# Patient Record
Sex: MALE | Race: WHITE | Employment: OTHER | URBAN - METROPOLITAN AREA
[De-identification: names, ages, dates, MRNs, and addresses within clinical notes are randomized per-mention and may not be internally consistent; named-entity substitution may affect disease eponyms.]

---

## 2017-08-21 ENCOUNTER — HOSPITAL ENCOUNTER (EMERGENCY)
Age: 82
Discharge: HOME | End: 2017-08-21
Attending: EMERGENCY MEDICINE
Payer: MEDICARE

## 2017-08-21 VITALS
WEIGHT: 178 LBS | TEMPERATURE: 97.9 F | SYSTOLIC BLOOD PRESSURE: 96 MMHG | RESPIRATION RATE: 18 BRPM | BODY MASS INDEX: 24.92 KG/M2 | DIASTOLIC BLOOD PRESSURE: 43 MMHG | OXYGEN SATURATION: 95 % | HEART RATE: 65 BPM | HEIGHT: 71 IN

## 2017-08-21 DIAGNOSIS — K92.2 GASTROINTESTINAL HEMORRHAGE, UNSPECIFIED GASTROINTESTINAL HEMORRHAGE TYPE: Primary | ICD-10-CM

## 2017-08-21 LAB
ALBUMIN SERPL-MCNC: 4 G/DL (ref 3.2–4.6)
ALBUMIN/GLOB SERPL: 1.2 {RATIO} (ref 1.2–3.5)
ALP SERPL-CCNC: 64 U/L (ref 50–136)
ALT SERPL-CCNC: 21 U/L (ref 12–65)
ANION GAP SERPL CALC-SCNC: 8 MMOL/L (ref 7–16)
AST SERPL-CCNC: 27 U/L (ref 15–37)
BASOPHILS # BLD: 0 K/UL (ref 0–0.2)
BASOPHILS NFR BLD: 1 % (ref 0–2)
BILIRUB SERPL-MCNC: 0.5 MG/DL (ref 0.2–1.1)
BUN SERPL-MCNC: 33 MG/DL (ref 8–23)
CALCIUM SERPL-MCNC: 8.9 MG/DL (ref 8.3–10.4)
CHLORIDE SERPL-SCNC: 106 MMOL/L (ref 98–107)
CO2 SERPL-SCNC: 30 MMOL/L (ref 21–32)
CREAT SERPL-MCNC: 1.25 MG/DL (ref 0.8–1.5)
DIFFERENTIAL METHOD BLD: ABNORMAL
EOSINOPHIL # BLD: 0.1 K/UL (ref 0–0.8)
EOSINOPHIL NFR BLD: 2 % (ref 0.5–7.8)
ERYTHROCYTE [DISTWIDTH] IN BLOOD BY AUTOMATED COUNT: 13.7 % (ref 11.9–14.6)
GLOBULIN SER CALC-MCNC: 3.4 G/DL (ref 2.3–3.5)
GLUCOSE SERPL-MCNC: 100 MG/DL (ref 65–100)
HCT VFR BLD AUTO: 37.2 % (ref 41.1–50.3)
HGB BLD-MCNC: 12.2 G/DL (ref 13.6–17.2)
IMM GRANULOCYTES # BLD: 0 K/UL (ref 0–0.5)
IMM GRANULOCYTES NFR BLD: 0.2 % (ref 0–5)
LYMPHOCYTES # BLD: 1.8 K/UL (ref 0.5–4.6)
LYMPHOCYTES NFR BLD: 28 % (ref 13–44)
MCH RBC QN AUTO: 31.3 PG (ref 26.1–32.9)
MCHC RBC AUTO-ENTMCNC: 32.8 G/DL (ref 31.4–35)
MCV RBC AUTO: 95.4 FL (ref 79.6–97.8)
MONOCYTES # BLD: 0.6 K/UL (ref 0.1–1.3)
MONOCYTES NFR BLD: 10 % (ref 4–12)
NEUTS SEG # BLD: 3.7 K/UL (ref 1.7–8.2)
NEUTS SEG NFR BLD: 59 % (ref 43–78)
PLATELET # BLD AUTO: 210 K/UL (ref 150–450)
PMV BLD AUTO: 10.2 FL (ref 10.8–14.1)
POTASSIUM SERPL-SCNC: 3.8 MMOL/L (ref 3.5–5.1)
PROT SERPL-MCNC: 7.4 G/DL (ref 6.3–8.2)
RBC # BLD AUTO: 3.9 M/UL (ref 4.23–5.67)
SODIUM SERPL-SCNC: 144 MMOL/L (ref 136–145)
WBC # BLD AUTO: 6.3 K/UL (ref 4.3–11.1)

## 2017-08-21 PROCEDURE — 99284 EMERGENCY DEPT VISIT MOD MDM: CPT | Performed by: EMERGENCY MEDICINE

## 2017-08-21 PROCEDURE — 80053 COMPREHEN METABOLIC PANEL: CPT | Performed by: EMERGENCY MEDICINE

## 2017-08-21 PROCEDURE — 85025 COMPLETE CBC W/AUTO DIFF WBC: CPT | Performed by: EMERGENCY MEDICINE

## 2017-08-21 NOTE — ED PROVIDER NOTES
HPI Comments: Patient is an 27-year-old male that states 2 hours ago he had a dark red loose stool. He then had a second bloody bowel movement. He denies abdominal pain. He denies chest pain or dyspnea. He states that about 2 or 3 years ago he had a similar episode and was hospitalized in [place]. He was diagnosed with diverticulitis at that time and had to receive a blood transfusion. He has had no other recurrence of the symptoms. Patient is a 80 y.o. male presenting with anal bleeding. The history is provided by the patient and the spouse. Rectal Bleeding    This is a new problem. The current episode started 1 to 2 hours ago. The stool is described as loose. Pertinent negatives include no abdominal pain, no chills, no fever, no nausea, no vomiting and no diarrhea. He has tried nothing for the symptoms. Past Medical History:   Diagnosis Date    Gastrointestinal disorder        Past Surgical History:   Procedure Laterality Date    HX APPENDECTOMY      HX GI           History reviewed. No pertinent family history. Social History     Social History    Marital status:      Spouse name: N/A    Number of children: N/A    Years of education: N/A     Occupational History    Not on file. Social History Main Topics    Smoking status: Never Smoker    Smokeless tobacco: Never Used    Alcohol use No    Drug use: No    Sexual activity: Not on file     Other Topics Concern    Not on file     Social History Narrative    No narrative on file         ALLERGIES: Review of patient's allergies indicates no known allergies. Review of Systems   Constitutional: Negative for chills and fever. HENT: Negative. Eyes: Negative. Respiratory: Negative. Cardiovascular: Negative. Gastrointestinal: Positive for anal bleeding. Negative for abdominal pain, diarrhea, nausea and vomiting. Endocrine: Negative. Genitourinary: Negative. Musculoskeletal: Negative.     Neurological: Negative. Vitals:    08/21/17 1416 08/21/17 1452   BP: 139/60    Pulse: 65    Resp: 18    Temp: 97.9 °F (36.6 °C)    SpO2: 93% 93%   Weight: 80.7 kg (178 lb)    Height: 5' 11\" (1.803 m)             Physical Exam   Constitutional: He is oriented to person, place, and time. He appears well-developed and well-nourished. HENT:   Head: Normocephalic and atraumatic. Cardiovascular: Normal rate, regular rhythm and intact distal pulses. Pulmonary/Chest: Effort normal and breath sounds normal.   Abdominal: Soft. There is no tenderness. There is no rebound and no guarding. Genitourinary: Rectum normal. Rectal exam shows guaiac negative stool. Genitourinary Comments: Round stool and no hemorrhoids   Neurological: He is alert and oriented to person, place, and time. He has normal strength. No cranial nerve deficit or sensory deficit. GCS eye subscore is 4. GCS verbal subscore is 5. GCS motor subscore is 6. Skin: Skin is warm and dry. No rash noted. No pallor. Nursing note and vitals reviewed. MDM  Number of Diagnoses or Management Options  Diagnosis management comments: 59-year-old male presents with some rectal bleeding    Differential diagnosis includes hemorrhoids, lower GI bleeding, diverticulitis, colitis       Amount and/or Complexity of Data Reviewed  Clinical lab tests: ordered and reviewed  Independent visualization of images, tracings, or specimens: yes    Risk of Complications, Morbidity, and/or Mortality  Presenting problems: moderate  Diagnostic procedures: low  Management options: low    Patient Progress  Patient progress: stable    ED Course     4:06 PM  Hgb is 12.5, abdomen exam is completely benign. Vital signs are normal.  Patient does not want to be admitted to the hospital he is agreeable to an observation here in the emergency department for a few hours to make sure he has no further bleeding.   If he remains stable we will discharge him home and he will contact his doctor for follow-up tomorrow. 5:47 PM  He was observed for 3 hours in the emergency department and had no further bleeding. He is ambulatory with a normal blood pressure. He does not want to be admitted to the hospital.  This is all likely a diverticular bleed similar to what he had 3 years ago. Wife will be with him tonight and he will contact his primary care physician tomorrow for recheck. I advised him to return to the emergency department immediately for any new or worsening symptoms. Voice dictation software was used during the making of this note. This software is not perfect and grammatical and other typographical errors may be present. This note has been proofread, but may still contain errors.   Abdias Gomez MD; 8/21/2017 @5:47 PM   ===================================================================      Procedures

## 2017-08-23 ENCOUNTER — HOSPITAL ENCOUNTER (INPATIENT)
Age: 82
LOS: 1 days | Discharge: HOME HEALTH | DRG: 378 | End: 2017-08-25
Attending: EMERGENCY MEDICINE | Admitting: INTERNAL MEDICINE
Payer: MEDICARE

## 2017-08-23 ENCOUNTER — APPOINTMENT (OUTPATIENT)
Dept: CT IMAGING | Age: 82
DRG: 378 | End: 2017-08-23
Attending: EMERGENCY MEDICINE
Payer: MEDICARE

## 2017-08-23 ENCOUNTER — APPOINTMENT (OUTPATIENT)
Dept: GENERAL RADIOLOGY | Age: 82
DRG: 378 | End: 2017-08-23
Attending: EMERGENCY MEDICINE
Payer: MEDICARE

## 2017-08-23 ENCOUNTER — APPOINTMENT (OUTPATIENT)
Dept: ULTRASOUND IMAGING | Age: 82
DRG: 378 | End: 2017-08-23
Attending: INTERNAL MEDICINE
Payer: MEDICARE

## 2017-08-23 DIAGNOSIS — R09.02 HYPOXEMIA: ICD-10-CM

## 2017-08-23 DIAGNOSIS — R55 NEAR SYNCOPE: ICD-10-CM

## 2017-08-23 DIAGNOSIS — R11.2 NON-INTRACTABLE VOMITING WITH NAUSEA, UNSPECIFIED VOMITING TYPE: ICD-10-CM

## 2017-08-23 DIAGNOSIS — K92.2 ACUTE LOWER GI BLEEDING: Primary | ICD-10-CM

## 2017-08-23 DIAGNOSIS — R00.1 SINUS BRADYCARDIA: ICD-10-CM

## 2017-08-23 LAB
ALBUMIN SERPL-MCNC: 3.7 G/DL (ref 3.2–4.6)
ALBUMIN/GLOB SERPL: 1.1 {RATIO} (ref 1.2–3.5)
ALP SERPL-CCNC: 58 U/L (ref 50–136)
ALT SERPL-CCNC: 17 U/L (ref 12–65)
ANION GAP SERPL CALC-SCNC: 10 MMOL/L (ref 7–16)
ARTERIAL PATENCY WRIST A: POSITIVE
AST SERPL-CCNC: 23 U/L (ref 15–37)
ATRIAL RATE: 51 BPM
BACTERIA SPEC CULT: NORMAL
BASE DEFICIT BLDA-SCNC: 0.5 MMOL/L (ref 0–2)
BASOPHILS # BLD: 0 K/UL (ref 0–0.2)
BASOPHILS NFR BLD: 0 % (ref 0–2)
BDY SITE: ABNORMAL
BILIRUB SERPL-MCNC: 0.4 MG/DL (ref 0.2–1.1)
BNP SERPL-MCNC: 119 PG/ML
BUN SERPL-MCNC: 29 MG/DL (ref 8–23)
CALCIUM SERPL-MCNC: 8.8 MG/DL (ref 8.3–10.4)
CALCULATED P AXIS, ECG09: 68 DEGREES
CALCULATED R AXIS, ECG10: 58 DEGREES
CALCULATED T AXIS, ECG11: 68 DEGREES
CHLORIDE SERPL-SCNC: 104 MMOL/L (ref 98–107)
CK MB CFR SERPL CALC: 2.2 %
CK MB SERPL-MCNC: 2.2 NG/ML (ref 0.5–3.6)
CK SERPL-CCNC: 101 U/L (ref 21–215)
CO2 SERPL-SCNC: 27 MMOL/L (ref 21–32)
COHGB MFR BLD: 0.2 % (ref 0.5–1.5)
CREAT SERPL-MCNC: 1.04 MG/DL (ref 0.8–1.5)
D DIMER PPP FEU-MCNC: 0.25 UG/ML(FEU)
DIAGNOSIS, 93000: NORMAL
DIFFERENTIAL METHOD BLD: ABNORMAL
DO-HGB BLD-MCNC: 9 % (ref 0–5)
EOSINOPHIL # BLD: 0.1 K/UL (ref 0–0.8)
EOSINOPHIL NFR BLD: 1 % (ref 0.5–7.8)
ERYTHROCYTE [DISTWIDTH] IN BLOOD BY AUTOMATED COUNT: 13.6 % (ref 11.9–14.6)
GLOBULIN SER CALC-MCNC: 3.4 G/DL (ref 2.3–3.5)
GLUCOSE SERPL-MCNC: 110 MG/DL (ref 65–100)
HCO3 BLDA-SCNC: 25 MMOL/L (ref 22–26)
HCT VFR BLD AUTO: 32.2 % (ref 41.1–50.3)
HCT VFR BLD AUTO: 33.6 % (ref 41.1–50.3)
HGB BLD-MCNC: 10.6 G/DL (ref 13.6–17.2)
HGB BLD-MCNC: 11.2 G/DL (ref 13.6–17.2)
HGB BLDMV-MCNC: 11.9 GM/DL (ref 11.7–15)
IMM GRANULOCYTES # BLD: 0 K/UL (ref 0–0.5)
IMM GRANULOCYTES NFR BLD: 0.2 % (ref 0–5)
LYMPHOCYTES # BLD: 1 K/UL (ref 0.5–4.6)
LYMPHOCYTES NFR BLD: 15 % (ref 13–44)
MAGNESIUM SERPL-MCNC: 1.9 MG/DL (ref 1.8–2.4)
MCH RBC QN AUTO: 31.7 PG (ref 26.1–32.9)
MCHC RBC AUTO-ENTMCNC: 33.3 G/DL (ref 31.4–35)
MCV RBC AUTO: 95.2 FL (ref 79.6–97.8)
METHGB MFR BLD: 0.3 % (ref 0–1.5)
MONOCYTES # BLD: 0.4 K/UL (ref 0.1–1.3)
MONOCYTES NFR BLD: 6 % (ref 4–12)
NEUTS SEG # BLD: 5 K/UL (ref 1.7–8.2)
NEUTS SEG NFR BLD: 78 % (ref 43–78)
OXYHGB MFR BLDA: 91 % (ref 94–97)
P-R INTERVAL, ECG05: 158 MS
PCO2 BLDA: 43 MMHG (ref 35–45)
PH BLDA: 7.38 [PH] (ref 7.35–7.45)
PHOSPHATE SERPL-MCNC: 3.1 MG/DL (ref 2.3–3.7)
PLATELET # BLD AUTO: 201 K/UL (ref 150–450)
PMV BLD AUTO: 9.9 FL (ref 10.8–14.1)
PO2 BLDA: 63 MMHG (ref 80–105)
POTASSIUM SERPL-SCNC: 3.9 MMOL/L (ref 3.5–5.1)
PROT SERPL-MCNC: 7.1 G/DL (ref 6.3–8.2)
Q-T INTERVAL, ECG07: 454 MS
QRS DURATION, ECG06: 90 MS
QTC CALCULATION (BEZET), ECG08: 418 MS
RBC # BLD AUTO: 3.53 M/UL (ref 4.23–5.67)
SAO2 % BLD: 92 % (ref 92–98.5)
SERVICE CMNT-IMP: ABNORMAL
SERVICE CMNT-IMP: NORMAL
SODIUM SERPL-SCNC: 141 MMOL/L (ref 136–145)
TROPONIN I BLD-MCNC: 0 NG/ML (ref 0–0.08)
TROPONIN I SERPL-MCNC: <0.04 NG/ML (ref 0.02–0.05)
TSH SERPL DL<=0.005 MIU/L-ACNC: 1.92 UIU/ML (ref 0.36–3.74)
VENTILATION MODE VENT: ABNORMAL
VENTRICULAR RATE, ECG03: 51 BPM
WBC # BLD AUTO: 6.5 K/UL (ref 4.3–11.1)

## 2017-08-23 PROCEDURE — 84443 ASSAY THYROID STIM HORMONE: CPT | Performed by: EMERGENCY MEDICINE

## 2017-08-23 PROCEDURE — 99218 HC RM OBSERVATION: CPT

## 2017-08-23 PROCEDURE — 71260 CT THORAX DX C+: CPT

## 2017-08-23 PROCEDURE — 84484 ASSAY OF TROPONIN QUANT: CPT

## 2017-08-23 PROCEDURE — 84100 ASSAY OF PHOSPHORUS: CPT | Performed by: EMERGENCY MEDICINE

## 2017-08-23 PROCEDURE — 85025 COMPLETE CBC W/AUTO DIFF WBC: CPT | Performed by: EMERGENCY MEDICINE

## 2017-08-23 PROCEDURE — 93005 ELECTROCARDIOGRAM TRACING: CPT | Performed by: EMERGENCY MEDICINE

## 2017-08-23 PROCEDURE — 74011636320 HC RX REV CODE- 636/320: Performed by: EMERGENCY MEDICINE

## 2017-08-23 PROCEDURE — 85379 FIBRIN DEGRADATION QUANT: CPT | Performed by: EMERGENCY MEDICINE

## 2017-08-23 PROCEDURE — 85018 HEMOGLOBIN: CPT | Performed by: INTERNAL MEDICINE

## 2017-08-23 PROCEDURE — 82550 ASSAY OF CK (CPK): CPT | Performed by: INTERNAL MEDICINE

## 2017-08-23 PROCEDURE — 74011250636 HC RX REV CODE- 250/636: Performed by: EMERGENCY MEDICINE

## 2017-08-23 PROCEDURE — 82803 BLOOD GASES ANY COMBINATION: CPT

## 2017-08-23 PROCEDURE — 77030032490 HC SLV COMPR SCD KNE COVD -B

## 2017-08-23 PROCEDURE — 99285 EMERGENCY DEPT VISIT HI MDM: CPT | Performed by: EMERGENCY MEDICINE

## 2017-08-23 PROCEDURE — 87641 MR-STAPH DNA AMP PROBE: CPT | Performed by: INTERNAL MEDICINE

## 2017-08-23 PROCEDURE — 74011250637 HC RX REV CODE- 250/637: Performed by: INTERNAL MEDICINE

## 2017-08-23 PROCEDURE — 96374 THER/PROPH/DIAG INJ IV PUSH: CPT

## 2017-08-23 PROCEDURE — 36600 WITHDRAWAL OF ARTERIAL BLOOD: CPT

## 2017-08-23 PROCEDURE — C9113 INJ PANTOPRAZOLE SODIUM, VIA: HCPCS | Performed by: EMERGENCY MEDICINE

## 2017-08-23 PROCEDURE — 96361 HYDRATE IV INFUSION ADD-ON: CPT | Performed by: EMERGENCY MEDICINE

## 2017-08-23 PROCEDURE — 96374 THER/PROPH/DIAG INJ IV PUSH: CPT | Performed by: EMERGENCY MEDICINE

## 2017-08-23 PROCEDURE — 80053 COMPREHEN METABOLIC PANEL: CPT | Performed by: EMERGENCY MEDICINE

## 2017-08-23 PROCEDURE — 96375 TX/PRO/DX INJ NEW DRUG ADDON: CPT | Performed by: EMERGENCY MEDICINE

## 2017-08-23 PROCEDURE — 36415 COLL VENOUS BLD VENIPUNCTURE: CPT | Performed by: INTERNAL MEDICINE

## 2017-08-23 PROCEDURE — 71010 XR CHEST PORT: CPT

## 2017-08-23 PROCEDURE — 83880 ASSAY OF NATRIURETIC PEPTIDE: CPT | Performed by: EMERGENCY MEDICINE

## 2017-08-23 PROCEDURE — 74011250636 HC RX REV CODE- 250/636: Performed by: INTERNAL MEDICINE

## 2017-08-23 PROCEDURE — 74011000250 HC RX REV CODE- 250: Performed by: INTERNAL MEDICINE

## 2017-08-23 PROCEDURE — 93880 EXTRACRANIAL BILAT STUDY: CPT

## 2017-08-23 PROCEDURE — 74011000258 HC RX REV CODE- 258: Performed by: EMERGENCY MEDICINE

## 2017-08-23 PROCEDURE — C9113 INJ PANTOPRAZOLE SODIUM, VIA: HCPCS | Performed by: INTERNAL MEDICINE

## 2017-08-23 PROCEDURE — 83735 ASSAY OF MAGNESIUM: CPT | Performed by: EMERGENCY MEDICINE

## 2017-08-23 PROCEDURE — 74011000250 HC RX REV CODE- 250: Performed by: EMERGENCY MEDICINE

## 2017-08-23 RX ORDER — LAMOTRIGINE 200 MG/1
200 TABLET ORAL DAILY
COMMUNITY
End: 2021-01-27 | Stop reason: SDUPTHER

## 2017-08-23 RX ORDER — SODIUM CHLORIDE 0.9 % (FLUSH) 0.9 %
10 SYRINGE (ML) INJECTION
Status: COMPLETED | OUTPATIENT
Start: 2017-08-23 | End: 2017-08-23

## 2017-08-23 RX ORDER — HYDRALAZINE HYDROCHLORIDE 20 MG/ML
10 INJECTION INTRAMUSCULAR; INTRAVENOUS
Status: DISCONTINUED | OUTPATIENT
Start: 2017-08-23 | End: 2017-08-25 | Stop reason: HOSPADM

## 2017-08-23 RX ORDER — ATORVASTATIN CALCIUM 20 MG/1
20 TABLET, FILM COATED ORAL DAILY
COMMUNITY
End: 2017-11-22

## 2017-08-23 RX ORDER — LAMOTRIGINE 100 MG/1
200 TABLET ORAL DAILY
Status: DISCONTINUED | OUTPATIENT
Start: 2017-08-24 | End: 2017-08-25 | Stop reason: HOSPADM

## 2017-08-23 RX ORDER — ONDANSETRON 2 MG/ML
4 INJECTION INTRAMUSCULAR; INTRAVENOUS
Status: COMPLETED | OUTPATIENT
Start: 2017-08-23 | End: 2017-08-23

## 2017-08-23 RX ORDER — TAMSULOSIN HYDROCHLORIDE 0.4 MG/1
0.4 CAPSULE ORAL
Status: DISCONTINUED | OUTPATIENT
Start: 2017-08-23 | End: 2017-08-25 | Stop reason: HOSPADM

## 2017-08-23 RX ORDER — ONDANSETRON 2 MG/ML
4 INJECTION INTRAMUSCULAR; INTRAVENOUS
Status: DISCONTINUED | OUTPATIENT
Start: 2017-08-23 | End: 2017-08-25 | Stop reason: HOSPADM

## 2017-08-23 RX ORDER — GALANTAMINE HYDROBROMIDE 16 MG/1
16 CAPSULE, EXTENDED RELEASE ORAL
COMMUNITY
End: 2017-08-25

## 2017-08-23 RX ORDER — RANITIDINE 150 MG/1
150 CAPSULE ORAL DAILY
COMMUNITY
End: 2019-12-18

## 2017-08-23 RX ORDER — AMLODIPINE BESYLATE 2.5 MG/1
2.5 TABLET ORAL DAILY
COMMUNITY
End: 2017-08-25

## 2017-08-23 RX ORDER — GALANTAMINE HYDROBROMIDE 16 MG/1
16 CAPSULE, EXTENDED RELEASE ORAL
Status: DISCONTINUED | OUTPATIENT
Start: 2017-08-24 | End: 2017-08-24

## 2017-08-23 RX ORDER — HYDROCHLOROTHIAZIDE 12.5 MG/1
12.5 TABLET ORAL DAILY
COMMUNITY
End: 2017-08-25

## 2017-08-23 RX ORDER — GLUCOSAMINE SULFATE 1500 MG
1000 POWDER IN PACKET (EA) ORAL DAILY
COMMUNITY
End: 2019-01-30

## 2017-08-23 RX ORDER — SERTRALINE HYDROCHLORIDE 100 MG/1
150 TABLET, FILM COATED ORAL DAILY
COMMUNITY
End: 2021-01-27 | Stop reason: SDUPTHER

## 2017-08-23 RX ORDER — SODIUM CHLORIDE 9 MG/ML
75 INJECTION, SOLUTION INTRAVENOUS CONTINUOUS
Status: DISCONTINUED | OUTPATIENT
Start: 2017-08-23 | End: 2017-08-25 | Stop reason: HOSPADM

## 2017-08-23 RX ORDER — TAMSULOSIN HYDROCHLORIDE 0.4 MG/1
0.4 CAPSULE ORAL DAILY
COMMUNITY
End: 2017-11-01

## 2017-08-23 RX ADMIN — ONDANSETRON 4 MG: 2 INJECTION INTRAMUSCULAR; INTRAVENOUS at 15:10

## 2017-08-23 RX ADMIN — SODIUM CHLORIDE 40 MG: 9 INJECTION INTRAMUSCULAR; INTRAVENOUS; SUBCUTANEOUS at 15:10

## 2017-08-23 RX ADMIN — SODIUM CHLORIDE 100 ML: 900 INJECTION, SOLUTION INTRAVENOUS at 14:36

## 2017-08-23 RX ADMIN — SODIUM CHLORIDE 40 MG: 9 INJECTION INTRAMUSCULAR; INTRAVENOUS; SUBCUTANEOUS at 21:03

## 2017-08-23 RX ADMIN — TAMSULOSIN HYDROCHLORIDE 0.4 MG: 0.4 CAPSULE ORAL at 21:03

## 2017-08-23 RX ADMIN — IOPAMIDOL 100 ML: 755 INJECTION, SOLUTION INTRAVENOUS at 14:36

## 2017-08-23 RX ADMIN — SODIUM CHLORIDE 100 ML/HR: 900 INJECTION, SOLUTION INTRAVENOUS at 15:59

## 2017-08-23 RX ADMIN — Medication 10 ML: at 14:36

## 2017-08-23 RX ADMIN — SODIUM CHLORIDE 1000 ML: 900 INJECTION, SOLUTION INTRAVENOUS at 15:09

## 2017-08-23 NOTE — PROGRESS NOTES
Pt in room 372, oriented to room, bed and orders. Pt given CHG bath and dual skin assessment completed with Renee Montiel RN; several abrasions to legs and arms; pt states these are from a lab puppy. No other skin issues seen. Admission assessment completed, see doc flowsheets;  VSS. Pt showing NSR/SB on tele and O2 sats 100% on 3L, so decreased O2 to 2L. Will monitor.

## 2017-08-23 NOTE — PROGRESS NOTES
TRANSFER - IN REPORT:    Verbal report received from LAYO Snow(name) on Leydi Mattson  being received from ER(unit) for routine progression of care      Report consisted of patients Situation, Background, Assessment and   Recommendations(SBAR). Information from the following report(s) MAR and Recent Results was reviewed with the receiving nurse. Opportunity for questions and clarification was provided. Assessment completed upon patients arrival to unit and care assumed.

## 2017-08-23 NOTE — PROGRESS NOTES
Problem: Nutrition Deficit  Goal: *Optimize nutritional status  Nutrition  Reason for assessment: Referral received from nursing admission Malnutrition Screening Tool for recently lost 14-23# without trying and eating poorly due to decreased appetite. (8/23)  Assessment:   Diet order(s): Clear Liquid  Food/Nutrition Patient History:  Pt presented with near syncope.  Past medical history notable for HTN.  Pt reports he had been trying to loose weight and also that his appetite has decreased the past few years.  Pt states he eats 3 well balanced meals/day.  Pt states his weight was ~ 207# 1 1/2 years ago and currently ~ 173#-stable. Pt currently at 84% weight from 1/12 years ago and 100% IBW.   Anthropometrics:Height: 5' 11\" (180.3 cm),  Weight: 78.7 kg (173 lb 8 oz), Weight Source: Bed, Body mass index is 24.2 kg/(m^2). BMI class of normal range for Older American Male.   Macronutrient needs:  EER:  2527-7649 kcal /day (20-25 kcal/kg BW)  EPR:  78-94 grams protein/day (1-1.2 grams/kg IBW)  Intake/Comparative Standards: Pt tolerated clear liquids late afternoon.  Clear liquid diet does not meet estimated kcal or protein needs.  Nutrition Diagnosis: Inadequate oral intake r/t decreased ability to consume sufficient oral intake as evidenced by dietary restrictions of a clear liquid diet.  Intervention:  Meals and snacks: Continue current diet.  Advance per MD recommendations.  Explained [facility] [employer] menu ordering.  Nutrition Supplement Therapy:  Initiated Ensure Clear on all meal trays while patient on clear liquids.      Mallory Bundy, RD, LD, MPH  [phone]

## 2017-08-23 NOTE — CONSULTS
[facility] Consult Note    Consult Date: 8/23/2017    Chief Complaint: GI bleed    Subjective:     History of Present Illness:  Patient is a 80 y.o. who is seen in consultation at the request of Dr. Gautam Sam for Gi bleed. Patient presented 8-21-17 with two red bloody BMs, painless and without dizziness at that time. His Hgb 12.2 in the ED. He was sent home to followup with PCP. No BMs yesterday. Today had one black BM, not red. However, he developed dizziness and called out to his wife. In ED, his vitals showed /77 with HR 54. In ICU, per nursing, no bloody BMs but his HR has remained < 50 consistently. He takes 325 mg aspirin for the past 6 mo due to recurrent TIAs and abnormal CT (per wife, changes of a stroke). He takes Aleve \"once in a blue moon\". He does have chronic heartburn over the years for which he takes ranitidine BID but gets breakthrough symptoms 3+ times weeks. He had a bleeding PUD > 40 years ago requiring what sounds like a pyloroplasty/over sew. He has had 5+ colonoscopies for Hx colon polyps. The most recent colonoscopy was 2 years ago at [facility] for LGI hemorrhage when he presented with large amounts of red rectal bleeding requiring 5 units of PRBCs; however, no EGD done at that time. He's had CT chest negative for pulmonary embolism but showed changes of emphysema. PMH:  Past Medical History:   Diagnosis Date    Gastrointestinal disorder        PSH:  Past Surgical History:   Procedure Laterality Date    HX APPENDECTOMY      HX GI         Allergies:  No Known Allergies    Home Medications:  Prior to Admission medications    Medication Sig Start Date End Date Taking? Authorizing Provider   cholecalciferol (VITAMIN D3) 1,000 unit cap Take  by mouth daily. Yes Golden Wu MD   raNITIdine hcl 150 mg capsule Take 150 mg by mouth two (2) times a day. Yes Golden Wu MD   sertraline (ZOLOFT) 100 mg tablet Take 150 mg by mouth daily.    Yes Phys Other, MD   hydroCHLOROthiazide (HYDRODIURIL) 12.5 mg tablet Take 12.5 mg by mouth daily. Yes Golden Wu MD   galantamine (RAZADYNE) 16 mg ER capsule Take 16 mg by mouth Daily (before breakfast). Yes Phys MD Bryce   amLODIPine (NORVASC) 2.5 mg tablet Take 2.5 mg by mouth daily. Yes Golden Wu MD   tamsulosin (FLOMAX) 0.4 mg capsule Take 0.4 mg by mouth daily. Yes Golden Wu MD   lamoTRIgine (LAMICTAL) 200 mg tablet Take 200 mg by mouth daily. Yes Golden Other, MD   atorvastatin (LIPITOR) 20 mg tablet Take 20 mg by mouth daily. Yes Golden Wu MD       Hospital Medications:  Current Facility-Administered Medications   Medication Dose Route Frequency    [START ON 8/24/2017] lamoTRIgine (LaMICtal) tablet 200 mg  200 mg Oral DAILY    [START ON 8/24/2017] sertraline (ZOLOFT) tablet 150 mg  150 mg Oral DAILY    tamsulosin (FLOMAX) capsule 0.4 mg  0.4 mg Oral QHS    0.9% sodium chloride infusion  100 mL/hr IntraVENous CONTINUOUS    pantoprazole (PROTONIX) 40 mg in sodium chloride 0.9 % 10 mL injection  40 mg IntraVENous Q12H    hydrALAZINE (APRESOLINE) 20 mg/mL injection 10 mg  10 mg IntraVENous Q6H PRN    ondansetron (ZOFRAN) injection 4 mg  4 mg IntraVENous Q6H PRN    [START ON 8/24/2017] galantamine (RAZADYNE) C24P 16 mg  16 mg Oral ACB       Social History:  Social History   Substance Use Topics    Smoking status: Never Smoker    Smokeless tobacco: Never Used    Alcohol use No   He smoked up until 50 years. Family History:  History reviewed. No pertinent family history. Review of Systems:  A detailed 10 system ROS is obtained, with pertinent positives as listed in the HPI and PMH, otherwise, all others are negative. Objective:     Physical Exam:  Vitals:  Visit Vitals    /76    Pulse (!) 56    Temp 97.2 °F (36.2 °C)    Resp (!) 32    Ht 5' 11\" (1.803 m)    Wt 78.7 kg (173 lb 8 oz)    SpO2 99%    BMI 24.2 kg/m2       Skin:  Extremities and face reveal no rashes.  No ledezma erythema. No telangiectasias on the chest wall. HEENT: Sclerae anicteric. Extra-occular muscles are intact. No oral ulcers. No abnormal pigmentation of the lips. The neck is supple. Cardiovascular: Regular rate and rhythm. No murmurs, gallops, or rubs. Respiratory:  Comfortable breathing  With no accessory muscle use. Clear breath sounds with no wheezes, rales, or rhonchi. GI:  Abdomen nondistended, soft, and nontender. Normal active bowel sounds. No enlargement of the liver or spleen. No masses palpable. Rectal:  Deferred  Musculoskeletal:  No pitting edema of the lower legs. Extremities have good range of motion. No costovertebral tenderness. Neurological:  Gross memory appears intact. Patient is alert and oriented. Psychiatric:  Mood appears appropriate with judgement intact. Lymphatic:  No cervical or supraclavicular adenopathy. Laboratory:    Recent Results (from the past 24 hour(s))   EKG, 12 LEAD, INITIAL    Collection Time: 08/23/17  1:15 PM   Result Value Ref Range    Ventricular Rate 51 BPM    Atrial Rate 51 BPM    P-R Interval 158 ms    QRS Duration 90 ms    Q-T Interval 454 ms    QTC Calculation (Bezet) 418 ms    Calculated P Axis 68 degrees    Calculated R Axis 58 degrees    Calculated T Axis 68 degrees    Diagnosis       !! AGE AND GENDER SPECIFIC ECG ANALYSIS !!   Sinus bradycardia  Otherwise normal ECG  No previous ECGs available  Confirmed by [facility]  MD ()WILEY ([id number]) on 8/23/2017 5:10:36 PM     CBC WITH AUTOMATED DIFF    Collection Time: 08/23/17  1:19 PM   Result Value Ref Range    WBC 6.5 4.3 - 11.1 K/uL    RBC 3.53 (L) 4.23 - 5.67 M/uL    HGB 11.2 (L) 13.6 - 17.2 g/dL    HCT 33.6 (L) 41.1 - 50.3 %    MCV 95.2 79.6 - 97.8 FL    MCH 31.7 26.1 - 32.9 PG    MCHC 33.3 31.4 - 35.0 g/dL    RDW 13.6 11.9 - 14.6 %    PLATELET 470 858 - 196 K/uL    MPV 9.9 (L) 10.8 - 14.1 FL    DF AUTOMATED      NEUTROPHILS 78 43 - 78 %    LYMPHOCYTES 15 13 - 44 %    MONOCYTES 6 4.0 - 12.0 %    EOSINOPHILS 1 0.5 - 7.8 %    BASOPHILS 0 0.0 - 2.0 %    IMMATURE GRANULOCYTES 0.2 0.0 - 5.0 %    ABS. NEUTROPHILS 5.0 1.7 - 8.2 K/UL    ABS. LYMPHOCYTES 1.0 0.5 - 4.6 K/UL    ABS. MONOCYTES 0.4 0.1 - 1.3 K/UL    ABS. EOSINOPHILS 0.1 0.0 - 0.8 K/UL    ABS. BASOPHILS 0.0 0.0 - 0.2 K/UL    ABS. IMM. GRANS. 0.0 0.0 - 0.5 K/UL   METABOLIC PANEL, COMPREHENSIVE    Collection Time: 08/23/17  1:19 PM   Result Value Ref Range    Sodium 141 136 - 145 mmol/L    Potassium 3.9 3.5 - 5.1 mmol/L    Chloride 104 98 - 107 mmol/L    CO2 27 21 - 32 mmol/L    Anion gap 10 7 - 16 mmol/L    Glucose 110 (H) 65 - 100 mg/dL    BUN 29 (H) 8 - 23 MG/DL    Creatinine 1.04 0.8 - 1.5 MG/DL    GFR est AA >60 >60 ml/min/1.73m2    GFR est non-AA >60 >60 ml/min/1.73m2    Calcium 8.8 8.3 - 10.4 MG/DL    Bilirubin, total 0.4 0.2 - 1.1 MG/DL    ALT (SGPT) 17 12 - 65 U/L    AST (SGOT) 23 15 - 37 U/L    Alk.  phosphatase 58 50 - 136 U/L    Protein, total 7.1 6.3 - 8.2 g/dL    Albumin 3.7 3.2 - 4.6 g/dL    Globulin 3.4 2.3 - 3.5 g/dL    A-G Ratio 1.1 (L) 1.2 - 3.5     D DIMER    Collection Time: 08/23/17  1:19 PM   Result Value Ref Range    D DIMER 0.25 <0.55 ug/ml(FEU)   MAGNESIUM    Collection Time: 08/23/17  1:19 PM   Result Value Ref Range    Magnesium 1.9 1.8 - 2.4 mg/dL   PHOSPHORUS    Collection Time: 08/23/17  1:19 PM   Result Value Ref Range    Phosphorus 3.1 2.3 - 3.7 MG/DL   TSH 3RD GENERATION    Collection Time: 08/23/17  1:19 PM   Result Value Ref Range    TSH 1.918 0.358 - 3.740 uIU/mL   POC TROPONIN-I    Collection Time: 08/23/17  1:23 PM   Result Value Ref Range    Troponin-I (POC) 0 0.0 - 0.08 ng/ml   BNP    Collection Time: 08/23/17  1:45 PM   Result Value Ref Range     pg/mL   BLOOD GAS, ARTERIAL    Collection Time: 08/23/17  2:05 PM   Result Value Ref Range    pH 7.38 7.35 - 7.45      PCO2 43 35 - 45 mmHg    PO2 63 (L) 80 - 105 mmHg    BICARBONATE 25 22 - 26 mmol/L    BASE DEFICIT 0.5 0 - 2 mmol/L    TOTAL HEMOGLOBIN 11.9 11.7 - 15.0 GM/DL    O2 SAT 92 92 - 98.5 %    Arterial O2 Hgb 91.0 (L) 94 - 97 %    CARBOXYHEMOGLOBIN 0.2 (L) 0.5 - 1.5 %    METHEMOGLOBIN 0.3 0.0 - 1.5 %    DEOXYHEMOGLOBIN 9 (H) 0.0 - 5.0 %    SITE RR     ALLENS TEST POSITIVE      MODE RA     Respiratory comment: MD Randall at 8 23 2017 2 09 22 PM. Read back. MRSA SCREEN - PCR (NASAL)    Collection Time: 08/23/17  4:00 PM   Result Value Ref Range    Special Requests: NO SPECIAL REQUESTS      Culture result:        MRSA target DNA is not detected (presumptive not colonized with MRSA)   HGB & HCT    Collection Time: 08/23/17  6:10 PM   Result Value Ref Range    HGB 10.6 (L) 13.6 - 17.2 g/dL    HCT 32.2 (L) 41.1 - 50.3 %   TROPONIN I    Collection Time: 08/23/17  6:10 PM   Result Value Ref Range    Troponin-I, Qt. <0.04 0.02 - 0.05 NG/ML   CK WITH MB    Collection Time: 08/23/17  6:10 PM   Result Value Ref Range     21 - 215 U/L    CK - MB 2.2 0.5 - 3.6 ng/ml    CK-MB Index 2.2 %       Assessment:       Active Problems:    Near syncope (8/23/2017)        Plan:       GI bleed. The red blood per rectum 2 days ago sounds lower given that he was not hemodynamically compromised and Hgb 12.2 with no subsequent ongoing bleeding. The BM today (only one) that was black is more likely residual \"old\" blood in the colon. If he had had an upper GI bleed resulting in red blood per rectum, generally, patients are very hemodynamically compromised. Therefore, I suspect he had a self limited diverticular bleed. Therefore, I do not at all feel comfortable in attributing his near syncope to GI bleed. In fact, given that he had an inappropriate bradycardia, I would be concerned about cardiac electrical conduction issue? Would like Cardiology's input. ECHO has been ordered. Chronic heartburn in a patient s/p surgery for bleeding PUD and on aspirin 325 mg every day.   At some point, whether during this hospitalization (if OK with Cardiology) or more electively as outpatient, would recommend EGD to check for esophagitis, PUD. Patient on Protonix during this admission.

## 2017-08-23 NOTE — ED PROVIDER NOTES
HPI Comments: 68-year-old male presents with complaints of lightheadedness. States occurred suddenly while working his computer today. Worse when he stood up and walk. Seen recently for an episode of GI bleeding, had stable labs and was discharged to follow-up with his primary care provider. Patient denies any overt shortness of breath, has had no chest pain, denies any abdominal pain today. Patient reports he did have 1 bloody bowel movement this morning, but a second bowel movement appeared to have cleared    No change in medications, no trauma    Patient is a 80 y.o. male presenting with dizziness. The history is provided by the patient, the spouse and the EMS personnel. Dizziness   This is a new problem. The current episode started 1 to 2 hours ago. The problem has been gradually improving. There was no focality noted. Primary symptoms include loss of balance. Pertinent negatives include no focal weakness, no loss of sensation, no slurred speech, no mental status change and no unresponsiveness. There has been no fever. Associated symptoms include vomiting and nausea. Pertinent negatives include no shortness of breath, no chest pain, no confusion and no headaches. There were no medications administered prior to arrival. Associated medical issues include dementia. Associated medical issues do not include trauma. Past Medical History:   Diagnosis Date    Gastrointestinal disorder        Past Surgical History:   Procedure Laterality Date    HX APPENDECTOMY      HX GI           History reviewed. No pertinent family history. Social History     Social History    Marital status:      Spouse name: N/A    Number of children: N/A    Years of education: N/A     Occupational History    Not on file.      Social History Main Topics    Smoking status: Never Smoker    Smokeless tobacco: Never Used    Alcohol use No    Drug use: No    Sexual activity: Not on file     Other Topics Concern  Not on file     Social History Narrative         ALLERGIES: Review of patient's allergies indicates no known allergies. Review of Systems   Constitutional: Negative for activity change, chills, diaphoresis and fever. HENT: Negative for dental problem, hearing loss, nosebleeds, rhinorrhea and sore throat. Eyes: Negative for pain, discharge, redness and visual disturbance. Respiratory: Negative for cough, chest tightness and shortness of breath. Cardiovascular: Negative for chest pain, palpitations and leg swelling. Gastrointestinal: Positive for blood in stool, nausea and vomiting. Negative for abdominal pain, constipation and diarrhea. Endocrine: Negative for cold intolerance, heat intolerance, polydipsia and polyuria. Genitourinary: Negative for dysuria and flank pain. Musculoskeletal: Negative for arthralgias, back pain, joint swelling, myalgias and neck pain. Skin: Negative for pallor, rash and wound. Allergic/Immunologic: Negative for environmental allergies and food allergies. Neurological: Positive for dizziness, weakness, light-headedness and loss of balance. Negative for tremors, focal weakness, numbness and headaches. Hematological: Negative for adenopathy. Does not bruise/bleed easily. Psychiatric/Behavioral: Negative for confusion and dysphoric mood. The patient is not nervous/anxious and is not hyperactive. All other systems reviewed and are negative. Vitals:    08/23/17 1310   BP: 168/77   Pulse: 64   Resp: 14   Temp: 97.6 °F (36.4 °C)   SpO2: (!) 89%   Weight: 80.7 kg (178 lb)   Height: 5' 11\" (1.803 m)            Physical Exam   Constitutional: He is oriented to person, place, and time. He appears well-developed and well-nourished. He appears distressed. HENT:   Head: Normocephalic and atraumatic. Mouth/Throat: Oropharynx is clear and moist.   Eyes: Conjunctivae and EOM are normal. Pupils are equal, round, and reactive to light.  Right eye exhibits no discharge. Left eye exhibits no discharge. No scleral icterus. Pupils are pinpoint bilaterally   Neck: Normal range of motion. Neck supple. No JVD present. Cardiovascular: Normal rate, regular rhythm, normal heart sounds and intact distal pulses. Exam reveals no gallop and no friction rub. No murmur heard. Pulmonary/Chest: Effort normal and breath sounds normal. No respiratory distress. He has no wheezes. Room air saturations 89%   Abdominal: Soft. Bowel sounds are normal. He exhibits no distension. There is no tenderness. There is no rebound and no guarding. Genitourinary: Penis normal. Rectal exam shows guaiac positive stool. Rectal exam shows no external hemorrhoid, no internal hemorrhoid, no fissure and anal tone normal. Circumcised. Genitourinary Comments: Bright red blood per rectum,   guaiac positive   QC Positive   Musculoskeletal: Normal range of motion. He exhibits no edema or tenderness. Lymphadenopathy:     He has no cervical adenopathy. Neurological: He is alert and oriented to person, place, and time. He has normal strength. No cranial nerve deficit or sensory deficit. He exhibits normal muscle tone. GCS eye subscore is 4. GCS verbal subscore is 5. GCS motor subscore is 6. Skin: Skin is warm and dry. No rash noted. He is not diaphoretic. No erythema. Psychiatric: He has a normal mood and affect. His speech is normal and behavior is normal. Judgment and thought content normal. Cognition and memory are normal.   Nursing note and vitals reviewed.        MDM  Number of Diagnoses or Management Options  Acute lower GI bleeding: new and requires workup  Hypoxemia: new and requires workup  Near syncope: new and requires workup  Non-intractable vomiting with nausea, unspecified vomiting type: new and requires workup  Sinus bradycardia: new and requires workup  Diagnosis management comments: EKG reviewed  Sinus bradycardia  No ectopy, no acute ischemic changes    Differential diagnosis  GI bleeding: Colitis, internal hemorrhoid, AVM  Bradycardia:  Electrolyte abnormality, medication reaction, MI  Hypoxia:  Pneumonia, PE, pneumothorax       Amount and/or Complexity of Data Reviewed  Clinical lab tests: ordered and reviewed  Tests in the radiology section of CPT®: ordered and reviewed  Tests in the medicine section of CPT®: ordered and reviewed  Obtain history from someone other than the patient: yes  Review and summarize past medical records: yes  Discuss the patient with other providers: yes  Independent visualization of images, tracings, or specimens: yes    Risk of Complications, Morbidity, and/or Mortality  Presenting problems: high  Diagnostic procedures: high  Management options: high  General comments: Elements of this note have been dictated via voice recognition software. Text and phrases may be limited by the accuracy of the software. The chart has been reviewed, but errors may still be present.       Critical Care  Total time providing critical care: 30-74 minutes (50 minutes)    Patient Progress  Patient progress: stable    ED Course       Procedures

## 2017-08-23 NOTE — H&P
HOSPITALIST H&P/CONSULT  NAME:  Sadie Carrion   Age:  80 y.o.  :   1934   MRN:   [id number]  PCP: Not On File [facility]  Consulting MD:  Treatment Team: Attending Provider: Summer Medellin MD; Primary Nurse: Hansel Albert RN  HPI:     This is an 79 YO male patient with a PMH of HTN, BPH, depression, and previous episode of GI bleed from diverticular disease who came to the ER today after he had an episode of near syncope. Mr. Edu Watson came to the ER 2 days ago complaining of melenas. He was evaluated in the ER and was found to have normal VS and unremarkable blood workup. HE was discharged home with plan to follow up with his PCP. Today he had a BM with melanotic stools in the morning. Later on had another BM which seemed to be clearing up. HE was working in his computer when he felt lightheaded, very dizzy and like if he was about to pass out. He stood up to the toilet and was retching but did not vomit anything. He denies abdominal pain, SOB, headache, focal motor deficit or any other symptom. He called his friend and was brought to the ER. Here he was found to have a BP of 168/77, HR of 54 and O2 sat of 89%. O2 sat improved with supplementary Oxygen. His blood workup showed a hgb of 11 ( was 12 two days ago), kidney and liver function were normal. A chest ct scan was done in the ER and reported NO evidence of PE, emphysema and atelectasis. He was presented to the hospitalist team for admission. 10 point ROS done and is negative except as noted in HPI. Past Medical History:   Diagnosis Date    Gastrointestinal disorder       Past Surgical History:   Procedure Laterality Date    HX APPENDECTOMY      HX GI        Prior to Admission Medications   Prescriptions Last Dose Informant Patient Reported? Taking? amLODIPine (NORVASC) 2.5 mg tablet   Yes Yes   Sig: Take 2.5 mg by mouth daily. atorvastatin (LIPITOR) 20 mg tablet   Yes Yes   Sig: Take 20 mg by mouth daily.    cholecalciferol (VITAMIN D3) 1,000 unit cap   Yes Yes   Sig: Take  by mouth daily. galantamine (RAZADYNE) 16 mg ER capsule   Yes Yes   Sig: Take 16 mg by mouth Daily (before breakfast). hydroCHLOROthiazide (HYDRODIURIL) 12.5 mg tablet   Yes Yes   Sig: Take 12.5 mg by mouth daily. lamoTRIgine (LAMICTAL) 200 mg tablet   Yes Yes   Sig: Take 200 mg by mouth daily. raNITIdine hcl 150 mg capsule   Yes Yes   Sig: Take 150 mg by mouth two (2) times a day. sertraline (ZOLOFT) 100 mg tablet   Yes Yes   Sig: Take 150 mg by mouth daily. tamsulosin (FLOMAX) 0.4 mg capsule   Yes Yes   Sig: Take 0.4 mg by mouth daily. Facility-Administered Medications: None     Home meds reconciled. No Known Allergies   Social History   Substance Use Topics    Smoking status: Never Smoker    Smokeless tobacco: Never Used    Alcohol use No      History reviewed. No pertinent family history. There is no immunization history on file for this patient. Objective:     Visit Vitals    /67    Pulse (!) 54    Temp 97.6 °F (36.4 °C)    Resp 29    Ht 5' 11\" (1.803 m)    Wt 80.7 kg (178 lb)    SpO2 97%    BMI 24.83 kg/m2      Temp (24hrs), Av.6 °F (36.4 °C), Min:97.6 °F (36.4 °C), Max:97.6 °F (36.4 °C)    Oxygen Therapy  O2 Sat (%): 97 % (17 1430)  Pulse via Oximetry: 54 beats per minute (17 1430)  O2 Device: Nasal cannula (17 143)  O2 Flow Rate (L/min): 3 l/min (17 1430)  Physical Exam:  General:    Alert, cooperative, no distress   Head:   NCAT. No obvious deformity  Nose:  Nares normal. No drainage  Lungs:   CTABL. No wheezing/rhonchi/rales  Heart:   RRR. No m/r/g. Abdomen:   Scars from previous surgical interventions. Extremities: No cyanosis. Skin:     No rashes or lesions. Not Jaundiced  Neurologic: Moves all extremities.   no gross focal deficits      Data Review:   Recent Results (from the past 24 hour(s))   EKG, 12 LEAD, INITIAL    Collection Time: 17  1:15 PM   Result Value Ref Range    Ventricular Rate 51 BPM    Atrial Rate 51 BPM    P-R Interval 158 ms    QRS Duration 90 ms    Q-T Interval 454 ms    QTC Calculation (Bezet) 418 ms    Calculated P Axis 68 degrees    Calculated R Axis 58 degrees    Calculated T Axis 68 degrees    Diagnosis       !! AGE AND GENDER SPECIFIC ECG ANALYSIS !! Sinus bradycardia  Otherwise normal ECG  No previous ECGs available     CBC WITH AUTOMATED DIFF    Collection Time: 08/23/17  1:19 PM   Result Value Ref Range    WBC 6.5 4.3 - 11.1 K/uL    RBC 3.53 (L) 4.23 - 5.67 M/uL    HGB 11.2 (L) 13.6 - 17.2 g/dL    HCT 33.6 (L) 41.1 - 50.3 %    MCV 95.2 79.6 - 97.8 FL    MCH 31.7 26.1 - 32.9 PG    MCHC 33.3 31.4 - 35.0 g/dL    RDW 13.6 11.9 - 14.6 %    PLATELET 377 836 - 612 K/uL    MPV 9.9 (L) 10.8 - 14.1 FL    DF AUTOMATED      NEUTROPHILS 78 43 - 78 %    LYMPHOCYTES 15 13 - 44 %    MONOCYTES 6 4.0 - 12.0 %    EOSINOPHILS 1 0.5 - 7.8 %    BASOPHILS 0 0.0 - 2.0 %    IMMATURE GRANULOCYTES 0.2 0.0 - 5.0 %    ABS. NEUTROPHILS 5.0 1.7 - 8.2 K/UL    ABS. LYMPHOCYTES 1.0 0.5 - 4.6 K/UL    ABS. MONOCYTES 0.4 0.1 - 1.3 K/UL    ABS. EOSINOPHILS 0.1 0.0 - 0.8 K/UL    ABS. BASOPHILS 0.0 0.0 - 0.2 K/UL    ABS. IMM. GRANS. 0.0 0.0 - 0.5 K/UL   METABOLIC PANEL, COMPREHENSIVE    Collection Time: 08/23/17  1:19 PM   Result Value Ref Range    Sodium 141 136 - 145 mmol/L    Potassium 3.9 3.5 - 5.1 mmol/L    Chloride 104 98 - 107 mmol/L    CO2 27 21 - 32 mmol/L    Anion gap 10 7 - 16 mmol/L    Glucose 110 (H) 65 - 100 mg/dL    BUN 29 (H) 8 - 23 MG/DL    Creatinine 1.04 0.8 - 1.5 MG/DL    GFR est AA >60 >60 ml/min/1.73m2    GFR est non-AA >60 >60 ml/min/1.73m2    Calcium 8.8 8.3 - 10.4 MG/DL    Bilirubin, total 0.4 0.2 - 1.1 MG/DL    ALT (SGPT) 17 12 - 65 U/L    AST (SGOT) 23 15 - 37 U/L    Alk.  phosphatase 58 50 - 136 U/L    Protein, total 7.1 6.3 - 8.2 g/dL    Albumin 3.7 3.2 - 4.6 g/dL    Globulin 3.4 2.3 - 3.5 g/dL    A-G Ratio 1.1 (L) 1.2 - 3.5     D DIMER    Collection Time: 08/23/17  1:19 PM   Result Value Ref Range    D DIMER 0.25 <0.55 ug/ml(FEU)   MAGNESIUM    Collection Time: 08/23/17  1:19 PM   Result Value Ref Range    Magnesium 1.9 1.8 - 2.4 mg/dL   PHOSPHORUS    Collection Time: 08/23/17  1:19 PM   Result Value Ref Range    Phosphorus 3.1 2.3 - 3.7 MG/DL   TSH 3RD GENERATION    Collection Time: 08/23/17  1:19 PM   Result Value Ref Range    TSH 1.918 0.358 - 3.740 uIU/mL   POC TROPONIN-I    Collection Time: 08/23/17  1:23 PM   Result Value Ref Range    Troponin-I (POC) 0 0.0 - 0.08 ng/ml   BNP    Collection Time: 08/23/17  1:45 PM   Result Value Ref Range     pg/mL   BLOOD GAS, ARTERIAL    Collection Time: 08/23/17  2:05 PM   Result Value Ref Range    pH 7.38 7.35 - 7.45      PCO2 43 35 - 45 mmHg    PO2 63 (L) 80 - 105 mmHg    BICARBONATE 25 22 - 26 mmol/L    BASE DEFICIT 0.5 0 - 2 mmol/L    TOTAL HEMOGLOBIN 11.9 11.7 - 15.0 GM/DL    O2 SAT 92 92 - 98.5 %    Arterial O2 Hgb 91.0 (L) 94 - 97 %    CARBOXYHEMOGLOBIN 0.2 (L) 0.5 - 1.5 %    METHEMOGLOBIN 0.3 0.0 - 1.5 %    DEOXYHEMOGLOBIN 9 (H) 0.0 - 5.0 %    SITE RR     ALLENS TEST POSITIVE      MODE RA     Respiratory comment: MD Randall at 8 23 2017 2 09 22 PM. Read back. Imaging /Procedures /Studies:  I personally reviewed all labs, imaging, and other studies this admission:  CXR Results  (Last 48 hours)               08/23/17 1349  XR CHEST PORT Final result    Narrative:  History: Chest pain and dizziness. Sudden onset with nausea and vomiting. Exam: portable chest       Comparison: None       Findings: The lungs are clear. The cardiac silhouette, mediastinal contour, and   osseous structures are normal.       Impressions: No acute abnormality. CT Results  (Last 48 hours)               08/23/17 1435  CT CHEST W CONT Final result    Narrative:  HISTORY: Hypoxia. Near syncopal episode. Exam: CT chest, PE protocol       Technique:  Thin section axial CT images are obtained from the thoracic inlet   through the upper abdomen. Coronal reformatted images are obtained based on the   axial data. 100 cc Isovue 370 is administered intraveneously without incident. Radiation dose reduction techniques were used for this study. Our CT scanners   use one or all of the following: Automated exposure control, adjustment of the   mA and/or kV according to patient size, use of iterative reconstruction. Comparison: None       Findings: There are no filling defects seen within the pulmonary vasculature to   suggest pulmonary embolus. There is a granuloma at the left lung base. There are   centrilobular emphysematous changes present within the upper and lower lungs. There is mild biapical scarring present. The central airways are patent. There   is left basilar atelectasis. There is no mediastinal, hilar, or axillary   lymphadenopathy seen. No suspicious pulmonary nodules. No pleural or pericardial   effusions. Evaluation of the upper abdomen demonstrates no definite abnormality. There is a   gallbladder calculus present. Bone window evaluation demonstrates no aggressive osseous lesions. IMPRESSIONS:       1. No evidence of pulmonary embolus. 2. Centrilobular emphysematous change. 3. Evidence of prior granulomatous disease. 4. Bibasilar atelectasis. Assessment and Plan:      Active Hospital Problems    Diagnosis Date Noted    Near syncope 08/23/2017       PLAN    # GI bleed   -clear liquid diet ordered   -IV fluids   -IV protonix   -monitor Hgb q6h   -GI consult     #Near syncopal episode   -could be secondary to GI bleed, however, Hgb of 11 does not correlate with his symptoms   -will monitor with telemetry   -VS   -ECHO and carotid US ordered     #HTN  -will hold oral BP meds for now   -hydralazine prn     #hypoxia   -Chest ct scan negative for PE   -emphysema + atelectasis reported   -Incentive spirometer ordered     #BPH   -continue flomax #depression   -continue home meds         FEN:  Clear liquid diet   DVT ppx:  BCD  Code status:  Full   Estimated LOS:  < 2 MN for now   Risk assessment:  High   Plan of care discussed with: patient     Signed By: Juanita Panchal MD     August 23, 2017

## 2017-08-23 NOTE — IP AVS SNAPSHOT
Alberto Parent 
 
 
 [address] 
[phone] Patient: Susanne Regalado MRN: [id number] :1934 You are allergic to the following No active allergies Recent Documentation Height Weight BMI Smoking Status 1.803 m 78.7 kg 24.2 kg/m2 Never Smoker About your hospitalization You were admitted on:  2017 You last received care in the:  [address] You were discharged on:  2017 Unit phone number:  [phone] Why you were hospitalized Your primary diagnosis was:  Gi Bleed Your diagnoses also included:  Near Syncope, Sinus Bradycardia, Hypertension, Acute Blood Loss Anemia Providers Seen During Your Hospitalizations Provider Role Specialty Primary office phone Marimar Rodriguez MD Attending Provider Emergency Medicine [phone] Tigre Zuluaga MD Attending Provider Internal Medicine [phone] Your Primary Care Physician (PCP) Primary Care Physician Office Phone Office Fax NOT ON FILE ** None ** ** None ** Follow-up Information Follow up With Details Comments Contact Info Gurpreet Hodge MD Schedule an appointment as soon as possible for a visit in 3 days for follow up GI bleed [address] [place] [address] [address] [facility] [address] [facility] [address] 
[phone] Current Discharge Medication List  
  
START taking these medications Dose & Instructions Dispensing Information Comments Morning Noon Evening Bedtime  
 albuterol 90 mcg/actuation inhaler Commonly known as:  PROAIR HFA Your last dose was: Your next dose is:    
   
   
 Dose:  2 Puff Take 2 Puffs by inhalation every six (6) hours as needed for Wheezing or Shortness of Breath. Quantity:  1 Inhaler Refills:  0 CONTINUE these medications which have NOT CHANGED Dose & Instructions Dispensing Information Comments Morning Noon Evening Bedtime  
 atorvastatin 20 mg tablet Commonly known as:  LIPITOR Your last dose was: Your next dose is:    
   
   
 Dose:  20 mg Take 20 mg by mouth daily. Refills:  0  
     
   
   
   
  
 cholecalciferol 1,000 unit Cap Commonly known as:  VITAMIN D3 Your last dose was: Your next dose is: Take  by mouth daily. Refills:  0  
     
   
   
   
  
 lamoTRIgine 200 mg tablet Commonly known as: LaMICtal  
   
Your last dose was: Your next dose is:    
   
   
 Dose:  200 mg Take 200 mg by mouth daily. Refills:  0  
     
   
   
   
  
 raNITIdine hcl 150 mg capsule Your last dose was: Your next dose is:    
   
   
 Dose:  150 mg Take 150 mg by mouth two (2) times a day. Refills:  0  
     
   
   
   
  
 sertraline 100 mg tablet Commonly known as:  ZOLOFT Your last dose was: Your next dose is:    
   
   
 Dose:  150 mg Take 150 mg by mouth daily. Refills:  0  
     
   
   
   
  
 tamsulosin 0.4 mg capsule Commonly known as:  FLOMAX Your last dose was: Your next dose is:    
   
   
 Dose:  0.4 mg Take 0.4 mg by mouth daily. Refills:  0 STOP taking these medications   
 amLODIPine 2.5 mg tablet Commonly known as:  NORVASC  
   
  
 galantamine 16 mg ER capsule Commonly known as:  RAZADYNE  
   
  
 hydroCHLOROthiazide 12.5 mg tablet Commonly known as:  HYDRODIURIL Where to Get Your Medications Information on where to get these meds will be given to you by the nurse or doctor. ! Ask your nurse or doctor about these medications  
  albuterol 90 mcg/actuation inhaler Discharge Instructions DISCHARGE SUMMARY from Nurse The following personal items are in your possession at time of discharge: 
 
Dental Appliances: None Visual Aid: Glasses (with wife) Hearing Aids/Status: At home Home Medications: None Jewelry: Ring Clothing: At bedside PATIENT INSTRUCTIONS: 
 
 
F-face looks uneven A-arms unable to move or move unevenly S-speech slurred or non-existent T-time-call 911 as soon as signs and symptoms begin-DO NOT go Back to bed or wait to see if you get better-TIME IS BRAIN. Warning Signs of HEART ATTACK Call 911 if you have these symptoms: 
? Chest discomfort. Most heart attacks involve discomfort in the center of the chest that lasts more than a few minutes, or that goes away and comes back. It can feel like uncomfortable pressure, squeezing, fullness, or pain. ? Discomfort in other areas of the upper body. Symptoms can include pain or discomfort in one or both arms, the back, neck, jaw, or stomach. ? Shortness of breath with or without chest discomfort. ? Other signs may include breaking out in a cold sweat, nausea, or lightheadedness. Don't wait more than five minutes to call [address]! Fast action can save your life. Calling 911 is almost always the fastest way to get lifesaving treatment. Emergency Medical Services staff can begin treatment when they arrive  up to an hour sooner than if someone gets to the hospital by car. The discharge information has been reviewed with the patient. The patient verbalized understanding. Discharge medications reviewed with the patient and appropriate educational materials and side effects teaching were provided. Gastrointestinal Bleeding: Care Instructions Your Care Instructions The digestive or gastrointestinal tract goes from the mouth to the anus. It is often called the GI tract. Bleeding can happen anywhere in the GI tract. It may be caused by an ulcer, an infection, or cancer. It may also be caused by medicines such as aspirin or ibuprofen. Light bleeding may not cause any symptoms at first. But if you continue to bleed for a while, you may feel very weak or tired. Sudden, heavy bleeding means you need to see a doctor right away. This kind of bleeding can be very dangerous. But it can usually be cured or controlled. The doctor may do some tests to find the cause of your bleeding. Follow-up care is a key part of your treatment and safety. Be sure to make and go to all appointments, and call your doctor if you are having problems. It's also a good idea to know your test results and keep a list of the medicines you take. How can you care for yourself at home? · Be safe with medicines. Take your medicines exactly as prescribed. Call your doctor if you think you are having a problem with your medicine. You will get more details on the specific medicines your doctor prescribes. · Do not take aspirin or other anti-inflammatory medicines, such as naproxen (Aleve) or ibuprofen (Advil, Motrin), without talking to your doctor first. Ask your doctor if it is okay to use acetaminophen (Tylenol). · Do not drink alcohol. · The bleeding may make you lose iron. So it's important to eat foods that have a lot of iron. These include red meat, shellfish, poultry, and eggs. They also include beans, raisins, whole-grain breads, and leafy green vegetables. If you want help planning meals, you can make an appointment with a dietitian. When should you call for help? Call 911 anytime you think you may need emergency care. For example, call if: 
· You have sudden, severe belly pain. · You vomit blood or what looks like coffee grounds. · You passed out (lost consciousness). · Your stools are maroon or very bloody. Call your doctor now or seek immediate medical care if: 
· You are dizzy or lightheaded, or you feel like you may faint. · Your stools are black and look like tar, or they have streaks of blood. · You have belly pain. · You vomit or have nausea. · You have trouble swallowing, or it hurts when you swallow. Watch closely for changes in your health, and be sure to contact your doctor if: 
· You do not get better as expected. Where can you learn more? Go to http://www.gray.com/. Enter K110 in the search box to learn more about \"Gastrointestinal Bleeding: Care Instructions. \" Current as of: March 20, 2017 Content Version: 11.3 © 7723-7052 [employer]. Care instructions adapted under license by [employer] (which disclaims liability or warranty for this information). If you have questions about a medical condition or this instruction, always ask your healthcare professional. [place] [address] any warranty or liability for your use of this information. Vasovagal Syncope: Care Instructions Your Care Instructions Vasovagal syncope (say \"[id number] [id number]\") is sudden dizziness or fainting that can be set off by things such as pain, stress, fear, or trauma. You may sweat or feel lightheaded, sick to your stomach, or tingly. The problem causes the heart rate to slow and the blood vessels to widen, or dilate, for a short time. When this happens, blood pools in the lower body, and less blood goes to the brain. You can usually get relief by lying down with your legs raised (elevated). This helps more blood to flow to your brain and may help relieve symptoms like feeling dizzy. Some doctors may recommend a technique that involves tensing your fists and arms. This type of fainting is often easy to predict.  For example, it happens to some people when they see blood or have to get a shot. They may feel symptoms before they faint. An episode of vasovagal syncope usually responds well to self-care. Other treatment often isn't needed. But if the fainting keeps happening, your doctor may suggest further treatments. Follow-up care is a key part of your treatment and safety. Be sure to make and go to all appointments, and call your doctor if you are having problems. It's also a good idea to know your test results and keep a list of the medicines you take. How can you care for yourself at home? · Drink plenty of fluids to prevent dehydration. If you have kidney, heart, or liver disease and have to limit fluids, talk with your doctor before you increase your fluid intake. · Try to avoid things that you think may set off vasovagal syncope. · Talk to your doctor about any medicines you take. Some medicines may increase the chance of this condition occurring. · If you feel symptoms, lie down with your legs raised. Talk to your doctor about what to do if your symptoms come back. When should you call for help? Call 911 anytime you think you may need emergency care. For example, call if: 
· You have symptoms of a heart problem. These may include: ¨ Chest pain or pressure. ¨ Severe trouble breathing. ¨ A fast or irregular heartbeat. Watch closely for changes in your health, and be sure to contact your doctor if: 
· You have more episodes of fainting at home. · You do not get better as expected. Where can you learn more? Go to http://www.gray.com/. Enter L754 in the search box to learn more about \"Vasovagal Syncope: Care Instructions. \" Current as of: March 20, 2017 Content Version: 11.3 © 1925-3145 "[employer]. Care instructions adapted under license by [employer] (which disclaims liability or warranty for this information).  If you have questions about a medical condition or this instruction, always ask your healthcare professional. [place] [address] any warranty or liability for your use of this information. Discharge Orders None Introducing [facility]! [employer] introduces [employer] patient portal. Now you can access parts of your medical record, email your doctor's office, and request medication refills online. 1. In your internet browser, go to [email or web address] 2. Click on the First Time User? Click Here link in the Sign In box. You will see the New Member Sign Up page. 3. Enter your [employer] Access Code exactly as it appears below. You will not need to use this code after youve completed the sign-up process. If you do not sign up before the expiration date, you must request a new code. · [employer] Access Code: [email or web address] Expires: 11/19/2017  2:10 PM 
 
4. Enter the last four digits of your Social Security Number (xxxx) and Date of Birth (mm/dd/yyyy) as indicated and click Submit. You will be taken to the next sign-up page. 5. Create a [employer] ID. This will be your [employer] login ID and cannot be changed, so think of one that is secure and easy to remember. 6. Create a [employer] password. You can change your password at any time. 7. Enter your Password Reset Question and Answer. This can be used at a later time if you forget your password. 8. Enter your e-mail address. You will receive e-mail notification when new information is available in [address] 9. Click Sign Up. You can now view and download portions of your medical record. 10. Click the Download Summary menu link to download a portable copy of your medical information. If you have questions, please visit the Frequently Asked Questions section of the [employer] website. Remember, [employer] is NOT to be used for urgent needs. For medical emergencies, dial 911. Now available from your iPhone and Android! General Information Please provide this summary of care documentation to your next provider. Patient Signature:  ____________________________________________________________ Date:  ____________________________________________________________  
  
Jesus Patron Provider Signature:  ____________________________________________________________ Date:  ____________________________________________________________

## 2017-08-23 NOTE — ED NOTES
TRANSFER - OUT REPORT:    Verbal report given to Miley Levi RN(name) on Susanne Regalado  being transferred to 372(unit) for routine progression of care       Report consisted of patients Situation, Background, Assessment and   Recommendations(SBAR). Information from the following report(s) SBAR, ED Summary and MAR was reviewed with the receiving nurse. Lines:   Peripheral IV 08/23/17 Right Antecubital (Active)   Site Assessment Clean, dry, & intact 8/23/2017  1:28 PM   Phlebitis Assessment 0 8/23/2017  1:28 PM   Infiltration Assessment 0 8/23/2017  1:28 PM   Dressing Status Clean, dry, & intact 8/23/2017  1:28 PM   Hub Color/Line Status Green 8/23/2017  1:28 PM       Peripheral IV 08/23/17 Left Wrist (Active)   Site Assessment Clean, dry, & intact 8/23/2017  1:28 PM   Phlebitis Assessment 0 8/23/2017  1:28 PM   Infiltration Assessment 0 8/23/2017  1:28 PM   Dressing Status Clean, dry, & intact 8/23/2017  1:28 PM   Dressing Type Transparent;Tape 8/23/2017  1:28 PM   Hub Color/Line Status Green;Flushed;Patent 8/23/2017  1:28 PM   Action Taken Dressing changed 8/23/2017  1:28 PM        Opportunity for questions and clarification was provided.       Patient transported with:   O2 @ 3 liters   RN  Monitor

## 2017-08-24 ENCOUNTER — ANESTHESIA EVENT (OUTPATIENT)
Dept: ENDOSCOPY | Age: 82
DRG: 378 | End: 2017-08-24
Payer: MEDICARE

## 2017-08-24 PROBLEM — I10 HYPERTENSION: Status: ACTIVE | Noted: 2017-08-24

## 2017-08-24 PROBLEM — D62 ACUTE BLOOD LOSS ANEMIA: Status: ACTIVE | Noted: 2017-08-24

## 2017-08-24 PROBLEM — K92.2 GI BLEED: Status: ACTIVE | Noted: 2017-08-24

## 2017-08-24 PROBLEM — R00.1 SINUS BRADYCARDIA: Status: ACTIVE | Noted: 2017-08-24

## 2017-08-24 LAB
ALBUMIN SERPL-MCNC: 3 G/DL (ref 3.2–4.6)
ALBUMIN/GLOB SERPL: 1 {RATIO} (ref 1.2–3.5)
ALP SERPL-CCNC: 53 U/L (ref 50–136)
ALT SERPL-CCNC: 12 U/L (ref 12–65)
ANION GAP SERPL CALC-SCNC: 5 MMOL/L (ref 7–16)
AST SERPL-CCNC: 17 U/L (ref 15–37)
BILIRUB DIRECT SERPL-MCNC: 0.1 MG/DL
BILIRUB SERPL-MCNC: 0.4 MG/DL (ref 0.2–1.1)
BUN SERPL-MCNC: 19 MG/DL (ref 8–23)
CALCIUM SERPL-MCNC: 8.1 MG/DL (ref 8.3–10.4)
CHLORIDE SERPL-SCNC: 107 MMOL/L (ref 98–107)
CK MB CFR SERPL CALC: 1.8 %
CK MB CFR SERPL CALC: 1.9 %
CK MB SERPL-MCNC: 1.6 NG/ML (ref 0.5–3.6)
CK MB SERPL-MCNC: 1.9 NG/ML (ref 0.5–3.6)
CK SERPL-CCNC: 88 U/L (ref 21–215)
CK SERPL-CCNC: 98 U/L (ref 21–215)
CO2 SERPL-SCNC: 28 MMOL/L (ref 21–32)
CREAT SERPL-MCNC: 0.92 MG/DL (ref 0.8–1.5)
ERYTHROCYTE [DISTWIDTH] IN BLOOD BY AUTOMATED COUNT: 13.7 % (ref 11.9–14.6)
GLOBULIN SER CALC-MCNC: 3 G/DL (ref 2.3–3.5)
GLUCOSE SERPL-MCNC: 92 MG/DL (ref 65–100)
HCT VFR BLD AUTO: 30.7 % (ref 41.1–50.3)
HCT VFR BLD AUTO: 30.8 % (ref 41.1–50.3)
HCT VFR BLD AUTO: 32 % (ref 41.1–50.3)
HGB BLD-MCNC: 10 G/DL (ref 13.6–17.2)
HGB BLD-MCNC: 10.1 G/DL (ref 13.6–17.2)
HGB BLD-MCNC: 10.3 G/DL (ref 13.6–17.2)
MCH RBC QN AUTO: 31.2 PG (ref 26.1–32.9)
MCHC RBC AUTO-ENTMCNC: 32.6 G/DL (ref 31.4–35)
MCV RBC AUTO: 95.6 FL (ref 79.6–97.8)
PLATELET # BLD AUTO: 180 K/UL (ref 150–450)
PMV BLD AUTO: 9.6 FL (ref 10.8–14.1)
POTASSIUM SERPL-SCNC: 3.8 MMOL/L (ref 3.5–5.1)
PROT SERPL-MCNC: 6 G/DL (ref 6.3–8.2)
RBC # BLD AUTO: 3.21 M/UL (ref 4.23–5.67)
SODIUM SERPL-SCNC: 140 MMOL/L (ref 136–145)
TROPONIN I SERPL-MCNC: <0.04 NG/ML (ref 0.02–0.05)
TROPONIN I SERPL-MCNC: <0.04 NG/ML (ref 0.02–0.05)
WBC # BLD AUTO: 5.9 K/UL (ref 4.3–11.1)

## 2017-08-24 PROCEDURE — 82553 CREATINE MB FRACTION: CPT | Performed by: INTERNAL MEDICINE

## 2017-08-24 PROCEDURE — 74011250636 HC RX REV CODE- 250/636: Performed by: INTERNAL MEDICINE

## 2017-08-24 PROCEDURE — C9113 INJ PANTOPRAZOLE SODIUM, VIA: HCPCS | Performed by: INTERNAL MEDICINE

## 2017-08-24 PROCEDURE — 82248 BILIRUBIN DIRECT: CPT | Performed by: INTERNAL MEDICINE

## 2017-08-24 PROCEDURE — 99218 HC RM OBSERVATION: CPT

## 2017-08-24 PROCEDURE — 96376 TX/PRO/DX INJ SAME DRUG ADON: CPT

## 2017-08-24 PROCEDURE — 77010033678 HC OXYGEN DAILY

## 2017-08-24 PROCEDURE — 93306 TTE W/DOPPLER COMPLETE: CPT

## 2017-08-24 PROCEDURE — 94760 N-INVAS EAR/PLS OXIMETRY 1: CPT

## 2017-08-24 PROCEDURE — 85027 COMPLETE CBC AUTOMATED: CPT | Performed by: INTERNAL MEDICINE

## 2017-08-24 PROCEDURE — 74011250637 HC RX REV CODE- 250/637: Performed by: INTERNAL MEDICINE

## 2017-08-24 PROCEDURE — 80053 COMPREHEN METABOLIC PANEL: CPT | Performed by: INTERNAL MEDICINE

## 2017-08-24 PROCEDURE — 77030027138 HC INCENT SPIROMETER -A

## 2017-08-24 PROCEDURE — 77030032490 HC SLV COMPR SCD KNE COVD -B

## 2017-08-24 PROCEDURE — 36415 COLL VENOUS BLD VENIPUNCTURE: CPT | Performed by: INTERNAL MEDICINE

## 2017-08-24 PROCEDURE — 74011000250 HC RX REV CODE- 250: Performed by: INTERNAL MEDICINE

## 2017-08-24 PROCEDURE — 85018 HEMOGLOBIN: CPT | Performed by: INTERNAL MEDICINE

## 2017-08-24 PROCEDURE — 84484 ASSAY OF TROPONIN QUANT: CPT | Performed by: INTERNAL MEDICINE

## 2017-08-24 RX ADMIN — SODIUM CHLORIDE 40 MG: 9 INJECTION INTRAMUSCULAR; INTRAVENOUS; SUBCUTANEOUS at 08:12

## 2017-08-24 RX ADMIN — SODIUM CHLORIDE 100 ML/HR: 900 INJECTION, SOLUTION INTRAVENOUS at 16:00

## 2017-08-24 RX ADMIN — TAMSULOSIN HYDROCHLORIDE 0.4 MG: 0.4 CAPSULE ORAL at 22:23

## 2017-08-24 RX ADMIN — LAMOTRIGINE 200 MG: 100 TABLET ORAL at 08:12

## 2017-08-24 RX ADMIN — SODIUM CHLORIDE 100 ML/HR: 900 INJECTION, SOLUTION INTRAVENOUS at 01:30

## 2017-08-24 RX ADMIN — SODIUM CHLORIDE 40 MG: 9 INJECTION INTRAMUSCULAR; INTRAVENOUS; SUBCUTANEOUS at 22:23

## 2017-08-24 RX ADMIN — SERTRALINE HYDROCHLORIDE 150 MG: 50 TABLET ORAL at 08:12

## 2017-08-24 NOTE — PROGRESS NOTES
Bedside, Verbal and Written shift change report given to Juan Muhammad RN (oncoming nurse) by Daisha Nevarez RN (offgoing nurse).  Report included the following information SBAR, Kardex, MAR, Recent Results and Cardiac Rhythm SB.

## 2017-08-24 NOTE — PROGRESS NOTES
Bedside report received from [facility]. Pt is resting comfortably in bed. Family at bedside.

## 2017-08-24 NOTE — CONSULTS
[facility] Cardiology Consult                Date of  Admission: 8/23/2017  1:09 PM     Primary Care Physician: Dr Karilyn Pallas at [facility]  Primary Cardiologist: None  Referring Physician: Dr Jan Meredith Physician: Dr Hieu Mccormack    CC/Reason for consult: bradycardia      Zunilda Mendenhall is a 80 y.o. male admitted for Near syncope. He has a h/o htn, TIA, GERD w h/o PUD and diverticular bleeding requiring blood transfusion. He has been on norvasc and hctz for hypertension and will occasionally feel dizzy if he stands quickly- this happens in spells and can occur a few times a day, though he has never had syncope before except in 2013 w acute diverticular bleed. He has done well without CP, SOB, palpitations, though he has had occasional ankle swelling on and off for a few weeks. He had a nuclear stress test 15-20 years ago which was normal and has no h/o CAD, CHF or arrhythmia. On Monday he passed two bloody stools and was seen in the ER where hgb was stable at 12.2, he was discharged home, had no further bleeding or problems until yesterday when he was at work sitting at a desk when he suddenly became nauseated, sweaty, dizzy and light headed with dry heaves though no emesis. He had not eaten much that day. He tried to get up but was dizzy and nausea and dry heaves got worse and he sat back down and dizziness improved. No CP, SOB or palpitations though he has noticed some mild wheezing during this admission which is new. He never lost consciousness. Hgb had dropped to 11.2 and has dropped to 10. Since admission he has had no dizziness or light headedness and on monitor he has been SB w HR 45-55. /52, troponin negative, EKG shows SB w rate 51 without ST/T wave changes, hgb down to 10, K 3.8, TSH 1.9, cr .92. Echo pending. CT chest showed no PE, centrilobular emphysematous changes and bibasilar atelectasis. He is active, walks 1 1/2-2 miles a day without CP, SOB, dizziness.      Patient Active Problem List Diagnosis Code    Near syncope R55    Sinus bradycardia R00.1    Hypertension I10    GI bleed K92.2    Acute blood loss anemia D62       Past Medical History:   Diagnosis Date    Gastrointestinal disorder       Past Surgical History:   Procedure Laterality Date    HX APPENDECTOMY      HX GI       No Known Allergies     History reviewed. No pertinent family history. Social History   Substance Use Topics    Smoking status: Never Smoker    Smokeless tobacco: Never Used    Alcohol use No   Active, walks 1 1/2- 2 miles a day.       Current Facility-Administered Medications   Medication Dose Route Frequency    lamoTRIgine (LaMICtal) tablet 200 mg  200 mg Oral DAILY    sertraline (ZOLOFT) tablet 150 mg  150 mg Oral DAILY    tamsulosin (FLOMAX) capsule 0.4 mg  0.4 mg Oral QHS    0.9% sodium chloride infusion  100 mL/hr IntraVENous CONTINUOUS    pantoprazole (PROTONIX) 40 mg in sodium chloride 0.9 % 10 mL injection  40 mg IntraVENous Q12H    hydrALAZINE (APRESOLINE) 20 mg/mL injection 10 mg  10 mg IntraVENous Q6H PRN    ondansetron (ZOFRAN) injection 4 mg  4 mg IntraVENous Q6H PRN    galantamine (RAZADYNE) C24P 16 mg  16 mg Oral ACB       Review of Symptoms:  General: no weight loss, weakness, fever or chills  Skin: no rashes, lumps, or other skin changes  HEENT: no headache, + dizziness as per HPI  Neck: no swollen glands, goiter, pain or stiffness  Respiratory: no cough, sputum, hemoptysis, dyspnea, + mild wheezing only since admission   Cardiovascular: + as per HPI  Gastrointestinal: + GERD, no constipation, diarrhea, liver problems, + h/o GI bleed  Urinary: no frequency, urgency , hematuria, burning/pain with urination, recent flank pain, polyuria, nocturia, or difficulty urinating  Peripheral Vascular: no claudication, leg cramps, prior DVTs, swelling of calves, legs, or feet, color change, or swelling with redness or tenderness  Musculoskeletal: no muscle or joint pain/stiffness, joint swelling, erythema of joints, or back pain  Psychiatric: no depression or excessive stress  Neurological: no sensory or motor loss, seizures, syncope, tremors, numbness, no dementia  Hematologic: + anemia  Endocrine: no thyroid problems, heat or cold intolerance, excessive sweating, polyuria, polydipsia, no diabetes. Physical Exam  Vitals:    08/24/17 0615 08/24/17 0815 08/24/17 0907 08/24/17 1015   BP: 120/52 128/69  123/52   Pulse: (!) 45 (!) 48  (!) 47   Resp: 14 19 17   Temp:  97 °F (36.1 °C)     SpO2: 97% 97% 94% 94%   Weight:       Height:           Physical Exam:  General: Well Developed, Well Nourished, No Acute Distress  HEENT: pupils equal and round, no abnormalities noted  Neck: supple, no JVD, no carotid bruits  Heart: S1S2 with RRR without murmurs or gallops  Lungs: Clear throughout auscultation bilaterally without adventitious sounds  Abd: soft, nontender, nondistended, with good bowel sounds  Ext: warm, no edema, calves supple/nontender, pulses 2+ bilaterally  Skin: warm and dry  Psychiatric: Normal mood and affect  Neurologic: Alert and oriented X 3      Cardiographics    Telemetry: SB rate 45-55 while I am speaking to pt       Labs:   Recent Labs      08/24/17   0411  08/24/17   0002   08/23/17   1319   NA  140   --    --   141   K  3.8   --    --   3.9   MG   --    --    --   1.9   BUN  19   --    --   29*   CREA  0.92   --    --   1.04   GLU  92   --    --   110*   WBC  5.9   --    --   6.5   HGB  10.0*  10.3*   < >  11.2*   HCT  30.7*  32.0*   < >  33.6*   PLT  180   --    --   201    < > = values in this interval not displayed. Assessment/Plan:     Assessment:   GI bleed- GI following, possible diverticular bleed, considering EGD at some point, cont PPI. Near syncope- Sounds like vasovagal symptoms related to profound nausea and retching. No further dizziness associated with SB on tele monitor.       Sinus bradycardia- Does not appear symptomatic w SB, not on rate slowing meds, cont to monitor on tele, check echo. Hypertension - Consider restarting norvasc on discharge if BP higher, may hold hctz and monitor for orthostatic hypotension. Acute blood loss anemia- Monitor. Thank you very much for this referral. We appreciate the opportunity to participate in this patient's care. We will follow along with above stated plan.     Teresa Ray PA-C  Consulting MD: Cali Marc

## 2017-08-24 NOTE — PROGRESS NOTES
Progress Note    Patient: Juani Echavarria MRN: [id number]  SSN: [id number]    YOB: 1934  Age: 80 y.o. Sex: male      Admit Date: 8/23/2017    LOS: 0 days     Subjective:     Seen at bedside. Stable. Denies abdominal pain or SOB. HGB has remained stable. EGD to be done tomorrow. Objective:     Vitals:    08/24/17 1215 08/24/17 1418 08/24/17 1515 08/24/17 1616   BP: 135/71 112/64 125/55 122/50   Pulse: (!) 47 (!) 47 (!) 45 (!) 51   Resp: 26 28 15 12   Temp: 97.4 °F (36.3 °C)   97.4 °F (36.3 °C)   SpO2: 97% 97% 95% 94%   Weight:       Height:            Intake and Output:  Current Shift: 08/24 0701 - 08/24 1900  In: -   Out: 675 [Urine:675]  Last three shifts: 08/22 1901 - 08/24 0700  In: 2360 [P.O.:960; I.V.:1400]  Out: 2325 [Urine:2325]    Physical Exam:   GENERAL: alert, cooperative, no distress, appears stated age  EYE: conjunctivae/corneas clear. PERRL, EOM's intact. Fundi benign  LYMPHATIC: Cervical, supraclavicular, and axillary nodes normal.   THROAT & NECK: normal and no erythema or exudates noted. LUNG: clear to auscultation bilaterally  HEART: bradycardia   ABDOMEN: soft, non-tender. Bowel sounds normal. No masses,  no organomegaly  EXTREMITIES:  extremities normal, atraumatic, no cyanosis or edema  SKIN: Normal.  NEUROLOGIC: AOx3. Gait normal. Reflexes and motor strength normal and symmetric. Cranial nerves 2-12 and sensation grossly intact. PSYCHIATRIC: non focal    Lab/Data Review:  Lab results reviewed. For significant abnormal values and values requiring intervention, see assessment and plan. Assessment:     Principal Problem:    GI bleed (8/24/2017)    Active Problems:    Near syncope (8/23/2017)      Sinus bradycardia (8/24/2017)      Hypertension (8/24/2017)      Acute blood loss anemia (8/24/2017)        Plan:     # GI bleed   -clear liquid diet   -NPO at midnight.  GI will perform EGD tomorrow   -IV fluids   -IV protonix   -monitor Hgb      #Near syncopal episode -unclear etiology  -sinus bradycardia not very impressive  -bradycardia could be related to galantamine use . Will hold for now and will monitor. -GI bleed not severe enough to explain symptoms  -Orthostatic?  Cardiology input appreciated      #HTN  -will hold oral BP meds for now   -hydralazine prn      #hypoxia   -stable   -Chest ct scan negative for PE   -emphysema + atelectasis reported   -Incentive spirometer ordered      #BPH   -continue flomax      #depression   -continue home meds                Signed By: Aki Vu MD     August 24, 2017

## 2017-08-24 NOTE — PROGRESS NOTES
GI DAILY PROGRESS NOTE    Admit Date:  8/23/2017    Today's Date:  8/24/2017    CC:  GI bleed    Subjective:     Patient denies BM, CP, SOB. Feels well. Medications:   Current Facility-Administered Medications   Medication Dose Route Frequency    lamoTRIgine (LaMICtal) tablet 200 mg  200 mg Oral DAILY    sertraline (ZOLOFT) tablet 150 mg  150 mg Oral DAILY    tamsulosin (FLOMAX) capsule 0.4 mg  0.4 mg Oral QHS    0.9% sodium chloride infusion  100 mL/hr IntraVENous CONTINUOUS    pantoprazole (PROTONIX) 40 mg in sodium chloride 0.9 % 10 mL injection  40 mg IntraVENous Q12H    hydrALAZINE (APRESOLINE) 20 mg/mL injection 10 mg  10 mg IntraVENous Q6H PRN    ondansetron (ZOFRAN) injection 4 mg  4 mg IntraVENous Q6H PRN    galantamine (RAZADYNE) C24P 16 mg  16 mg Oral ACB       Review of Systems:  ROS was obtained, with pertinent positives as listed above. No chest pain or SOB. Diet:  Clear liquids    Objective:   Vitals:  Visit Vitals    /71 (BP 1 Location: Right arm, BP Patient Position: At rest)    Pulse (!) 47    Temp 97.4 °F (36.3 °C)    Resp 26    Ht 5' 11\" (1.803 m)    Wt 78.7 kg (173 lb 8 oz)    SpO2 97%    BMI 24.2 kg/m2     Intake/Output:  08/24 0701 - 08/24 1900  In: -   Out: 781 [Urine:675]  08/22 1901 - 08/24 0700  In: 2360 [P.O.:960; I.V.:1400]  Out: 2325 [Urine:2325]  Exam:  General appearance: alert, cooperative, no distress  Lungs: clear to auscultation bilaterally anteriorly  Heart: regular rate and rhythm  Abdomen: soft, non-tender.  Bowel sounds normal. No masses, no organomegaly  Extremities: extremities normal, atraumatic, no cyanosis or edema  Neuro:  alert and oriented    Data Review (Labs):    Recent Labs      08/24/17   1339  08/24/17   0411  08/24/17   0002  08/23/17   1810  08/23/17   1319   WBC   --   5.9   --    --   6.5   HGB  10.1*  10.0*  10.3*  10.6*  11.2*   HCT  30.8*  30.7*  32.0*  32.2*  33.6*   PLT   --   180   --    --   201   MCV   --   95.6   --    -- 95.2   NA   --   140   --    --   141   K   --   3.8   --    --   3.9   CL   --   107   --    --   104   CO2   --   28   --    --   27   BUN   --   19   --    --   29*   CREA   --   0.92   --    --   1.04   CA   --   8.1*   --    --   8.8   MG   --    --    --    --   1.9   GLU   --   92   --    --   110*   AP   --   53   --    --   58   SGOT   --   17   --    --   23   ALT   --   12   --    --   17   TBILI   --   0.4   --    --   0.4   CBIL   --   0.1   --    --    --    ALB   --   3.0*   --    --   3.7   TP   --   6.0*   --    --   7.1       Assessment:     Principal Problem:    GI bleed (8/24/2017)    Active Problems:    Near syncope (8/23/2017)      Sinus bradycardia (8/24/2017)      Hypertension (8/24/2017)      Acute blood loss anemia (8/24/2017)      80 y.o. male patient who was seen in GI consultation 8/23/17 at the request of Dr. Jcarlos Mistry for GI bleed. He presented to ED 8-21-17 with c/o two red bloody BMs, painless and without dizziness at that time following which he was d/c home with PCP f/u. Re-presented 8/23 with c/o one black BM with onset of dizziness. Reportedly no recurrent GI bleeding in ED however HR remained <50. Hx significant for use of  mg x 6 mo due to recurrent TIAs and abnormal CT (per wife, changes of a stroke). Also use of Aleve \"once in a blue moon\". +Hx chronic heartburn over the years for which he takes ranitidine BID but gets breakthrough symptoms 3+ times weeks. +Hx bleeding PUD > 40 years ago requiring what sounds like a pyloroplasty/over sew. 5+ colonoscopies in the past for Hx colon polyps. The most recent colonoscopy was 2 years ago at [facility] for LGI hemorrhage when he presented with large amounts of red rectal bleeding requiring 5 units of PRBCs; however, no EGD done at that time. CT chest was negative for pulmonary embolism but showed changes of emphysema.   S/p Cardiology evaluation secondary to his bradycardia- after which it appears that is was felt that pt had probable diverticular bleed bradycardia and with hx of chronic low grade postural dizziness. Note plan for continued monitoring on tele. ECHO completed per order review though no dictated results that I can see.       Plan:     Possible lower GIB now resolved with residual melena   Hg decreased 2 gm/dL  Plan EGD tomorrow assuming no new cardiac information  If EGD negative, colonoscopy should be done, but can probably be done as outpatient    Miguel Harvey MD  [facility]

## 2017-08-24 NOTE — PROGRESS NOTES
TRANSFER - IN REPORT:    Verbal report received from [facility]) on Brijesh Yo  being received from ICU(unit) for routine progression of care      Report consisted of patients Situation, Background, Assessment and   Recommendations(SBAR). Information from the following report(s) SBAR and Kardex was reviewed with the receiving nurse. Opportunity for questions and clarification was provided. Assessment completed upon patients arrival to unit and care assumed.

## 2017-08-25 ENCOUNTER — ANESTHESIA (OUTPATIENT)
Dept: ENDOSCOPY | Age: 82
DRG: 378 | End: 2017-08-25
Payer: MEDICARE

## 2017-08-25 ENCOUNTER — APPOINTMENT (OUTPATIENT)
Dept: CT IMAGING | Age: 82
DRG: 378 | End: 2017-08-25
Attending: INTERNAL MEDICINE
Payer: MEDICARE

## 2017-08-25 ENCOUNTER — HOSPITAL ENCOUNTER (OUTPATIENT)
Age: 82
Setting detail: OUTPATIENT SURGERY
Discharge: OTHER INSTITUTION | DRG: 378 | End: 2017-08-25
Attending: INTERNAL MEDICINE | Admitting: INTERNAL MEDICINE
Payer: MEDICARE

## 2017-08-25 VITALS
SYSTOLIC BLOOD PRESSURE: 125 MMHG | OXYGEN SATURATION: 96 % | DIASTOLIC BLOOD PRESSURE: 58 MMHG | RESPIRATION RATE: 16 BRPM | HEART RATE: 58 BPM | TEMPERATURE: 98.6 F

## 2017-08-25 VITALS
WEIGHT: 173.5 LBS | OXYGEN SATURATION: 93 % | SYSTOLIC BLOOD PRESSURE: 146 MMHG | HEART RATE: 50 BPM | DIASTOLIC BLOOD PRESSURE: 59 MMHG | BODY MASS INDEX: 24.29 KG/M2 | RESPIRATION RATE: 16 BRPM | TEMPERATURE: 96.6 F | HEIGHT: 71 IN

## 2017-08-25 LAB
ALBUMIN SERPL-MCNC: 3.3 G/DL (ref 3.2–4.6)
ALBUMIN/GLOB SERPL: 1 {RATIO} (ref 1.2–3.5)
ALP SERPL-CCNC: 61 U/L (ref 50–136)
ALT SERPL-CCNC: 13 U/L (ref 12–65)
ANION GAP SERPL CALC-SCNC: 5 MMOL/L (ref 7–16)
AST SERPL-CCNC: 17 U/L (ref 15–37)
BILIRUB SERPL-MCNC: 0.4 MG/DL (ref 0.2–1.1)
BUN SERPL-MCNC: 16 MG/DL (ref 8–23)
CALCIUM SERPL-MCNC: 8.5 MG/DL (ref 8.3–10.4)
CHLORIDE SERPL-SCNC: 106 MMOL/L (ref 98–107)
CO2 SERPL-SCNC: 30 MMOL/L (ref 21–32)
CREAT SERPL-MCNC: 0.99 MG/DL (ref 0.8–1.5)
ERYTHROCYTE [DISTWIDTH] IN BLOOD BY AUTOMATED COUNT: 13.4 % (ref 11.9–14.6)
GLOBULIN SER CALC-MCNC: 3.3 G/DL (ref 2.3–3.5)
GLUCOSE SERPL-MCNC: 91 MG/DL (ref 65–100)
HCT VFR BLD AUTO: 32.5 % (ref 41.1–50.3)
HCT VFR BLD AUTO: 33.8 % (ref 41.1–50.3)
HGB BLD-MCNC: 10.7 G/DL (ref 13.6–17.2)
HGB BLD-MCNC: 11.1 G/DL (ref 13.6–17.2)
MCH RBC QN AUTO: 31.4 PG (ref 26.1–32.9)
MCHC RBC AUTO-ENTMCNC: 32.8 G/DL (ref 31.4–35)
MCV RBC AUTO: 95.5 FL (ref 79.6–97.8)
PLATELET # BLD AUTO: 202 K/UL (ref 150–450)
PMV BLD AUTO: 9.9 FL (ref 10.8–14.1)
POTASSIUM SERPL-SCNC: 3.7 MMOL/L (ref 3.5–5.1)
PROT SERPL-MCNC: 6.6 G/DL (ref 6.3–8.2)
RBC # BLD AUTO: 3.54 M/UL (ref 4.23–5.67)
SODIUM SERPL-SCNC: 141 MMOL/L (ref 136–145)
WBC # BLD AUTO: 7.9 K/UL (ref 4.3–11.1)

## 2017-08-25 PROCEDURE — 88305 TISSUE EXAM BY PATHOLOGIST: CPT | Performed by: INTERNAL MEDICINE

## 2017-08-25 PROCEDURE — 76040000025: Performed by: INTERNAL MEDICINE

## 2017-08-25 PROCEDURE — 85027 COMPLETE CBC AUTOMATED: CPT | Performed by: INTERNAL MEDICINE

## 2017-08-25 PROCEDURE — 74011000250 HC RX REV CODE- 250

## 2017-08-25 PROCEDURE — 74176 CT ABD & PELVIS W/O CONTRAST: CPT

## 2017-08-25 PROCEDURE — 74011000250 HC RX REV CODE- 250: Performed by: INTERNAL MEDICINE

## 2017-08-25 PROCEDURE — 74011250636 HC RX REV CODE- 250/636

## 2017-08-25 PROCEDURE — 77030009426 HC FCPS BIOP ENDOSC BSC -B: Performed by: INTERNAL MEDICINE

## 2017-08-25 PROCEDURE — 76060000031 HC ANESTHESIA FIRST 0.5 HR: Performed by: INTERNAL MEDICINE

## 2017-08-25 PROCEDURE — 0D758ZZ DILATION OF ESOPHAGUS, VIA NATURAL OR ARTIFICIAL OPENING ENDOSCOPIC: ICD-10-PCS | Performed by: INTERNAL MEDICINE

## 2017-08-25 PROCEDURE — 80053 COMPREHEN METABOLIC PANEL: CPT | Performed by: INTERNAL MEDICINE

## 2017-08-25 PROCEDURE — 74011636320 HC RX REV CODE- 636/320: Performed by: INTERNAL MEDICINE

## 2017-08-25 PROCEDURE — C9113 INJ PANTOPRAZOLE SODIUM, VIA: HCPCS | Performed by: INTERNAL MEDICINE

## 2017-08-25 PROCEDURE — 36415 COLL VENOUS BLD VENIPUNCTURE: CPT | Performed by: INTERNAL MEDICINE

## 2017-08-25 PROCEDURE — 74011250636 HC RX REV CODE- 250/636: Performed by: ANESTHESIOLOGY

## 2017-08-25 PROCEDURE — 99218 HC RM OBSERVATION: CPT

## 2017-08-25 PROCEDURE — 74011250636 HC RX REV CODE- 250/636: Performed by: INTERNAL MEDICINE

## 2017-08-25 PROCEDURE — 85018 HEMOGLOBIN: CPT | Performed by: INTERNAL MEDICINE

## 2017-08-25 PROCEDURE — 96376 TX/PRO/DX INJ SAME DRUG ADON: CPT

## 2017-08-25 PROCEDURE — 88312 SPECIAL STAINS GROUP 1: CPT | Performed by: INTERNAL MEDICINE

## 2017-08-25 PROCEDURE — 65270000029 HC RM PRIVATE

## 2017-08-25 RX ORDER — LAMOTRIGINE 100 MG/1
200 TABLET ORAL DAILY
Status: DISCONTINUED | OUTPATIENT
Start: 2017-08-26 | End: 2017-08-25 | Stop reason: HOSPADM

## 2017-08-25 RX ORDER — ALBUTEROL SULFATE 90 UG/1
2 AEROSOL, METERED RESPIRATORY (INHALATION)
Qty: 1 INHALER | Refills: 0 | Status: SHIPPED | OUTPATIENT
Start: 2017-08-25 | End: 2017-08-25

## 2017-08-25 RX ORDER — SODIUM CHLORIDE, SODIUM LACTATE, POTASSIUM CHLORIDE, CALCIUM CHLORIDE 600; 310; 30; 20 MG/100ML; MG/100ML; MG/100ML; MG/100ML
100 INJECTION, SOLUTION INTRAVENOUS CONTINUOUS
Status: CANCELLED | OUTPATIENT
Start: 2017-08-25

## 2017-08-25 RX ORDER — ACETAMINOPHEN 500 MG
TABLET ORAL
Qty: 1 KIT | Refills: 0 | Status: SHIPPED | OUTPATIENT
Start: 2017-08-25 | End: 2018-11-14

## 2017-08-25 RX ORDER — ATORVASTATIN CALCIUM 10 MG/1
20 TABLET, FILM COATED ORAL
Status: DISCONTINUED | OUTPATIENT
Start: 2017-08-25 | End: 2017-08-25 | Stop reason: HOSPADM

## 2017-08-25 RX ORDER — SODIUM CHLORIDE 0.9 % (FLUSH) 0.9 %
5-10 SYRINGE (ML) INJECTION AS NEEDED
Status: CANCELLED | OUTPATIENT
Start: 2017-08-25

## 2017-08-25 RX ORDER — LIDOCAINE HYDROCHLORIDE 20 MG/ML
INJECTION, SOLUTION EPIDURAL; INFILTRATION; INTRACAUDAL; PERINEURAL AS NEEDED
Status: DISCONTINUED | OUTPATIENT
Start: 2017-08-25 | End: 2017-08-25 | Stop reason: HOSPADM

## 2017-08-25 RX ORDER — ALBUTEROL SULFATE 90 UG/1
2 AEROSOL, METERED RESPIRATORY (INHALATION)
Qty: 1 INHALER | Refills: 0 | Status: SHIPPED | OUTPATIENT
Start: 2017-08-25 | End: 2017-11-01

## 2017-08-25 RX ORDER — ONDANSETRON 2 MG/ML
4 INJECTION INTRAMUSCULAR; INTRAVENOUS
Status: DISCONTINUED | OUTPATIENT
Start: 2017-08-25 | End: 2017-08-25 | Stop reason: HOSPADM

## 2017-08-25 RX ORDER — PROPOFOL 10 MG/ML
INJECTION, EMULSION INTRAVENOUS AS NEEDED
Status: DISCONTINUED | OUTPATIENT
Start: 2017-08-25 | End: 2017-08-25 | Stop reason: HOSPADM

## 2017-08-25 RX ORDER — PANTOPRAZOLE SODIUM 40 MG/1
40 TABLET, DELAYED RELEASE ORAL
Status: DISCONTINUED | OUTPATIENT
Start: 2017-08-26 | End: 2017-08-25 | Stop reason: HOSPADM

## 2017-08-25 RX ORDER — TAMSULOSIN HYDROCHLORIDE 0.4 MG/1
0.4 CAPSULE ORAL
Status: DISCONTINUED | OUTPATIENT
Start: 2017-08-25 | End: 2017-08-25 | Stop reason: HOSPADM

## 2017-08-25 RX ORDER — SODIUM CHLORIDE, SODIUM LACTATE, POTASSIUM CHLORIDE, CALCIUM CHLORIDE 600; 310; 30; 20 MG/100ML; MG/100ML; MG/100ML; MG/100ML
100 INJECTION, SOLUTION INTRAVENOUS CONTINUOUS
Status: DISCONTINUED | OUTPATIENT
Start: 2017-08-25 | End: 2017-08-25 | Stop reason: HOSPADM

## 2017-08-25 RX ORDER — ACETAMINOPHEN 325 MG/1
650 TABLET ORAL
Status: DISCONTINUED | OUTPATIENT
Start: 2017-08-25 | End: 2017-08-25 | Stop reason: HOSPADM

## 2017-08-25 RX ADMIN — PROPOFOL 20 MG: 10 INJECTION, EMULSION INTRAVENOUS at 12:52

## 2017-08-25 RX ADMIN — PROPOFOL 20 MG: 10 INJECTION, EMULSION INTRAVENOUS at 12:51

## 2017-08-25 RX ADMIN — SODIUM CHLORIDE, SODIUM LACTATE, POTASSIUM CHLORIDE, AND CALCIUM CHLORIDE 100 ML/HR: 600; 310; 30; 20 INJECTION, SOLUTION INTRAVENOUS at 11:55

## 2017-08-25 RX ADMIN — LIDOCAINE HYDROCHLORIDE 30 MG: 20 INJECTION, SOLUTION EPIDURAL; INFILTRATION; INTRACAUDAL; PERINEURAL at 12:49

## 2017-08-25 RX ADMIN — PROPOFOL 20 MG: 10 INJECTION, EMULSION INTRAVENOUS at 12:53

## 2017-08-25 RX ADMIN — PROPOFOL 20 MG: 10 INJECTION, EMULSION INTRAVENOUS at 12:55

## 2017-08-25 RX ADMIN — PROPOFOL 30 MG: 10 INJECTION, EMULSION INTRAVENOUS at 12:56

## 2017-08-25 RX ADMIN — SODIUM CHLORIDE 40 MG: 9 INJECTION INTRAMUSCULAR; INTRAVENOUS; SUBCUTANEOUS at 08:44

## 2017-08-25 RX ADMIN — PROPOFOL 50 MG: 10 INJECTION, EMULSION INTRAVENOUS at 12:49

## 2017-08-25 RX ADMIN — DIATRIZOATE MEGLUMINE AND DIATRIZOATE SODIUM 15 ML: 600; 100 SOLUTION ORAL; RECTAL at 15:00

## 2017-08-25 NOTE — DISCHARGE INSTRUCTIONS
DISCHARGE SUMMARY from Nurse    The following personal items are in your possession at time of discharge:    Dental Appliances: None  Visual Aid: Glasses (with wife)  Hearing Aids/Status: At home  Home Medications: None  Jewelry: Ring  Clothing: At bedside                PATIENT INSTRUCTIONS:    After general anesthesia or intravenous sedation, for 24 hours or while taking prescription Narcotics:  · Limit your activities  · Do not drive and operate hazardous machinery  · Do not make important personal or business decisions  · Do  not drink alcoholic beverages  · If you have not urinated within 8 hours after discharge, please contact your surgeon on call. Report the following to your surgeon:  · Excessive pain, swelling, redness or odor of or around the surgical area  · Temperature over 100.5  · Nausea and vomiting lasting longer than 4 hours or if unable to take medications  · Any signs of decreased circulation or nerve impairment to extremity: change in color, persistent  numbness, tingling, coldness or increase pain  · Any questions        What to do at Home:  Recommended activity: Activity as tolerated,     If you experience any of the following symptoms rectal bleeding ,vomiting blood , abdominal swelling , increase pain , please follow up with Doctor . *  Please give a list of your current medications to your Primary Care Provider. *  Please update this list whenever your medications are discontinued, doses are      changed, or new medications (including over-the-counter products) are added. *  Please carry medication information at all times in case of emergency situations. These are general instructions for a healthy lifestyle:    No smoking/ No tobacco products/ Avoid exposure to second hand smoke    Surgeon General's Warning:  Quitting smoking now greatly reduces serious risk to your health.     Obesity, smoking, and sedentary lifestyle greatly increases your risk for illness    A healthy diet, regular physical exercise & weight monitoring are important for maintaining a healthy lifestyle    You may be retaining fluid if you have a history of heart failure or if you experience any of the following symptoms:  Weight gain of 3 pounds or more overnight or 5 pounds in a week, increased swelling in our hands or feet or shortness of breath while lying flat in bed. Please call your doctor as soon as you notice any of these symptoms; do not wait until your next office visit. Recognize signs and symptoms of STROKE:    F-face looks uneven    A-arms unable to move or move unevenly    S-speech slurred or non-existent    T-time-call 911 as soon as signs and symptoms begin-DO NOT go       Back to bed or wait to see if you get better-TIME IS BRAIN. Warning Signs of HEART ATTACK     Call 911 if you have these symptoms:   Chest discomfort. Most heart attacks involve discomfort in the center of the chest that lasts more than a few minutes, or that goes away and comes back. It can feel like uncomfortable pressure, squeezing, fullness, or pain.  Discomfort in other areas of the upper body. Symptoms can include pain or discomfort in one or both arms, the back, neck, jaw, or stomach.  Shortness of breath with or without chest discomfort.  Other signs may include breaking out in a cold sweat, nausea, or lightheadedness. Don't wait more than five minutes to call 911 - MINUTES MATTER! Fast action can save your life. Calling 911 is almost always the fastest way to get lifesaving treatment. Emergency Medical Services staff can begin treatment when they arrive -- up to an hour sooner than if someone gets to the hospital by car. The discharge information has been reviewed with the patient. The patient verbalized understanding. Discharge medications reviewed with the patient and appropriate educational materials and side effects teaching were provided.                    Gastrointestinal Bleeding: Care Instructions  Your Care Instructions    The digestive or gastrointestinal tract goes from the mouth to the anus. It is often called the GI tract. Bleeding can happen anywhere in the GI tract. It may be caused by an ulcer, an infection, or cancer. It may also be caused by medicines such as aspirin or ibuprofen. Light bleeding may not cause any symptoms at first. But if you continue to bleed for a while, you may feel very weak or tired. Sudden, heavy bleeding means you need to see a doctor right away. This kind of bleeding can be very dangerous. But it can usually be cured or controlled. The doctor may do some tests to find the cause of your bleeding. Follow-up care is a key part of your treatment and safety. Be sure to make and go to all appointments, and call your doctor if you are having problems. It's also a good idea to know your test results and keep a list of the medicines you take. How can you care for yourself at home? · Be safe with medicines. Take your medicines exactly as prescribed. Call your doctor if you think you are having a problem with your medicine. You will get more details on the specific medicines your doctor prescribes. · Do not take aspirin or other anti-inflammatory medicines, such as naproxen (Aleve) or ibuprofen (Advil, Motrin), without talking to your doctor first. Ask your doctor if it is okay to use acetaminophen (Tylenol). · Do not drink alcohol. · The bleeding may make you lose iron. So it's important to eat foods that have a lot of iron. These include red meat, shellfish, poultry, and eggs. They also include beans, raisins, whole-grain breads, and leafy green vegetables. If you want help planning meals, you can make an appointment with a dietitian. When should you call for help? Call 911 anytime you think you may need emergency care. For example, call if:  · You have sudden, severe belly pain. · You vomit blood or what looks like coffee grounds.   · You passed out (lost consciousness).  · Your stools are maroon or very bloody.  Call your doctor now or seek immediate medical care if:  · You are dizzy or lightheaded, or you feel like you may faint.  · Your stools are black and look like tar, or they have streaks of blood.  · You have belly pain.  · You vomit or have nausea.  · You have trouble swallowing, or it hurts when you swallow.  Watch closely for changes in your health, and be sure to contact your doctor if:  · You do not get better as expected.  Where can you learn more?  Go to http://www.[employer].net/Floor64pConnections.  Enter F981 in the search box to learn more about \"Gastrointestinal Bleeding: Care Instructions.\"  Current as of: March 20, 2017  Content Version: 11.3  © 8225-1361 [employer]. Care instructions adapted under license by [employer] (which disclaims liability or warranty for this information). If you have questions about a medical condition or this instruction, always ask your healthcare professional. [employer] disclaims any warranty or liability for your use of this information.           Vasovagal Syncope: Care Instructions  Your Care Instructions    Vasovagal syncope (say \"toc-rmi-KYO-gul URBN-ipy-nmx\") is sudden dizziness or fainting that can be set off by things such as pain, stress, fear, or trauma. You may sweat or feel lightheaded, sick to your stomach, or tingly.  The problem causes the heart rate to slow and the blood vessels to widen, or dilate, for a short time. When this happens, blood pools in the lower body, and less blood goes to the brain.  You can usually get relief by lying down with your legs raised (elevated). This helps more blood to flow to your brain and may help relieve symptoms like feeling dizzy. Some doctors may recommend a technique that involves tensing your fists and arms.  This type of fainting is often easy to predict. For example, it happens to some people when they see blood or  have to get a shot. They may feel symptoms before they faint. An episode of vasovagal syncope usually responds well to self-care. Other treatment often isn't needed. But if the fainting keeps happening, your doctor may suggest further treatments. Follow-up care is a key part of your treatment and safety. Be sure to make and go to all appointments, and call your doctor if you are having problems. It's also a good idea to know your test results and keep a list of the medicines you take. How can you care for yourself at home? · Drink plenty of fluids to prevent dehydration. If you have kidney, heart, or liver disease and have to limit fluids, talk with your doctor before you increase your fluid intake. · Try to avoid things that you think may set off vasovagal syncope. · Talk to your doctor about any medicines you take. Some medicines may increase the chance of this condition occurring. · If you feel symptoms, lie down with your legs raised. Talk to your doctor about what to do if your symptoms come back. When should you call for help? Call 911 anytime you think you may need emergency care. For example, call if:  · You have symptoms of a heart problem. These may include:  ¨ Chest pain or pressure. ¨ Severe trouble breathing. ¨ A fast or irregular heartbeat. Watch closely for changes in your health, and be sure to contact your doctor if:  · You have more episodes of fainting at home. · You do not get better as expected. Where can you learn more? Go to http://www.gray.com/. Enter L754 in the search box to learn more about \"Vasovagal Syncope: Care Instructions. \"  Current as of: March 20, 2017  Content Version: 11.3  © 2049-4657 [employer]. Care instructions adapted under license by [employer] (which disclaims liability or warranty for this information).  If you have questions about a medical condition or this instruction, always ask your healthcare professional. [address] any warranty or liability for your use of this information.

## 2017-08-25 NOTE — DISCHARGE SUMMARY
Discharge Summary     Patient: Cristela Hyatt MRN: [id number]  SSN: [id number]    YOB: 1934  Age: 80 y.o. Sex: male       Admit Date: 8/23/2017    Discharge Date: 8/25/2017      Admission Diagnoses: Near syncope  GI bleed    Discharge Diagnoses:   Problem List as of 8/25/2017  Date Reviewed: 8/25/2017          Codes Class Noted - Resolved    Sinus bradycardia ICD-10-CM: R00.1  ICD-9-CM: 427.89  8/24/2017 - Present        Hypertension ICD-10-CM: I10  ICD-9-CM: 401.9  8/24/2017 - Present        * (Principal)GI bleed ICD-10-CM: K92.2  ICD-9-CM: 578.9  8/24/2017 - Present        Acute blood loss anemia ICD-10-CM: D62  ICD-9-CM: 285.1  8/24/2017 - Present        Near syncope ICD-10-CM: R55  ICD-9-CM: 780.2  8/23/2017 - Present               Discharge Condition: [facility] Hospital Course: This is an 79 YO male patient with a PMH of HTN, BPH, depression, and previous episode of GI bleed from diverticular disease who came to the ER on 8/23 after he had an episode of near syncope. Mr. Michael Cunha came to the ER on 8/21 complaining of melenas. He was evaluated in the ER and was found to have normal VS and unremarkable blood workup. He was discharged home with plan to follow up with his PCP. The day of admission  he had a BM with melanotic stools in the morning. Later on,  had another BM which seemed to be clearing up. HE was working in his computer when he felt lightheaded, very dizzy and like if he was about to pass out. He stood up to the toilet and was retching but did not vomit anything. He denied abdominal pain, SOB, headache, focal motor deficit or any other symptom. He called his friend and was brought to the ER. Here he was found to have a BP of 168/77, HR of 54 and O2 sat of 89%. O2 sat improved with supplementary Oxygen.  His blood workup showed a hgb of 11 ( was 12 two days ago), kidney and liver function were normal. A chest ct scan was done in the ER and reported NO evidence of PE, but did show emphysema and atelectasis. He was presented to the hospitalist team for admission. He was admitted to the telemetry unit. His Hgb went down to 10-10.5 but remained stable at that level. He was seen by cardiology due to persistent sinus susie. It was considered this was NOT related to his near-syncope and no particular intervention was recommended by them, except holding his BP meds, specially his diuretic. He was seen by GI and underwent an EGD which was negative for any source of bleeding. GI considered colonoscopy could be done as an outpatient. A ct scan of the abdomen was done and reported diverticulosis but NO other major findings. Patient was considered stable to be discharged. His BP meds were held at discharged and he was advised to monitor his BP at home. Galantamine was also stopped ( can cause bradycardia in 1 % of patients). Patient will follow up with his PCP, and GI doctor.  GENERAL: alert, cooperative, no distress, appears stated age  EYE: conjunctivae/corneas clear. PERRL, EOM's intact. Fundi benign  LYMPHATIC: Cervical, supraclavicular, and axillary nodes normal.   THROAT & NECK: normal and no erythema or exudates noted. LUNG: clear to auscultation bilaterally  HEART: bradycardia   ABDOMEN: soft, non-tender.  Bowel sounds normal. No masses,  no organomegaly  EXTREMITIES:  extremities normal, atraumatic, no cyanosis or edema  SKIN: Normal.  NEUROLOGIC: no focal symptoms   PSYCHIATRIC: non focal        Consults: Cardiology and Gastroenterology    Significant Diagnostic Studies: see hospital course     Disposition: home    Discharge Medications:     Current Facility-Administered Medications:     atorvastatin (LIPITOR) tablet 20 mg, 20 mg, Oral, QHS, Fady Bauer MD    [START ON 8/26/2017] pantoprazole (PROTONIX) tablet 40 mg, 40 mg, Oral, ACB, MD Carla Hope ON 8/26/2017] sertraline (ZOLOFT) tablet 150 mg, 150 mg, Oral, DAILY, Rashid Valenzuela Lennie Varela MD  [address] [place] [address]  Lv Zendejas ON 8/26/2017] lamoTRIgine (LaMICtal) tablet 200 mg, 200 mg, Oral, DAILY, Jose R Jenean Cowden, MD    [facility]) capsule 0.4 mg, 0.4 mg, Oral, PCD, Jose R Jenean Cowden, MD    ondansetron [facility]) injection 4 mg, 4 mg, IntraVENous, Q6H PRN, Lurdes Biggs MD    acetaminophen (TYLENOL) tablet 650 mg, 650 mg, Oral, Q6H PRN, Lurdes Biggs MD    Current Outpatient Prescriptions:     albuterol (PROAIR HFA) 90 mcg/actuation inhaler, Take 2 Puffs by inhalation every six (6) hours as needed for Wheezing or Shortness of Breath., Disp: 1 Inhaler, Rfl: 0    Blood Pressure Test Kit-Wrist (BLOOD PRESSURE UNIT) kit, Check BP 2 times per day and write it down, Disp: 1 Kit, Rfl: 0    cholecalciferol (VITAMIN D3) 1,000 unit cap, Take  by mouth daily. , Disp: , Rfl:     raNITIdine hcl 150 mg capsule, Take 150 mg by mouth two (2) times a day., Disp: , Rfl:     sertraline (ZOLOFT) 100 mg tablet, Take 150 mg by mouth daily. , Disp: , Rfl:     tamsulosin (FLOMAX) 0.4 mg capsule, Take 0.4 mg by mouth daily. , Disp: , Rfl:     lamoTRIgine (LAMICTAL) 200 mg tablet, Take 200 mg by mouth daily. , Disp: , Rfl:     atorvastatin (LIPITOR) 20 mg tablet, Take 20 mg by mouth daily. , Disp: , Rfl:       Activity: Activity as tolerated  Diet: Cardiac Diet  Wound Care: None needed    Follow-up Appointments   Procedures    FOLLOW UP VISIT Appointment in: 3 - 5 Days Gi. FOLLOW UP FOR gi BLEED     Gi. FOLLOW UP FOR gi BLEED     Standing Status:   Standing     Number of Occurrences:   1     Order Specific Question:   Appointment in     Answer:   3 - 5 Days    FOLLOW UP VISIT Appointment in: One Week pcp     pcp     Standing Status:   Standing     Number of Occurrences:   1     Standing Expiration Date:   8/26/2017     Order Specific Question:   Appointment in     Answer:    One Week       Signed By: Lurdes Biggs MD     August 25, 2017

## 2017-08-25 NOTE — H&P (VIEW-ONLY)
GI DAILY PROGRESS NOTE    Admit Date:  8/23/2017    Today's Date:  8/24/2017    CC:  GI bleed    Subjective:     Patient denies BM, CP, SOB. Feels well. Medications:   Current Facility-Administered Medications   Medication Dose Route Frequency    lamoTRIgine (LaMICtal) tablet 200 mg  200 mg Oral DAILY    sertraline (ZOLOFT) tablet 150 mg  150 mg Oral DAILY    tamsulosin (FLOMAX) capsule 0.4 mg  0.4 mg Oral QHS    0.9% sodium chloride infusion  100 mL/hr IntraVENous CONTINUOUS    pantoprazole (PROTONIX) 40 mg in sodium chloride 0.9 % 10 mL injection  40 mg IntraVENous Q12H    hydrALAZINE (APRESOLINE) 20 mg/mL injection 10 mg  10 mg IntraVENous Q6H PRN    ondansetron (ZOFRAN) injection 4 mg  4 mg IntraVENous Q6H PRN    galantamine (RAZADYNE) C24P 16 mg  16 mg Oral ACB       Review of Systems:  ROS was obtained, with pertinent positives as listed above. No chest pain or SOB. Diet:  Clear liquids    Objective:   Vitals:  Visit Vitals    /71 (BP 1 Location: Right arm, BP Patient Position: At rest)    Pulse (!) 47    Temp 97.4 °F (36.3 °C)    Resp 26    Ht 5' 11\" (1.803 m)    Wt 78.7 kg (173 lb 8 oz)    SpO2 97%    BMI 24.2 kg/m2     Intake/Output:  08/24 0701 - 08/24 1900  In: -   Out: 099 [Urine:675]  08/22 1901 - 08/24 0700  In: 2360 [P.O.:960; I.V.:1400]  Out: 2325 [Urine:2325]  Exam:  General appearance: alert, cooperative, no distress  Lungs: clear to auscultation bilaterally anteriorly  Heart: regular rate and rhythm  Abdomen: soft, non-tender.  Bowel sounds normal. No masses, no organomegaly  Extremities: extremities normal, atraumatic, no cyanosis or edema  Neuro:  alert and oriented    Data Review (Labs):    Recent Labs      08/24/17   1339  08/24/17   0411  08/24/17   0002  08/23/17   1810  08/23/17   1319   WBC   --   5.9   --    --   6.5   HGB  10.1*  10.0*  10.3*  10.6*  11.2*   HCT  30.8*  30.7*  32.0*  32.2*  33.6*   PLT   --   180   --    --   201   MCV   --   95.6   --    -- 95.2   NA   --   140   --    --   141   K   --   3.8   --    --   3.9   CL   --   107   --    --   104   CO2   --   28   --    --   27   BUN   --   19   --    --   29*   CREA   --   0.92   --    --   1.04   CA   --   8.1*   --    --   8.8   MG   --    --    --    --   1.9   GLU   --   92   --    --   110*   AP   --   53   --    --   58   SGOT   --   17   --    --   23   ALT   --   12   --    --   17   TBILI   --   0.4   --    --   0.4   CBIL   --   0.1   --    --    --    ALB   --   3.0*   --    --   3.7   TP   --   6.0*   --    --   7.1       Assessment:     Principal Problem:    GI bleed (8/24/2017)    Active Problems:    Near syncope (8/23/2017)      Sinus bradycardia (8/24/2017)      Hypertension (8/24/2017)      Acute blood loss anemia (8/24/2017)      80 y.o. male patient who was seen in GI consultation 8/23/17 at the request of Dr. Marley Mercado for GI bleed. He presented to ED 8-21-17 with c/o two red bloody BMs, painless and without dizziness at that time following which he was d/c home with PCP f/u. Re-presented 8/23 with c/o one black BM with onset of dizziness. Reportedly no recurrent GI bleeding in ED however HR remained <50. Hx significant for use of  mg x 6 mo due to recurrent TIAs and abnormal CT (per wife, changes of a stroke). Also use of Aleve \"once in a blue moon\". +Hx chronic heartburn over the years for which he takes ranitidine BID but gets breakthrough symptoms 3+ times weeks. +Hx bleeding PUD > 40 years ago requiring what sounds like a pyloroplasty/over sew. 5+ colonoscopies in the past for Hx colon polyps. The most recent colonoscopy was 2 years ago at [facility] for LGI hemorrhage when he presented with large amounts of red rectal bleeding requiring 5 units of PRBCs; however, no EGD done at that time. CT chest was negative for pulmonary embolism but showed changes of emphysema.   S/p Cardiology evaluation secondary to his bradycardia- after which it appears that is was felt that pt had probable diverticular bleed bradycardia and with hx of chronic low grade postural dizziness. Note plan for continued monitoring on tele. ECHO completed per order review though no dictated results that I can see.       Plan:     Possible lower GIB now resolved with residual melena   Hg decreased 2 gm/dL  Plan EGD tomorrow assuming no new cardiac information  If EGD negative, colonoscopy should be done, but can probably be done as outpatient    Sharmila Pelayo MD  [facility]

## 2017-08-25 NOTE — PROGRESS NOTES
Initial visit by  to convey care and concern and encourage patient that  services are available if desired. No needs were voiced during the visit. Provided business card for future reference.      Mr. Yadira Parkinson was anticipating going downtown and returning to [place] after his test.     Zaid Santillan 68  Board Certified

## 2017-08-25 NOTE — PROGRESS NOTES
Discharge instructions reviewed with pt and wife . 2 prescriptions given . Wife is to make f/u on Monday .  Pt was taken out in w/c

## 2017-08-25 NOTE — ROUTINE PROCESS
TRANSFER - OUT REPORT:    Verbal report given to LAYO Taylor on Yesica Hathaway  being transferred to [facility] for routine progression of care       Report consisted of patients Situation, Background, Assessment and   Recommendations(SBAR). Information from the following report(s) Procedure Summary was reviewed with the receiving nurse. Lines:   Peripheral IV 08/23/17 Right Antecubital (Active)   Site Assessment Clean, dry, & intact 8/25/2017  8:42 AM   Phlebitis Assessment 0 8/25/2017  8:42 AM   Infiltration Assessment 0 8/25/2017  8:42 AM   Dressing Status Clean, dry, & intact 8/25/2017  8:42 AM   Dressing Type Transparent 8/25/2017  8:42 AM   Hub Color/Line Status Patent 8/25/2017  8:42 AM   Alcohol Cap Used No 8/24/2017  8:15 AM       Peripheral IV 08/23/17 Left Wrist (Active)   Site Assessment Clean, dry, & intact 8/25/2017 11:47 AM   Phlebitis Assessment 0 8/25/2017 11:47 AM   Infiltration Assessment 0 8/25/2017 11:47 AM   Dressing Status Clean, dry, & intact 8/25/2017 11:47 AM   Dressing Type Tape;Transparent 8/25/2017 11:47 AM   Hub Color/Line Status Green; Infusing 8/25/2017 11:47 AM   Action Taken Dressing changed 8/23/2017  1:28 PM   Alcohol Cap Used No 8/24/2017  8:15 AM        Opportunity for questions and clarification was provided.       Patient transported with:   Mami Zuniga

## 2017-08-25 NOTE — ANESTHESIA PREPROCEDURE EVALUATION
Anesthetic History   No history of anesthetic complications            Review of Systems / Medical History  Patient summary reviewed and pertinent labs reviewed    Pulmonary  Within defined limits                 Neuro/Psych   Within defined limits           Cardiovascular    Hypertension                Comments: bradycardia   GI/Hepatic/Renal               Comments: Melena stools and anemia Endo/Other             Other Findings              Physical Exam    Airway  Mallampati: I  TM Distance: 4 - 6 cm  Neck ROM: normal range of motion   Mouth opening: Normal     Cardiovascular    Rhythm: regular  Rate: abnormal         Dental    Dentition: Poor dentition and Caps/crowns     Pulmonary  Breath sounds clear to auscultation               Abdominal         Other Findings            Anesthetic Plan    ASA: 3  Anesthesia type: total IV anesthesia          Induction: Intravenous  Anesthetic plan and risks discussed with: Patient

## 2017-08-25 NOTE — INTERVAL H&P NOTE
H&P Update:  Shannon Baron was seen and examined. Mr Helen Arenas remains appropriate for endoscopy, risks, benefits alternatives were discussed.   He wishes to proceed    Signed By: Carola Valles MD     August 25, 2017 12:42 PM

## 2017-08-25 NOTE — PROGRESS NOTES
TRANSFER - OUT REPORT:    Verbal report given to [facility]  RN GI lab (name) on Monroe Roles  being transferred to GI lab (unit) for ordered procedure       Report consisted of patients Situation, Background, Assessment and   Recommendations(SBAR). Information from the following report(s) SBAR, Kardex, [facility] and Recent Results was reviewed with the receiving nurse.

## 2017-08-25 NOTE — PROGRESS NOTES
[facility] CARDIOLOGY PROGRESS NOTE      8/25/2017 5:53 PM    Admit Date: 8/23/2017    Admit Diagnosis: Near syncope;GI bleed      Subjective:   Patient seem and examined in route for EGD . No chest pain or dyspnea. Objective:      Vitals:    08/24/17 2336 08/25/17 0347 08/25/17 0955 08/25/17 1430   BP: 141/86 115/52 131/65 146/59   Pulse: (!) 59 (!) 57 (!) 57 (!) 50   Resp: 16 16 16 16   Temp: 97.4 °F (36.3 °C) 98.1 °F (36.7 °C) 97.2 °F (36.2 °C) 96.6 °F (35.9 °C)   SpO2: 93% 94% 91% 93%   Weight:       Height:           Physical Exam:  Neck-   CV- rr+r  Lungs- clear  Ext-  Skin- warm and dry        Data Review:   Recent Labs      08/25/17   1508  08/25/17   0555   08/24/17   0411   08/23/17   1319   NA   --   141   --   140   --   141   K   --   3.7   --   3.8   --   3.9   MG   --    --    --    --    --   1.9   BUN   --   16   --   19   --   29*   CREA   --   0.99   --   0.92   --   1.04   GLU   --   91   --   92   --   110*   WBC   --   7.9   --   5.9   --   6.5   HGB  10.7*  11.1*   < >  10.0*   < >  11.2*   HCT  32.5*  33.8*   < >  30.7*   < >  33.6*   PLT   --   202   --   180   --   201    < > = values in this interval not displayed. Assessment and Plan:     Principal Problem:    GI bleed (8/24/2017)  resolving     Active Problems:    Near syncope (8/23/2017)  GI bleeding related with hypotension   improved       Sinus bradycardia (8/24/2017)  probably vagal mediated and not cause for syncope . We may see him in f/u as outpatient on his blood pressure meds as well as heart rate .        Hypertension (8/24/2017)      Acute blood loss anemia (8/24/2017)        Fady Cardona MD

## 2017-08-25 NOTE — PROCEDURES
[address] [facility]   PROCEDURE NOTE       Name:  Iam Overton   MR#:  [id number]   :  1934   Account #:  [de-identified]   Date of Adm:  2017       DATE OF PROCEDURE: 2017     PROCEDURE: Esophagogastroduodenoscopy. INDICATIONS: Gastrointestinal bleed. POSTOPERATIVE DIAGNOSIS: Gastritis and empiric dilation for   dysphagia. ANESTHESIA: MAC.      OTHER PROCEDURES: Include Savary dilation and biopsy. SCOPE: Olympus GIF-180. PROCEDURE NOTE: After informed consent was obtained, the patient   was placed in the left lateral decubitus position in the   endoscopy suite. Conscious sedation obtained utilizing monitored   anesthesia. Please see anesthesia flow sheet for details. Once   adequate analgesia was obtained, the Olympus GIF-180 video chip   endoscope was passed through the oropharynx into the esophagus,   stomach and small bowel under direct visualization. The scope   was advanced to the area of the periampullary duodenum and was   withdrawn with careful attention to mucosal detail. The entire   examined duodenum is normal. The pylorus is normal. In the   prepyloric antrum extending up into the body of the stomach,   there is diffuse nonbleeding gastritis which was biopsied. The   esophagus was normal. Given the patient's dysphagic symptoms,   empiric dilation performed to [address]. ASSESSMENT AND PLAN:    1. Gastrointestinal bleed, likely  lower gastrointestinal   bleed. Clear liquid diet, advance as tolerated. 2. Dysphagia. Repeat dilation, p.r.n. Followup with Dr. Kait Amaya.          MD LIZETTE Hankins / Lucio Desai   D:  2017   13:21   T:  2017   13:34   Job #:  [id number]

## 2017-08-25 NOTE — ANESTHESIA POSTPROCEDURE EVALUATION
Post-Anesthesia Evaluation and Assessment    Patient: Ailin Wells MRN: [id number]  SSN: [id number]    YOB: 1934  Age: 80 y.o. Sex: male       Cardiovascular Function/Vital Signs  Visit Vitals    /58    Pulse (!) 58    Temp 37 °C (98.6 °F)    Resp 16    SpO2 96%       Patient is status post total IV anesthesia anesthesia for Procedure(s):  ESOPHAGOGASTRODUODENOSCOPY (EGD)  BMI 24 ROOM 372 ES  ESOPHAGEAL DILATION  ESOPHAGOGASTRODUODENAL (EGD) BIOPSY. Nausea/Vomiting: None    Postoperative hydration reviewed and adequate. Pain:  Pain Scale 1: Numeric (0 - 10) (08/25/17 1333)  Pain Intensity 1: 0 (08/25/17 1333)   Managed    Neurological Status: At baseline    Mental Status and Level of Consciousness: Alert and oriented     Pulmonary Status:   O2 Device: Room air (08/25/17 1333)   Adequate oxygenation and airway patent    Complications related to anesthesia: None    Post-anesthesia assessment completed.  No concerns    Signed By: Ebony Croft MD     August 25, 2017

## 2017-10-05 ENCOUNTER — HOSPITAL ENCOUNTER (OUTPATIENT)
Dept: LAB | Age: 82
Discharge: HOME | End: 2017-10-05

## 2017-10-05 PROCEDURE — 88305 TISSUE EXAM BY PATHOLOGIST: CPT | Performed by: INTERNAL MEDICINE

## 2017-10-11 ENCOUNTER — HOSPITAL ENCOUNTER (EMERGENCY)
Age: 82
Discharge: HOME | End: 2017-10-11
Attending: EMERGENCY MEDICINE
Payer: MEDICARE

## 2017-10-11 ENCOUNTER — APPOINTMENT (OUTPATIENT)
Dept: GENERAL RADIOLOGY | Age: 82
End: 2017-10-11
Attending: EMERGENCY MEDICINE
Payer: MEDICARE

## 2017-10-11 VITALS
HEIGHT: 71 IN | WEIGHT: 173 LBS | BODY MASS INDEX: 24.22 KG/M2 | RESPIRATION RATE: 14 BRPM | SYSTOLIC BLOOD PRESSURE: 155 MMHG | TEMPERATURE: 98.2 F | DIASTOLIC BLOOD PRESSURE: 81 MMHG | OXYGEN SATURATION: 95 % | HEART RATE: 75 BPM

## 2017-10-11 DIAGNOSIS — E86.0 DEHYDRATION: Primary | ICD-10-CM

## 2017-10-11 DIAGNOSIS — I95.1 ORTHOSTATIC HYPOTENSION: ICD-10-CM

## 2017-10-11 DIAGNOSIS — N17.9 AKI (ACUTE KIDNEY INJURY) (HCC): ICD-10-CM

## 2017-10-11 LAB
ALBUMIN SERPL-MCNC: 4 G/DL (ref 3.2–4.6)
ALBUMIN/GLOB SERPL: 1.1 {RATIO} (ref 1.2–3.5)
ALP SERPL-CCNC: 75 U/L (ref 50–136)
ALT SERPL-CCNC: 19 U/L (ref 12–65)
ANION GAP SERPL CALC-SCNC: 8 MMOL/L (ref 7–16)
AST SERPL-CCNC: 24 U/L (ref 15–37)
ATRIAL RATE: 86 BPM
BASOPHILS # BLD: 0 K/UL (ref 0–0.2)
BASOPHILS NFR BLD: 1 % (ref 0–2)
BILIRUB SERPL-MCNC: 0.4 MG/DL (ref 0.2–1.1)
BUN SERPL-MCNC: 25 MG/DL (ref 8–23)
CALCIUM SERPL-MCNC: 9.1 MG/DL (ref 8.3–10.4)
CALCULATED P AXIS, ECG09: 72 DEGREES
CALCULATED R AXIS, ECG10: 55 DEGREES
CALCULATED T AXIS, ECG11: 67 DEGREES
CHLORIDE SERPL-SCNC: 105 MMOL/L (ref 98–107)
CO2 SERPL-SCNC: 29 MMOL/L (ref 21–32)
CREAT SERPL-MCNC: 1.54 MG/DL (ref 0.8–1.5)
DIAGNOSIS, 93000: NORMAL
DIFFERENTIAL METHOD BLD: ABNORMAL
EOSINOPHIL # BLD: 0.1 K/UL (ref 0–0.8)
EOSINOPHIL NFR BLD: 1 % (ref 0.5–7.8)
ERYTHROCYTE [DISTWIDTH] IN BLOOD BY AUTOMATED COUNT: 13.7 % (ref 11.9–14.6)
GLOBULIN SER CALC-MCNC: 3.8 G/DL (ref 2.3–3.5)
GLUCOSE SERPL-MCNC: 130 MG/DL (ref 65–100)
HCT VFR BLD AUTO: 36.8 % (ref 41.1–50.3)
HGB BLD-MCNC: 11.8 G/DL (ref 13.6–17.2)
IMM GRANULOCYTES # BLD: 0 K/UL (ref 0–0.5)
IMM GRANULOCYTES NFR BLD: 0.2 % (ref 0–5)
LYMPHOCYTES # BLD: 1.2 K/UL (ref 0.5–4.6)
LYMPHOCYTES NFR BLD: 19 % (ref 13–44)
MCH RBC QN AUTO: 30.6 PG (ref 26.1–32.9)
MCHC RBC AUTO-ENTMCNC: 32.1 G/DL (ref 31.4–35)
MCV RBC AUTO: 95.3 FL (ref 79.6–97.8)
MONOCYTES # BLD: 0.4 K/UL (ref 0.1–1.3)
MONOCYTES NFR BLD: 6 % (ref 4–12)
NEUTS SEG # BLD: 4.7 K/UL (ref 1.7–8.2)
NEUTS SEG NFR BLD: 73 % (ref 43–78)
P-R INTERVAL, ECG05: 162 MS
PLATELET # BLD AUTO: 257 K/UL (ref 150–450)
PMV BLD AUTO: 10.4 FL (ref 10.8–14.1)
POTASSIUM SERPL-SCNC: 3.9 MMOL/L (ref 3.5–5.1)
PROT SERPL-MCNC: 7.8 G/DL (ref 6.3–8.2)
Q-T INTERVAL, ECG07: 376 MS
QRS DURATION, ECG06: 88 MS
QTC CALCULATION (BEZET), ECG08: 449 MS
RBC # BLD AUTO: 3.86 M/UL (ref 4.23–5.67)
SODIUM SERPL-SCNC: 142 MMOL/L (ref 136–145)
TROPONIN I SERPL-MCNC: <0.04 NG/ML (ref 0.02–0.05)
VENTRICULAR RATE, ECG03: 86 BPM
WBC # BLD AUTO: 6.4 K/UL (ref 4.3–11.1)

## 2017-10-11 PROCEDURE — 96360 HYDRATION IV INFUSION INIT: CPT | Performed by: EMERGENCY MEDICINE

## 2017-10-11 PROCEDURE — 74011250636 HC RX REV CODE- 250/636: Performed by: EMERGENCY MEDICINE

## 2017-10-11 PROCEDURE — 93005 ELECTROCARDIOGRAM TRACING: CPT | Performed by: EMERGENCY MEDICINE

## 2017-10-11 PROCEDURE — 71020 XR CHEST PA LAT: CPT

## 2017-10-11 PROCEDURE — 84484 ASSAY OF TROPONIN QUANT: CPT | Performed by: EMERGENCY MEDICINE

## 2017-10-11 PROCEDURE — 85025 COMPLETE CBC W/AUTO DIFF WBC: CPT | Performed by: EMERGENCY MEDICINE

## 2017-10-11 PROCEDURE — 99284 EMERGENCY DEPT VISIT MOD MDM: CPT | Performed by: EMERGENCY MEDICINE

## 2017-10-11 PROCEDURE — 80053 COMPREHEN METABOLIC PANEL: CPT | Performed by: EMERGENCY MEDICINE

## 2017-10-11 RX ORDER — SODIUM CHLORIDE 0.9 % (FLUSH) 0.9 %
5-10 SYRINGE (ML) INJECTION AS NEEDED
Status: DISCONTINUED | OUTPATIENT
Start: 2017-10-11 | End: 2017-10-11 | Stop reason: HOSPADM

## 2017-10-11 RX ORDER — SODIUM CHLORIDE 0.9 % (FLUSH) 0.9 %
5-10 SYRINGE (ML) INJECTION EVERY 8 HOURS
Status: DISCONTINUED | OUTPATIENT
Start: 2017-10-11 | End: 2017-10-11 | Stop reason: HOSPADM

## 2017-10-11 RX ADMIN — SODIUM CHLORIDE 1000 ML: 900 INJECTION, SOLUTION INTRAVENOUS at 15:49

## 2017-10-11 NOTE — ED TRIAGE NOTES
C/o feeling dizzy for 5hours today. States has had a change in BP medication x2-3 weeks ago. Pt was admitted then for diverticulitis, he also had \"dizzy spells\" at that time. Denies CP or SOB.

## 2017-10-11 NOTE — DISCHARGE INSTRUCTIONS
Dehydration: Care Instructions  Your Care Instructions  Dehydration happens when your body loses too much fluid. This might happen when you do not drink enough water or you lose large amounts of fluids from your body because of diarrhea, vomiting, or sweating. Severe dehydration can be life-threatening. Water and minerals called electrolytes help put your body fluids back in balance. Learn the early signs of fluid loss, and drink more fluids to prevent dehydration. Follow-up care is a key part of your treatment and safety. Be sure to make and go to all appointments, and call your doctor if you are having problems. It's also a good idea to know your test results and keep a list of the medicines you take. How can you care for yourself at home? · To prevent dehydration, drink plenty of fluids, enough so that your urine is light yellow or clear like water. Choose water and other caffeine-free clear liquids until you feel better. If you have kidney, heart, or liver disease and have to limit fluids, talk with your doctor before you increase the amount of fluids you drink. · If you do not feel like eating or drinking, try taking small sips of water, sports drinks, or other rehydration drinks. · Get plenty of rest.  To prevent dehydration  · Add more fluids to your diet and daily routine, unless your doctor has told you not to. · During hot weather, drink more fluids. Drink even more fluids if you exercise a lot. Stay away from drinks with alcohol or caffeine. · Watch for the symptoms of dehydration. These include:  ¨ A dry, sticky mouth. ¨ Dark yellow urine, and not much of it. ¨ Dry and sunken eyes. ¨ Feeling very tired. · Learn what problems can lead to dehydration. These include:  ¨ Diarrhea, fever, and vomiting. ¨ Any illness with a fever, such as pneumonia or the flu. ¨ Activities that cause heavy sweating, such as endurance races and heavy outdoor work in hot or humid weather.   ¨ Alcohol or drug abuse or withdrawal.  ¨ Certain medicines, such as cold and allergy pills (antihistamines), diet pills (diuretics), and laxatives. ¨ Certain diseases, such as diabetes, cancer, and heart or kidney disease. When should you call for help? Call 911 anytime you think you may need emergency care. For example, call if:  · You passed out (lost consciousness). Call your doctor now or seek immediate medical care if:  · You are confused and cannot think clearly. · You are dizzy or lightheaded, or you feel like you may faint. · You have signs of needing more fluids. You have sunken eyes and a dry mouth, and you pass only a little dark urine. · You cannot keep fluids down. Watch closely for changes in your health, and be sure to contact your doctor if:  · You are not making tears. · Your skin is very dry and sags slowly back into place after you pinch it. · Your mouth and eyes are very dry. Where can you learn more? Go to http://sylvia-hector.info/. Enter Q251 in the search box to learn more about \"Dehydration: Care Instructions. \"  Current as of: March 20, 2017  Content Version: 11.3  © 5442-2150 [employer]. Care instructions adapted under license by [employer] (which disclaims liability or warranty for this information). If you have questions about a medical condition or this instruction, always ask your healthcare professional. [place] [address] any warranty or liability for your use of this information. Orthostatic Hypotension: Care Instructions  Your Care Instructions  Orthostatic hypotension is a quick drop in blood pressure. It happens when you get up from sitting or lying down. You may feel faint, lightheaded, or dizzy. When a person sits up or stands up, the body changes the way it pumps blood. This can slow the flow of blood to the brain for a very short time. And that can make you feel lightheaded.   Many medicines can cause this problem, especially in older people. Lack of fluids (dehydration) or illnesses such as diabetes or heart disease also can cause it. Follow-up care is a key part of your treatment and safety. Be sure to make and go to all appointments, and call your doctor if you are having problems. It's also a good idea to know your test results and keep a list of the medicines you take. How can you care for yourself at home? · Tell your doctor about any problems you have with your medicines. · If your doctor prescribes medicine to help prevent a low blood pressure problem, take it exactly as prescribed. Call your doctor if you think you are having a problem with your medicine. · Drink plenty of fluids, enough so that your urine is light yellow or clear like water. Choose water and other caffeine-free clear liquids. If you have kidney, heart, or liver disease and have to limit fluids, talk with your doctor before you increase the amount of fluids you drink. · Limit or avoid alcohol and caffeine. · Get up slowly from bed or after sitting for a long time. If you are in bed, roll to your side and swing your legs over the edge of the bed and onto the floor. Push your body up to a sitting position. Wait for a while before you slowly stand up. If you are dizzy or lightheaded, sit or lie down. When should you call for help? Call 911 anytime you think you may need emergency care. For example, call if:  · You passed out (lost consciousness). Watch closely for changes in your health, and be sure to contact your doctor if:  · You do not get better as expected. Where can you learn more? Go to http://sylvia-hector.info/. Enter O162 in the search box to learn more about \"Orthostatic Hypotension: Care Instructions. \"  Current as of: April 3, 2017  Content Version: 11.3  © 5116-9025 [employer].  Care instructions adapted under license by [employer] (which disclaims liability or warranty for this information). If you have questions about a medical condition or this instruction, always ask your healthcare professional. [address] any warranty or liability for your use of this information. Oral Rehydration: Care Instructions  Your Care Instructions  Dehydration occurs when your body loses too much water. This can happen if you do not drink enough fluids or lose a lot of fluid due to diarrhea, vomiting, or sweating. Being dehydrated can cause health problems and can even be life-threatening. To replace lost fluids, you need to drink liquid that contains special chemicals called electrolytes. Electrolytes keep your body working well. Plain water does not have electrolytes. You also need to rest to prevent more fluid loss. Replacing water and electrolytes (oral rehydration) completely takes about 36 hours. But you should feel better within a few hours. Follow-up care is a key part of your treatment and safety. Be sure to make and go to all appointments, and call your doctor if you are having problems. It's also a good idea to know your test results and keep a list of the medicines you take. How can you care for yourself at home? · Take frequent sips of a drink such as Gatorade, Powerade, or other rehydration drinks that your doctor suggests. These replace both fluid and important chemicals (electrolytes) you need for balance in your blood. · Drink 2 quarts of cool liquid over 2 to 4 hours. You should have at least 10 glasses of liquid a day to replace lost fluid. If you have kidney, heart, or liver disease and have to limit fluids, talk with your doctor before you increase the amount of fluids you drink. · Make your own drink. Measure everything carefully. The drink may not work well or may even be harmful if the amounts are off. ¨ 1 quart water  ¨ ½ teaspoon salt  ¨ 6 teaspoons sugar  · Do not drink liquid with caffeine, such as coffee and alayna.   · Do not drink any alcohol. It can make you dehydrated. · Drink plenty of fluids, enough so that your urine is light yellow or clear like water. If you have kidney, heart, or liver disease and have to limit fluids, talk with your doctor before you increase the amount of fluids you drink. When should you call for help? Call 911 anytime you think you may need emergency care. For example, call if:  · You have signs of severe dehydration, such as:  ¨ You are confused or unable to stay awake. ¨ You passed out (lost consciousness). Call your doctor now or seek immediate medical care if:  · You still have signs of dehydration. You have sunken eyes and a dry mouth, and you pass only a little dark urine. · You are dizzy or lightheaded, or you feel like you may faint. · You are not able to keep down fluids. Watch closely for changes in your health, and be sure to contact your doctor if:  · You do not get better as expected. Where can you learn more? Go to http://sylvia-hector.info/. Enter I040 in the search box to learn more about \"Oral Rehydration: Care Instructions. \"  Current as of: March 20, 2017  Content Version: 11.3  © 4881-1130 [employer]. Care instructions adapted under license by [employer] (which disclaims liability or warranty for this information). If you have questions about a medical condition or this instruction, always ask your healthcare professional. [place] [address] any warranty or liability for your use of this information.

## 2017-10-11 NOTE — ED NOTES
I have reviewed discharge instructions with the patient and spouse. The patient and spouse verbalized understanding. Patient left ED via Discharge Method: ambulatory to Home with wife. Opportunity for questions and clarification provided. Patient given 0 scripts.

## 2017-10-11 NOTE — ED NOTES
Pt is resting on bed while fluids are being infused. Change of position was offered. Vitals are scheduled for after bag is complete.

## 2017-10-11 NOTE — ED PROVIDER NOTES
HPI Comments: 80-year-old male w/ PMHx of emphysema presents with complaint of near syncope and constant dizziness with standing over the course of the past day. Patient denies chest pain, shortness of breath, focal weakness, numbness, tingling, nausea, vomiting, diarrhea, abdominal pain, HA, melena, hematochezia. Denies syncopal episode. States he is currently on Flomax. States has had a change in BP medication x2-3 weeks ago. Patient is a 80 y.o. male presenting with dizziness. The history is provided by the patient. Dizziness   This is a new problem. The current episode started 12 to 24 hours ago. The problem has not changed since onset. There was no focality noted. Pertinent negatives include no focal weakness, no loss of sensation, no loss of balance, no slurred speech, no speech difficulty, no memory loss, no movement disorder, no agitation, no visual change, no auditory change, no mental status change, no unresponsiveness and no disorientation. There has been no fever. Pertinent negatives include no shortness of breath, no chest pain, no vomiting, no altered mental status, no confusion, no headaches, no nausea, no bowel incontinence and no bladder incontinence. There were no medications administered prior to arrival. Associated medical issues include dementia. Associated medical issues do not include seizures or CVA. Past Medical History:   Diagnosis Date    Gastrointestinal disorder        Past Surgical History:   Procedure Laterality Date    HX APPENDECTOMY      HX GI           History reviewed. No pertinent family history. Social History     Social History    Marital status:      Spouse name: N/A    Number of children: N/A    Years of education: N/A     Occupational History    Not on file.      Social History Main Topics    Smoking status: Never Smoker    Smokeless tobacco: Never Used    Alcohol use No    Drug use: No    Sexual activity: Not on file     Other Topics Concern    Not on file     Social History Narrative         ALLERGIES: Review of patient's allergies indicates no known allergies. Review of Systems   Constitutional: Negative for chills and fever. HENT: Negative for congestion, facial swelling and sore throat. Eyes: Negative for pain and visual disturbance. Respiratory: Negative for cough and shortness of breath. Cardiovascular: Negative for chest pain, palpitations and leg swelling. Gastrointestinal: Negative for abdominal pain, bowel incontinence, constipation, nausea and vomiting. Endocrine: Negative for polydipsia. Genitourinary: Negative for bladder incontinence, dysuria, flank pain and hematuria. Musculoskeletal: Negative for back pain, joint swelling, myalgias and neck pain. Skin: Negative for pallor and wound. Neurological: Positive for dizziness. Negative for focal weakness, seizures, syncope, speech difficulty, weakness, numbness, headaches and loss of balance. Psychiatric/Behavioral: Negative for agitation, confusion and memory loss. Vitals:    10/11/17 1411 10/11/17 1449 10/11/17 1659   BP: 132/70 105/58 142/67   Pulse: 88 69 72   Resp: 14     Temp: 97.8 °F (36.6 °C)     SpO2: 96% 92% 95%   Weight: 78.5 kg (173 lb)     Height: 5' 11\" (1.803 m)              Physical Exam   Constitutional: He is oriented to person, place, and time. He appears well-developed and well-nourished. HENT:   Head: Normocephalic. Mouth/Throat: Oropharynx is clear and moist. No oropharyngeal exudate. Eyes: Conjunctivae and EOM are normal. Pupils are equal, round, and reactive to light. No nystagmus   Neck: Normal range of motion. No JVD present. No tracheal deviation present. Cardiovascular: Normal rate, regular rhythm, normal heart sounds and intact distal pulses. No murmur heard. Radial pulses 2+ and equal bilaterally    Pulmonary/Chest: Effort normal and breath sounds normal. No respiratory distress. He has no wheezes.  He has no rales. He exhibits no tenderness. Abdominal: Soft. Bowel sounds are normal. He exhibits no distension. There is no tenderness. There is no rebound and no guarding. Musculoskeletal: Normal range of motion. He exhibits no edema, tenderness or deformity. Neurological: He is alert and oriented to person, place, and time. No cranial nerve deficit. Coordination normal.   Strength 5/5 throughout. Normal sensory. Normal gait. Skin: Skin is warm and dry. No rash noted. No erythema. Nursing note and vitals reviewed. MDM  Number of Diagnoses or Management Options  QI (acute kidney injury) ([facility] [address]):   Dehydration: new and requires workup  Orthostatic hypotension: new and requires workup  Diagnosis management comments: Patient with positive orthostatics initially. Patient given 1 L IV fluid. Orthostatics redrawn with normalization. Patient ambulatory without assistance. Normal gait. Patient states he is ready for discharge.        Amount and/or Complexity of Data Reviewed  Clinical lab tests: ordered and reviewed  Tests in the radiology section of CPT®: ordered and reviewed  Tests in the medicine section of CPT®: reviewed and ordered  Review and summarize past medical records: yes  Independent visualization of images, tracings, or specimens: yes    Risk of Complications, Morbidity, and/or Mortality  Presenting problems: moderate  Diagnostic procedures: moderate  Management options: moderate    Patient Progress  Patient progress: improved    ED Course       EKG  Date/Time: 10/11/2017 3:21 PM  Performed by: Erik Marc by: Janene De La Rosa     ECG reviewed by ED Physician in the absence of a cardiologist: yes    Rate:     ECG rate:  86    ECG rate assessment: normal    Rhythm:     Rhythm: sinus rhythm    Ectopy:     Ectopy: none    QRS:     QRS axis:  Normal    QRS intervals:  Normal  Conduction:     Conduction: normal    ST segments:     ST segments:  Normal  T waves:     T waves: normal

## 2017-11-02 PROBLEM — R00.1 SINUS BRADYCARDIA: Status: RESOLVED | Noted: 2017-08-24 | Resolved: 2017-11-02

## 2017-11-02 PROBLEM — R55 NEAR SYNCOPE: Status: RESOLVED | Noted: 2017-08-23 | Resolved: 2017-11-02

## 2017-11-02 PROBLEM — K92.2 GI BLEED: Status: RESOLVED | Noted: 2017-08-24 | Resolved: 2017-11-02

## 2017-11-02 PROBLEM — I10 HYPERTENSION: Status: RESOLVED | Noted: 2017-08-24 | Resolved: 2017-11-02

## 2017-11-02 PROBLEM — D62 ACUTE BLOOD LOSS ANEMIA: Status: RESOLVED | Noted: 2017-08-24 | Resolved: 2017-11-02

## 2017-11-03 ENCOUNTER — HOSPITAL ENCOUNTER (OUTPATIENT)
Dept: CT IMAGING | Age: 82
Discharge: HOME | End: 2017-11-03
Attending: INTERNAL MEDICINE
Payer: SELF-PAY

## 2017-11-03 DIAGNOSIS — Z91.89 CARDIOVASCULAR RISK FACTOR: ICD-10-CM

## 2017-11-03 PROCEDURE — 75571 CT HRT W/O DYE W/CA TEST: CPT

## 2017-11-05 NOTE — PROGRESS NOTES
Let Mr. Jose Barrios know he has extensive plaque in his coronaries-score 1779. He should see a cardiologist for a stress test vs cath. Suggest he see Dr. Snow Freed of [facility]. How to interpret Coronary Calcium Score Results:  1-10: Minimal plaque burden with low risk cardiovascular disease. : Mild plaque burden with moderate risk of cardiovascular disease. 101-400: Moderate plaque burden with high risk of cardiovascular disease. Greater than 401: Extensive plaque burden with a very high cardiovascular  disease risk.

## 2017-11-10 ENCOUNTER — HOSPITAL ENCOUNTER (OUTPATIENT)
Dept: LAB | Age: 82
Discharge: HOME | End: 2017-11-10
Payer: MEDICARE

## 2017-11-10 DIAGNOSIS — R53.82 CHRONIC FATIGUE: ICD-10-CM

## 2017-11-10 DIAGNOSIS — I25.119 CORONARY ARTERY DISEASE INVOLVING NATIVE CORONARY ARTERY OF NATIVE HEART WITH ANGINA PECTORIS (HCC): ICD-10-CM

## 2017-11-10 LAB
ANION GAP SERPL CALC-SCNC: 6 MMOL/L (ref 7–16)
BASOPHILS # BLD: 0 K/UL (ref 0–0.2)
BASOPHILS NFR BLD: 1 % (ref 0–2)
BNP SERPL-MCNC: 165 PG/ML
BUN SERPL-MCNC: 22 MG/DL (ref 8–23)
CALCIUM SERPL-MCNC: 8.4 MG/DL (ref 8.3–10.4)
CHLORIDE SERPL-SCNC: 105 MMOL/L (ref 98–107)
CO2 SERPL-SCNC: 31 MMOL/L (ref 21–32)
CREAT SERPL-MCNC: 1.18 MG/DL (ref 0.8–1.5)
DIFFERENTIAL METHOD BLD: ABNORMAL
EOSINOPHIL # BLD: 0.1 K/UL (ref 0–0.8)
EOSINOPHIL NFR BLD: 1 % (ref 0.5–7.8)
ERYTHROCYTE [DISTWIDTH] IN BLOOD BY AUTOMATED COUNT: 13.2 % (ref 11.9–14.6)
GLUCOSE SERPL-MCNC: 111 MG/DL (ref 65–100)
HCT VFR BLD AUTO: 39.8 % (ref 41.1–50.3)
HGB BLD-MCNC: 12.7 G/DL (ref 13.6–17.2)
INR PPP: 1 (ref 0.9–1.2)
LYMPHOCYTES # BLD: 1.3 K/UL (ref 0.5–4.6)
LYMPHOCYTES NFR BLD: 23 % (ref 13–44)
MCH RBC QN AUTO: 30.7 PG (ref 26.1–32.9)
MCHC RBC AUTO-ENTMCNC: 31.9 G/DL (ref 31.4–35)
MCV RBC AUTO: 96.1 FL (ref 79.6–97.8)
MONOCYTES # BLD: 0.5 K/UL (ref 0.1–1.3)
MONOCYTES NFR BLD: 9 % (ref 4–12)
NEUTS SEG # BLD: 3.6 K/UL (ref 1.7–8.2)
NEUTS SEG NFR BLD: 66 % (ref 43–78)
PLATELET # BLD AUTO: 225 K/UL (ref 150–450)
PMV BLD AUTO: 8.9 FL (ref 10.8–14.1)
POTASSIUM SERPL-SCNC: 3.8 MMOL/L (ref 3.5–5.1)
PROTHROMBIN TIME: 10.4 SEC (ref 9.6–12)
RBC # BLD AUTO: 4.14 M/UL (ref 4.23–5.67)
SODIUM SERPL-SCNC: 142 MMOL/L (ref 136–145)
TSH SERPL DL<=0.005 MIU/L-ACNC: 1.45 UIU/ML (ref 0.36–3.74)
WBC # BLD AUTO: 5.6 K/UL (ref 4.3–11.1)

## 2017-11-10 PROCEDURE — 84443 ASSAY THYROID STIM HORMONE: CPT | Performed by: INTERNAL MEDICINE

## 2017-11-10 PROCEDURE — 85025 COMPLETE CBC W/AUTO DIFF WBC: CPT | Performed by: INTERNAL MEDICINE

## 2017-11-10 PROCEDURE — 36415 COLL VENOUS BLD VENIPUNCTURE: CPT | Performed by: INTERNAL MEDICINE

## 2017-11-10 PROCEDURE — 83880 ASSAY OF NATRIURETIC PEPTIDE: CPT | Performed by: INTERNAL MEDICINE

## 2017-11-10 PROCEDURE — 80048 BASIC METABOLIC PNL TOTAL CA: CPT | Performed by: INTERNAL MEDICINE

## 2017-11-10 PROCEDURE — 85610 PROTHROMBIN TIME: CPT | Performed by: INTERNAL MEDICINE

## 2017-11-13 ENCOUNTER — HOSPITAL ENCOUNTER (OUTPATIENT)
Dept: CARDIAC CATH/INVASIVE PROCEDURES | Age: 82
Discharge: HOME | End: 2017-11-13
Attending: INTERNAL MEDICINE | Admitting: INTERNAL MEDICINE
Payer: MEDICARE

## 2017-11-13 VITALS
RESPIRATION RATE: 37 BRPM | HEART RATE: 53 BPM | OXYGEN SATURATION: 95 % | DIASTOLIC BLOOD PRESSURE: 62 MMHG | WEIGHT: 175 LBS | SYSTOLIC BLOOD PRESSURE: 135 MMHG | HEIGHT: 71 IN | BODY MASS INDEX: 24.5 KG/M2

## 2017-11-13 PROCEDURE — 74011000250 HC RX REV CODE- 250: Performed by: INTERNAL MEDICINE

## 2017-11-13 PROCEDURE — 74011250636 HC RX REV CODE- 250/636: Performed by: INTERNAL MEDICINE

## 2017-11-13 PROCEDURE — 77030015766

## 2017-11-13 PROCEDURE — C1894 INTRO/SHEATH, NON-LASER: HCPCS

## 2017-11-13 PROCEDURE — C8929 TTE W OR WO FOL WCON,DOPPLER: HCPCS

## 2017-11-13 PROCEDURE — C1769 GUIDE WIRE: HCPCS

## 2017-11-13 PROCEDURE — 74011636320 HC RX REV CODE- 636/320: Performed by: INTERNAL MEDICINE

## 2017-11-13 PROCEDURE — 77030029997 HC DEV COM RDL R BND TELE -B

## 2017-11-13 PROCEDURE — 93458 L HRT ARTERY/VENTRICLE ANGIO: CPT

## 2017-11-13 PROCEDURE — 77030004559 HC CATH ANGI DX SUPT CARD -B

## 2017-11-13 PROCEDURE — 99152 MOD SED SAME PHYS/QHP 5/>YRS: CPT

## 2017-11-13 PROCEDURE — 77030004534 HC CATH ANGI DX INFN CARD -A

## 2017-11-13 PROCEDURE — 74011250636 HC RX REV CODE- 250/636

## 2017-11-13 RX ORDER — LIDOCAINE HYDROCHLORIDE 20 MG/ML
60 INJECTION, SOLUTION INFILTRATION; PERINEURAL ONCE
Status: COMPLETED | OUTPATIENT
Start: 2017-11-13 | End: 2017-11-13

## 2017-11-13 RX ORDER — MIDAZOLAM HYDROCHLORIDE 1 MG/ML
.5-2 INJECTION, SOLUTION INTRAMUSCULAR; INTRAVENOUS
Status: DISCONTINUED | OUTPATIENT
Start: 2017-11-13 | End: 2017-11-13 | Stop reason: HOSPADM

## 2017-11-13 RX ORDER — DIAZEPAM 5 MG/1
5 TABLET ORAL ONCE
Status: DISCONTINUED | OUTPATIENT
Start: 2017-11-13 | End: 2017-11-13 | Stop reason: HOSPADM

## 2017-11-13 RX ORDER — HEPARIN SODIUM 200 [USP'U]/100ML
3 INJECTION, SOLUTION INTRAVENOUS CONTINUOUS
Status: DISCONTINUED | OUTPATIENT
Start: 2017-11-13 | End: 2017-11-13 | Stop reason: HOSPADM

## 2017-11-13 RX ORDER — SODIUM CHLORIDE 0.9 % (FLUSH) 0.9 %
5-10 SYRINGE (ML) INJECTION AS NEEDED
Status: DISCONTINUED | OUTPATIENT
Start: 2017-11-13 | End: 2017-11-13 | Stop reason: HOSPADM

## 2017-11-13 RX ORDER — SODIUM CHLORIDE 0.9 % (FLUSH) 0.9 %
5-10 SYRINGE (ML) INJECTION EVERY 8 HOURS
Status: DISCONTINUED | OUTPATIENT
Start: 2017-11-13 | End: 2017-11-13 | Stop reason: HOSPADM

## 2017-11-13 RX ORDER — SODIUM CHLORIDE 9 MG/ML
75 INJECTION, SOLUTION INTRAVENOUS CONTINUOUS
Status: DISCONTINUED | OUTPATIENT
Start: 2017-11-13 | End: 2017-11-13 | Stop reason: HOSPADM

## 2017-11-13 RX ORDER — GUAIFENESIN 100 MG/5ML
81-324 LIQUID (ML) ORAL
Status: DISCONTINUED | OUTPATIENT
Start: 2017-11-13 | End: 2017-11-13 | Stop reason: HOSPADM

## 2017-11-13 RX ORDER — FENTANYL CITRATE 50 UG/ML
25-50 INJECTION, SOLUTION INTRAMUSCULAR; INTRAVENOUS
Status: DISCONTINUED | OUTPATIENT
Start: 2017-11-13 | End: 2017-11-13 | Stop reason: HOSPADM

## 2017-11-13 RX ADMIN — MIDAZOLAM HYDROCHLORIDE 2 MG: 1 INJECTION, SOLUTION INTRAMUSCULAR; INTRAVENOUS at 10:38

## 2017-11-13 RX ADMIN — IOPAMIDOL 80 ML: 755 INJECTION, SOLUTION INTRAVENOUS at 10:57

## 2017-11-13 RX ADMIN — PERFLUTREN 1 ML: 6.52 INJECTION, SUSPENSION INTRAVENOUS at 13:00

## 2017-11-13 RX ADMIN — LIDOCAINE HYDROCHLORIDE 60 MG: 20 INJECTION, SOLUTION INFILTRATION; PERINEURAL at 10:38

## 2017-11-13 RX ADMIN — FENTANYL CITRATE 25 MCG: 50 INJECTION, SOLUTION INTRAMUSCULAR; INTRAVENOUS at 10:38

## 2017-11-13 RX ADMIN — SODIUM CHLORIDE 75 ML/HR: 900 INJECTION, SOLUTION INTRAVENOUS at 07:16

## 2017-11-13 RX ADMIN — LIDOCAINE HYDROCHLORIDE 60 MG: 20 INJECTION, SOLUTION INFILTRATION; PERINEURAL at 10:45

## 2017-11-13 RX ADMIN — HEPARIN SODIUM 3 ML/HR: 200 INJECTION, SOLUTION INTRAVENOUS at 10:38

## 2017-11-13 NOTE — PROGRESS NOTES
6FR sheath removed from right femoral artery. Manual pressure held for 20 minutes until hemostasis achieved. No bleeding or hematoma noted afterwards. Sterile dressing placed. Pt instructed to keep right leg straight and to remain flat. Pt verbalized understanding of post procedural instructions. Call bell within reach. Will continue to monitor. Family at bedside. Hemostasis achieved at 1140.

## 2017-11-13 NOTE — DISCHARGE INSTRUCTIONS
HEART CATHETERIZATION/ANGIOGRAPHY DISCHARGE INSTRUCTIONS    1. Check puncture site frequently for swelling or bleeding. If there is any bleeding, lie down and apply pressure over the area with a clean towel or washcloth. Notify your doctor for any redness, swelling, drainage, or oozing from the puncture site. Notify your doctor for any fever or chills. 2. If the extremity becomes cold, numb, or painful call [facility] at [phone].  3. Activity should be limited for the next 48 hours. Climb stairs as little as possible and avoid any pushing, pulling, stooping, bending, or strenuous activity for 48 hours. No heavy lifting (anything over 10 pounds) for 3 days. 4. You may resume your usual diet. Drink more fluids than usual.  5. Have a responsible person drive you home and stay with you for at least 24 hours after your heart catheterization/angiography. 6. You may remove bandage from your Right groin and right wrist in 24 hours. You may shower in 24 hours. No tub baths, hot tubs, or swimming for 1 week. Do not place any lotions, creams, powders, or ointments over puncture site for 1 week. You may place a clean band-aid over the puncture site each day for 5 days. Change daily. I have read the above instructions and have had the opportunity to ask questions.       Patient: ________________________   Date: 11/13/2017    Witness: _______________________   Date: 11/13/2017

## 2017-11-13 NOTE — PROGRESS NOTES
TRANSFER - OUT REPORT:    Verbal report given to morro(name) on Arun Rivera  being transferred to cpru(unit) for routine progression of care       Report consisted of patients Situation, Background, Assessment and   Recommendations(SBAR). Information from the following report(s) SBAR was reviewed with the receiving nurse. Opportunity for questions and clarification was provided. Procedure: [facility]   Finding Summary: multiple blockages-surgery candidate(cath/pci/pacer settings)  Location: r wrist, r band 10 ml    Closure Device: r band right wrist(yes/no/description)                   R groin, 6f sheath in place  Post Site Assessment: no bleeding no hematoma to either site. Transparent dressing, dry and intact to right groin         Versed: 2mg  Fentanyl: 25mcg               Peripheral IV 11/13/17 Left Wrist (Active)       Peripheral IV 11/13/17 Right Forearm (Active)     Sheath 11/13/17 (Active)   Site Assessment Clean, dry, & intact 11/13/2017 10:57 AM   Dressing Status Clean, dry, & intact 11/13/2017 10:57 AM   Dressing Type Transparent 11/13/2017 10:57 AM   Hub Color/Line Status Green 11/13/2017 10:57 AM     Post-Procedure Site Assessment (1)  Wound Type: Catheter entry/exit  Location: Wrist  Orientation : Right  Hemostasis : TR Band (10ml)  Site Assessment: No bleeding, No hematoma, Dry/intact                       has No Known Allergies.     Past Medical History:   Diagnosis Date    Asymptomatic gallstones     CAD (coronary artery disease)     Centrilobular emphysema CT Findings (HCC)     Chronic anemia     Chronic renal insufficiency, stage III (moderate)     Cognitive impairment     Colon polyps     Dyslipidemia     GERD (gastroesophageal reflux disease)     History of penile implant     Mood disorder (HCC)     Sigmoid diverticulosis      Visit Vitals    /84    Pulse (!) 56    Resp 16    Ht 5' 11\" (1.803 m)    Wt 79.4 kg (175 lb)    SpO2 100%    BMI 24.41 kg/m2

## 2017-11-13 NOTE — CONSULTS
[address]       Name:  Minh Jordan   MR#:  [id number]   :  1934   Account #:  [de-identified]   Date of Adm:  2017       REFERRING PHYSICIAN: Lissy Trinidad MD.    PRIMARY CARDIOLOGIST: Tate Quintanilla MD.    PRIMARY PHYSICIAN: Delfin Simon MD.    DIAGNOSES: Severe multivessel atherosclerotic coronary artery   disease. HISTORY: The patient is a very pleasant 80-year-old gentleman   who is quite active, who had been recently experiencing some   [place] class 3 symptoms. He underwent coronary calcium scoring   on 2017 which came back . Left heart catheterization   today confirmed severe multivessel disease with preserved LV   function. Currently, he is chest pain free. PAST MEDICAL HISTORY: Remarkable for:    1. Coronary artery disease. 2. Chronic fatigue. 3. Sigmoid diverticulosis. 4. Mood disorder. 5. Dyslipidemia. 6. Chronic anemia. 7. Gallstones. 8. Chronic renal insufficiency, stage III. PAST SURGICAL HISTORY: Remarkable for status post appendectomy   and penile implant. HOME MEDICATIONS:    1. Hemocyte 324 mg daily. 2.  1.5 mg b.i.d.   3. Artificial Tears p.r.n.   4. Aspirin 325 mg daily. 5. Zantac 150 mg b.i.d.   6. Zoloft 100 mg daily. 7. Lamictal 200 mg daily. 8. Lipitor 20 mg daily. ALLERGIES: NO KNOWN DRUG ALLERGIES. FAMILY HISTORY: Negative for premature coronary artery disease. SOCIAL HISTORY: Never smoked. No alcohol use. Currently . REVIEW OF SYSTEMS   He denies history of stroke, TIAs, amaurosis, otherwise   negative. PHYSICAL EXAMINATION   GENERAL: He is a well-developed, well-nourished 80-year-old   gentleman, alert and oriented x4 in no acute distress. HEENT: Exam is normal.   NECK: No bruits are noted. LUNGS: Clear to auscultation bilaterally. HEART: Regular rate, no murmurs. ABDOMEN: Soft, nontender, no masses noted. EXTREMITIES: Without clubbing or edema. NEUROLOGIC: Grossly nonfocal.    IMPRESSION: Severe multivessel disease with preserved left   ventricular function. PLAN: I discussed in detail with the patient and his wife both   the alternatives to and risks and benefits of CABG. They did see   our teaching video. Risks include, but not limited to:   1. Bleeding. 2. Infection including mediastinitis. 3. Myocardial infarction. 4. Stroke. 5. Potential death. All questions were answered. They state they understand and wish   to proceed.         MD CARLOS Farley / Mira Claros   D:  11/13/2017   14:44   T:  11/13/2017   15:02   Job #:  [id number]

## 2017-11-13 NOTE — PROCEDURES
[address]       Name:  Gabrielle Matthews   MR#:  [id number]   :  1934   Account #:  [de-identified]   Date of Adm:  2017       DATE OF PROCEDURE: 2017    CLINICAL INFORMATION: The patient with chest pain, shortness of   breath and exertional fatigue consistent with [place] class III   angina referred for catheterization. PROCEDURE DETAILS: After informed consent was obtained, a 6-  [place] sheath was placed in the right radial artery. A radial   cocktail was given by protocol. A 5-[place] Tiger wire could not   be placed through a loop in the patient's proximal radial artery   and therefore a 6-[place] sheath was placed in the right femoral   artery. A TR band was placed in the radial cath site and a 6-  [place] JL4, Juan C right angle pigtail were used. Manual   pressure will be used to gain hemostasis at the cath site. Conscious sedation was given by Louis Nance RN under my   supervision beginning at 10:35 and concluding at 10:57. A total   of 2 mg of Versed and 25 mcg of fentanyl were given. Vital signs   and saturations were stable throughout. FINDINGS: The left ventricle is normal size with normal systolic   function. Ejection fraction of 60%. Left ventricular end-  diastolic pressure is 5 mmHg. Opening aortic pressure is 130/60   with no gradient across the aortic valve. The left main coronary artery is in normal anatomic position,   free of significant disease. It bifurcates into LAD and   circumflex system. The mid LAD has 60-70% narrowing. The distal vessel is free of   significant disease. The diagonal branch has a 50-60% proximal narrowing. This is a   large diagonal branch with two limbs. The superior limb has a   90% narrowing within it. The circumflex has a 80-90% narrowing in the mid vessel before   the takeoff of the RCA. The right coronary artery is a dominant vessel, heavily   calcified.  There is diffuse narrowing in the mid vessel up to   30%. There is a distal 85% narrowing before the takeoff of the   PDA and posterolateral obtuse marginal branch. The PDA has   diffuse narrowing up to 60-70% as well. CONCLUSION:   1. Significant diffuse 3-vessel coronary artery disease with   preserved left ventricular systolic function. 2. Manual pressure for hemostasis at the femoral catheterization   site and a TR band at the radial catheterization site. RECOMMENDATIONS: CT Surgery consultation.         MD Jasiel Hernandez / Carolyn Jones   D:  11/13/2017   11:17   T:  11/13/2017   12:21   Job #:  [id number]

## 2017-11-13 NOTE — IP AVS SNAPSHOT
[address] 
[phone] Patient: Jackson Downey MRN: [id number] :1934 Discharge Summary 2017 Jackson Downey MRN[de-identified]  [id number] Admission Information Provider Pager Service Admission Date Expected D/C Date Mauri Cowden, MD  CARDIAC CATH LAB 2017 Actual LOS Patient Class 0 days OUTPATIENT Follow-up Information None My Medications ASK your physician about these medications Instructions Each Dose to Equal  
 Morning Noon Evening Bedtime  
 aspirin 325 mg tablet Commonly known as:  ASPIRIN Your last dose was: Your next dose is: Take 325 mg by mouth daily. 325 mg  
    
   
   
   
  
 atorvastatin 20 mg tablet Commonly known as:  LIPITOR Your last dose was: Your next dose is: Take 20 mg by mouth daily. 20 mg Blood Pressure Test Kit-Wrist Kit Commonly known as:  Blood Pressure Unit Your last dose was: Your next dose is:    
   
   
 Check BP 2 times per day and write it down  
     
   
   
   
  
 cholecalciferol 1,000 unit Cap Commonly known as:  VITAMIN D3 Your last dose was: Your next dose is: Take  by mouth daily. dextran 70-hypromellose ophthalmic solution Commonly known as:  ARTIFICIAL TEARS Your last dose was: Your next dose is:    
   
   
 Administer  to both eyes as needed. ferrous fumarate 324 mg (106 mg iron) Tab Commonly known as:  HEMOCYTE Your last dose was: Your next dose is: Take 1 Tab by mouth daily. 1 Tab  
    
   
   
   
  
 fluticasone 50 mcg/actuation nasal spray Commonly known as:  Polly George Your last dose was: Your next dose is: 2 Sprays by Both Nostrils route daily. 2 Spray  
    
   
   
   
  
 lamoTRIgine 200 mg tablet Commonly known as: LaMICtal  
   
Your last dose was: Your next dose is: Take 200 mg by mouth daily. 200 mg  
    
   
   
   
  
 raNITIdine hcl 150 mg capsule Your last dose was: Your next dose is: Take 150 mg by mouth two (2) times a day. 150 mg  
    
   
   
   
  
 rivastigmine tartrate 1.5 mg capsule Commonly known as:  EXELON Your last dose was: Your next dose is: Take  by mouth two (2) times daily (with meals). sertraline 100 mg tablet Commonly known as:  ZOLOFT Your last dose was: Your next dose is: Take 150 mg by mouth daily. 150 mg General Information Please provide this summary of care documentation to your next provider. Allergies No Known Allergies Current Immunizations  Never Reviewed No immunizations on file. Discharge Instructions Discharge Instructions HEART CATHETERIZATION/ANGIOGRAPHY DISCHARGE INSTRUCTIONS 1. Check puncture site frequently for swelling or bleeding. If there is any bleeding, lie down and apply pressure over the area with a clean towel or washcloth. Notify your doctor for any redness, swelling, drainage, or oozing from the puncture site. Notify your doctor for any fever or chills. 2. If the extremity becomes cold, numb, or painful call [facility] at [phone]. 
3. Activity should be limited for the next 48 hours. Climb stairs as little as possible and avoid any pushing, pulling, stooping, bending, or strenuous activity for 48 hours. No heavy lifting (anything over 10 pounds) for 3 days. 4. You may resume your usual diet.  Drink more fluids than usual. 
5. Have a responsible person drive you home and stay with you for at least 24 hours after your heart catheterization/angiography. 6. You may remove bandage from your Right groin and right wrist in 24 hours. You may shower in 24 hours. No tub baths, hot tubs, or swimming for 1 week. Do not place any lotions, creams, powders, or ointments over puncture site for 1 week. You may place a clean band-aid over the puncture site each day for 5 days. Change daily. I have read the above instructions and have had the opportunity to ask questions. Patient: ________________________   Date: 11/13/2017 Witness: _______________________   Date: 11/13/2017 Discharge Orders None  
  
` Patient Signature:  ____________________________________________________________ Date:  ____________________________________________________________  
  
 Paulene Greener Provider Signature:  ____________________________________________________________ Date:  ____________________________________________________________

## 2017-11-13 NOTE — PROGRESS NOTES
Terumo band removed without difficulty. Sterile dressing applied to wrist. No signs or symptoms of bleeding or hematoma.

## 2017-11-13 NOTE — PROCEDURES
Brief Cardiac Procedure Note    Patient: Arun Rivera MRN: [id number]  SSN: [id number]    YOB: 1934  Age: 80 y.o. Sex: male      Date of Procedure: 11/13/2017     Pre-procedure Diagnosis: Chest pain CCS Class III    Post-procedure Diagnosis: Coronary Artery Disease    Procedure: Left Heart Catheterization    Brief Description of Procedure: lhc via right radial, tr    Performed By: Michelle Christie MD     Assistants: none    Anesthesia: Moderate Sedation    Estimated Blood Loss: Less than 10 mL      Specimens: None    Implants: None    Findings: 3 vessel cad with normal lvef    Complications: None    Recommendations: Continue medical therapy.     Signed By: Michelle Christie MD     November 13, 2017

## 2017-11-13 NOTE — PROGRESS NOTES
Discharge instructions given per orders, voiced good understanding of post radial and femoral cath care, medications & follow up care. Denies any questions or concerns.  Discharged to home with family

## 2017-11-13 NOTE — PROGRESS NOTES
Patient received to [address] [place] [address] room # 6  Ambulatory from [facility]. Patient scheduled for C today with Dr Mehran Rojas. Procedure reviewed & questions answered, voiced good understanding consent obtained & placed on chart. All medications and medical history reviewed. Will prep patient per orders. Patient & family updated on plan of care.

## 2017-11-15 ENCOUNTER — HOSPITAL ENCOUNTER (OUTPATIENT)
Dept: ULTRASOUND IMAGING | Age: 82
Discharge: HOME | DRG: 236 | End: 2017-11-15
Attending: THORACIC SURGERY (CARDIOTHORACIC VASCULAR SURGERY)
Payer: MEDICARE

## 2017-11-15 ENCOUNTER — HOSPITAL ENCOUNTER (OUTPATIENT)
Dept: SURGERY | Age: 82
Discharge: HOME | DRG: 236 | End: 2017-11-15
Payer: MEDICARE

## 2017-11-15 ENCOUNTER — ANESTHESIA EVENT (OUTPATIENT)
Dept: SURGERY | Age: 82
DRG: 236 | End: 2017-11-15
Payer: MEDICARE

## 2017-11-15 ENCOUNTER — HOSPITAL ENCOUNTER (OUTPATIENT)
Dept: GENERAL RADIOLOGY | Age: 82
Discharge: HOME | DRG: 236 | End: 2017-11-15
Attending: THORACIC SURGERY (CARDIOTHORACIC VASCULAR SURGERY)
Payer: MEDICARE

## 2017-11-15 VITALS
OXYGEN SATURATION: 96 % | BODY MASS INDEX: 27.24 KG/M2 | HEART RATE: 66 BPM | DIASTOLIC BLOOD PRESSURE: 65 MMHG | SYSTOLIC BLOOD PRESSURE: 159 MMHG | HEIGHT: 69 IN | WEIGHT: 183.9 LBS | RESPIRATION RATE: 17 BRPM | TEMPERATURE: 97.9 F

## 2017-11-15 LAB
ALBUMIN SERPL-MCNC: 3.9 G/DL (ref 3.2–4.6)
ANION GAP SERPL CALC-SCNC: 6 MMOL/L (ref 7–16)
APPEARANCE UR: CLEAR
APTT PPP: 28.1 SEC (ref 23.5–31.7)
ATRIAL RATE: 53 BPM
BACTERIA SPEC CULT: NORMAL
BILIRUB SERPL-MCNC: 0.3 MG/DL (ref 0.2–1.1)
BILIRUB UR QL: NEGATIVE
BUN SERPL-MCNC: 27 MG/DL (ref 8–23)
CALCIUM SERPL-MCNC: 9.1 MG/DL (ref 8.3–10.4)
CALCULATED P AXIS, ECG09: 66 DEGREES
CALCULATED R AXIS, ECG10: 42 DEGREES
CALCULATED T AXIS, ECG11: 64 DEGREES
CHLORIDE SERPL-SCNC: 106 MMOL/L (ref 98–107)
CO2 SERPL-SCNC: 30 MMOL/L (ref 21–32)
COLOR UR: YELLOW
CREAT SERPL-MCNC: 1.12 MG/DL (ref 0.8–1.5)
DIAGNOSIS, 93000: NORMAL
ERYTHROCYTE [DISTWIDTH] IN BLOOD BY AUTOMATED COUNT: 13.7 % (ref 11.9–14.6)
EST. AVERAGE GLUCOSE BLD GHB EST-MCNC: 108 MG/DL
GLUCOSE SERPL-MCNC: 65 MG/DL (ref 65–100)
GLUCOSE UR STRIP.AUTO-MCNC: NEGATIVE MG/DL
HBA1C MFR BLD: 5.4 % (ref 4.8–6)
HCT VFR BLD AUTO: 40.9 % (ref 41.1–50.3)
HGB BLD-MCNC: 13 G/DL (ref 13.6–17.2)
HGB UR QL STRIP: NEGATIVE
INR PPP: 1 (ref 0.9–1.2)
KETONES UR QL STRIP.AUTO: NEGATIVE MG/DL
LEUKOCYTE ESTERASE UR QL STRIP.AUTO: NEGATIVE
MCH RBC QN AUTO: 30.2 PG (ref 26.1–32.9)
MCHC RBC AUTO-ENTMCNC: 31.8 G/DL (ref 31.4–35)
MCV RBC AUTO: 94.9 FL (ref 79.6–97.8)
NITRITE UR QL STRIP.AUTO: NEGATIVE
P-R INTERVAL, ECG05: 154 MS
PH UR STRIP: 5.5 [PH] (ref 5–9)
PLATELET # BLD AUTO: 273 K/UL (ref 150–450)
PMV BLD AUTO: 9.9 FL (ref 10.8–14.1)
POTASSIUM SERPL-SCNC: 3.8 MMOL/L (ref 3.5–5.1)
PROT UR STRIP-MCNC: NEGATIVE MG/DL
PROTHROMBIN TIME: 10.5 SEC (ref 9.6–12)
Q-T INTERVAL, ECG07: 436 MS
QRS DURATION, ECG06: 94 MS
QTC CALCULATION (BEZET), ECG08: 409 MS
RBC # BLD AUTO: 4.31 M/UL (ref 4.23–5.67)
SERVICE CMNT-IMP: NORMAL
SODIUM SERPL-SCNC: 142 MMOL/L (ref 136–145)
SP GR UR REFRACTOMETRY: 1.02 (ref 1–1.02)
UROBILINOGEN UR QL STRIP.AUTO: 0.2 EU/DL (ref 0.2–1)
VENTRICULAR RATE, ECG03: 53 BPM
WBC # BLD AUTO: 5.9 K/UL (ref 4.3–11.1)

## 2017-11-15 PROCEDURE — 77030027138 HC INCENT SPIROMETER -A

## 2017-11-15 PROCEDURE — 77030012890

## 2017-11-15 RX ORDER — CHLORHEXIDINE GLUCONATE 4 G/100ML
SOLUTION TOPICAL DAILY PRN
COMMUNITY
End: 2017-11-22

## 2017-11-15 RX ORDER — SODIUM CHLORIDE 9 MG/ML
250 INJECTION, SOLUTION INTRAVENOUS AS NEEDED
Status: DISCONTINUED | OUTPATIENT
Start: 2017-11-15 | End: 2017-11-19 | Stop reason: HOSPADM

## 2017-11-15 RX ORDER — MUPIROCIN 20 MG/G
OINTMENT TOPICAL DAILY
COMMUNITY
End: 2017-11-22

## 2017-11-15 RX ORDER — AMIODARONE HYDROCHLORIDE 200 MG/1
600 TABLET ORAL ONCE
COMMUNITY
Start: 2017-11-16 | End: 2017-11-22

## 2017-11-15 NOTE — ANESTHESIA PREPROCEDURE EVALUATION
Anesthetic History               Review of Systems / Medical History  Patient summary reviewed, nursing notes reviewed and pertinent labs reviewed    Pulmonary    COPD (Emphysema): moderate    Sleep apnea (Suspected)           Neuro/Psych         TIA     Cardiovascular              CAD (3-vessel obstructive coronary dz w preserved LV systolic function) and hyperlipidemia    Exercise tolerance: <4 METS     GI/Hepatic/Renal     GERD: well controlled      PUD (Remote hx)     Endo/Other             Other Findings              Physical Exam    Airway  Mallampati: II  TM Distance: > 6 cm  Neck ROM: decreased range of motion   Mouth opening: Normal     Cardiovascular  Regular rate and rhythm,  S1 and S2 normal,  no murmur, click, rub, or gallop             Dental    Dentition: Caps/crowns     Pulmonary      Decreased breath sounds: bilateral           Abdominal  GI exam deferred       Other Findings            Anesthetic Plan    ASA: 4  Anesthesia type: general    Monitoring Plan: Arterial line, BIS, CVP, [place] and BRETT    Post procedure ventilation     Anesthetic plan and risks discussed with: Patient and Spouse

## 2017-11-15 NOTE — PERIOP NOTES
Patient agrees that pt name, date of birth and procedure is correct. All questions were answered to the best of their ability. Patient is here with his wife Buzz Chirinos. Dr Ang Neal in to assess patient per anesthesia protocol. All cardiac records reviewed and approved-- EKG today, Echo 11/13/17, Cath Report 11/13/17 and last office note. Patient viewed preoperative heart teaching video. Pre op instructions and education sheets given and reviewed with patient:  Heart instructions, central line catheter infection prevention, ventilator education,  blood transfusion education, hand hygiene education, smoking cessation education, pain management education. Patient verbalizes understanding of all. Patient given incentive spirometer with verbal instructions,demonstrates proficient use, and verbalizes understanding to bring incentive spirometer on day of surgery. Patient seen by respiratory therapist, and FEV1 results on chart. Pt instructed on importance of handwashing for infection prevention. Pt verbalizes understanding to shower nightly with non-moisturizing soap provided at pre assessment for THREE nights prior to surgery, and on the night before and morning of surgery wash with HIBICLENS from chin to toes avoiding genitalia, then rinse. Pt verbalizes understanding to repeat this the morning of surgery. Pt provided list of current medications. Pt verbalizes understanding to take listed medications only on morning of surgery: Aspirin, lamictal, ranitidine, sertraline, rivastigmine and mupirocin ointment    Left message for Cody Puente @ Dr Tyra Hillman office informing that pt takes Aspirin 325mg and orders state to continue. Requested Cody Puente contact the patient for any concerns or changes. Pt verbalizes understanding to hold the following medications: None    Pt verbalizes understanding to CONTINUE ALL OTHER MEDICATIONS AS PRESCRIBED UNTIL DAY OF SURGERY.     Prescriptions called to patient's pharmacy: [facility]  Patient given written and verbal instructions to obtain and instructions for use. Pt understands instructions to apply Mupirocin ointment with clean q-tips to each nostril twice daily and morning of surgery. Pt verbalizes understanding to take Amiodarone 200 mg 3 tablets after 4 pm the night before heart surgery. Lopressor 12.5 mg take by mouth after 4 pm the night before heart surgery. One time dose with no refills. MRSA swab and clean catch urine obtained and sent to lab. Patient ID armband placed on patient's arm, and patient given copy of order for CXR and carotid ultrasound. Patient verbalizes understanding to proceed to radiology after labs are drawn to have both CXR and carotid ultrasound completed before leaving the hospital today. Labs drawn including blood bank labs. [employer] blood bank wrist band placed on the patient's right wrist and pt verbalizes understanding not to remove the band until discharged from the hospital after surgery. Patient verbalizes understanding that arrival time will be called to patient on the weekday prior to surgery date and that patient must check phone messages. If patient has any questions regarding arrival times call [phone]. PT. INSTRUCTED TO CALL THE FOLLOWING NUMBERS IF ANY SAFETY CONCERNS BEFORE, DURING, OR AFTER HOSPITAL ENCOUNTER: PT. SAFETY [facility] - [phone] OR PT. RELATIONS - [phone].

## 2017-11-15 NOTE — PERIOP NOTES
Recent Results (from the past 12 hour(s))   CBC W/O DIFF    Collection Time: 11/15/17  8:46 AM   Result Value Ref Range    WBC 5.9 4.3 - 11.1 K/uL    RBC 4.31 4.23 - 5.67 M/uL    HGB 13.0 (L) 13.6 - 17.2 g/dL    HCT 40.9 (L) 41.1 - 50.3 %    MCV 94.9 79.6 - 97.8 FL    MCH 30.2 26.1 - 32.9 PG    MCHC 31.8 31.4 - 35.0 g/dL    RDW 13.7 11.9 - 14.6 %    PLATELET 899 721 - 118 K/uL    MPV 9.9 (L) 10.8 - 88.2 FL   METABOLIC PANEL, BASIC    Collection Time: 11/15/17  8:46 AM   Result Value Ref Range    Sodium 142 136 - 145 mmol/L    Potassium 3.8 3.5 - 5.1 mmol/L    Chloride 106 98 - 107 mmol/L    CO2 30 21 - 32 mmol/L    Anion gap 6 (L) 7 - 16 mmol/L    Glucose 65 65 - 100 mg/dL    BUN 27 (H) 8 - 23 MG/DL    Creatinine 1.12 0.8 - 1.5 MG/DL    GFR est AA >60 >60 ml/min/1.73m2    GFR est non-AA >60 >60 ml/min/1.73m2    Calcium 9.1 8.3 - 10.4 MG/DL   MSSA/MRSA SC BY PCR, NASAL SWAB    Collection Time: 11/15/17  8:46 AM   Result Value Ref Range    Special Requests: NO SPECIAL REQUESTS      Culture result:        SA target not detected. A MRSA NEGATIVE, SA NEGATIVE test result does not preclude MRSA or SA nasal colonization.    HEMOGLOBIN A1C WITH EAG    Collection Time: 11/15/17  8:46 AM   Result Value Ref Range    Hemoglobin A1c 5.4 4.8 - 6.0 %    Est. average glucose 108 mg/dL   PROTHROMBIN TIME + INR    Collection Time: 11/15/17  8:46 AM   Result Value Ref Range    Prothrombin time 10.5 9.6 - 12.0 sec    INR 1.0 0.9 - 1.2     PTT    Collection Time: 11/15/17  8:46 AM   Result Value Ref Range    aPTT 28.1 23.5 - 31.7 SEC   BILIRUBIN, TOTAL    Collection Time: 11/15/17  8:46 AM   Result Value Ref Range    Bilirubin, total 0.3 0.2 - 1.1 MG/DL   ALBUMIN    Collection Time: 11/15/17  8:46 AM   Result Value Ref Range    Albumin 3.9 3.2 - 4.6 g/dL   TYPE + CROSSMATCH    Collection Time: 11/15/17  8:46 AM   Result Value Ref Range    Crossmatch Expiration 11/18/2017     ABO/Rh(D) A NEGATIVE Antibody screen NEG    URINALYSIS W/ RFLX MICROSCOPIC    Collection Time: 11/15/17  8:46 AM   Result Value Ref Range    Color YELLOW      Appearance CLEAR      Specific gravity 1.024 (H) 1.001 - 1.023      pH (UA) 5.5 5.0 - 9.0      Protein NEGATIVE  NEG mg/dL    Glucose NEGATIVE  mg/dL    Ketone NEGATIVE  NEG mg/dL    Bilirubin NEGATIVE  NEG      Blood NEGATIVE  NEG      Urobilinogen 0.2 0.2 - 1.0 EU/dL    Nitrites NEGATIVE  NEG      Leukocyte Esterase NEGATIVE  NEG

## 2017-11-16 RX ORDER — TRANEXAMIC ACID 100 MG/ML
500 INJECTION, SOLUTION INTRAVENOUS ONCE
Status: DISCONTINUED | OUTPATIENT
Start: 2017-11-17 | End: 2017-11-17 | Stop reason: HOSPADM

## 2017-11-17 ENCOUNTER — HOSPITAL ENCOUNTER (INPATIENT)
Age: 82
LOS: 5 days | Discharge: HOME HEALTH | DRG: 236 | End: 2017-11-22
Attending: THORACIC SURGERY (CARDIOTHORACIC VASCULAR SURGERY) | Admitting: THORACIC SURGERY (CARDIOTHORACIC VASCULAR SURGERY)
Payer: MEDICARE

## 2017-11-17 ENCOUNTER — APPOINTMENT (OUTPATIENT)
Dept: GENERAL RADIOLOGY | Age: 82
DRG: 236 | End: 2017-11-17
Attending: THORACIC SURGERY (CARDIOTHORACIC VASCULAR SURGERY)
Payer: MEDICARE

## 2017-11-17 ENCOUNTER — ANESTHESIA (OUTPATIENT)
Dept: SURGERY | Age: 82
DRG: 236 | End: 2017-11-17
Payer: MEDICARE

## 2017-11-17 DIAGNOSIS — R09.02 HYPOXIA: ICD-10-CM

## 2017-11-17 DIAGNOSIS — Z95.1 S/P CABG X 4: ICD-10-CM

## 2017-11-17 DIAGNOSIS — J43.2 CENTRILOBULAR EMPHYSEMA (HCC): Chronic | ICD-10-CM

## 2017-11-17 DIAGNOSIS — Z99.11 ENCOUNTER FOR WEANING FROM VENTILATOR (HCC): ICD-10-CM

## 2017-11-17 DIAGNOSIS — R41.89 COGNITIVE IMPAIRMENT: ICD-10-CM

## 2017-11-17 DIAGNOSIS — J98.11 ATELECTASIS: ICD-10-CM

## 2017-11-17 DIAGNOSIS — G47.34 NOCTURNAL HYPOXEMIA: ICD-10-CM

## 2017-11-17 DIAGNOSIS — D62 ACUTE BLOOD LOSS ANEMIA: ICD-10-CM

## 2017-11-17 PROBLEM — I25.10 CAD (CORONARY ARTERY DISEASE): Chronic | Status: ACTIVE | Noted: 2017-11-17

## 2017-11-17 LAB
ANION GAP SERPL CALC-SCNC: 8 MMOL/L (ref 7–16)
APTT PPP: 36.5 SEC (ref 23.5–31.7)
ARTERIAL PATENCY WRIST A: POSITIVE
ATRIAL RATE: 78 BPM
BASE DEFICIT BLD-SCNC: 1 MMOL/L
BASE DEFICIT BLD-SCNC: 1 MMOL/L
BASE DEFICIT BLDA-SCNC: 3.5 MMOL/L (ref 0–2)
BASE EXCESS BLD CALC-SCNC: 1 MMOL/L
BASE EXCESS BLD CALC-SCNC: 3 MMOL/L
BDY SITE: ABNORMAL
BUN SERPL-MCNC: 23 MG/DL (ref 8–23)
CA-I BLD-MCNC: 1.17 MMOL/L (ref 1.12–1.32)
CA-I BLD-MCNC: 1.18 MMOL/L (ref 1.12–1.32)
CA-I BLD-MCNC: 1.19 MMOL/L (ref 1.12–1.32)
CA-I BLD-MCNC: 1.27 MMOL/L (ref 1.12–1.32)
CA-I BLD-MCNC: 1.28 MMOL/L (ref 1.12–1.32)
CA-I BLD-MCNC: 1.31 MMOL/L (ref 1.12–1.32)
CALCIUM SERPL-MCNC: 7.9 MG/DL (ref 8.3–10.4)
CALCULATED P AXIS, ECG09: 54 DEGREES
CALCULATED R AXIS, ECG10: 43 DEGREES
CALCULATED T AXIS, ECG11: 69 DEGREES
CHLORIDE SERPL-SCNC: 112 MMOL/L (ref 98–107)
CO2 SERPL-SCNC: 24 MMOL/L (ref 21–32)
COHGB MFR BLD: 0.3 % (ref 0.5–1.5)
CREAT SERPL-MCNC: 1.15 MG/DL (ref 0.8–1.5)
DIAGNOSIS, 93000: NORMAL
DO-HGB BLD-MCNC: 2 % (ref 0–5)
ERYTHROCYTE [DISTWIDTH] IN BLOOD BY AUTOMATED COUNT: 14 % (ref 11.9–14.6)
FIBRINOGEN PPP-MCNC: 148 MG/DL (ref 172–437)
FIO2 ON VENT: 60 %
GLUCOSE BLD STRIP.AUTO-MCNC: 102 MG/DL (ref 65–100)
GLUCOSE BLD STRIP.AUTO-MCNC: 105 MG/DL (ref 65–100)
GLUCOSE BLD STRIP.AUTO-MCNC: 107 MG/DL (ref 65–100)
GLUCOSE BLD STRIP.AUTO-MCNC: 111 MG/DL (ref 65–100)
GLUCOSE BLD STRIP.AUTO-MCNC: 111 MG/DL (ref 65–100)
GLUCOSE BLD STRIP.AUTO-MCNC: 121 MG/DL (ref 65–100)
GLUCOSE BLD STRIP.AUTO-MCNC: 129 MG/DL (ref 65–100)
GLUCOSE BLD STRIP.AUTO-MCNC: 130 MG/DL (ref 65–100)
GLUCOSE BLD STRIP.AUTO-MCNC: 131 MG/DL (ref 65–100)
GLUCOSE BLD STRIP.AUTO-MCNC: 138 MG/DL (ref 65–100)
GLUCOSE BLD STRIP.AUTO-MCNC: 143 MG/DL (ref 65–100)
GLUCOSE BLD STRIP.AUTO-MCNC: 154 MG/DL (ref 65–100)
GLUCOSE SERPL-MCNC: 126 MG/DL (ref 65–100)
HCO3 BLD-SCNC: 25 MMOL/L (ref 22–26)
HCO3 BLD-SCNC: 25.2 MMOL/L (ref 22–26)
HCO3 BLD-SCNC: 26.6 MMOL/L (ref 22–26)
HCO3 BLD-SCNC: 26.6 MMOL/L (ref 22–26)
HCO3 BLD-SCNC: 27.1 MMOL/L (ref 22–26)
HCO3 BLD-SCNC: 28.9 MMOL/L (ref 22–26)
HCO3 BLDA-SCNC: 23 MMOL/L (ref 22–26)
HCT VFR BLD AUTO: 28.6 % (ref 41.1–50.3)
HCT VFR BLD AUTO: 31.4 % (ref 41.1–50.3)
HCT VFR BLD AUTO: 32.2 % (ref 41.1–50.3)
HGB BLD-MCNC: 10.2 G/DL (ref 13.6–17.2)
HGB BLD-MCNC: 10.5 G/DL (ref 13.6–17.2)
HGB BLD-MCNC: 9.4 G/DL (ref 13.6–17.2)
HGB BLDMV-MCNC: 10.2 GM/DL (ref 11.7–15)
INR PPP: 1.2 (ref 0.9–1.2)
MAGNESIUM SERPL-MCNC: 2.1 MG/DL (ref 1.8–2.4)
MAGNESIUM SERPL-MCNC: 2.4 MG/DL (ref 1.8–2.4)
MAGNESIUM SERPL-MCNC: 3.1 MG/DL (ref 1.8–2.4)
MCH RBC QN AUTO: 30.3 PG (ref 26.1–32.9)
MCHC RBC AUTO-ENTMCNC: 32.9 G/DL (ref 31.4–35)
MCV RBC AUTO: 92.3 FL (ref 79.6–97.8)
METHGB MFR BLD: 0.5 % (ref 0–1.5)
OXYHGB MFR BLDA: 97.6 % (ref 94–97)
P-R INTERVAL, ECG05: 214 MS
PCO2 BLD: 45.5 MMHG (ref 35–45)
PCO2 BLD: 48.6 MMHG (ref 35–45)
PCO2 BLD: 49.1 MMHG (ref 35–45)
PCO2 BLD: 50.3 MMHG (ref 35–45)
PCO2 BLD: 51.1 MMHG (ref 35–45)
PCO2 BLD: 53.3 MMHG (ref 35–45)
PCO2 BLDA: 49 MMHG (ref 35–45)
PEEP RESPIRATORY: 8 CM[H2O]
PH BLD: 7.31 [PH] (ref 7.35–7.45)
PH BLD: 7.31 [PH] (ref 7.35–7.45)
PH BLD: 7.32 [PH] (ref 7.35–7.45)
PH BLD: 7.34 [PH] (ref 7.35–7.45)
PH BLD: 7.36 [PH] (ref 7.35–7.45)
PH BLD: 7.38 [PH] (ref 7.35–7.45)
PH BLDA: 7.29 [PH] (ref 7.35–7.45)
PLATELET # BLD AUTO: 134 K/UL (ref 150–450)
PMV BLD AUTO: 9.4 FL (ref 10.8–14.1)
PO2 BLD: 189 MMHG (ref 80–105)
PO2 BLD: 316 MMHG (ref 80–105)
PO2 BLD: 355 MMHG (ref 80–105)
PO2 BLD: 365 MMHG (ref 80–105)
PO2 BLD: 453 MMHG (ref 80–105)
PO2 BLD: 505 MMHG (ref 80–105)
PO2 BLDA: 195 MMHG (ref 80–105)
POTASSIUM BLD-SCNC: 3.9 MMOL/L (ref 3.5–5.1)
POTASSIUM BLD-SCNC: 4 MMOL/L (ref 3.5–5.1)
POTASSIUM BLD-SCNC: 4 MMOL/L (ref 3.5–5.1)
POTASSIUM BLD-SCNC: 4.3 MMOL/L (ref 3.5–5.1)
POTASSIUM BLD-SCNC: 4.7 MMOL/L (ref 3.5–5.1)
POTASSIUM BLD-SCNC: 4.8 MMOL/L (ref 3.5–5.1)
POTASSIUM SERPL-SCNC: 3.8 MMOL/L (ref 3.5–5.1)
POTASSIUM SERPL-SCNC: 4.2 MMOL/L (ref 3.5–5.1)
POTASSIUM SERPL-SCNC: 4.5 MMOL/L (ref 3.5–5.1)
PROTHROMBIN TIME: 12.6 SEC (ref 9.6–12)
Q-T INTERVAL, ECG07: 412 MS
QRS DURATION, ECG06: 92 MS
QTC CALCULATION (BEZET), ECG08: 469 MS
RBC # BLD AUTO: 3.1 M/UL (ref 4.23–5.67)
RESP RATE: 16
SAO2 % BLD: 100 % (ref 95–98)
SAO2 % BLD: 98 % (ref 92–98.5)
SODIUM BLD-SCNC: 137 MMOL/L (ref 136–145)
SODIUM BLD-SCNC: 138 MMOL/L (ref 136–145)
SODIUM BLD-SCNC: 139 MMOL/L (ref 136–145)
SODIUM BLD-SCNC: 139 MMOL/L (ref 136–145)
SODIUM BLD-SCNC: 140 MMOL/L (ref 136–145)
SODIUM BLD-SCNC: 140 MMOL/L (ref 136–145)
SODIUM SERPL-SCNC: 144 MMOL/L (ref 136–145)
VENTILATION MODE VENT: ABNORMAL
VENTRICULAR RATE, ECG03: 78 BPM
VT SETTING VENT: 500 ML
WBC # BLD AUTO: 10.3 K/UL (ref 4.3–11.1)

## 2017-11-17 PROCEDURE — 77030002986 HC SUT PROL J&J -A: Performed by: THORACIC SURGERY (CARDIOTHORACIC VASCULAR SURGERY)

## 2017-11-17 PROCEDURE — 74011000250 HC RX REV CODE- 250: Performed by: THORACIC SURGERY (CARDIOTHORACIC VASCULAR SURGERY)

## 2017-11-17 PROCEDURE — 77030003034 HC SUT WRE CRD J&J -B: Performed by: THORACIC SURGERY (CARDIOTHORACIC VASCULAR SURGERY)

## 2017-11-17 PROCEDURE — 5A1223Z PERFORMANCE OF CARDIAC PACING, CONTINUOUS: ICD-10-PCS | Performed by: THORACIC SURGERY (CARDIOTHORACIC VASCULAR SURGERY)

## 2017-11-17 PROCEDURE — 77030002520 HC INSRT CLMP LATIS STLTH AMR -B: Performed by: THORACIC SURGERY (CARDIOTHORACIC VASCULAR SURGERY)

## 2017-11-17 PROCEDURE — 85610 PROTHROMBIN TIME: CPT | Performed by: THORACIC SURGERY (CARDIOTHORACIC VASCULAR SURGERY)

## 2017-11-17 PROCEDURE — 77030020751 HC FLTR TBNG TRNSFUS HAEM -A: Performed by: THORACIC SURGERY (CARDIOTHORACIC VASCULAR SURGERY)

## 2017-11-17 PROCEDURE — 74011250637 HC RX REV CODE- 250/637: Performed by: THORACIC SURGERY (CARDIOTHORACIC VASCULAR SURGERY)

## 2017-11-17 PROCEDURE — 77030005521 HC CATH URETH FOL38 BARD -B: Performed by: THORACIC SURGERY (CARDIOTHORACIC VASCULAR SURGERY)

## 2017-11-17 PROCEDURE — 77030010939 HC CLP LIG TELE -B: Performed by: THORACIC SURGERY (CARDIOTHORACIC VASCULAR SURGERY)

## 2017-11-17 PROCEDURE — C1751 CATH, INF, PER/CENT/MIDLINE: HCPCS | Performed by: ANESTHESIOLOGY

## 2017-11-17 PROCEDURE — C1769 GUIDE WIRE: HCPCS | Performed by: THORACIC SURGERY (CARDIOTHORACIC VASCULAR SURGERY)

## 2017-11-17 PROCEDURE — 77030018836 HC SOL IRR NACL ICUM -A: Performed by: THORACIC SURGERY (CARDIOTHORACIC VASCULAR SURGERY)

## 2017-11-17 PROCEDURE — 74011250637 HC RX REV CODE- 250/637: Performed by: NURSE PRACTITIONER

## 2017-11-17 PROCEDURE — 77030002970 HC SUT PLEDG TELE -A: Performed by: THORACIC SURGERY (CARDIOTHORACIC VASCULAR SURGERY)

## 2017-11-17 PROCEDURE — 77030008703 HC TU ET UNCUF COVD -A: Performed by: ANESTHESIOLOGY

## 2017-11-17 PROCEDURE — 77030008477 HC STYL SATN SLP COVD -A: Performed by: ANESTHESIOLOGY

## 2017-11-17 PROCEDURE — 82947 ASSAY GLUCOSE BLOOD QUANT: CPT

## 2017-11-17 PROCEDURE — 85018 HEMOGLOBIN: CPT | Performed by: THORACIC SURGERY (CARDIOTHORACIC VASCULAR SURGERY)

## 2017-11-17 PROCEDURE — 77030005401 HC CATH RAD ARRO -A: Performed by: ANESTHESIOLOGY

## 2017-11-17 PROCEDURE — 77030013797 HC KT TRNSDUC PRSSR EDWD -A: Performed by: THORACIC SURGERY (CARDIOTHORACIC VASCULAR SURGERY)

## 2017-11-17 PROCEDURE — 77030010512 HC APPL CLP LIG J&J -C: Performed by: THORACIC SURGERY (CARDIOTHORACIC VASCULAR SURGERY)

## 2017-11-17 PROCEDURE — 77030005537 HC CATH URETH BARD -A: Performed by: THORACIC SURGERY (CARDIOTHORACIC VASCULAR SURGERY)

## 2017-11-17 PROCEDURE — 77030018547 HC SUT ETHBND1 J&J -B: Performed by: THORACIC SURGERY (CARDIOTHORACIC VASCULAR SURGERY)

## 2017-11-17 PROCEDURE — 77030009995: Performed by: THORACIC SURGERY (CARDIOTHORACIC VASCULAR SURGERY)

## 2017-11-17 PROCEDURE — 82803 BLOOD GASES ANY COMBINATION: CPT

## 2017-11-17 PROCEDURE — 77030016687: Performed by: THORACIC SURGERY (CARDIOTHORACIC VASCULAR SURGERY)

## 2017-11-17 PROCEDURE — 77030006689 HC BLD OPHTH BVR BD -A: Performed by: THORACIC SURGERY (CARDIOTHORACIC VASCULAR SURGERY)

## 2017-11-17 PROCEDURE — 74011000258 HC RX REV CODE- 258: Performed by: THORACIC SURGERY (CARDIOTHORACIC VASCULAR SURGERY)

## 2017-11-17 PROCEDURE — 74011250636 HC RX REV CODE- 250/636

## 2017-11-17 PROCEDURE — 65610000006 HC RM INTENSIVE CARE

## 2017-11-17 PROCEDURE — 77030013292 HC BOWL MX PRSM J&J -A: Performed by: ANESTHESIOLOGY

## 2017-11-17 PROCEDURE — 77030009355 HC CRDPLG DEL SET QUES -C: Performed by: THORACIC SURGERY (CARDIOTHORACIC VASCULAR SURGERY)

## 2017-11-17 PROCEDURE — 77030027138 HC INCENT SPIROMETER -A

## 2017-11-17 PROCEDURE — 77030002996 HC SUT SLK J&J -A: Performed by: THORACIC SURGERY (CARDIOTHORACIC VASCULAR SURGERY)

## 2017-11-17 PROCEDURE — 74011636637 HC RX REV CODE- 636/637: Performed by: THORACIC SURGERY (CARDIOTHORACIC VASCULAR SURGERY)

## 2017-11-17 PROCEDURE — 77030032994 HC SOL INJ SOD CL 0.9% LFCR 500ML

## 2017-11-17 PROCEDURE — 77030019908 HC STETH ESOPH SIMS -A: Performed by: ANESTHESIOLOGY

## 2017-11-17 PROCEDURE — 77030012890

## 2017-11-17 PROCEDURE — 77030018548 HC SUT ETHBND2 J&J -B: Performed by: THORACIC SURGERY (CARDIOTHORACIC VASCULAR SURGERY)

## 2017-11-17 PROCEDURE — 99223 1ST HOSP IP/OBS HIGH 75: CPT | Performed by: INTERNAL MEDICINE

## 2017-11-17 PROCEDURE — 77030025827 HC BG BLD DNR AUTLG MEDT -A: Performed by: THORACIC SURGERY (CARDIOTHORACIC VASCULAR SURGERY)

## 2017-11-17 PROCEDURE — 02100A9 BYPASS CORONARY ARTERY, ONE ARTERY FROM LEFT INTERNAL MAMMARY WITH AUTOLOGOUS ARTERIAL TISSUE, OPEN APPROACH: ICD-10-PCS | Performed by: THORACIC SURGERY (CARDIOTHORACIC VASCULAR SURGERY)

## 2017-11-17 PROCEDURE — 93005 ELECTROCARDIOGRAM TRACING: CPT | Performed by: THORACIC SURGERY (CARDIOTHORACIC VASCULAR SURGERY)

## 2017-11-17 PROCEDURE — 77030008771 HC TU NG SALEM SUMP -A: Performed by: ANESTHESIOLOGY

## 2017-11-17 PROCEDURE — 82962 GLUCOSE BLOOD TEST: CPT

## 2017-11-17 PROCEDURE — 77030034927 HC PK PROC CPB INSPIRE PERF LIVA -F: Performed by: THORACIC SURGERY (CARDIOTHORACIC VASCULAR SURGERY)

## 2017-11-17 PROCEDURE — 74011000258 HC RX REV CODE- 258

## 2017-11-17 PROCEDURE — 77030005424 HC CATH THOR GTNG -A: Performed by: THORACIC SURGERY (CARDIOTHORACIC VASCULAR SURGERY)

## 2017-11-17 PROCEDURE — P9045 ALBUMIN (HUMAN), 5%, 250 ML: HCPCS | Performed by: THORACIC SURGERY (CARDIOTHORACIC VASCULAR SURGERY)

## 2017-11-17 PROCEDURE — 77030003037 HC SUT WRE STRNOTMY AEMC -B: Performed by: THORACIC SURGERY (CARDIOTHORACIC VASCULAR SURGERY)

## 2017-11-17 PROCEDURE — 77030020407 HC IV BLD WRMR ST 3M -A: Performed by: ANESTHESIOLOGY

## 2017-11-17 PROCEDURE — P9045 ALBUMIN (HUMAN), 5%, 250 ML: HCPCS

## 2017-11-17 PROCEDURE — 77030013861 HC PNCH AORT CLNCUT QUES -B: Performed by: THORACIC SURGERY (CARDIOTHORACIC VASCULAR SURGERY)

## 2017-11-17 PROCEDURE — 77030018571 HC SUT PROL1 J&J -B: Performed by: THORACIC SURGERY (CARDIOTHORACIC VASCULAR SURGERY)

## 2017-11-17 PROCEDURE — 36600 WITHDRAWAL OF ARTERIAL BLOOD: CPT

## 2017-11-17 PROCEDURE — 77030025646 HC AUTOTRNSFUS KT TERU -C: Performed by: THORACIC SURGERY (CARDIOTHORACIC VASCULAR SURGERY)

## 2017-11-17 PROCEDURE — 83735 ASSAY OF MAGNESIUM: CPT | Performed by: THORACIC SURGERY (CARDIOTHORACIC VASCULAR SURGERY)

## 2017-11-17 PROCEDURE — 77030003010 HC SUT SURG STL J&J -B: Performed by: THORACIC SURGERY (CARDIOTHORACIC VASCULAR SURGERY)

## 2017-11-17 PROCEDURE — 94002 VENT MGMT INPAT INIT DAY: CPT

## 2017-11-17 PROCEDURE — 77030002966 HC SUT PDS J&J -A: Performed by: THORACIC SURGERY (CARDIOTHORACIC VASCULAR SURGERY)

## 2017-11-17 PROCEDURE — 85384 FIBRINOGEN ACTIVITY: CPT | Performed by: THORACIC SURGERY (CARDIOTHORACIC VASCULAR SURGERY)

## 2017-11-17 PROCEDURE — 77030016564 HC BLD STRNL SAW4 CNMD -B: Performed by: THORACIC SURGERY (CARDIOTHORACIC VASCULAR SURGERY)

## 2017-11-17 PROCEDURE — 74011250637 HC RX REV CODE- 250/637: Performed by: ANESTHESIOLOGY

## 2017-11-17 PROCEDURE — 74011000250 HC RX REV CODE- 250

## 2017-11-17 PROCEDURE — 74011250636 HC RX REV CODE- 250/636: Performed by: ANESTHESIOLOGY

## 2017-11-17 PROCEDURE — 77030018729 HC ELECTRD DEFIB PAD CARD -B: Performed by: THORACIC SURGERY (CARDIOTHORACIC VASCULAR SURGERY)

## 2017-11-17 PROCEDURE — 77030015758: Performed by: THORACIC SURGERY (CARDIOTHORACIC VASCULAR SURGERY)

## 2017-11-17 PROCEDURE — 77030020751 HC FLTR TBNG TRNSFUS HAEM -A: Performed by: ANESTHESIOLOGY

## 2017-11-17 PROCEDURE — 74011250637 HC RX REV CODE- 250/637

## 2017-11-17 PROCEDURE — 77030010514 HC APPL CLP LIG COVD -B: Performed by: THORACIC SURGERY (CARDIOTHORACIC VASCULAR SURGERY)

## 2017-11-17 PROCEDURE — P9047 ALBUMIN (HUMAN), 25%, 50ML: HCPCS

## 2017-11-17 PROCEDURE — 76010000199 HC CV SURG 4.5 TO 5 HR INTENSV-TIER 1: Performed by: THORACIC SURGERY (CARDIOTHORACIC VASCULAR SURGERY)

## 2017-11-17 PROCEDURE — 80048 BASIC METABOLIC PNL TOTAL CA: CPT | Performed by: THORACIC SURGERY (CARDIOTHORACIC VASCULAR SURGERY)

## 2017-11-17 PROCEDURE — 84132 ASSAY OF SERUM POTASSIUM: CPT | Performed by: THORACIC SURGERY (CARDIOTHORACIC VASCULAR SURGERY)

## 2017-11-17 PROCEDURE — 77030013794 HC KT TRNSDUC BLD EDWD -B: Performed by: ANESTHESIOLOGY

## 2017-11-17 PROCEDURE — 3E080GC INTRODUCTION OF OTHER THERAPEUTIC SUBSTANCE INTO HEART, OPEN APPROACH: ICD-10-PCS | Performed by: THORACIC SURGERY (CARDIOTHORACIC VASCULAR SURGERY)

## 2017-11-17 PROCEDURE — 76060000041 HC ANESTHESIA 5 TO 5.5 HR: Performed by: THORACIC SURGERY (CARDIOTHORACIC VASCULAR SURGERY)

## 2017-11-17 PROCEDURE — 77030031139 HC SUT VCRL2 J&J -A: Performed by: THORACIC SURGERY (CARDIOTHORACIC VASCULAR SURGERY)

## 2017-11-17 PROCEDURE — 85730 THROMBOPLASTIN TIME PARTIAL: CPT | Performed by: THORACIC SURGERY (CARDIOTHORACIC VASCULAR SURGERY)

## 2017-11-17 PROCEDURE — 86580 TB INTRADERMAL TEST: CPT | Performed by: THORACIC SURGERY (CARDIOTHORACIC VASCULAR SURGERY)

## 2017-11-17 PROCEDURE — 74011250636 HC RX REV CODE- 250/636: Performed by: THORACIC SURGERY (CARDIOTHORACIC VASCULAR SURGERY)

## 2017-11-17 PROCEDURE — 77030016791 HC CATH CV SWN1 EDWD -C: Performed by: ANESTHESIOLOGY

## 2017-11-17 PROCEDURE — 85027 COMPLETE CBC AUTOMATED: CPT | Performed by: THORACIC SURGERY (CARDIOTHORACIC VASCULAR SURGERY)

## 2017-11-17 PROCEDURE — C1729 CATH, DRAINAGE: HCPCS | Performed by: THORACIC SURGERY (CARDIOTHORACIC VASCULAR SURGERY)

## 2017-11-17 PROCEDURE — 71010 XR CHEST SNGL V: CPT

## 2017-11-17 PROCEDURE — 77030014007 HC SPNG HEMSTAT J&J -B: Performed by: THORACIC SURGERY (CARDIOTHORACIC VASCULAR SURGERY)

## 2017-11-17 PROCEDURE — 5A1221Z PERFORMANCE OF CARDIAC OUTPUT, CONTINUOUS: ICD-10-PCS | Performed by: THORACIC SURGERY (CARDIOTHORACIC VASCULAR SURGERY)

## 2017-11-17 PROCEDURE — 74011000302 HC RX REV CODE- 302: Performed by: THORACIC SURGERY (CARDIOTHORACIC VASCULAR SURGERY)

## 2017-11-17 PROCEDURE — 77030002888 HC SUT CHRMC J&J -A: Performed by: THORACIC SURGERY (CARDIOTHORACIC VASCULAR SURGERY)

## 2017-11-17 PROCEDURE — 77030034888 HC SUT PROL 2 J&J -B: Performed by: THORACIC SURGERY (CARDIOTHORACIC VASCULAR SURGERY)

## 2017-11-17 PROCEDURE — 021209W BYPASS CORONARY ARTERY, THREE ARTERIES FROM AORTA WITH AUTOLOGOUS VENOUS TISSUE, OPEN APPROACH: ICD-10-PCS | Performed by: THORACIC SURGERY (CARDIOTHORACIC VASCULAR SURGERY)

## 2017-11-17 PROCEDURE — 76010000155 HC AUTO TRANSFUSION/CELL SAVER: Performed by: THORACIC SURGERY (CARDIOTHORACIC VASCULAR SURGERY)

## 2017-11-17 PROCEDURE — 77030002987 HC SUT PROL J&J -B: Performed by: THORACIC SURGERY (CARDIOTHORACIC VASCULAR SURGERY)

## 2017-11-17 PROCEDURE — 77030010813: Performed by: THORACIC SURGERY (CARDIOTHORACIC VASCULAR SURGERY)

## 2017-11-17 PROCEDURE — 06BQ0ZZ EXCISION OF LEFT SAPHENOUS VEIN, OPEN APPROACH: ICD-10-PCS | Performed by: THORACIC SURGERY (CARDIOTHORACIC VASCULAR SURGERY)

## 2017-11-17 PROCEDURE — 77030011640 HC PAD GRND REM COVD -A: Performed by: THORACIC SURGERY (CARDIOTHORACIC VASCULAR SURGERY)

## 2017-11-17 PROCEDURE — 77010033711 HC HIGH FLOW OXYGEN

## 2017-11-17 PROCEDURE — 77030020782 HC GWN BAIR PAWS FLX 3M -B: Performed by: ANESTHESIOLOGY

## 2017-11-17 RX ORDER — SODIUM CHLORIDE, SODIUM LACTATE, POTASSIUM CHLORIDE, CALCIUM CHLORIDE 600; 310; 30; 20 MG/100ML; MG/100ML; MG/100ML; MG/100ML
100 INJECTION, SOLUTION INTRAVENOUS CONTINUOUS
Status: DISCONTINUED | OUTPATIENT
Start: 2017-11-17 | End: 2017-11-17 | Stop reason: HOSPADM

## 2017-11-17 RX ORDER — MORPHINE SULFATE 10 MG/ML
3-5 INJECTION, SOLUTION INTRAMUSCULAR; INTRAVENOUS
Status: DISCONTINUED | OUTPATIENT
Start: 2017-11-17 | End: 2017-11-18

## 2017-11-17 RX ORDER — OXYCODONE AND ACETAMINOPHEN 10; 325 MG/1; MG/1
1 TABLET ORAL
Status: DISCONTINUED | OUTPATIENT
Start: 2017-11-17 | End: 2017-11-18

## 2017-11-17 RX ORDER — SODIUM CHLORIDE 0.9 % (FLUSH) 0.9 %
5-10 SYRINGE (ML) INJECTION EVERY 8 HOURS
Status: DISCONTINUED | OUTPATIENT
Start: 2017-11-17 | End: 2017-11-17 | Stop reason: HOSPADM

## 2017-11-17 RX ORDER — GUAIFENESIN 100 MG/5ML
81 LIQUID (ML) ORAL DAILY
Status: DISCONTINUED | OUTPATIENT
Start: 2017-11-18 | End: 2017-11-18

## 2017-11-17 RX ORDER — SODIUM CHLORIDE 0.9 % (FLUSH) 0.9 %
5-10 SYRINGE (ML) INJECTION EVERY 8 HOURS
Status: DISCONTINUED | OUTPATIENT
Start: 2017-11-17 | End: 2017-11-18

## 2017-11-17 RX ORDER — POTASSIUM CHLORIDE 14.9 MG/ML
10 INJECTION INTRAVENOUS AS NEEDED
Status: DISCONTINUED | OUTPATIENT
Start: 2017-11-17 | End: 2017-11-18

## 2017-11-17 RX ORDER — DEXTROSE, SODIUM CHLORIDE, AND POTASSIUM CHLORIDE 5; .45; .15 G/100ML; G/100ML; G/100ML
25 INJECTION INTRAVENOUS CONTINUOUS
Status: DISCONTINUED | OUTPATIENT
Start: 2017-11-17 | End: 2017-11-18

## 2017-11-17 RX ORDER — MIDAZOLAM HYDROCHLORIDE 1 MG/ML
INJECTION, SOLUTION INTRAMUSCULAR; INTRAVENOUS AS NEEDED
Status: DISCONTINUED | OUTPATIENT
Start: 2017-11-17 | End: 2017-11-17 | Stop reason: HOSPADM

## 2017-11-17 RX ORDER — CEFAZOLIN SODIUM IN 0.9 % NACL 2 G/50 ML
2 INTRAVENOUS SOLUTION, PIGGYBACK (ML) INTRAVENOUS ONCE
Status: COMPLETED | OUTPATIENT
Start: 2017-11-17 | End: 2017-11-17

## 2017-11-17 RX ORDER — CEFAZOLIN SODIUM 1 G/3ML
INJECTION, POWDER, FOR SOLUTION INTRAMUSCULAR; INTRAVENOUS AS NEEDED
Status: DISCONTINUED | OUTPATIENT
Start: 2017-11-17 | End: 2017-11-17 | Stop reason: HOSPADM

## 2017-11-17 RX ORDER — ALBUMIN HUMAN 50 G/1000ML
SOLUTION INTRAVENOUS AS NEEDED
Status: DISCONTINUED | OUTPATIENT
Start: 2017-11-17 | End: 2017-11-17 | Stop reason: HOSPADM

## 2017-11-17 RX ORDER — CEFAZOLIN SODIUM IN 0.9 % NACL 2 G/50 ML
2 INTRAVENOUS SOLUTION, PIGGYBACK (ML) INTRAVENOUS EVERY 8 HOURS
Status: COMPLETED | OUTPATIENT
Start: 2017-11-17 | End: 2017-11-18

## 2017-11-17 RX ORDER — SODIUM CHLORIDE 9 MG/ML
INJECTION, SOLUTION INTRAVENOUS
Status: DISCONTINUED | OUTPATIENT
Start: 2017-11-17 | End: 2017-11-17 | Stop reason: HOSPADM

## 2017-11-17 RX ORDER — PROTAMINE SULFATE 10 MG/ML
INJECTION, SOLUTION INTRAVENOUS AS NEEDED
Status: DISCONTINUED | OUTPATIENT
Start: 2017-11-17 | End: 2017-11-17 | Stop reason: HOSPADM

## 2017-11-17 RX ORDER — FAMOTIDINE 20 MG/1
20 TABLET, FILM COATED ORAL ONCE
Status: COMPLETED | OUTPATIENT
Start: 2017-11-17 | End: 2017-11-17

## 2017-11-17 RX ORDER — VECURONIUM BROMIDE FOR INJECTION 1 MG/ML
INJECTION, POWDER, LYOPHILIZED, FOR SOLUTION INTRAVENOUS AS NEEDED
Status: DISCONTINUED | OUTPATIENT
Start: 2017-11-17 | End: 2017-11-17 | Stop reason: HOSPADM

## 2017-11-17 RX ORDER — AMIODARONE HYDROCHLORIDE 200 MG/1
400 TABLET ORAL ONCE
Status: DISCONTINUED | OUTPATIENT
Start: 2017-11-17 | End: 2017-11-17 | Stop reason: HOSPADM

## 2017-11-17 RX ORDER — NALOXONE HYDROCHLORIDE 0.4 MG/ML
0.4 INJECTION, SOLUTION INTRAMUSCULAR; INTRAVENOUS; SUBCUTANEOUS AS NEEDED
Status: DISCONTINUED | OUTPATIENT
Start: 2017-11-17 | End: 2017-11-18

## 2017-11-17 RX ORDER — PROPOFOL 10 MG/ML
0-50 VIAL (ML) INTRAVENOUS
Status: DISCONTINUED | OUTPATIENT
Start: 2017-11-17 | End: 2017-11-17

## 2017-11-17 RX ORDER — LIDOCAINE HCL/PF 100 MG/5ML
50-100 SYRINGE (ML) INTRAVENOUS
Status: DISCONTINUED | OUTPATIENT
Start: 2017-11-17 | End: 2017-11-18

## 2017-11-17 RX ORDER — DEXTROSE 50 % IN WATER (D50W) INTRAVENOUS SYRINGE
25 AS NEEDED
Status: DISCONTINUED | OUTPATIENT
Start: 2017-11-17 | End: 2017-11-18

## 2017-11-17 RX ORDER — MUPIROCIN 20 MG/G
OINTMENT TOPICAL 2 TIMES DAILY
Status: DISCONTINUED | OUTPATIENT
Start: 2017-11-17 | End: 2017-11-18

## 2017-11-17 RX ORDER — SODIUM CHLORIDE 9 MG/ML
25 INJECTION, SOLUTION INTRAVENOUS CONTINUOUS
Status: DISCONTINUED | OUTPATIENT
Start: 2017-11-17 | End: 2017-11-17

## 2017-11-17 RX ORDER — SODIUM CHLORIDE 0.9 % (FLUSH) 0.9 %
5-10 SYRINGE (ML) INJECTION AS NEEDED
Status: DISCONTINUED | OUTPATIENT
Start: 2017-11-17 | End: 2017-11-18

## 2017-11-17 RX ORDER — SODIUM CHLORIDE 0.9 % (FLUSH) 0.9 %
5-10 SYRINGE (ML) INJECTION AS NEEDED
Status: DISCONTINUED | OUTPATIENT
Start: 2017-11-17 | End: 2017-11-17 | Stop reason: HOSPADM

## 2017-11-17 RX ORDER — MUPIROCIN 20 MG/G
OINTMENT TOPICAL ONCE
Status: DISCONTINUED | OUTPATIENT
Start: 2017-11-17 | End: 2017-11-17 | Stop reason: HOSPADM

## 2017-11-17 RX ORDER — HEPARIN SODIUM 1000 [USP'U]/ML
INJECTION, SOLUTION INTRAVENOUS; SUBCUTANEOUS AS NEEDED
Status: DISCONTINUED | OUTPATIENT
Start: 2017-11-17 | End: 2017-11-17 | Stop reason: HOSPADM

## 2017-11-17 RX ORDER — ROCURONIUM BROMIDE 10 MG/ML
INJECTION, SOLUTION INTRAVENOUS AS NEEDED
Status: DISCONTINUED | OUTPATIENT
Start: 2017-11-17 | End: 2017-11-17 | Stop reason: HOSPADM

## 2017-11-17 RX ORDER — MIDAZOLAM HYDROCHLORIDE 1 MG/ML
1 INJECTION, SOLUTION INTRAMUSCULAR; INTRAVENOUS
Status: DISCONTINUED | OUTPATIENT
Start: 2017-11-17 | End: 2017-11-17

## 2017-11-17 RX ORDER — LIDOCAINE HYDROCHLORIDE 10 MG/ML
0.5 INJECTION INFILTRATION; PERINEURAL ONCE
Status: DISCONTINUED | OUTPATIENT
Start: 2017-11-17 | End: 2017-11-17 | Stop reason: HOSPADM

## 2017-11-17 RX ORDER — NITROGLYCERIN 20 MG/100ML
10-100 INJECTION INTRAVENOUS
Status: DISCONTINUED | OUTPATIENT
Start: 2017-11-17 | End: 2017-11-18

## 2017-11-17 RX ORDER — PAPAVERINE HYDROCHLORIDE 30 MG/ML
INJECTION INTRAMUSCULAR; INTRAVENOUS AS NEEDED
Status: DISCONTINUED | OUTPATIENT
Start: 2017-11-17 | End: 2017-11-17 | Stop reason: HOSPADM

## 2017-11-17 RX ORDER — ALBUMIN HUMAN 50 G/1000ML
SOLUTION INTRAVENOUS
Status: DISCONTINUED | OUTPATIENT
Start: 2017-11-17 | End: 2017-11-17 | Stop reason: HOSPADM

## 2017-11-17 RX ORDER — SODIUM BICARBONATE 1 MEQ/ML
50 SYRINGE (ML) INTRAVENOUS AS NEEDED
Status: DISCONTINUED | OUTPATIENT
Start: 2017-11-17 | End: 2017-11-18

## 2017-11-17 RX ORDER — SUFENTANIL CITRATE 50 UG/ML
INJECTION EPIDURAL; INTRAVENOUS AS NEEDED
Status: DISCONTINUED | OUTPATIENT
Start: 2017-11-17 | End: 2017-11-17 | Stop reason: HOSPADM

## 2017-11-17 RX ORDER — OXYCODONE AND ACETAMINOPHEN 5; 325 MG/1; MG/1
1 TABLET ORAL
Status: DISCONTINUED | OUTPATIENT
Start: 2017-11-17 | End: 2017-11-18

## 2017-11-17 RX ORDER — CHLORHEXIDINE GLUCONATE 1.2 MG/ML
10 RINSE ORAL 2 TIMES DAILY
Status: DISCONTINUED | OUTPATIENT
Start: 2017-11-17 | End: 2017-11-17

## 2017-11-17 RX ORDER — SODIUM CHLORIDE, SODIUM LACTATE, POTASSIUM CHLORIDE, CALCIUM CHLORIDE 600; 310; 30; 20 MG/100ML; MG/100ML; MG/100ML; MG/100ML
INJECTION, SOLUTION INTRAVENOUS
Status: DISCONTINUED | OUTPATIENT
Start: 2017-11-17 | End: 2017-11-17 | Stop reason: HOSPADM

## 2017-11-17 RX ORDER — AMIODARONE HYDROCHLORIDE 200 MG/1
200 TABLET ORAL EVERY 12 HOURS
Status: DISCONTINUED | OUTPATIENT
Start: 2017-11-17 | End: 2017-11-18

## 2017-11-17 RX ORDER — LIDOCAINE HYDROCHLORIDE 20 MG/ML
INJECTION, SOLUTION EPIDURAL; INFILTRATION; INTRACAUDAL; PERINEURAL AS NEEDED
Status: DISCONTINUED | OUTPATIENT
Start: 2017-11-17 | End: 2017-11-17 | Stop reason: HOSPADM

## 2017-11-17 RX ORDER — MAGNESIUM SULFATE 1 G/100ML
1 INJECTION INTRAVENOUS AS NEEDED
Status: DISCONTINUED | OUTPATIENT
Start: 2017-11-17 | End: 2017-11-18

## 2017-11-17 RX ORDER — ATORVASTATIN CALCIUM 40 MG/1
80 TABLET, FILM COATED ORAL
Status: DISCONTINUED | OUTPATIENT
Start: 2017-11-17 | End: 2017-11-18

## 2017-11-17 RX ORDER — ALBUMIN HUMAN 50 G/1000ML
25 SOLUTION INTRAVENOUS ONCE
Status: DISCONTINUED | OUTPATIENT
Start: 2017-11-17 | End: 2017-11-17

## 2017-11-17 RX ORDER — MIDAZOLAM HYDROCHLORIDE 1 MG/ML
2 INJECTION, SOLUTION INTRAMUSCULAR; INTRAVENOUS ONCE
Status: DISCONTINUED | OUTPATIENT
Start: 2017-11-17 | End: 2017-11-17 | Stop reason: HOSPADM

## 2017-11-17 RX ORDER — NITROGLYCERIN 20 MG/100ML
INJECTION INTRAVENOUS
Status: DISCONTINUED | OUTPATIENT
Start: 2017-11-17 | End: 2017-11-17 | Stop reason: HOSPADM

## 2017-11-17 RX ORDER — METOPROLOL TARTRATE 25 MG/1
25 TABLET, FILM COATED ORAL EVERY 12 HOURS
Status: DISCONTINUED | OUTPATIENT
Start: 2017-11-18 | End: 2017-11-18

## 2017-11-17 RX ADMIN — SUFENTANIL CITRATE 25 MCG: 50 INJECTION EPIDURAL; INTRAVENOUS at 09:07

## 2017-11-17 RX ADMIN — MIDAZOLAM HYDROCHLORIDE 2 MG: 1 INJECTION, SOLUTION INTRAMUSCULAR; INTRAVENOUS at 07:36

## 2017-11-17 RX ADMIN — SODIUM CHLORIDE 25 ML/HR: 900 INJECTION, SOLUTION INTRAVENOUS at 13:25

## 2017-11-17 RX ADMIN — POTASSIUM CHLORIDE, DEXTROSE MONOHYDRATE AND SODIUM CHLORIDE 25 ML/HR: 150; 5; 450 INJECTION, SOLUTION INTRAVENOUS at 13:28

## 2017-11-17 RX ADMIN — SODIUM CHLORIDE, SODIUM LACTATE, POTASSIUM CHLORIDE, AND CALCIUM CHLORIDE: 600; 310; 30; 20 INJECTION, SOLUTION INTRAVENOUS at 07:33

## 2017-11-17 RX ADMIN — VECURONIUM BROMIDE FOR INJECTION 3 MG: 1 INJECTION, POWDER, LYOPHILIZED, FOR SOLUTION INTRAVENOUS at 09:05

## 2017-11-17 RX ADMIN — ALBUMIN HUMAN 250 ML: 50 SOLUTION INTRAVENOUS at 09:20

## 2017-11-17 RX ADMIN — FAMOTIDINE 20 MG: 10 INJECTION, SOLUTION INTRAVENOUS at 21:43

## 2017-11-17 RX ADMIN — SUFENTANIL CITRATE 5 MCG: 50 INJECTION EPIDURAL; INTRAVENOUS at 07:36

## 2017-11-17 RX ADMIN — TUBERCULIN PURIFIED PROTEIN DERIVATIVE 5 UNITS: 5 INJECTION INTRADERMAL at 21:13

## 2017-11-17 RX ADMIN — SUFENTANIL CITRATE 70 MCG: 50 INJECTION EPIDURAL; INTRAVENOUS at 07:42

## 2017-11-17 RX ADMIN — SODIUM CHLORIDE, SODIUM LACTATE, POTASSIUM CHLORIDE, AND CALCIUM CHLORIDE 1000 ML: 600; 310; 30; 20 INJECTION, SOLUTION INTRAVENOUS at 06:23

## 2017-11-17 RX ADMIN — SODIUM CHLORIDE 55 MG/HR: 900 INJECTION, SOLUTION INTRAVENOUS at 08:52

## 2017-11-17 RX ADMIN — Medication: at 19:26

## 2017-11-17 RX ADMIN — ALBUMIN HUMAN 250 ML: 50 SOLUTION INTRAVENOUS at 09:44

## 2017-11-17 RX ADMIN — ATORVASTATIN CALCIUM 80 MG: 40 TABLET, FILM COATED ORAL at 21:32

## 2017-11-17 RX ADMIN — Medication 10 ML: at 21:43

## 2017-11-17 RX ADMIN — SODIUM CHLORIDE: 9 INJECTION, SOLUTION INTRAVENOUS at 08:06

## 2017-11-17 RX ADMIN — FAMOTIDINE 20 MG: 20 TABLET, FILM COATED ORAL at 06:23

## 2017-11-17 RX ADMIN — HEPARIN SODIUM 25000 UNITS: 1000 INJECTION, SOLUTION INTRAVENOUS; SUBCUTANEOUS at 09:38

## 2017-11-17 RX ADMIN — SUFENTANIL CITRATE 25 MCG: 50 INJECTION EPIDURAL; INTRAVENOUS at 09:46

## 2017-11-17 RX ADMIN — SUFENTANIL CITRATE 25 MCG: 50 INJECTION EPIDURAL; INTRAVENOUS at 08:37

## 2017-11-17 RX ADMIN — POTASSIUM CHLORIDE 10 MEQ: 14.9 INJECTION, SOLUTION INTRAVENOUS at 13:33

## 2017-11-17 RX ADMIN — ROCURONIUM BROMIDE 50 MG: 10 INJECTION, SOLUTION INTRAVENOUS at 07:42

## 2017-11-17 RX ADMIN — SODIUM CHLORIDE: 9 INJECTION, SOLUTION INTRAVENOUS at 12:05

## 2017-11-17 RX ADMIN — SODIUM CHLORIDE, SODIUM LACTATE, POTASSIUM CHLORIDE, CALCIUM CHLORIDE: 600; 310; 30; 20 INJECTION, SOLUTION INTRAVENOUS at 08:06

## 2017-11-17 RX ADMIN — SODIUM CHLORIDE 2 UNITS/HR: 900 INJECTION, SOLUTION INTRAVENOUS at 15:14

## 2017-11-17 RX ADMIN — AMIODARONE HYDROCHLORIDE 200 MG: 200 TABLET ORAL at 21:32

## 2017-11-17 RX ADMIN — MUPIROCIN: 20 OINTMENT TOPICAL at 17:00

## 2017-11-17 RX ADMIN — CEFAZOLIN SODIUM 2 G: 1 INJECTION, POWDER, FOR SOLUTION INTRAMUSCULAR; INTRAVENOUS at 11:30

## 2017-11-17 RX ADMIN — SUFENTANIL CITRATE 25 MCG: 50 INJECTION EPIDURAL; INTRAVENOUS at 09:53

## 2017-11-17 RX ADMIN — MIDAZOLAM HYDROCHLORIDE 2 MG: 1 INJECTION, SOLUTION INTRAMUSCULAR; INTRAVENOUS at 10:49

## 2017-11-17 RX ADMIN — CHLORHEXIDINE GLUCONATE 10 ML: 1.2 RINSE ORAL at 15:19

## 2017-11-17 RX ADMIN — SUFENTANIL CITRATE 25 MCG: 50 INJECTION EPIDURAL; INTRAVENOUS at 11:51

## 2017-11-17 RX ADMIN — CEFAZOLIN 2 G: 1 INJECTION, POWDER, FOR SOLUTION INTRAMUSCULAR; INTRAVENOUS; PARENTERAL at 08:11

## 2017-11-17 RX ADMIN — VECURONIUM BROMIDE FOR INJECTION 3 MG: 1 INJECTION, POWDER, LYOPHILIZED, FOR SOLUTION INTRAVENOUS at 08:18

## 2017-11-17 RX ADMIN — MORPHINE SULFATE 3 MG: 10 INJECTION INTRAMUSCULAR; INTRAVENOUS; SUBCUTANEOUS at 19:39

## 2017-11-17 RX ADMIN — SUFENTANIL CITRATE 25 MCG: 50 INJECTION EPIDURAL; INTRAVENOUS at 09:51

## 2017-11-17 RX ADMIN — NITROGLYCERIN 10 MCG/MIN: 20 INJECTION INTRAVENOUS at 08:11

## 2017-11-17 RX ADMIN — SODIUM CHLORIDE 1 G: 900 INJECTION, SOLUTION INTRAVENOUS at 08:35

## 2017-11-17 RX ADMIN — MORPHINE SULFATE 3 MG: 10 INJECTION INTRAMUSCULAR; INTRAVENOUS; SUBCUTANEOUS at 16:57

## 2017-11-17 RX ADMIN — PROTAMINE SULFATE 200 MG: 10 INJECTION, SOLUTION INTRAVENOUS at 11:26

## 2017-11-17 RX ADMIN — SUFENTANIL CITRATE 25 MCG: 50 INJECTION EPIDURAL; INTRAVENOUS at 09:23

## 2017-11-17 RX ADMIN — OXYCODONE HYDROCHLORIDE AND ACETAMINOPHEN 1 TABLET: 5; 325 TABLET ORAL at 16:11

## 2017-11-17 RX ADMIN — CEFAZOLIN 2 G: 1 INJECTION, POWDER, FOR SOLUTION INTRAMUSCULAR; INTRAVENOUS; PARENTERAL at 16:11

## 2017-11-17 RX ADMIN — ROCURONIUM BROMIDE 50 MG: 10 INJECTION, SOLUTION INTRAVENOUS at 10:49

## 2017-11-17 RX ADMIN — Medication 10 ML: at 13:41

## 2017-11-17 RX ADMIN — OXYCODONE HYDROCHLORIDE AND ACETAMINOPHEN 1 TABLET: 10; 325 TABLET ORAL at 21:33

## 2017-11-17 RX ADMIN — LIDOCAINE HYDROCHLORIDE 100 MG: 20 INJECTION, SOLUTION EPIDURAL; INFILTRATION; INTRACAUDAL; PERINEURAL at 07:42

## 2017-11-17 RX ADMIN — MIDAZOLAM HYDROCHLORIDE 6 MG: 1 INJECTION, SOLUTION INTRAMUSCULAR; INTRAVENOUS at 07:42

## 2017-11-17 RX ADMIN — VECURONIUM BROMIDE FOR INJECTION 4 MG: 1 INJECTION, POWDER, LYOPHILIZED, FOR SOLUTION INTRAVENOUS at 09:47

## 2017-11-17 NOTE — ANESTHESIA PROCEDURE NOTES
Central Line Placement    Start time: 11/17/2017 7:54 AM  End time: 11/17/2017 8:05 AM  Performed by: Deatrice Fleischer  Authorized by: Deatrice Fleischer     Indications: vascular access, central pressure monitoring and need for vasopressors  Preanesthetic Checklist: patient identified, risks and benefits discussed, anesthesia consent, site marked, patient being monitored and timeout performed    Timeout Time: 07:54       Pre-procedure: All elements of maximal sterile barrier technique followed? Yes    Maximal barrier precautions followed, 2% Chlorhexidine for cutaneous antisepsis, Hand hygiene performed prior to catheter insertion and Ultrasound guidance    Sterile Ultrasound Technique followed?: Yes          Procedure:   Prep:  ChloraPrep  Location:  Internal jugular  Orientation:  Right  Patient position:  Trendelenburg  Number of lumens: sg introducer. Catheter size:  8.5 Fr    Number of attempts:  1  Successful placement: Yes      Assessment:   Post-procedure:  Catheter secured and sterile dressing applied  Assessment:  Blood return through all ports and free fluid flow  Insertion:  Uncomplicated  Patient tolerance:  Patient tolerated the procedure well with no immediate complications  Right ij with real ultrasound guidance. Venous canulation confirmed with manometry and visualization of wire in miguel on ultrasound. [place] scot floated into pa to 54 cm. without difficulty. All needles out intact, procedure tolerated well without problems. cxr to be done in icu.

## 2017-11-17 NOTE — OP NOTES
[facility] 65   OPERATIVE REPORT       Name:  Sonny Andrews   MR#:  [id number]   :  1934   Account #:  [de-identified]   Date of Adm:  2017       DATE OF SURGERY: 2017    DESCRIPTION OF PROCEDURE: After informed consent was obtained   for the above mentioned procedure, the patient was then taken to   the operating room. Appropriate monitoring lines were placed by   the anesthesia department. After administration of general   endotracheal anesthesia, the patient was prepped and draped in   the usual fashion with Betadine scrub and paint and Ioban   cardiovascular drapes. The chest was then entered through a standard mediastinotomy   incision while simultaneous harvesting of peripheral conduit was   undertaken. After harvesting the conduit, it was inspected and   noted to be adequate. The incisions were subsequently closed in   a 2-layer fashion of 3-0 and 4-0 Vicryl. The left internal   mammary harvest was then undertaken in pedicle fashion and was   injected with Papaverine solution. After administration of   heparin, the distal pedicle was incised and noted to bleed   briskly. A thoracostomy tube was then placed. This was brought   out through a separate stab incision inferiorly and affixed to   the skin. The pericardium was then opened and tacked up into a   pericardial well, revealing the heart. Pursestrings were then   placed into the aorta, the right atrial appendage, and the right   atrium. After adequate ACT was obtained the patient was then   cannulated through these pursestrings, at which point   cardiopulmonary bypass was started. Target vessels were   identified and marked. A crossclamp was then applied. Antegrade   and retrograde cardioplegia was administered initially and then   given intermittently throughout the case.  A latticed heart   support was placed to assist with the distal anastomoses, which   were performed by using 7-0 Prolene for vein to artery anastomosis and 8-0 Prolene for artery to artery anastomosis. Proximal anastomoses to the aorta were also placed using 6-0   Prolene. At the conclusion of the final proximal anastomosis,   the aorta and right ventricle were flushed of debris and the   anastomosis was completed. The crossclamp was then removed. The heart then underwent meticulous de-airing. The patient was   warmed and weaned from the cardiopulmonary bypass. The distal   anastomoses were visualized and noted to be hemostatic. All   grafts were dopplered and noted to have an excellent flow. The   venous cannula was then removed. Ventricular and atrial pacing   wires were then placed upon the heart, brought out through stab   wounds inferiorly and affixed to the skin. A single straight   mediastinal tube was placed, brought out through a separate stab   incision inferiorly, and also affixed to the skin. After   meticulous hemostasis was made the sternum was reapproximated   with heavy gauge stainless steel wire. The midline fascia was   subsequently closed separately. Vicryl was then used in a   running fashion for subcutaneous tissue and skin. Dressings were   then applied to all wounds. The patient was then transported   with monitors to the intensive care unit intubated and   ventilated. PREOPERATIVE DIAGNOSIS: Severe multivessel atherosclerotic   coronary artery disease. POSTPROCEDURE DIAGNOSES: Severe multivessel atherosclerotic   coronary artery disease. ATTENDING SURGEON:  Navid Khanna MD    ASSISTANT:  Leann Galeas MD     NAME OF PROCEDURE: Coronary artery bypass grafting x4 grafts   consisting of:   1. Left internal mammary artery to left anterior descending. 2. Reverse saphenous vein graft to diagonal 1.   3. Reverse saphenous vein graft to obtuse marginal.   4. Reverse saphenous vein graft to the posterior descending   artery. OPERATIVE FINDINGS: Saphenous vein 3 to 4 mm, LIMA 3 mm. Aorta   was soft.  No palpable plaque. LAD is 1.4 mm, severe distal   disease. Diagonal is 1.3. OM is 1.2. PDA is 1.3. INDICATIONS: The patient is a very pleasant 51-year-old   gentleman who is very active, who presented with [place] Class   III symptoms, underwent calcium scoring which was positive at   1779. Left heart cath confirmed severe multivessel disease with   preserved LV function. COMPLICATIONS: None. COUNTS: All instrument and sponge counts were correct at the end   of the case.         Tunde Timmons MD      THW / DV   D:  11/17/2017   12:31   T:  11/17/2017   12:45   Job #:  [id number]

## 2017-11-17 NOTE — ANESTHESIA POSTPROCEDURE EVALUATION
Post-Anesthesia Evaluation and Assessment    Patient: Halley Manuel MRN: [id number]  SSN: [id number]    YOB: 1934  Age: 80 y.o. Sex: male       Cardiovascular Function/Vital Signs  Visit Vitals    /58 (BP 1 Location: Left arm, BP Patient Position: At rest)    Pulse 84    Temp 36.6 °C (97.9 °F)    Resp 21    Ht 5' 11\" (1.803 m)    Wt 81.6 kg (180 lb)    SpO2 93%    BMI 25.1 kg/m2       Patient is status post general anesthesia for Procedure(s):  CORONARY ARTERY BYPASS GRAFT (CABG X 4)/ LIMA  VEIN HARVEST/ GREATER SAPHANEOUS. Nausea/Vomiting: None    Postoperative hydration reviewed and adequate. Pain:  Pain Scale 1: Numeric (0 - 10) (11/17/17 1657)  Pain Intensity 1: 9 (11/17/17 1657)   Managed    Neurological Status:   Neuro (WDL): Within Defined Limits (11/17/17 3255)  Neuro  Neurologic State: Drowsy; Eyes open to voice (11/17/17 1440)  Orientation Level: Unable to verbalize (11/17/17 1225)  Cognition: Unable to assess (comment) (s/p anesthesia) (11/17/17 1225)  Speech: Intubated (11/17/17 1225)  Assessment L Pupil: Round;Brisk (11/17/17 1440)  Size L Pupil (mm): 2 (11/17/17 1225)  Assessment R Pupil: Round;Brisk (11/17/17 1440)  Size R Pupil (mm): 2 (11/17/17 1225)  LUE Motor Response: Purposeful;Spontaneous ;Weak (11/17/17 1440)  LLE Motor Response: Purposeful;Spontaneous ;Weak (11/17/17 1440)  RUE Motor Response: Purposeful;Spontaneous ;Weak (11/17/17 1440)  RLE Motor Response: Purposeful;Spontaneous ;Weak (11/17/17 1440)   Asleep on vent. Mental Status and Level of Consciousness: Sedated    Pulmonary Status:   O2 Device: Heated; Hi flow nasal cannula;Humidifier (11/17/17 1610)   On vent    Complications related to anesthesia: None    Post-anesthesia assessment completed. No concerns. To cvru in stable but critical condition s/p cabg.     Signed By: Melba Garnett MD     November 17, 2017

## 2017-11-17 NOTE — PERIOP NOTES
Updated patient family at 8:40 AM  Updated patient family at 10:12 AM  Updated patient family at 11:44 AM    Four digit code obtained

## 2017-11-17 NOTE — IP AVS SNAPSHOT
Mallory Grate 
 
 
 [address] [facility] 
[phone] Patient: Baldemar Taylor MRN: [id number] :1934 About your hospitalization You were admitted on:  2017 You last received care in the:  [facility] 2 CV STEPDOWN You were discharged on:  2017 Why you were hospitalized Your primary diagnosis was:  Coronary Artery Disease Involving Native Coronary Artery Of Native Heart With Angina Pectoris (Hcc) Your diagnoses also included:  S/P Cabg X 4, Hypoxia, Encounter For Weaning From Ventilator (Formerly [facility]), Acute Blood Loss Anemia, Chronic Anemia, Cognitive Impairment Things You Need To Do (next 8 weeks) Follow up with Ella Santiago MD  
  
Phone:  [phone] Where:  [address] [place], [place] [address]  Office Visit with Ella Santiago MD at  8:30 AM  
Where:  [facility] HOSPITAL FOLLOW-UP with Cynthia Reinoso DO at  3:15 PM  
[facility]:  [facility] ([address] [facility]) Follow up with [employer] Follow-up with Dr. Irma Kilgore  @ 3:15 Phone:  [phone] Where:  [address] [place],  [address] [address] Tuesday Dec 12, 2017 ORIENTATION with John Gilmore at 10:15 AM  
Where:  [facility] ([address] [facility]) Follow up with [facility] Please arrive at 10:00am to sign in with Registration on the first floor before coming to your appointment on the second floor. Phone:  [phone] Where:  [address], [address] Wednesday Dec 13, 2017 Discharge Followup with Alma Estrella MD at  2:20 PM  
Where:  [address] [facility] ([address] Discharge Orders Procedure Order Date Status Priority Quantity Spec Type Associated Dx REFERRAL TO CARDIAC [facility] 11/21/17 0955 Normal Routine 1  S/P CABG x 4 [[id number]] A check madisyn indicates which time of day the medication should be taken. My Medications STOP taking these medications   
 amiodarone 200 mg tablet Commonly known as:  CORDARONE  
   
  
 aspirin 325 mg tablet Commonly known as:  ASPIRIN Replaced by:  aspirin delayed-release 81 mg tablet  
   
  
 chlorhexidine 4 % liquid Commonly known as:  HIBICLENS  
   
  
 mupirocin 2 % ointment Commonly known as:  BACTROBAN  
   
  
  
TAKE these medications as instructed Instructions Each Dose to Equal  
 Morning Noon Evening Bedtime  
 acetaminophen 325 mg tablet Commonly known as:  TYLENOL May take for pain as directed on label. aspirin delayed-release 81 mg tablet Start taking on:  11/22/2017 Take 1 Tab by mouth daily. 81 mg  
    
  
   
   
   
  
 atorvastatin 40 mg tablet Commonly known as:  LIPITOR Your next dose is: Today Take 1 Tab by mouth nightly. 40 mg Blood Pressure Test Kit-Wrist Kit Commonly known as:  Blood Pressure Unit Check BP 2 times per day and write it down  
     
   
   
   
  
 cholecalciferol 1,000 unit Cap Commonly known as:  VITAMIN D3 Your next dose is:  Tomorrow Take 1,000 Units by mouth daily. 1000 Units  
    
  
   
   
   
  
 dextran 70-hypromellose ophthalmic solution Commonly known as:  ARTIFICIAL TEARS Administer  to both eyes as needed. ferrous fumarate 324 mg (106 mg iron) Tab Commonly known as:  HEMOCYTE Your next dose is:  Tomorrow Take 1 Tab by mouth daily. 1 Tab  
    
  
   
   
   
  
 fluticasone 50 mcg/actuation nasal spray Commonly known as:  Ave Jumper Your next dose is:  Tomorrow 2 Sprays by Both Nostrils route daily. 2 Spray  
    
  
   
   
   
  
 guaiFENesin 1,200 mg Ta12 ER tablet Commonly known as:  [email or web address] Over the counter Mucinex (Guaifenesin) take to keep secretions thin and easy to cough up  
     
   
   
   
  
 lamoTRIgine 200 mg tablet Commonly known as: LaMICtal  
Your next dose is:  Tomorrow Take 200 mg by mouth daily. 200 mg  
    
  
   
   
   
  
 metoprolol succinate 25 mg XL tablet Commonly known as:  TOPROL-XL Your next dose is:  Tomorrow Take 1 Tab by mouth daily. 25 mg  
    
  
   
   
   
  
 raNITIdine hcl 150 mg capsule Your next dose is: Today Take 150 mg by mouth two (2) times a day. 150 mg  
    
   
   
   
  
  
 rivastigmine tartrate 1.5 mg capsule Commonly known as:  EXELON Your next dose is: Today Take 1.5 mg by mouth two (2) times daily (with meals). 1.5 mg  
    
   
   
  
   
  
 senna-docusate 8.6-50 mg per tablet Commonly known as:  [facility] Over the counter Colace and Senakot - take for constipation as needed on label  
     
   
   
   
  
 sertraline 100 mg tablet Commonly known as:  ZOLOFT Your next dose is:  Tomorrow Take 150 mg by mouth daily. 150 mg  
    
  
   
   
   
  
 traMADol 50 mg tablet Commonly known as:  ULTRAM  
   
 Take 1 Tab by mouth every six (6) hours as needed. Max Daily Amount: 200 mg.  
 50 mg Where to Get Your Medications Information on where to get these meds will be given to you by the nurse or doctor. ! Ask your nurse or doctor about these medications  
  acetaminophen 325 mg tablet  
 aspirin delayed-release 81 mg tablet  
 atorvastatin 40 mg tablet  
 guaiFENesin 1,200 mg Ta12 ER tablet  
 metoprolol succinate 25 mg XL tablet  
 senna-docusate 8.6-50 mg per tablet  
 traMADol 50 mg tablet Discharge Instructions DISCHARGE SUMMARY from Nurse PATIENT INSTRUCTIONS: 
 
 
F-face looks uneven A-arms unable to move or move unevenly S-speech slurred or non-existent T-time-call 911 as soon as signs and symptoms begin-DO NOT go Back to bed or wait to see if you get better-TIME IS BRAIN. Warning Signs of HEART ATTACK Call 911 if you have these symptoms: 
? Chest discomfort. Most heart attacks involve discomfort in the center of the chest that lasts more than a few minutes, or that goes away and comes back. It can feel like uncomfortable pressure, squeezing, fullness, or pain. ? Discomfort in other areas of the upper body. Symptoms can include pain or discomfort in one or both arms, the back, neck, jaw, or stomach. ? Shortness of breath with or without chest discomfort. ? Other signs may include breaking out in a cold sweat, nausea, or lightheadedness. Don't wait more than five minutes to call [address]! Fast action can save your life. Calling 911 is almost always the fastest way to get lifesaving treatment. Emergency Medical Services staff can begin treatment when they arrive  up to an hour sooner than if someone gets to the hospital by car. The discharge information has been reviewed with the patient. The patient verbalized understanding. Discharge medications reviewed with the patient and appropriate educational materials and side effects teaching were provided. ___________________________________________________________________________________________________________________________________ CueSong[employer]art Announcement We are excited to announce that we are making your provider's discharge notes available to you in [employer] Made Easy. You will see these notes when they are completed and signed by the physician that discharged you from your recent hospital stay.   If you have any questions or concerns about any information you see in CueSongshart, please call the ibabybox Department where you were seen or reach out to your Primary Care Provider for more information about your plan of care. Introducing [facility]! Dear Kirti Castillo: Thank you for requesting a LOFTY account. Our records indicate that you already have an active LOFTY account. You can access your account anytime at [email or web address] Did you know that you can access your hospital and ER discharge instructions at any time in LOFTY? You can also review all of your test results from your hospital stay or ER visit. Additional Information If you have questions, please visit the Frequently Asked Questions section of the LOFTY website at [email or web address] Remember, LOFTY is NOT to be used for urgent needs. For medical emergencies, dial 911. Now available from your iPhone and Android! Unresulted Labs-Please follow up with your PCP about these lab tests Order Current Status CULTURE, RESPIRATORY/SPUTUM/BRONCH W GRAM STAIN Preliminary result Providers Seen During Your Hospitalization Provider Specialty Primary office phone Deidra Javier MD Cardiothoracic Surgery [phone] Immunizations Administered for This Admission Name Date  
 TB Skin Test (PPD) Intradermal 11/17/2017 Your Primary Care Physician (PCP) Primary Care Physician Office Phone Office Fax Lauren Davis, [address] [phone] You are allergic to the following No active allergies Recent Documentation Height Weight BMI Smoking Status 1.803 m 81.6 kg 25.1 kg/m2 Former Smoker Emergency Contacts Name Discharge Info Relation Home Work Mobile Pankaj Tai  Spouse [3] [phone] Patient Belongings  The following personal items are in your possession at time of discharge: 
  Dental Appliances: None  Visual Aid: None      Home Medications: None Jewelry: None  Clothing: Shirt, Pants, Undergarments, Footwear, Socks    Other Valuables: None Discharge Instructions Attachments/References CORONARY ARTERY BYPASS GRAFT: POST-OP (ENGLISH) CARDIAC REHABILITATION (ENGLISH) HEART ATTACK: MEDICINE TO REDUCE RISK (ENGLISH) HEALTHY DIET: HEART (ENGLISH) SECONDHAND SMOKE (ENGLISH) Patient Handouts Coronary Artery Bypass Graft: What to Expect at Home Your Recovery Coronary artery bypass graft (CABG) is surgery to treat coronary artery disease. The surgery helps blood make a detour, or bypass, around one or more narrowed or blocked coronary arteries. Coronary arteries are the blood vessels that bring blood to the heart. Your doctor did the surgery through a cut, called an incision, in your chest. 
You will feel tired and sore for the first few weeks after surgery. You may have some brief, sharp pains on either side of your chest. Your chest, shoulders, and upper back may ache. The incision in your chest and the area where the healthy vein was taken may be sore or swollen. These symptoms usually get better after 4 to 6 weeks. You will probably be able to do many of your usual activities after 4 to 6 weeks. But for 2 to 3 months you will not be able to lift heavy objects or do activities that strain your chest or upper arm muscles. At first you may notice that you get tired easily and need to rest often. It may take 1 to 2 months to get your energy back. Some people find that they are more emotional after this surgery. You may cry easily or show emotion in ways that are unusual for you. This is common and may last for up to a year. Some people get depressed after CABG surgery. Talk with your doctor if you have sadness that continues or you are concerned about how you are feeling. Treatment and other support can help you feel better.  
Even though the surgery may improve your symptoms, you will still need to make changes in your lifestyle to lower your risk of a heart attack or stroke. It will be important to eat a heart-healthy diet, get regular exercise, not smoke, take your heart medicines, and reduce stress. You will likely start a cardiac rehabilitation (rehab) program in the hospital. You will continue with this rehab program after you go home to help you recover and prevent problems with your heart. Talk to your doctor about whether rehab is right for you. This care sheet gives you a general idea about how long it will take for you to recover. But each person recovers at a different pace. Follow the steps below to get better as quickly as possible. How can you care for yourself at home? Activity ? · Rest when you feel tired. Getting enough sleep will help you recover. Try to sleep on your back for 4 to 6 weeks while your breastbone (sternum) heals. This usually takes about 4 to 6 weeks. ? · Try to walk each day. Start by walking a little more than you did the day before. Bit by bit, increase the amount you walk. Walking boosts blood flow and helps prevent pneumonia and constipation. ? · Avoid strenuous activities, such as bicycle riding, jogging, weight lifting, or heavy aerobic exercise, until your doctor says it is okay. ? · For 3 months, avoid activities that strain your chest or upper arm muscles. This includes pushing a  or vacuum, mopping floors, or swinging a golf club or tennis racquet. ? · For 2 to 3 months, avoid lifting anything that would make you strain. This may include a child, heavy grocery bags and milk containers, a heavy briefcase or backpack, or cat litter or dog food bags. ? · Hold a pillow firmly over your chest incision when you cough or take deep breaths. This will support your chest and reduce your pain. ? · Do breathing exercises at home as instructed by your doctor. This will help prevent pneumonia. ? · Ask your doctor when you can drive again. ? · You will probably need to take 4 to 12 weeks off from work. It depends on the type of work you do and how you feel. ? · You may shower as usual. Pat the incision dry. Do not take a bath for the first 3 weeks, or until your doctor tells you it is okay. ? · Do not swim or use a hot tub for at least 1 month, or until your doctor says it is okay. ? · Ask your doctor when it is okay for you to have sex. Diet ? · Eat a heart-healthy diet. If you have not been eating this way, talk to your doctor. You also may want to talk to a dietitian. A dietitian can help you learn about healthy foods. ? · Drink plenty of fluids (unless your doctor tells you not to). ? · You may notice that your bowel movements are not regular right after your surgery. This is common. Try to avoid constipation and straining with bowel movements. You may want to take a fiber supplement every day. If you have not had a bowel movement after a couple of days, ask your doctor about taking a mild laxative. Medicines ? · Your doctor will tell you if and when you can restart your medicines. He or she will also give you instructions about taking any new medicines. ? · If you take blood thinners, such as warfarin (Coumadin), clopidogrel (Plavix), or aspirin, be sure to talk to your doctor. He or she will tell you if and when to start taking those medicines again. Make sure that you understand exactly what your doctor wants you to do.  
? · Your doctor may give you medicines to prevent blood clots, keep your heartbeat steady, and lower your blood pressure and cholesterol. Take your medicines exactly as prescribed. Call your doctor if you think you are having a problem with your medicine. ? · Be safe with medicines. Take pain medicines exactly as directed. ¨ If the doctor gave you a prescription medicine for pain, take it as prescribed.  
¨ If you are not taking a prescription pain medicine, ask your doctor if you can take an over-the-counter medicine. ¨ Do not take aspirin, ibuprofen (Advil, Motrin), naproxen (Aleve), or other nonsteroidal anti-inflammatory drugs (NSAIDs) unless your doctor says it is okay. ? · If you think your pain medicine is making you sick to your stomach: 
¨ Take your medicine after meals (unless your doctor has told you not to). ¨ Ask your doctor for a different pain medicine. ? · If your doctor prescribed antibiotics, take them as directed. Do not stop taking them just because you feel better. You need to take the full course of antibiotics. Incision care ? · If you have strips of tape on the incisions the doctor made, leave the tape on for a week or until it falls off.  
? · Wash the area daily with warm, soapy water, and pat it dry. Don't use hydrogen peroxide or alcohol, which can slow healing. You may cover the area with a gauze bandage if it weeps or rubs against clothing. Change the bandage every day. ? · Keep the area clean and dry. ? · If you have an incision in your leg: ¨ Wear support stockings on your legs during the day for the first 2 weeks. You can take the stockings off at night while you sleep. ¨ Raise your legs above the level of your heart whenever you lay down for the first 4 to 6 weeks. Other instructions ? · Keep track of your weight. Weigh yourself every day at the same time of day, on the same scale, in the same amount of clothing. A sudden increase in weight can be a sign of a problem with your heart. Tell your doctor if you suddenly gain weight, such as 3 pounds or more in 2 to 3 days. ? · Do not smoke. Smoking can make it harder for you to recover and it will raise the chances of your arteries narrowing again. If you need help quitting, talk to your doctor about stop-smoking programs and medicines. These can increase your chances of quitting for good. Follow-up care is a key part of your treatment and safety.  Be sure to make and go to all appointments, and call your doctor if you are having problems. It's also a good idea to know your test results and keep a list of the medicines you take. When should you call for help? Call 911 anytime you think you may need emergency care. For example, call if: 
? · You passed out (lost consciousness). ? · You have severe trouble breathing. ? · You have sudden chest pain and shortness of breath, or you cough up blood. ? · You have severe pain in your chest.  
? · You have symptoms of a heart attack. These may include: ¨ Chest pain or pressure, or a strange feeling in the chest. 
¨ Sweating. ¨ Shortness of breath. ¨ Nausea or vomiting. ¨ Pain, pressure, or a strange feeling in the back, neck, jaw, or upper belly or in one or both shoulders or arms. ¨ Lightheadedness or sudden weakness. ¨ A fast or irregular heartbeat. After you call 911, the  may tell you to chew 1 adult-strength or 2 to 4 low-dose aspirin. Wait for an ambulance. Do not try to drive yourself. ? · You have angina symptoms (such as chest pain or pressure) that do not go away with rest or are not getting better within 5 minutes after you take a dose of nitroglycerin. ?Call your doctor now or seek immediate medical care if: 
? · You have pain that does not get better after you take pain medicine. ? · You have a fever over 100°F.  
? · You have loose stitches, or your incision comes open. ? · Bright red blood has soaked through the bandage over your incision. ? · You have signs of infection, such as: 
¨ Increased pain, swelling, warmth, or redness. ¨ Red streaks leading from the incision. ¨ Pus draining from the incision. ¨ Swollen lymph nodes in your neck, armpits, or groin. ¨ A fever. ? · You have signs of a blood clot in a leg. If you had a vein removed from your leg, you may have tenderness and swelling while your leg heals.  But signs of a blood clot may be in a different part of your leg and may include: 
¨ Pain in your calf, back of the knee, thigh, or groin. ¨ Redness and swelling in your leg or groin. ? · Your heartbeat feels very fast or slow, skips beats, or flutters. ? · You are dizzy or lightheaded, or you feel like you may faint. ? · You have new or increased shortness of breath. ? Watch closely for changes in your health, and be sure to contact your doctor if: 
? · You gain weight suddenly, such as 3 pounds or more in 2 to 3 days. ? · You have increased swelling in your legs, ankles, or feet. ? · You have any concerns about your incision. ? · You feel very sad or have other signs of depression, such as trouble sleeping or eating. ? · You have questions about diet, exercise, quitting smoking, or stress reduction after surgery. Where can you learn more? Go to http://sylvia-hector.info/. Enter F759 in the search box to learn more about \"Coronary Artery Bypass Graft: What to Expect at Home. \" Current as of: September 21, 2016 Content Version: 11.4 © 7190-6781 [employer]. Care instructions adapted under license by [employer] (which disclaims liability or warranty for this information). If you have questions about a medical condition or this instruction, always ask your healthcare professional. [address] any warranty or liability for your use of this information. Cardiac Rehabilitation: Care Instructions Your Care Instructions Cardiac rehabilitation is a program for people who have a heart problem, such as a heart attack, heart failure, or a heart valve disease. The program includes exercise, lifestyle changes, education, and emotional support. Cardiac rehab can help you improve the quality of your life through better overall health. It can help you lose weight and feel better about yourself.  
On your cardiac rehab team, you may have your doctor, a nurse specialist, a dietitian, and a physical therapist. They will design your cardiac rehab program specifically for you. You will learn how to reduce your risk for heart problems, how to manage stress, and how to eat a heart-healthy diet. By the end of the program, you will be ready to maintain a healthier lifestyle on your own. Follow-up care is a key part of your treatment and safety. Be sure to make and go to all appointments, and call your doctor if you are having problems. It's also a good idea to know your test results and keep a list of the medicines you take. How can you care for yourself at home? · Take your medicines exactly as prescribed. Call your doctor if you think you are having a problem with your medicine. You will get more details on the specific medicines your doctor prescribes. · Weigh yourself every day if your doctor tells you to. Watch for sudden weight gain. Weigh yourself on the same scale with the same amount of clothing at the same time of day. · Plan your meals so that you are eating heart-healthy foods. ¨ Eat a variety of foods daily. Fresh fruits and vegetables and whole-grains are good choices. ¨ Limit your fat intake, especially saturated and trans fat. ¨ Limit salt (sodium). ¨ Increase fiber in your diet. ¨ Limit alcohol. · Learn how to take your pulse so that you can track your heart rate during exercise. · Always check with your doctor before you begin a new exercise program. 
· Warm up before you exercise and cool down afterward for at least 15 minutes each. This will help your heart gradually prepare for and recover from exercise and avoid pushing your heart too hard. · Stop exercising if you have any unusual discomfort, such as chest pain. · Do not smoke. Smoking can make heart problems worse. If you need help quitting, talk to your doctor about stop-smoking programs and medicines. These can increase your chances of quitting for good. When should you call for help? Call 911 anytime you think you may need emergency care. For example, call if: 
? · You have severe trouble breathing. ? · You cough up pink, foamy mucus and you have trouble breathing. ? · You have symptoms of a heart attack. These may include: ¨ Chest pain or pressure, or a strange feeling in the chest. 
¨ Sweating. ¨ Shortness of breath. ¨ Nausea or vomiting. ¨ Pain, pressure, or a strange feeling in the back, neck, jaw, or upper belly or in one or both shoulders or arms. ¨ Lightheadedness or sudden weakness. ¨ A fast or irregular heartbeat. After you call 911, the  may tell you to chew 1 adult-strength or 2 to 4 low-dose aspirin. Wait for an ambulance. Do not try to drive yourself. ? · You have angina symptoms (such as chest pain or pressure) that do not go away with rest or are not getting better within 5 minutes after you take a dose of nitroglycerin. ? · You have symptoms of a stroke. These may include: 
¨ Sudden numbness, tingling, weakness, or loss of movement in your face, arm, or leg, especially on only one side of your body. ¨ Sudden vision changes. ¨ Sudden trouble speaking. ¨ Sudden confusion or trouble understanding simple statements. ¨ Sudden problems with walking or balance. ¨ A sudden, severe headache that is different from past headaches. ? · You passed out (lost consciousness). ?Call your doctor now or seek immediate medical care if: 
? · You have new or increased shortness of breath. ? · You are dizzy or lightheaded, or you feel like you may faint. ? · You gain weight suddenly, such as more than 2 to 3 pounds in a day or 5 pounds in a week. (Your doctor may suggest a different range of weight gain.) ? · You have increased swelling in your legs, ankles, or feet. ? Watch closely for changes in your health, and be sure to contact your doctor if you have any problems. Where can you learn more? Go to http://will.info/. Enter T022 in the search box to learn more about \"Cardiac Rehabilitation: Care Instructions. \" Current as of: September 21, 2016 Content Version: 11.4 © 3010-3166 [employer]. Care instructions adapted under license by [employer] (which disclaims liability or warranty for this information). If you have questions about a medical condition or this instruction, always ask your healthcare professional. [address] any warranty or liability for your use of this information. Reducing Heart Attack Risk With Daily Medicine: Care Instructions Your Care Instructions Heart disease is the number one cause of death. If you are at risk for heart disease, there are many medicines that can reduce your risk. These include: · ACE inhibitors. These are a type of blood pressure medicine. They can reduce the risk of heart attacks and strokes if you are at high risk. · Statin medicines. These lower cholesterol. They can also reduce the risk of heart disease and strokes. · Aspirin. It can help certain people lower their risk of a heart attack or stroke. · Beta-blocker medicines. These are a type of blood pressure and heart medicine. They can reduce the chance of early death if you have had a heart attack. All medicines can cause side effects. So it is important to understand the pros and cons of any medicine you take. It is also important to take your medicines exactly as your doctor tells you to. Follow-up care is a key part of your treatment and safety. Be sure to make and go to all appointments, and call your doctor if you are having problems. It's also a good idea to know your test results and keep a list of the medicines you take. ACE inhibitors ACE (angiotensin-converting enzyme) inhibitors are used for three main reasons. They lower blood pressure, protect the kidneys, and prevent heart attacks and strokes.  Examples include benazepril (Lotensin), lisinopril (Prinivil, Zestril), and ramipril (Altace). Before you start taking an ACE inhibitor, make sure your doctor knows if: 
· You are taking a water pill (diuretic). · You are taking potassium pills or using salt substitutes. · You are pregnant or breastfeeding. · You have had a kidney transplant or other kidney problems. ACE inhibitors can cause side effects. Call your doctor right away if you have: · Trouble breathing. · Swelling in your face, head, neck, or tongue. · Dizziness or lightheadedness. · A dry cough. Statins Statins lower cholesterol. Examples include atorvastatin (Lipitor), lovastatin (Mevacor), pravastatin (Pravachol), and simvastatin (Zocor). Before you start taking a statin, make sure your doctor knows if: 
· You have had a kidney transplant or other kidney problems. · You have liver disease. · You take any other prescription medicine, over-the-counter medicine, vitamins, supplements, or herbal remedies. · You are pregnant or breastfeeding. Statins can cause side effects. Call your doctor right away if you have: · New, severe muscle aches. · Brown urine. Aspirin Taking an aspirin every day can lower your risk for a heart attack. A heart attack occurs when a blood vessel in the heart gets blocked. When this happens, oxygen can't get to the heart muscle, and part of the heart dies. Aspirin can help prevent blood clots that can block the blood vessels. Talk to your doctor before you start taking aspirin every day. He or she may recommend that you take one low-dose aspirin (81 mg) tablet each day, with a meal and a full glass of water. Taking aspirin isn't right for everyone. This is because it can cause serious bleeding. And you may not be able to use aspirin if you: 
· Have asthma. · Have an ulcer or other stomach problem. · Take some other medicine (called a blood thinner) that prevents blood clots. · Are allergic to aspirin. Before having a surgery or procedure, tell your doctor or dentist that you take aspirin. He or she will tell you if you should stop taking aspirin beforehand. Make sure that you understand exactly what your doctor wants you to do. Aspirin can cause side effects. Call your doctor right away if you have: · Unusual bleeding or bruising. · Nausea, vomiting, or heartburn. · Black or bloody stools. Beta-blockers Beta-blockers are used for three main reasons. They lower blood pressure, relieve angina symptoms (such as chest pain or pressure), and reduce the chances of a second heart attack. They include atenolol (Tenormin), carvedilol (Coreg), and metoprolol (Lopressor). Before you start taking a beta-blocker, make sure your doctor knows if you have: · Severe asthma or frequent asthma attacks. · A very slow pulse (less than 55 beats a minute). Beta-blockers can cause side effects. Call your doctor right away if you have: · Wheezing or trouble breathing. · Dizziness or lightheadedness. · Asthma that gets worse. When should you call for help? Watch closely for changes in your health, and be sure to contact your doctor if you have any problems. Where can you learn more? Go to http://sylvia-hector.info/. Enter R428 in the search box to learn more about \"Reducing Heart Attack Risk With Daily Medicine: Care Instructions. \" Current as of: September 21, 2016 Content Version: 11.4 © 5339-7432 [employer]. Care instructions adapted under license by [employer] (which disclaims liability or warranty for this information). If you have questions about a medical condition or this instruction, always ask your healthcare professional. [place] [address] any warranty or liability for your use of this information. Heart-Healthy Diet: Care Instructions Your Care Instructions A heart-healthy diet has lots of vegetables, fruits, nuts, beans, and whole grains, and is low in salt. It limits foods that are high in saturated fat, such as meats, cheeses, and fried foods. It may be hard to change your diet, but even small changes can lower your risk of heart attack and heart disease. Follow-up care is a key part of your treatment and safety. Be sure to make and go to all appointments, and call your doctor if you are having problems. It's also a good idea to know your test results and keep a list of the medicines you take. How can you care for yourself at home? Watch your portions · Learn what a serving is. A \"serving\" and a \"portion\" are not always the same thing. Make sure that you are not eating larger portions than are recommended. For example, a serving of pasta is ½ cup. A serving size of meat is 2 to 3 ounces. A 3-ounce serving is about the size of a deck of cards. Measure serving sizes until you are good at [place]" them. Keep in mind that restaurants often serve portions that are 2 or 3 times the size of one serving. · To keep your energy level up and keep you from feeling hungry, eat often but in smaller portions. · Eat only the number of calories you need to stay at a healthy weight. If you need to lose weight, eat fewer calories than your body burns (through exercise and other physical activity). Eat more fruits and vegetables · Eat a variety of fruit and vegetables every day. Dark green, deep orange, red, or yellow fruits and vegetables are especially good for you. Examples include spinach, carrots, peaches, and berries. · Keep carrots, celery, and other veggies handy for snacks. Buy fruit that is in season and store it where you can see it so that you will be tempted to eat it. · Cook dishes that have a lot of veggies in them, such as stir-fries and soups. Limit saturated and trans fat · Read food labels, and try to avoid saturated and trans fats. They increase your risk of heart disease.  Trans fat is found in many processed foods such as cookies and crackers. · Use olive or canola oil when you cook. Try cholesterol-lowering spreads, such as Benecol or Take Control. · Bake, broil, grill, or steam foods instead of frying them. · Choose lean meats instead of high-fat meats such as hot dogs and sausages. Cut off all visible fat when you prepare meat. · Eat fish, skinless poultry, and meat alternatives such as soy products instead of high-fat meats. Soy products, such as tofu, may be especially good for your heart. · Choose low-fat or fat-free milk and dairy products. Eat fish · Eat at least two servings of fish a week. Certain fish, such as salmon and tuna, contain omega-3 fatty acids, which may help reduce your risk of heart attack. Eat foods high in fiber · Eat a variety of grain products every day. Include whole-grain foods that have lots of fiber and nutrients. Examples of whole-grain foods include oats, whole wheat bread, and brown rice. · Buy whole-grain breads and cereals, instead of white bread or pastries. Limit salt and sodium · Limit how much salt and sodium you eat to help lower your blood pressure. · Taste food before you salt it. Add only a little salt when you think you need it. With time, your taste buds will adjust to less salt. · Eat fewer snack items, fast foods, and other high-salt, processed foods. Check food labels for the amount of sodium in packaged foods. · Choose low-sodium versions of canned goods (such as soups, vegetables, and beans). Limit sugar · Limit drinks and foods with added sugar. These include candy, desserts, and soda pop. Limit alcohol · Limit alcohol to no more than 2 drinks a day for men and 1 drink a day for women. Too much alcohol can cause health problems. When should you call for help? Watch closely for changes in your health, and be sure to contact your doctor if: 
? · You would like help planning heart-healthy meals. Where can you learn more? Go to http://sylvia-hector.info/. Enter V137 in the search box to learn more about \"Heart-Healthy Diet: Care Instructions. \" Current as of: September 21, 2016 Content Version: 11.4 © 2111-4714 imagine. Care instructions adapted under license by [employer] (which disclaims liability or warranty for this information). If you have questions about a medical condition or this instruction, always ask your healthcare professional. [address] any warranty or liability for your use of this information. Secondhand Smoke: Care Instructions Your Care Instructions Secondhand smoke comes from the burning end of a cigarette, cigar, or pipe and the smoke that a smoker exhales. The smoke contains nicotine and many other harmful chemicals. Breathing secondhand smoke can cause or worsen health problems including cancer, asthma, coronary artery disease, and respiratory infections. It can make your eyes and nose burn and cause a sore throat. Secondhand smoke is especially bad for babies and young children whose lungs are still developing. Babies whose parents smoke are more likely to have ear infections, pneumonia, and bronchitis in the first few years of their lives. Secondhand smoke can make asthma symptoms worse in children. If you are pregnant, it is important that you not smoke and that you avoid secondhand smoke. You are more likely to give birth to a baby who weighs less than expected (low birth weight) if you smoke. And your baby may have a greater risk for sudden infant death syndrome (SIDS). Babies whose mothers are exposed to secondhand smoke during pregnancy have a higher risk for health problems. Follow-up care is a key part of your treatment and safety. Be sure to make and go to all appointments, and call your doctor if you are having problems.  It's also a good idea to know your test results and keep a list of the medicines you take. How can you care for yourself at home? · Do not smoke or let anyone else smoke in your home. If people must smoke, ask them to go outside. · If people do smoke in your home, choose a room where you can open a window or use a fan to get the smoke outside. · Do not let anyone smoke in your car. If someone must smoke, pull over in a safe place and let him or her smoke away from the car. · Ask your employer to make sure that you have a smoke-free work area. · Make sure that your children are not exposed to secondhand smoke at day care, school, and after-school programs. · Try to choose nonsmoking bars, restaurants, and other public places when you go out. · Help your family and friends who smoke to quit by encouraging them to try. Tell them about treatment resources. Having support from others often helps. · If you smoke, quit. Quitting is hard, but there are ways to boost your chance of quitting tobacco for good. ¨ Use nicotine gum, patches, or lozenges. Call a quitline. Ask your doctor about stop-smoking programs and medicines. ¨ Keep trying. When should you call for help? Watch closely for changes in your health, and be sure to contact your doctor if you have any problems. Where can you learn more? Go to http://sylvia-hector.info/. Enter L004 in the search box to learn more about \"Secondhand Smoke: Care Instructions. \" Current as of: March 20, 2017 Content Version: 11.4 © 2222-2352 [employer]. Care instructions adapted under license by [employer] (which disclaims liability or warranty for this information). If you have questions about a medical condition or this instruction, always ask your healthcare professional. [address] any warranty or liability for your use of this information. Please provide this summary of care documentation to your next provider. Signatures-by signing, you are acknowledging that this After Visit Summary has been reviewed with you and you have received a copy. Patient Signature:  ____________________________________________________________ Date:  ____________________________________________________________  
  
Suzon Mems Provider Signature:  ____________________________________________________________ Date:  ____________________________________________________________

## 2017-11-17 NOTE — PROGRESS NOTES
Patient out from operating room and placed on the ventilator on documented settings. Patient is orally intubated with a # 8.0 ET Tube secured at the 20 cm madisyn at the lip. Breath sounds are diminished. Trachea is midline. Negative for subcutaneous air, chest excursion is symmetric. Negative for pitting edema. Patient is also Negative for cyanosis. All alarms are set and audible. Resuscitation bag is at the head of the bed.       Ventilator Settings  Mode FIO2 Rate Tidal Volume Pressure PEEP I:E Ratio   PRVC  40 %  16    500    8 cm H20  1:3.13      Peak airway pressure: 18.8 cm H2O   Minute ventilation: 7.9 l/min     ABG:   Recent Labs      11/17/17   1240   PH  7.29*   PCO2  49*   PO2  195*   HCO3  23         Mina Weaver, RT

## 2017-11-17 NOTE — PROGRESS NOTES
Spiritual Care visit. Initial Visit, Pre Surgery Consult. Visit and prayer before patient goes to surgery.     Visit by Ivone Leach M.Ed., Th.B. ,Staff

## 2017-11-17 NOTE — IP AVS SNAPSHOT
Fiona [place] 
 
 
 [address] [facility] [address] [facility] 
[phone] Patient: Phillip Carolina MRN: [id number] :1934 My Medications STOP taking these medications   
 amiodarone 200 mg tablet Commonly known as:  CORDARONE  
   
  
 aspirin 325 mg tablet Commonly known as:  ASPIRIN Replaced by:  aspirin delayed-release 81 mg tablet  
   
  
 chlorhexidine 4 % liquid Commonly known as:  HIBICLENS  
   
  
 mupirocin 2 % ointment Commonly known as:  BACTROBAN  
   
  
  
TAKE these medications as instructed Instructions Each Dose to Equal  
 Morning Noon Evening Bedtime  
 acetaminophen 325 mg tablet Commonly known as:  TYLENOL May take for pain as directed on label. aspirin delayed-release 81 mg tablet Start taking on:  2017 Take 1 Tab by mouth daily. 81 mg  
    
  
   
   
   
  
 atorvastatin 40 mg tablet Commonly known as:  LIPITOR Your next dose is: Today Take 1 Tab by mouth nightly. 40 mg Blood Pressure Test Kit-Wrist Kit Commonly known as:  Blood Pressure Unit Check BP 2 times per day and write it down  
     
   
   
   
  
 cholecalciferol 1,000 unit Cap Commonly known as:  VITAMIN D3 Your next dose is:  Tomorrow Take 1,000 Units by mouth daily. 1000 Units  
    
  
   
   
   
  
 dextran 70-hypromellose ophthalmic solution Commonly known as:  ARTIFICIAL TEARS Administer  to both eyes as needed. ferrous fumarate 324 mg (106 mg iron) Tab Commonly known as:  HEMOCYTE Your next dose is:  Tomorrow Take 1 Tab by mouth daily. 1 Tab  
    
  
   
   
   
  
 fluticasone 50 mcg/actuation nasal spray Commonly known as:  Lyly Osorio Your next dose is:  Tomorrow 2 Sprays by Both Nostrils route daily. 2 Spray  
    
  
   
   
   
  
 guaiFENesin 1,200 mg Ta12 ER tablet Commonly known as:  Jd & TDX Over the counter Mucinex (Guaifenesin) take to keep secretions thin and easy to cough up  
     
   
   
   
  
 lamoTRIgine 200 mg tablet Commonly known as: LaMICtal  
Your next dose is:  Tomorrow Take 200 mg by mouth daily. 200 mg  
    
  
   
   
   
  
 metoprolol succinate 25 mg XL tablet Commonly known as:  TOPROL-XL Your next dose is:  Tomorrow Take 1 Tab by mouth daily. 25 mg  
    
  
   
   
   
  
 raNITIdine hcl 150 mg capsule Your next dose is: Today Take 150 mg by mouth two (2) times a day. 150 mg  
    
   
   
   
  
  
 rivastigmine tartrate 1.5 mg capsule Commonly known as:  EXELON Your next dose is: Today Take 1.5 mg by mouth two (2) times daily (with meals). 1.5 mg  
    
   
   
  
   
  
 senna-docusate 8.6-50 mg per tablet Commonly known as:  Sabine Pilon Over the counter Colace and Senakot - take for constipation as needed on label  
     
   
   
   
  
 sertraline 100 mg tablet Commonly known as:  ZOLOFT Your next dose is:  Tomorrow Take 150 mg by mouth daily. 150 mg  
    
  
   
   
   
  
 traMADol 50 mg tablet Commonly known as:  ULTRAM  
   
 Take 1 Tab by mouth every six (6) hours as needed. Max Daily Amount: 200 mg.  
 50 mg Where to Get Your Medications Information on where to get these meds will be given to you by the nurse or doctor. ! Ask your nurse or doctor about these medications  
  acetaminophen 325 mg tablet  
 aspirin delayed-release 81 mg tablet  
 atorvastatin 40 mg tablet  
 guaiFENesin 1,200 mg Ta12 ER tablet  
 metoprolol succinate 25 mg XL tablet  
 senna-docusate 8.6-50 mg per tablet  
 traMADol 50 mg tablet

## 2017-11-17 NOTE — ANESTHESIA PROCEDURE NOTES
Arterial Line Placement    Start time: 11/17/2017 7:38 AM  End time: 11/17/2017 7:40 AM  Performed by: Damir Mckee  Authorized by: Antoinette Zaman     Pre-Procedure  Indications:  Arterial pressure monitoring and blood sampling  Preanesthetic Checklist: patient identified, risks and benefits discussed, anesthesia consent, site marked, patient being monitored, timeout performed and patient being monitored    Timeout Time: 07:38        Procedure:   Prep:  ChloraPrep  Seldinger Technique?: No    Orientation:  Left  Location:  Radial artery  Catheter size:  20 G  Number of attempts:  1  Cont Cardiac Output Sensor: No      Assessment:   Post-procedure:  Line secured and sterile dressing applied  Patient Tolerance:  Patient tolerated the procedure well with no immediate complications

## 2017-11-17 NOTE — PERIOP NOTES
TMonitor  O2 @ 15 liters  Patient-specific medications from Pharmacy  Registered NurseRANSFER - OUT REPORT:    Verbal report given to Marni Norman on Earley Hashimoto  being transferred to [facility](unit) for routine progression of care       Report consisted of patients Situation, Background, Assessment and   Recommendations(SBAR). Information from the following report(s) OR Summary was reviewed with the receiving nurse. Lines:   Double Lumen 11/17/17 Right Internal jugular (Active)       Judene Hanly Triple 11/17/17 Right Neck (Active)       Peripheral IV 11/17/17 Right Arm (Active)   Site Assessment Clean, dry, & intact 11/17/2017  6:10 AM   Phlebitis Assessment 0 11/17/2017  6:10 AM   Infiltration Assessment 0 11/17/2017  6:10 AM   Dressing Status Clean, dry, & intact 11/17/2017  6:10 AM   Dressing Type Tape;Transparent 11/17/2017  6:10 AM   Hub Color/Line Status Green; Infusing 11/17/2017  6:10 AM       Arterial Line 11/17/17 Left Radial artery (Active)        Opportunity for [id number] and clarification was provided.       Patient transported with:   Monitor  O2 @ 15 liters  Patient-specific medications from Pharmacy  Registered Nurse

## 2017-11-17 NOTE — PROGRESS NOTES
RT at bedside. Pt able to meet requirements for extubation (physical mechanics and NIF). Procedure explained to Pt. Pt nodded understanding. VSS. Pt extubated at this time. Mouth and throat suctioned as Pt coughed to removed all secretions. BBS , no stridor noted. Pt able to verbalize name and cough effectively. Pt tolerated procedure well, no acute distress noted. Pt placed on 35L and 35% O2  via Airvo, O2 sat 95%. Will continue to monitor.

## 2017-11-17 NOTE — BRIEF OP NOTE
BRIEF OPERATIVE NOTE    Date of Procedure: 11/17/2017   Preoperative Diagnosis: Atherosclerosis of native coronary artery of native heart with unstable angina pectoris [facility] [I25.110]  Postoperative Diagnosis: Atherosclerosis of native coronary artery of native heart with unstable angina pectoris ([facility] [I25.110]    Procedure(s):  CORONARY ARTERY BYPASS GRAFT (CABG X 4)/ LIMA  VEIN HARVEST/ GREATER SAPHANEOUS  Surgeon(s) and Role:     * Michelle Diaz MD - Primary         Assistant Staff:       Surgical Staff:  Circ-1: Oscar Dow RN  Circ-2: Connor Hodge RN  Perfusionist: Juan Santana  Scrub Tech-1: Theresa Muñiz  Scrub Tech-3: Yayo Vizcarra  Scrub RN-1: Reagan Saldivar RN  Event Time In   Incision Start 1354   Incision Close 1207     Anesthesia: General   Estimated Blood Loss: minimal  Specimens: * No specimens in log *   Findings: cad   Complications: none  Implants: * No implants in log *

## 2017-11-17 NOTE — PROGRESS NOTES
Respiratory Mechanics completed and are as follows:  Weaning Parameters  Spontaneous Breathing Trial Complete: Yes  Resp Rate Observed: 16  Ve: 8.2  VT: 675  RSBI: 23  NIF: -20  VC: 924  Bloom Agitation Sedation Scale (RASS): Light sedation  Patient extubated to an AIRVO 35% 35L. Patient is able to communicate and is negative for stridor. Breath sounds are diminished. No complications with extubation.      [address], RT

## 2017-11-17 NOTE — PROGRESS NOTES
Dual skin assessment performed with RN. Pt rolled side-to-side, Allevyn pulled back, all skin assessed. Lines and wounds documented under designated flowsheets. Otherwise, skin warm, dry, intact, and appropriate for ethnicity. Allevyn replaced.

## 2017-11-17 NOTE — PROGRESS NOTES
TRANSFER - IN REPORT:    Verbal report received from CRNA (name) on Sofía Anguiano  being received from [place] [address] (unit) for routine post - op      Report consisted of patients Situation, Background, Assessment and   Recommendations(SBAR). Information from the following report(s) SBAR, Kardex, OR Summary, Intake/Output, MAR, Accordion and Recent Results was reviewed with the receiving nurse. Opportunity for questions and clarification was provided. Assessment completed upon patients arrival to unit and care assumed.      Care assumed by Angel Wing RN

## 2017-11-17 NOTE — CONSULTS
Cardiovascular ICU Consult Note: 11/17/2017  Nickolas Tello  Admission Date: 11/17/2017     The patient's chart is reviewed and the patient is discussed with the staff. Subjective:     79 y/o white male is seen at the request of Dr. Janette Covarrubias for respiratory management status post cardiac surgery. Patient had CABG x4. Currently is sedated in CV-ICU and orally intubated receiving  mechanical ventilation. Patient has been experiencing [place] class 3 symptoms and had elevated calcium score. [facility] revelaed multivessel CAD and surgical revascularization was recommended. We have been asked to see in the CV-ICU for mechanical ventilation management and weaning. Prior to Admission Medications   Prescriptions Last Dose Informant Patient Reported? Taking? Blood Pressure Test Kit-Wrist (BLOOD PRESSURE UNIT) kit 11/16/2017 at Unknown time  No Yes   Sig: Check BP 2 times per day and write it down   METOPROLOL SUCCINATE (TOPROL XL PO) 11/16/2017 at 1700  Yes Yes   Sig: Take 12.5 mg by mouth once. Called in to [facility] 11/15/17-per Dr Janette Covarrubias--- take after 4 pm the evening prior to surgery-- one time dose NO REFILLS   amiodarone (CORDARONE) 200 mg tablet 11/16/2017 at 1700  Yes No   Sig: Take 600 mg by mouth once. Called in to [facility] 11/15/17-per Dr Janette Covarrubias--- take after 4 pm the evening prior to surgery-- one time dose NO REFILLS   aspirin (ASPIRIN) 325 mg tablet 11/17/2017 at 1700  Yes Yes   Sig: Take 325 mg by mouth daily. atorvastatin (LIPITOR) 20 mg tablet 11/17/2017 at 0420  Yes Yes   Sig: Take 20 mg by mouth daily. chlorhexidine (HIBICLENS) 4 % liquid 11/17/2017 at 0400  Yes Yes   Sig: Apply  to affected area daily as needed. Provided in [facility] 11/15/17- shower using hibiclens as instructed the night before and morning of surgery   cholecalciferol (VITAMIN D3) 1,000 unit cap 11/16/2017 at Unknown time  Yes Yes   Sig: Take 1,000 Units by mouth daily.    dextran 70-hypromellose (ARTIFICIAL TEARS) ophthalmic solution 2017 at 0420  Yes Yes   Sig: Administer  to both eyes as needed. ferrous fumarate (HEMOCYTE) 324 mg (106 mg iron) tab 2017 at Unknown time  No Yes   Sig: Take 1 Tab by mouth daily. fluticasone (FLONASE) 50 mcg/actuation nasal spray 2017 at 0420  Yes Yes   Si Sprays by Both Nostrils route daily. lamoTRIgine (LAMICTAL) 200 mg tablet 2017 at 0420  Yes Yes   Sig: Take 200 mg by mouth daily. mupirocin (BACTROBAN) 2 % ointment 2017 at 0420  Yes Yes   Sig: Apply  to affected area daily. Called in to [facility] 11/15/17-per Dr Khalif Humphreys as instructed by RN - NO REFILLS   raNITIdine hcl 150 mg capsule 2017 at Unknown time  Yes Yes   Sig: Take 150 mg by mouth two (2) times a day. rivastigmine tartrate (EXELON) 1.5 mg capsule 2017 at Unknown time  Yes Yes   Sig: Take 1.5 mg by mouth two (2) times daily (with meals). sertraline (ZOLOFT) 100 mg tablet 2017 at 0420  Yes Yes   Sig: Take 150 mg by mouth daily. Facility-Administered Medications: None       Review of Systems  Review of systems not obtained due to patient factors. Patient is sedated and intubated immediatly post op CABG x4.       Past Medical History:   Diagnosis Date    Apnea     recognized by wife--- has not had sleep study     Asymptomatic gallstones     CAD (coronary artery disease)     denies mi-- heart cath 17    Centrilobular emphysema CT Findings [facility])     no treatment required--     Childhood asthma     Chronic anemia     Chronic renal insufficiency, stage III (moderate)     Pt is unaware of this dx- states was told in ED that he was dehydrated and received fluids-resolved    Cognitive impairment     Colon polyps     Dyslipidemia     Ex-smoker for more than 1 year     Quit  -  15 cig. daily x 25 yrs    GERD (gastroesophageal reflux disease)     hx of pyloric stenosis- takes ranitidine b.i.d.-  \"comes and goes\" takes prn pepto- sleeps on 1 pillow    History of penile implant     History of stomach ulcers 1959    \"none since\"    Iron deficiency anemia     fe oral - denies hx of blood transfusions    Mood disorder ([address]     Sigmoid diverticulosis     TIA (transient ischemic attack)     x 2 events and verified through MRI     Past Surgical History:   Procedure Laterality Date    HX APPENDECTOMY  1938    HX CORONARY ARTERY BYPASS GRAFT  11/17/2017    CABG x4, Dr. Maria M Jacobs      no stents- scheduled CABG    HX ORTHOPAEDIC Right     fx R arm x 2- -required hardware and later removed    HX UROLOGICAL      penile implant     Social History     Social History    Marital status:      Spouse name: N/A    Number of children: 3    Years of education: N/A     Occupational History    [employer]      Social History Main Topics    Smoking status: Former Smoker     Packs/day: 0.75     Years: 25.00     Quit date: 1977    Smokeless tobacco: Never Used    Alcohol use No    Drug use: No    Sexual activity: Not on file     Other Topics Concern    Not on file     Social History Narrative    Has 3 biologic children, 3 step kids.       Family History   Problem Relation Age of Onset    Headache Father     Stroke Brother      No Known Allergies    Current Facility-Administered Medications   Medication Dose Route Frequency    0.9% sodium chloride infusion  25 mL/hr IntraVENous CONTINUOUS    dextrose 5% - 0.45% NaCl with KCl 20 mEq/L infusion  25 mL/hr IntraVENous CONTINUOUS    sodium chloride (NS) flush 5-10 mL  5-10 mL IntraVENous Q8H    sodium chloride (NS) flush 5-10 mL  5-10 mL IntraVENous PRN    oxyCODONE-acetaminophen (PERCOCET) 5-325 mg per tablet 1 Tab  1 Tab Oral Q4H PRN    morphine injection 3-5 mg  3-5 mg IntraVENous Q1H PRN    naloxone (NARCAN) injection 0.4 mg  0.4 mg IntraVENous PRN    mupirocin (BACTROBAN) 2 % ointment   Both Nostrils BID    ceFAZolin in 0.9% NS (ANCEF) IVPB soln 2 g  2 g IntraVENous Q8H    sodium bicarbonate 8.4 % (1 mEq/mL) injection 50 mEq  50 mEq IntraVENous PRN    EPINEPHrine (ADRENALIN) 4 mg in 0.9% sodium chloride 250 mL infusion  0.05-0.1 mcg/kg/min IntraVENous TITRATE    nitroglycerin (Tridil) 200 mcg/ml infusion   mcg/min IntraVENous TITRATE    PHENYLephrine (SUNDAY-SYNEPHRINE) 30 mg in 0.9% sodium chloride 250 mL infusion   mcg/min IntraVENous TITRATE    lidocaine (PF) (XYLOCAINE) 100 mg/5 mL (2 %) injection syringe  mg   mg IntraVENous ONCE PRN    amiodarone (CORDARONE) tablet 200 mg  200 mg Oral Q12H    [START ON 11/18/2017] metoprolol tartrate (LOPRESSOR) tablet 25 mg  25 mg Oral Q12H    atorvastatin (LIPITOR) tablet 80 mg  80 mg Oral QHS    insulin regular (NOVOLIN R, HUMULIN R) 100 Units in 0.9% sodium chloride 100 mL infusion  1-10 Units/hr IntraVENous TITRATE    dextrose (D50W) injection syrg 12.5 g  25 mL IntraVENous PRN    [START ON 11/18/2017] aspirin chewable tablet 81 mg  81 mg Oral DAILY    magnesium sulfate 1 g/100 ml IVPB (premix or compounded)  1 g IntraVENous PRN    potassium chloride 10 mEq in 50 ml IVPB  10 mEq IntraVENous PRN    midazolam (VERSED) injection 1 mg  1 mg IntraVENous Q1H PRN    propofol (DIPRIVAN) infusion  0-50 mcg/kg/min IntraVENous TITRATE    meperidine (DEMEROL) injection 25 mg  25 mg IntraVENous Q1H PRN    chlorhexidine (PERIDEX) 0.12 % mouthwash 10 mL  10 mL Oral BID    tuberculin injection 5 Units  5 Units IntraDERMal ONCE     Facility-Administered Medications Ordered in Other Encounters   Medication Dose Route Frequency    0.9% sodium chloride infusion 250 mL  250 mL IntraVENous PRN         Objective:     Vitals:    11/17/17 0615 11/17/17 0629 11/17/17 1225 11/17/17 1226   BP: (!) 201/80 185/83 102/46    Pulse: (!) 50  78 79   Resp: 17  16 13   Temp: 97.5 °F (36.4 °C)  97.7 °F (36.5 °C) 97.7 °F (36.5 °C)   SpO2: 94%  100% Weight: 180 lb (81.6 kg)      Height: 5' 11\" (1.803 m)          Intake and Output:      11/17 0701 - 11/17 1900  In: 4505 [I.V.:1650]  Out: 36 [Urine:458]    Physical Exam:          Constitutional:  Sedated, orally intubated and mechanically ventilated. EENMT:  Sclera clear, pupils equal, oral mucosa moist and orally intubated  Respiratory: clear  Cardiovascular:  RRR with no M,G,R;  Gastrointestinal:  soft; no bowel sounds present  Musculoskeletal:  warm with no cyanosis, no lower leg edema. [place] site left leg with ace wrap. Sedated with no movements. SKIN:  no jaundice or ecchymosis   Neurologic:  sedated but no gross neuro deficits  Psychiatric:  sedated and unable to assess at this time    CXR:        LINES:  ETT, tucker, swan scot, arterial line, chest tubes times 2 in epigastric area without air leak. DRIPS:  NTG, Ryan-synephrine    CI:  3.4    Ventilator Settings  Mode FIO2 Rate Tidal Volume Pressure PEEP     imv/ps  40%                 Peak airway pressure:     Minute ventilation:       ABG:   Recent Labs      11/17/17   1240   PH  7.29*   PCO2  49*   PO2  195*   HCO3  23        LAB  Recent Labs      11/17/17   1239  11/15/17   0846   WBC  10.3  5.9   HGB  9.4*  13.0*   HCT  28.6*  40.9*   PLT  134*  273   INR   --   1.0     Recent Labs      11/15/17   0846   NA  142   K  3.8   CL  106   CO2  30   GLU  65   BUN  27*   CREA  1.12   CA  9.1   ALB  3.9     No results for input(s): LCAD, LAC in the last 72 hours.     Assessment and Plan :  (Medical Decision Making)     Hospital Problems  Date Reviewed: 11/17/2017          Codes Class Noted POA    S/P CABG x 4 ICD-10-CM: Z95.1  ICD-9-CM: V45.81  11/17/2017 No        Hypoxia ICD-10-CM: R09.02  ICD-9-CM: 799.02  11/17/2017 No    On vent    * (Principal)CAD (coronary artery disease) (Chronic) ICD-10-CM: I25.10  ICD-9-CM: 414.00  11/17/2017 Yes        Encounter for weaning from ventilator [facility]) ICD-10-CM: Z99.11  ICD-9-CM: V46.13  11/17/2017 No Plan:   --Wean mechanical ventilation per protocol. --Bronchodilators per protocol. --Incentive spirometry every hour post extubation. --Review CXR    More than 50% of the time documented was spent in face-to-face contact with the patient and in the care of the patient on the floor/unit where the patient is located. Thank you for this referral.  We appreciate the opportunity to participate in this patient's care. Will follow along with you. Armando Mosquera MD  Lungs: clear  Heart:  RRR with no Murmur/Rubs/Gallops    Additional Comments:  cxr -tube fairly high -advance 2-3 cm    I have spoken with and examined the patient. I agree with the above assessment and plan as documented.     Armando Mosquera MD

## 2017-11-18 ENCOUNTER — APPOINTMENT (OUTPATIENT)
Dept: GENERAL RADIOLOGY | Age: 82
DRG: 236 | End: 2017-11-18
Attending: THORACIC SURGERY (CARDIOTHORACIC VASCULAR SURGERY)
Payer: MEDICARE

## 2017-11-18 ENCOUNTER — APPOINTMENT (OUTPATIENT)
Dept: GENERAL RADIOLOGY | Age: 82
DRG: 236 | End: 2017-11-18
Attending: INTERNAL MEDICINE
Payer: MEDICARE

## 2017-11-18 PROBLEM — Z99.11 ENCOUNTER FOR WEANING FROM VENTILATOR (HCC): Status: RESOLVED | Noted: 2017-11-17 | Resolved: 2017-11-18

## 2017-11-18 LAB
ANION GAP SERPL CALC-SCNC: 8 MMOL/L (ref 7–16)
ARTERIAL PATENCY WRIST A: POSITIVE
ATRIAL RATE: 79 BPM
BASE DEFICIT BLDA-SCNC: 0.8 MMOL/L (ref 0–2)
BDY SITE: ABNORMAL
BLD PROD TYP BPU: NORMAL
BPU ID: NORMAL
BUN SERPL-MCNC: 22 MG/DL (ref 8–23)
CALCIUM SERPL-MCNC: 7.9 MG/DL (ref 8.3–10.4)
CALCULATED P AXIS, ECG09: 41 DEGREES
CALCULATED R AXIS, ECG10: 20 DEGREES
CALCULATED T AXIS, ECG11: 59 DEGREES
CHLORIDE SERPL-SCNC: 110 MMOL/L (ref 98–107)
CO2 SERPL-SCNC: 27 MMOL/L (ref 21–32)
COHGB MFR BLD: 1.5 % (ref 0.5–1.5)
CREAT SERPL-MCNC: 0.96 MG/DL (ref 0.8–1.5)
DIAGNOSIS, 93000: NORMAL
DO-HGB BLD-MCNC: 5 % (ref 0–5)
ERYTHROCYTE [DISTWIDTH] IN BLOOD BY AUTOMATED COUNT: 14 % (ref 11.9–14.6)
FIO2 ON VENT: 70 %
GAS FLOW.O2 O2 DELIVERY SYS: 50 L/MIN
GLUCOSE BLD STRIP.AUTO-MCNC: 102 MG/DL (ref 65–100)
GLUCOSE BLD STRIP.AUTO-MCNC: 113 MG/DL (ref 65–100)
GLUCOSE BLD STRIP.AUTO-MCNC: 113 MG/DL (ref 65–100)
GLUCOSE BLD STRIP.AUTO-MCNC: 132 MG/DL (ref 65–100)
GLUCOSE BLD STRIP.AUTO-MCNC: 143 MG/DL (ref 65–100)
GLUCOSE BLD STRIP.AUTO-MCNC: 159 MG/DL (ref 65–100)
GLUCOSE BLD STRIP.AUTO-MCNC: 94 MG/DL (ref 65–100)
GLUCOSE SERPL-MCNC: 112 MG/DL (ref 65–100)
HCO3 BLDA-SCNC: 25 MMOL/L (ref 22–26)
HCT VFR BLD AUTO: 31.3 % (ref 41.1–50.3)
HGB BLD-MCNC: 10 G/DL (ref 13.6–17.2)
HGB BLDMV-MCNC: 10.1 GM/DL (ref 11.7–15)
MAGNESIUM SERPL-MCNC: 2 MG/DL (ref 1.8–2.4)
MCH RBC QN AUTO: 29.5 PG (ref 26.1–32.9)
MCHC RBC AUTO-ENTMCNC: 31.9 G/DL (ref 31.4–35)
MCV RBC AUTO: 92.3 FL (ref 79.6–97.8)
METHGB MFR BLD: 0.3 % (ref 0–1.5)
MM INDURATION POC: 0 MM (ref 0–5)
OXYHGB MFR BLDA: 92.8 % (ref 94–97)
P-R INTERVAL, ECG05: 164 MS
PCO2 BLDA: 49 MMHG (ref 35–45)
PH BLDA: 7.33 [PH] (ref 7.35–7.45)
PLATELET # BLD AUTO: 152 K/UL (ref 150–450)
PMV BLD AUTO: 9.7 FL (ref 10.8–14.1)
PO2 BLDA: 74 MMHG (ref 80–105)
POTASSIUM SERPL-SCNC: 4.2 MMOL/L (ref 3.5–5.1)
PPD POC: NORMAL NEGATIVE
Q-T INTERVAL, ECG07: 382 MS
QRS DURATION, ECG06: 92 MS
QTC CALCULATION (BEZET), ECG08: 438 MS
RBC # BLD AUTO: 3.39 M/UL (ref 4.23–5.67)
SAO2 % BLD: 95 % (ref 92–98.5)
SERVICE CMNT-IMP: ABNORMAL
SODIUM SERPL-SCNC: 145 MMOL/L (ref 136–145)
STATUS OF UNIT,%ST: NORMAL
UNIT DIVISION, %UDIV: 0
UNIT DIVISION, %UDIV: NORMAL
UNIT DIVISION, %UDIV: NORMAL
VENTILATION MODE VENT: ABNORMAL
VENTRICULAR RATE, ECG03: 79 BPM
WBC # BLD AUTO: 10.6 K/UL (ref 4.3–11.1)

## 2017-11-18 PROCEDURE — 77010033711 HC HIGH FLOW OXYGEN

## 2017-11-18 PROCEDURE — 77030012390 HC DRN CHST BTL GTNG -B

## 2017-11-18 PROCEDURE — 74011250637 HC RX REV CODE- 250/637: Performed by: NURSE PRACTITIONER

## 2017-11-18 PROCEDURE — 74011000250 HC RX REV CODE- 250: Performed by: THORACIC SURGERY (CARDIOTHORACIC VASCULAR SURGERY)

## 2017-11-18 PROCEDURE — 83735 ASSAY OF MAGNESIUM: CPT | Performed by: THORACIC SURGERY (CARDIOTHORACIC VASCULAR SURGERY)

## 2017-11-18 PROCEDURE — 94640 AIRWAY INHALATION TREATMENT: CPT

## 2017-11-18 PROCEDURE — 74011000250 HC RX REV CODE- 250: Performed by: INTERNAL MEDICINE

## 2017-11-18 PROCEDURE — 71010 XR CHEST SNGL V: CPT

## 2017-11-18 PROCEDURE — 74011636637 HC RX REV CODE- 636/637: Performed by: THORACIC SURGERY (CARDIOTHORACIC VASCULAR SURGERY)

## 2017-11-18 PROCEDURE — 36600 WITHDRAWAL OF ARTERIAL BLOOD: CPT

## 2017-11-18 PROCEDURE — 80048 BASIC METABOLIC PNL TOTAL CA: CPT | Performed by: THORACIC SURGERY (CARDIOTHORACIC VASCULAR SURGERY)

## 2017-11-18 PROCEDURE — 74011250636 HC RX REV CODE- 250/636: Performed by: NURSE PRACTITIONER

## 2017-11-18 PROCEDURE — 74011250636 HC RX REV CODE- 250/636: Performed by: THORACIC SURGERY (CARDIOTHORACIC VASCULAR SURGERY)

## 2017-11-18 PROCEDURE — 97161 PT EVAL LOW COMPLEX 20 MIN: CPT

## 2017-11-18 PROCEDURE — 65660000004 HC RM CVT STEPDOWN

## 2017-11-18 PROCEDURE — 85027 COMPLETE CBC AUTOMATED: CPT | Performed by: THORACIC SURGERY (CARDIOTHORACIC VASCULAR SURGERY)

## 2017-11-18 PROCEDURE — 99232 SBSQ HOSP IP/OBS MODERATE 35: CPT | Performed by: INTERNAL MEDICINE

## 2017-11-18 PROCEDURE — 82803 BLOOD GASES ANY COMBINATION: CPT

## 2017-11-18 PROCEDURE — 94760 N-INVAS EAR/PLS OXIMETRY 1: CPT

## 2017-11-18 PROCEDURE — 36592 COLLECT BLOOD FROM PICC: CPT

## 2017-11-18 PROCEDURE — 93005 ELECTROCARDIOGRAM TRACING: CPT | Performed by: THORACIC SURGERY (CARDIOTHORACIC VASCULAR SURGERY)

## 2017-11-18 PROCEDURE — 74011250637 HC RX REV CODE- 250/637: Performed by: THORACIC SURGERY (CARDIOTHORACIC VASCULAR SURGERY)

## 2017-11-18 PROCEDURE — 74011250636 HC RX REV CODE- 250/636: Performed by: INTERNAL MEDICINE

## 2017-11-18 RX ORDER — ADHESIVE BANDAGE
30 BANDAGE TOPICAL DAILY PRN
Status: DISCONTINUED | OUTPATIENT
Start: 2017-11-18 | End: 2017-11-22 | Stop reason: HOSPADM

## 2017-11-18 RX ORDER — SODIUM CHLORIDE 0.9 % (FLUSH) 0.9 %
5-10 SYRINGE (ML) INJECTION EVERY 8 HOURS
Status: DISCONTINUED | OUTPATIENT
Start: 2017-11-18 | End: 2017-11-21

## 2017-11-18 RX ORDER — POTASSIUM CHLORIDE 20 MEQ/1
40 TABLET, EXTENDED RELEASE ORAL
Status: DISCONTINUED | OUTPATIENT
Start: 2017-11-18 | End: 2017-11-22 | Stop reason: HOSPADM

## 2017-11-18 RX ORDER — FUROSEMIDE 40 MG/1
40 TABLET ORAL DAILY
Status: COMPLETED | OUTPATIENT
Start: 2017-11-19 | End: 2017-11-21

## 2017-11-18 RX ORDER — LAMOTRIGINE 100 MG/1
200 TABLET ORAL DAILY
Status: DISCONTINUED | OUTPATIENT
Start: 2017-11-18 | End: 2017-11-22 | Stop reason: HOSPADM

## 2017-11-18 RX ORDER — RIVASTIGMINE TARTRATE 1.5 MG/1
1.5 CAPSULE ORAL 2 TIMES DAILY WITH MEALS
Status: DISCONTINUED | OUTPATIENT
Start: 2017-11-18 | End: 2017-11-22 | Stop reason: HOSPADM

## 2017-11-18 RX ORDER — TRAMADOL HYDROCHLORIDE 50 MG/1
50 TABLET ORAL
Status: DISCONTINUED | OUTPATIENT
Start: 2017-11-18 | End: 2017-11-18

## 2017-11-18 RX ORDER — AMIODARONE HYDROCHLORIDE 200 MG/1
200 TABLET ORAL 2 TIMES DAILY
Status: DISCONTINUED | OUTPATIENT
Start: 2017-11-18 | End: 2017-11-18

## 2017-11-18 RX ORDER — SODIUM CHLORIDE 0.9 % (FLUSH) 0.9 %
5-10 SYRINGE (ML) INJECTION AS NEEDED
Status: DISCONTINUED | OUTPATIENT
Start: 2017-11-18 | End: 2017-11-22 | Stop reason: HOSPADM

## 2017-11-18 RX ORDER — TRAMADOL HYDROCHLORIDE 50 MG/1
50 TABLET ORAL
Status: DISCONTINUED | OUTPATIENT
Start: 2017-11-18 | End: 2017-11-22 | Stop reason: HOSPADM

## 2017-11-18 RX ORDER — IPRATROPIUM BROMIDE AND ALBUTEROL SULFATE 2.5; .5 MG/3ML; MG/3ML
3 SOLUTION RESPIRATORY (INHALATION)
Status: DISCONTINUED | OUTPATIENT
Start: 2017-11-18 | End: 2017-11-22 | Stop reason: HOSPADM

## 2017-11-18 RX ORDER — METOPROLOL TARTRATE 25 MG/1
12.5 TABLET, FILM COATED ORAL EVERY 12 HOURS
Status: DISCONTINUED | OUTPATIENT
Start: 2017-11-18 | End: 2017-11-22 | Stop reason: HOSPADM

## 2017-11-18 RX ORDER — AMIODARONE HYDROCHLORIDE 200 MG/1
200 TABLET ORAL EVERY 12 HOURS
Status: DISCONTINUED | OUTPATIENT
Start: 2017-11-18 | End: 2017-11-22 | Stop reason: HOSPADM

## 2017-11-18 RX ORDER — LANOLIN ALCOHOL/MO/W.PET/CERES
400 CREAM (GRAM) TOPICAL
Status: DISCONTINUED | OUTPATIENT
Start: 2017-11-18 | End: 2017-11-22 | Stop reason: HOSPADM

## 2017-11-18 RX ORDER — FLUTICASONE PROPIONATE 50 MCG
2 SPRAY, SUSPENSION (ML) NASAL DAILY
Status: DISCONTINUED | OUTPATIENT
Start: 2017-11-19 | End: 2017-11-22 | Stop reason: HOSPADM

## 2017-11-18 RX ORDER — MUPIROCIN 20 MG/G
OINTMENT TOPICAL 2 TIMES DAILY
Status: DISCONTINUED | OUTPATIENT
Start: 2017-11-18 | End: 2017-11-18

## 2017-11-18 RX ORDER — METOPROLOL TARTRATE 25 MG/1
12.5 TABLET, FILM COATED ORAL 2 TIMES DAILY
Status: DISCONTINUED | OUTPATIENT
Start: 2017-11-18 | End: 2017-11-18

## 2017-11-18 RX ORDER — ACETAMINOPHEN 325 MG/1
650 TABLET ORAL
Status: DISCONTINUED | OUTPATIENT
Start: 2017-11-18 | End: 2017-11-22 | Stop reason: HOSPADM

## 2017-11-18 RX ORDER — MUPIROCIN 20 MG/G
OINTMENT TOPICAL EVERY 12 HOURS
Status: COMPLETED | OUTPATIENT
Start: 2017-11-18 | End: 2017-11-20

## 2017-11-18 RX ORDER — FAMOTIDINE 20 MG/1
20 TABLET, FILM COATED ORAL EVERY 12 HOURS
Status: DISCONTINUED | OUTPATIENT
Start: 2017-11-18 | End: 2017-11-19

## 2017-11-18 RX ORDER — POTASSIUM CHLORIDE 20 MEQ/1
20 TABLET, EXTENDED RELEASE ORAL
Status: DISCONTINUED | OUTPATIENT
Start: 2017-11-18 | End: 2017-11-22 | Stop reason: HOSPADM

## 2017-11-18 RX ORDER — ATORVASTATIN CALCIUM 40 MG/1
40 TABLET, FILM COATED ORAL EVERY EVENING
Status: DISCONTINUED | OUTPATIENT
Start: 2017-11-18 | End: 2017-11-18

## 2017-11-18 RX ORDER — LANOLIN ALCOHOL/MO/W.PET/CERES
325 CREAM (GRAM) TOPICAL DAILY
Status: DISCONTINUED | OUTPATIENT
Start: 2017-11-19 | End: 2017-11-22 | Stop reason: HOSPADM

## 2017-11-18 RX ORDER — FUROSEMIDE 10 MG/ML
40 INJECTION INTRAMUSCULAR; INTRAVENOUS ONCE
Status: COMPLETED | OUTPATIENT
Start: 2017-11-18 | End: 2017-11-18

## 2017-11-18 RX ORDER — ONDANSETRON 2 MG/ML
4 INJECTION INTRAMUSCULAR; INTRAVENOUS
Status: DISCONTINUED | OUTPATIENT
Start: 2017-11-18 | End: 2017-11-22 | Stop reason: HOSPADM

## 2017-11-18 RX ORDER — OXYCODONE AND ACETAMINOPHEN 5; 325 MG/1; MG/1
1 TABLET ORAL
Status: DISCONTINUED | OUTPATIENT
Start: 2017-11-18 | End: 2017-11-22 | Stop reason: HOSPADM

## 2017-11-18 RX ORDER — SERTRALINE HYDROCHLORIDE 50 MG/1
150 TABLET, FILM COATED ORAL DAILY
Status: DISCONTINUED | OUTPATIENT
Start: 2017-11-18 | End: 2017-11-22 | Stop reason: HOSPADM

## 2017-11-18 RX ORDER — FAMOTIDINE 20 MG/1
20 TABLET, FILM COATED ORAL 2 TIMES DAILY
Status: DISCONTINUED | OUTPATIENT
Start: 2017-11-18 | End: 2017-11-18

## 2017-11-18 RX ORDER — ZOLPIDEM TARTRATE 5 MG/1
5 TABLET ORAL
Status: DISCONTINUED | OUTPATIENT
Start: 2017-11-18 | End: 2017-11-19

## 2017-11-18 RX ORDER — POTASSIUM CHLORIDE 750 MG/1
10 TABLET, EXTENDED RELEASE ORAL DAILY
Status: COMPLETED | OUTPATIENT
Start: 2017-11-19 | End: 2017-11-21

## 2017-11-18 RX ORDER — MAG HYDROX/ALUMINUM HYD/SIMETH 200-200-20
30 SUSPENSION, ORAL (FINAL DOSE FORM) ORAL
Status: DISCONTINUED | OUTPATIENT
Start: 2017-11-18 | End: 2017-11-22 | Stop reason: HOSPADM

## 2017-11-18 RX ORDER — ASPIRIN 81 MG/1
81 TABLET ORAL DAILY
Status: DISCONTINUED | OUTPATIENT
Start: 2017-11-19 | End: 2017-11-22 | Stop reason: HOSPADM

## 2017-11-18 RX ORDER — ATORVASTATIN CALCIUM 40 MG/1
40 TABLET, FILM COATED ORAL
Status: DISCONTINUED | OUTPATIENT
Start: 2017-11-18 | End: 2017-11-22 | Stop reason: HOSPADM

## 2017-11-18 RX ORDER — FUROSEMIDE 10 MG/ML
20 INJECTION INTRAMUSCULAR; INTRAVENOUS ONCE
Status: COMPLETED | OUTPATIENT
Start: 2017-11-18 | End: 2017-11-18

## 2017-11-18 RX ORDER — INSULIN LISPRO 100 [IU]/ML
INJECTION, SOLUTION INTRAVENOUS; SUBCUTANEOUS
Status: DISCONTINUED | OUTPATIENT
Start: 2017-11-18 | End: 2017-11-20

## 2017-11-18 RX ADMIN — SERTRALINE HYDROCHLORIDE 150 MG: 100 TABLET ORAL at 09:29

## 2017-11-18 RX ADMIN — FUROSEMIDE 40 MG: 10 INJECTION, SOLUTION INTRAMUSCULAR; INTRAVENOUS at 21:22

## 2017-11-18 RX ADMIN — FAMOTIDINE 20 MG: 10 INJECTION, SOLUTION INTRAVENOUS at 09:27

## 2017-11-18 RX ADMIN — METOPROLOL TARTRATE 12.5 MG: 25 TABLET ORAL at 09:27

## 2017-11-18 RX ADMIN — AMIODARONE HYDROCHLORIDE 200 MG: 200 TABLET ORAL at 09:26

## 2017-11-18 RX ADMIN — FUROSEMIDE 20 MG: 10 INJECTION, SOLUTION INTRAMUSCULAR; INTRAVENOUS at 09:26

## 2017-11-18 RX ADMIN — MORPHINE SULFATE 3 MG: 10 INJECTION INTRAMUSCULAR; INTRAVENOUS; SUBCUTANEOUS at 10:32

## 2017-11-18 RX ADMIN — MUPIROCIN: 20 OINTMENT TOPICAL at 09:27

## 2017-11-18 RX ADMIN — OXYCODONE HYDROCHLORIDE AND ACETAMINOPHEN 1 TABLET: 5; 325 TABLET ORAL at 09:48

## 2017-11-18 RX ADMIN — OXYCODONE HYDROCHLORIDE AND ACETAMINOPHEN 1 TABLET: 5; 325 TABLET ORAL at 02:28

## 2017-11-18 RX ADMIN — AMIODARONE HYDROCHLORIDE 200 MG: 200 TABLET ORAL at 20:36

## 2017-11-18 RX ADMIN — CEFAZOLIN 2 G: 1 INJECTION, POWDER, FOR SOLUTION INTRAMUSCULAR; INTRAVENOUS; PARENTERAL at 00:03

## 2017-11-18 RX ADMIN — RIVASTIGMINE TARTRATE 1.5 MG: 1.5 CAPSULE ORAL at 09:47

## 2017-11-18 RX ADMIN — ASPIRIN 81 MG 81 MG: 81 TABLET ORAL at 09:26

## 2017-11-18 RX ADMIN — TRAMADOL HYDROCHLORIDE 50 MG: 50 TABLET, FILM COATED ORAL at 05:51

## 2017-11-18 RX ADMIN — Medication 10 ML: at 21:00

## 2017-11-18 RX ADMIN — METOPROLOL TARTRATE 12.5 MG: 25 TABLET ORAL at 20:36

## 2017-11-18 RX ADMIN — RIVASTIGMINE TARTRATE 1.5 MG: 1.5 CAPSULE ORAL at 18:26

## 2017-11-18 RX ADMIN — MUPIROCIN: 20 OINTMENT TOPICAL at 21:00

## 2017-11-18 RX ADMIN — FAMOTIDINE 20 MG: 20 TABLET, FILM COATED ORAL at 20:36

## 2017-11-18 RX ADMIN — IPRATROPIUM BROMIDE AND ALBUTEROL SULFATE 3 ML: .5; 3 SOLUTION RESPIRATORY (INHALATION) at 20:12

## 2017-11-18 RX ADMIN — ATORVASTATIN CALCIUM 40 MG: 40 TABLET, FILM COATED ORAL at 20:36

## 2017-11-18 RX ADMIN — IPRATROPIUM BROMIDE AND ALBUTEROL SULFATE 3 ML: .5; 3 SOLUTION RESPIRATORY (INHALATION) at 23:27

## 2017-11-18 RX ADMIN — Medication 10 ML: at 02:27

## 2017-11-18 RX ADMIN — LAMOTRIGINE 200 MG: 100 TABLET ORAL at 09:48

## 2017-11-18 RX ADMIN — INSULIN LISPRO 2 UNITS: 100 INJECTION, SOLUTION INTRAVENOUS; SUBCUTANEOUS at 18:22

## 2017-11-18 RX ADMIN — OXYCODONE HYDROCHLORIDE AND ACETAMINOPHEN 1 TABLET: 5; 325 TABLET ORAL at 14:48

## 2017-11-18 NOTE — PROGRESS NOTES
TRANSFER - IN REPORT:     Verbal report received from [facility], RN(name) on Wilmer Bates.  being received from CVICU(unit) for routine progression of care       Report consisted of patients Situation, Background, Assessment and   Recommendations(SBAR).    Information from the following report(s) SBAR, Kardex, Recent Results and Cardiac Rhythm NSR was reviewed with the receiving nurse.     Opportunity for questions and clarification was provided.       Assessment completed upon patients arrival to unit and care assumed.      Dual skin assessment completed with RN. Allevyn pulled back to view sacrum and then replaced. Sacrum visualized and is benign. Patient has MS and LL incisions covered with dressings that are clean, dry, and intact. Chest tube present. Site is clean, dry, and intact. No redness or breakdown noted.

## 2017-11-18 NOTE — PROGRESS NOTES
Problem: Mobility Impaired (Adult and Pediatric)  Goal: *Therapy Goal (Edit Goal, Insert Text)    STERNAL PRECAUTIONS   STG:  (1.)Mr. Halina Marley will move from supine to sit and sit to supine , scoot up and down and roll side to side with MODIFIED INDEPENDENCE within 4 day(s). (2.)Mr. Halina Marley will transfer from bed to chair and chair to bed with MODIFIED INDEPENDENCE using the least restrictive device within 4 day(s). (3.)Mr. Halina Marley will ambulate with MODIFIED INDEPENDENCE for 300 feet with the least restrictive device within 4 day(s). LTG:  (1.)Mr. Halina Marley will move from supine to sit and sit to supine , scoot up and down and roll side to side in bed with INDEPENDENT within 7 day(s). (2.)Mr. Halina Marley will transfer from bed to chair and chair to bed with INDEPENDENT using the least restrictive device within 7 day(s). (3.)Mr. Halina Marley will ambulate with INDEPENDENT for 600 feet with the least restrictive device within 7 day(s). ________________________________________________________________________________________________     PHYSICAL THERAPY: Initial Assessment, Treatment Day: Day of Assessment 11/18/2017  INPATIENT: Hospital Day: 2  [place] / Product Type: Medicare /      NAME/AGE/GENDER: Edie Khan is a 80 y.o. male   PRIMARY DIAGNOSIS: Atherosclerosis of native coronary artery of native heart with unstable angina pectoris ([facility] [address] [I25.110] Coronary artery disease involving native coronary artery of native heart with angina pectoris ([facility] [address] Coronary artery disease involving native coronary artery of native heart with angina pectoris (HCC)  Procedure(s) (LRB):  CORONARY ARTERY BYPASS GRAFT (CABG X 4)/ LIMA (N/A)  VEIN HARVEST/ GREATER SAPHANEOUS (Left)  1 Day Post-Op  ICD-10: Treatment Diagnosis:   · Difficulty in walking, Not elsewhere classified (R26.2)   Precaution/Allergies:  Review of patient's allergies indicates no known allergies. ASSESSMENT:     Mr. Lizzie Jimenez is a pleasant elderly male with above diagnosis with decreased transfers, ambulation and mobility with sternal precautions. Pt is in seated and agreeable to mobilize out of chair for ambulation. He performed all bed mobility and sit to stand with supervision. He then ambulates with supervision for 200 feet in the room and newton then returns to room and is seated in the bedside chair. His static balance is fair. Pt is motivated and demonstrates potential for improvement with skilled physical therapy. Skilled PT is indicated for his functional mobility deficits and debility for return to normal baseline function. This section established at most recent assessment   PROBLEM LIST (Impairments causing functional limitations):  1. Decreased Strength  2. Decreased Transfer Abilities  3. Decreased Ambulation Ability/Technique  4. Decreased Balance  5. Decreased Activity Tolerance    INTERVENTIONS PLANNED: (Benefits and precautions of physical therapy have been discussed with the patient.)  1. Balance Exercise  2. Bed Mobility  3. Gait Training  4. Neuromuscular Re-education/Strengthening  5. Range of Motion (ROM)  6. Therapeutic Activites  7. Therapeutic Exercise/Strengthening  8. Group Therapy      TREATMENT PLAN: Frequency/Duration: Patient to be seen for PT 2 x's per day for the duration of current acute and cardiac rehab hospital episode. Rehabilitation Potential For Stated Goals: GOOD        RECOMMENDED REHABILITATION/EQUIPMENT: (at time of discharge pending progress): Due to the probability of continued deficits (see above) this patient will likely need continued skilled physical therapy after discharge.   Equipment:   Anniece Crank, Type: Lylia Jessica will need potentially upon D/C                   HISTORY:   History of Present Injury/Illness (Reason for Referral):PER MD H&P   79 y/o white male is seen at the request of Dr. Hamida Garcia for respiratory management status post cardiac surgery. Patient had CABG x4. Currently is sedated in CV-ICU and orally intubated receiving  mechanical ventilation. Patient has been experiencing [place] class 3 symptoms and had elevated calcium score. [facility] revelaed multivessel CAD and surgical revascularization was recommended. We have been asked to see in the CV-ICU for mechanical ventilation management and weaning  Past Medical History/Comorbidities:   Mr. David Padron  has a past medical history of Apnea; Asymptomatic gallstones; CAD (coronary artery disease); Centrilobular emphysema CT Findings ([facility] [address] Childhood asthma; Chronic anemia; Chronic renal insufficiency, stage III (moderate); Cognitive impairment; Colon polyps; Dyslipidemia; Ex-smoker for more than 1 year; GERD (gastroesophageal reflux disease); History of penile implant; History of stomach ulcers (1959); Iron deficiency anemia; Mood disorder ([facility] [address] Sigmoid diverticulosis; and TIA (transient ischemic attack). Mr. David Padron  has a past surgical history that includes appendectomy (1938); heart catheterization; gi (1959); urological; orthopaedic (Right); and coronary artery bypass graft (11/17/2017). Social History/Living Environment:   Home Environment: Private residence  # Steps to Enter: 2  One/Two Story Residence: One story  Living Alone: No  Support Systems: Spouse/Significant Other/Partner  Patient Expects to be Discharged to[de-identified] Private residence  Current DME Used/Available at Home: None  Prior level of Function/Work/Activity:  Patient had been independent with all functional mobility. Dominant Side:         RIGHT  Personal Factors:          Sex:  male        Age:  80 y.o.    Number of Personal Factors/Comorbidities that affect the Plan of Care: 0: LOW COMPLEXITY   EXAMINATION:   Most Recent Physical Functioning:   Gross Assessment:  AROM: Generally decreased, functional  Strength: Generally decreased, functional  Coordination: Generally decreased, functional  Tone: Normal  Sensation: Intact               Posture:  Posture (WDL): Exceptions to WDL  Posture Assessment: Cervical, Forward head  Balance:  Sitting: Impaired  Sitting - Static: Fair (occasional)  Sitting - Dynamic: Fair (occasional)  Standing: Impaired  Standing - Static: Fair  Standing - Dynamic : Fair Bed Mobility:  Rolling:  (already in bedside chair )  Wheelchair Mobility:     Transfers:  Sit to Stand: Stand-by asssistance  Stand to Sit: Stand-by asssistance  Bed to Chair: Stand-by asssistance  Gait:     Base of Support: Center of gravity altered  Speed/Giuliana: Fluctuations; Pace decreased (<100 feet/min); Shuffled; Slow  Step Length: Left shortened;Right shortened  Swing Pattern: Left asymmetrical;Right asymmetrical  Stance: Left decreased;Right decreased;Time;Weight shift  Gait Abnormalities: Decreased step clearance;Shuffling gait; Steppage gait; Step to gait;Trunk sway increased  Distance (ft): 200 Feet (ft)  Assistive Device: Walker, rolling  Ambulation - Level of Assistance: Supervision  Interventions: Safety awareness training; Tactile cues; Verbal cues; Visual/Demos      Body Structures Involved:  1. Bones  2. Muscles  3. Ligaments Body Functions Affected:  1. Neuromusculoskeletal  2. Movement Related  3. Skin Related  4. Metobolic/Endocrine Activities and Participation Affected:  1. General Tasks and Demands  2. Communication  3. Mobility  4. Interpersonal Interactions and Relationships  5. Community, Social and [place]   Number of elements that affect the Plan of Care: 1-2: LOW COMPLEXITY   CLINICAL PRESENTATION:   Presentation: Stable and uncomplicated: LOW COMPLEXITY   CLINICAL DECISION MAKIN [facility] Inpatient Short Form  How much difficulty does the patient currently have. .. Unable A Lot A Little None   1. Turning over in bed (including adjusting bedclothes, sheets and blankets)? [] 1   [] 2   [] 3   [x] 4   2.   Sitting down on and standing up from a chair with arms ( e.g., wheelchair, bedside commode, etc.)   [] 1   [] 2   [] 3   [x] 4   3. Moving from lying on back to sitting on the side of the bed? [] 1   [] 2   [] 3   [x] 4   How much help from another person does the patient currently need. .. Total A Lot A Little None   4. Moving to and from a bed to a chair (including a wheelchair)? [] 1   [] 2   [x] 3   [] 4   5. Need to walk in hospital room? [] 1   [] 2   [x] 3   [] 4   6. Climbing 3-5 steps with a railing? [] 1   [] 2   [x] 3   [] 4   © 2007, Trustees of [address] [place] [address] [address] [address], under license to [employer]. All rights reserved      Score:  Initial: 21 Most Recent: X (Date: -- )    Interpretation of Tool:  Represents activities that are increasingly more difficult (i.e. Bed mobility, Transfers, Gait). Score 24 23 22-20 19-15 14-10 9-7 6     Modifier CH CI CJ CK CL CM CN      ? Mobility - Walking and Moving Around:     - CURRENT STATUS: CJ - 20%-39% impaired, limited or restricted    - GOAL STATUS: CI - 1%-19% impaired, limited or restricted    - D/C STATUS:  ---------------To be determined---------------  [place] / [place] / Product Type: Medicare /      Medical Necessity:     · Patient demonstrates good rehab potential due to higher previous functional level. Reason for Services/Other Comments:  · Patient continues to require skilled intervention due to s/p Cardiac Surgery. .   Use of outcome tool(s) and clinical judgement create a POC that gives a: Clear prediction of patient's progress: LOW COMPLEXITY            TREATMENT:   (In addition to Assessment/Re-Assessment sessions the following treatments were rendered)   Pre-treatment Symptoms/Complaints:  3/10 in the chest   Pain: Initial:   Pain Intensity 1: 3  Pain Location 1: Chest  Pain Intervention(s) 1: Repositioned  Post Session:  3/10 in the chest region.       Assessment/Reassessment only, no treatment provided today    Braces/Orthotics/Lines/Etc:   · O2 Device: Nasal cannula, Humidifier  Treatment/Session Assessment:    · Response to Treatment:  Improving overall. Initial evaluation substantial ambulatory distance with supervision assistance. · Interdisciplinary Collaboration:   o Physical Therapist  o Registered Nurse  · After treatment position/precautions:   o Supine in bed  o Bed alarm/tab alert on  o Bed/Chair-wheels locked  o Bed in low position  o Call light within reach  o RN notified  o Side rails x 3   · Compliance with Program/Exercises: Will assess as treatment progresses. · Recommendations/Intent for next treatment session: \"Next visit will focus on advancements to more challenging activities, reduction in assistance provided and cardiac rehabilitation. \".   Total Treatment Duration:  PT Patient Time In/Time Out  Time In: 0907  Time Out: [address] [place]

## 2017-11-18 NOTE — PROGRESS NOTES
Problem: Falls - Risk of  Goal: *Absence of Falls  Document Girish Fall Risk and appropriate interventions in the flowsheet. Outcome: Progressing Towards Goal  Fall Risk Interventions:            Medication Interventions: Assess postural VS orthostatic hypotension, Bed/chair exit alarm, Evaluate medications/consider consulting pharmacy, Patient to call before getting OOB, Teach patient to arise slowly    Elimination Interventions:  Toileting schedule/hourly rounds, Urinal in reach, Call light in reach

## 2017-11-18 NOTE — PROGRESS NOTES
Critical Care Daily Progress Note: 11/18/2017    Landon Osorio   Admission Date: 11/17/2017         The patient's chart is reviewed and the patient is discussed with the staff. Subjective:     79 y/o s/p cabg x 4 on 11/17. He is tolerating extubation yesterday but is confused and somewhat agitated.   There is a hx of dementia    Current Facility-Administered Medications   Medication Dose Route Frequency    traMADol (ULTRAM) tablet 50 mg  50 mg Oral Q6H PRN    furosemide (LASIX) injection 20 mg  20 mg IntraVENous ONCE    dextrose 5% - 0.45% NaCl with KCl 20 mEq/L infusion  25 mL/hr IntraVENous CONTINUOUS    sodium chloride (NS) flush 5-10 mL  5-10 mL IntraVENous Q8H    sodium chloride (NS) flush 5-10 mL  5-10 mL IntraVENous PRN    oxyCODONE-acetaminophen (PERCOCET) 5-325 mg per tablet 1 Tab  1 Tab Oral Q4H PRN    morphine injection 3-5 mg  3-5 mg IntraVENous Q1H PRN    naloxone (NARCAN) injection 0.4 mg  0.4 mg IntraVENous PRN    mupirocin (BACTROBAN) 2 % ointment   Both Nostrils BID    sodium bicarbonate 8.4 % (1 mEq/mL) injection 50 mEq  50 mEq IntraVENous PRN    nitroglycerin (Tridil) 200 mcg/ml infusion   mcg/min IntraVENous TITRATE    PHENYLephrine (SUNDAY-SYNEPHRINE) 30 mg in 0.9% sodium chloride 250 mL infusion   mcg/min IntraVENous TITRATE    lidocaine (PF) (XYLOCAINE) 100 mg/5 mL (2 %) injection syringe  mg   mg IntraVENous ONCE PRN    amiodarone (CORDARONE) tablet 200 mg  200 mg Oral Q12H    metoprolol tartrate (LOPRESSOR) tablet 25 mg  25 mg Oral Q12H    atorvastatin (LIPITOR) tablet 80 mg  80 mg Oral QHS    insulin regular (NOVOLIN R, HUMULIN R) 100 Units in 0.9% sodium chloride 100 mL infusion  1-10 Units/hr IntraVENous TITRATE    dextrose (D50W) injection syrg 12.5 g  25 mL IntraVENous PRN    aspirin chewable tablet 81 mg  81 mg Oral DAILY    magnesium sulfate 1 g/100 ml IVPB (premix or compounded)  1 g IntraVENous PRN    potassium chloride 10 mEq in 50 ml IVPB  10 mEq IntraVENous PRN    meperidine (DEMEROL) injection 25 mg  25 mg IntraVENous Q1H PRN    tuberculin injection 5 Units  5 Units IntraDERMal ONCE    lip protectant (BLISTEX) ointment   Topical PRN    oxyCODONE-acetaminophen (PERCOCET 10)  mg per tablet 1 Tab  1 Tab Oral Q4H PRN    famotidine (PF) (PEPCID) 20 mg in sodium chloride 0.9 % 10 mL injection  20 mg IntraVENous Q12H     Facility-Administered Medications Ordered in Other Encounters   Medication Dose Route Frequency    0.9% sodium chloride infusion 250 mL  250 mL IntraVENous PRN       Review of Systems    Constitutional:  negative for fever, chills, sweats  Cardiovascular:  negative for chest pain, palpitations, syncope, edema  Gastrointestinal:  negative for dysphagia, reflux, vomiting, diarrhea, abdominal pain, or melena  Neurologic:  negative for focal weakness, numbness, headache      Objective:     Vitals:    11/18/17 0701 11/18/17 0715 11/18/17 0732 11/18/17 0745   BP: 111/56  93/59    Pulse: 83 80 86 80   Resp: 25 23 25 26   Temp:       SpO2: 96% 95% 96% 96%   Weight:       Height:           Intake and Output:   11/16 1901 - 11/18 0700  In: 3707.4 [P.O.:360; I.V.:3347.4]  Out: 3452 [Urine:3202]       Physical Exam:          Constitutional:  the patient is well developed and in no acute distress  EENMT:  Sclera clear, pupils equal, oral mucosa moist  Respiratory: clear  Cardiovascular:  RRR without M,G,R  Gastrointestinal: soft and non-tender; with positive bowel sounds. Musculoskeletal: warm without cyanosis. There is no lower leg edema.   Skin:  no jaundice or rashes, no wounds   Neurologic: no gross neuro deficits     Psychiatric:  alert and oriented x 2    LINES: central line    DRIPS:   none    CXR:       LAB  Recent Labs      11/18/17   0631  11/18/17   0527  11/18/17   0353  11/18/17   0225  11/17/17   2355   GLUCPOC  132*  102*  113*  94  113*      Recent Labs      11/18/17   0350  11/17/17 2126 11/17/17   1711  11/17/17   1239   WBC  10.6   --    --   10.3   HGB  10.0*  10.2*  10.5*  9.4*   HCT  31.3*  31.4*  32.2*  28.6*   PLT  152   --    --   134*   INR   --    --    --   1.2     Recent Labs      11/18/17   0350  11/17/17   2126  11/17/17   1711  11/17/17   1239   NA  145   --    --   144   K  4.2  4.2  4.5  3.8   CL  110*   --    --   112*   CO2  27   --    --   24   GLU  112*   --    --   126*   BUN  22   --    --   23   CREA  0.96   --    --   1.15   MG  2.0  2.1  2.4  3.1*   CA  7.9*   --    --   7.9*     Recent Labs      11/17/17   1240   PH  7.29*   PCO2  49*   PO2  195*   HCO3  23     No results for input(s): LCAD, LAC in the last 72 hours. Assessment:  (Medical Decision Making)     Hospital Problems  Date Reviewed: 11/17/2017          Codes Class Noted POA    S/P CABG x 4 ICD-10-CM: Z95.1  ICD-9-CM: V45.81  11/17/2017 No        Hypoxia ICD-10-CM: R09.02  ICD-9-CM: 799.02  11/17/2017 No    On nc    Encounter for weaning from ventilator [facility]) ICD-10-CM: Z99.11  ICD-9-CM: V46.13  11/17/2017 No        * (Principal)Coronary artery disease involving native coronary artery of native heart with angina pectoris ([facility] [address] ICD-10-CM: I25.119  ICD-9-CM: 414.01, 413.9  11/10/2017 Yes    Overview Signed 11/18/2017  7:49 AM by Eva Miranda NP     11/17/17 (Dr Suzi Marcelo) Coronary artery bypass grafting x4 grafts   consisting of:   1. Left internal mammary artery to left anterior descending. 2. Reverse saphenous vein graft to diagonal 1.   3. Reverse saphenous vein graft to obtuse marginal.   4. Reverse saphenous vein graft to the posterior descending   artery. Chronic anemia ICD-10-CM: D64.9  ICD-9-CM: 849. 9  Unknown Yes        Cognitive impairment ICD-10-CM: R41.89  ICD-9-CM: 294.9  Unknown Yes    Confused this am          Plan:  (Medical Decision Making)   1   Remove chest tube this am per surgery  2   Is  3   Resume home meds  4   Move to floor per surgery  --    More than 50% of the time documented was spent in face-to-face contact with the patient and in the care of the patient on the floor/unit where the patient is located.     Birdie Quintana MD

## 2017-11-18 NOTE — PROGRESS NOTES
TRANSFER - OUT REPORT:    Verbal report given to Rosanna Antonio RN(name) on Sofía Lombardo  being transferred to [facility](unit) for routine progression of care       Report consisted of patients Situation, Background, Assessment and   Recommendations(SBAR). Information from the following report(s) SBAR, OR Summary, Procedure Summary, Intake/Output, MAR, Recent Results and Cardiac Rhythm NSR was reviewed with the receiving nurse. Lines:   Peripheral IV 11/17/17 Right Arm (Active)   Site Assessment Clean, dry, & intact 11/18/2017 11:00 AM   Phlebitis Assessment 0 11/18/2017 11:00 AM   Infiltration Assessment 0 11/18/2017 11:00 AM   Dressing Status Clean, dry, & intact 11/18/2017 11:00 AM   Dressing Type Transparent;Tape 11/18/2017 11:00 AM   Hub Color/Line Status Green;Patent 11/18/2017 11:00 AM        Opportunity for questions and clarification was provided.       Patient transported with:   Monitor  O2 @ 3 liters  Registered Nurse

## 2017-11-18 NOTE — PROGRESS NOTES
Patient found with NC off. O2 sat high 70's. Wife states he removed O2 while trying to use urinal and NC had been off for several mins. Patient appeared confused and was not able to answer questions appropriately. NC replaced on patient. O2 sat remained in 80's despite increasing O2 to 6L. Notified RT. Patient placed on HFNC and O2 sat increased to 90%. Monitoring O2 sat continuously. Patient now is able to answer questions appropriately and is resting comfortably.

## 2017-11-18 NOTE — PROGRESS NOTES
CV Progress Note    Admit Date: 11/17/2017    Postop: CABG x 4 day 1    Subjective:     Patient present conditions: awake, alert, conversant and with good pain control      Objective:     Vitals:  Blood pressure 113/59, pulse 84, temperature 98.9 °F (37.2 °C), resp. rate 28, height 5' 11\" (1.803 m), weight 187 lb 6.3 oz (85 kg), SpO2 96 %. Vitals:    11/18/17 0601 11/18/17 0611 11/18/17 0622 11/18/17 0631   BP: 117/59   113/59   Pulse: 80 81 80 84   Resp: 24 28 24 28   Temp:       SpO2: 96% 96% 96% 96%   Weight:       Height:            I/O:     11/16 1901 - 11/18 0700  In: 3707.4 [P.O.:360; I.V.:3347.4]  Out: 1829 [Urine:3183]  Last 3 Recorded Weights in this Encounter    11/17/17 0549 11/17/17 0615 11/18/17 0000   Weight: 180 lb (81.6 kg) 180 lb (81.6 kg) 187 lb 6.3 oz (85 kg)       Intake/Output Summary (Last 24 hours) at 11/18/17 0750  Last data filed at 11/18/17 0600   Gross per 24 hour   Intake          3707.35 ml   Output             3433 ml   Net           274.35 ml           Heart: RRR, no murmur  Lung: good effort, small airleak, will leave pleural CT in place. Neuro: grossly intact  Incisions: surgical dressings CDI  Chest Tubes: as above.     ECG/Telemetry: NSR    Lab/Data Review:  Recent Results (from the past 12 hour(s))   GLUCOSE, POC    Collection Time: 11/17/17  9:08 PM   Result Value Ref Range    Glucose (POC) 129 (H) 65 - 100 mg/dL   MAGNESIUM    Collection Time: 11/17/17  9:26 PM   Result Value Ref Range    Magnesium 2.1 1.8 - 2.4 mg/dL   POTASSIUM    Collection Time: 11/17/17  9:26 PM   Result Value Ref Range    Potassium 4.2 3.5 - 5.1 mmol/L   HGB & HCT    Collection Time: 11/17/17  9:26 PM   Result Value Ref Range    HGB 10.2 (L) 13.6 - 17.2 g/dL    HCT 31.4 (L) 41.1 - 50.3 %   GLUCOSE, POC    Collection Time: 11/17/17 10:44 PM   Result Value Ref Range    Glucose (POC) 130 (H) 65 - 100 mg/dL   GLUCOSE, POC    Collection Time: 11/17/17 11:55 PM   Result Value Ref Range    Glucose (POC) 113 (H) 65 - 100 mg/dL   GLUCOSE, POC    Collection Time: 11/18/17  2:25 AM   Result Value Ref Range    Glucose (POC) 94 65 - 100 mg/dL   MAGNESIUM    Collection Time: 11/18/17  3:50 AM   Result Value Ref Range    Magnesium 2.0 1.8 - 2.4 mg/dL   METABOLIC PANEL, BASIC    Collection Time: 11/18/17  3:50 AM   Result Value Ref Range    Sodium 145 136 - 145 mmol/L    Potassium 4.2 3.5 - 5.1 mmol/L    Chloride 110 (H) 98 - 107 mmol/L    CO2 27 21 - 32 mmol/L    Anion gap 8 7 - 16 mmol/L    Glucose 112 (H) 65 - 100 mg/dL    BUN 22 8 - 23 MG/DL    Creatinine 0.96 0.8 - 1.5 MG/DL    GFR est AA >60 >60 ml/min/1.73m2    GFR est non-AA >60 >60 ml/min/1.73m2    Calcium 7.9 (L) 8.3 - 10.4 MG/DL   CBC W/O DIFF    Collection Time: 11/18/17  3:50 AM   Result Value Ref Range    WBC 10.6 4.3 - 11.1 K/uL    RBC 3.39 (L) 4.23 - 5.67 M/uL    HGB 10.0 (L) 13.6 - 17.2 g/dL    HCT 31.3 (L) 41.1 - 50.3 %    MCV 92.3 79.6 - 97.8 FL    MCH 29.5 26.1 - 32.9 PG    MCHC 31.9 31.4 - 35.0 g/dL    RDW 14.0 11.9 - 14.6 %    PLATELET 326 371 - 023 K/uL    MPV 9.7 (L) 10.8 - 14.1 FL   GLUCOSE, POC    Collection Time: 11/18/17  3:53 AM   Result Value Ref Range    Glucose (POC) 113 (H) 65 - 100 mg/dL   GLUCOSE, POC    Collection Time: 11/18/17  5:27 AM   Result Value Ref Range    Glucose (POC) 102 (H) 65 - 100 mg/dL   GLUCOSE, POC    Collection Time: 11/18/17  6:31 AM   Result Value Ref Range    Glucose (POC) 132 (H) 65 - 100 mg/dL     BMP: Lab Results   Component Value Date/Time     11/18/2017 03:50 AM    K 4.2 11/18/2017 03:50 AM     (H) 11/18/2017 03:50 AM    CO2 27 11/18/2017 03:50 AM    AGAP 8 11/18/2017 03:50 AM     (H) 11/18/2017 03:50 AM    BUN 22 11/18/2017 03:50 AM    CREA 0.96 11/18/2017 03:50 AM    GFRAA >60 11/18/2017 03:50 AM    GFRNA >60 11/18/2017 03:50 AM     CMP: Lab Results   Component Value Date/Time     11/18/2017 03:50 AM    K 4.2 11/18/2017 03:50 AM     (H) 11/18/2017 03:50 AM    CO2 27 11/18/2017 03:50 AM    AGAP 8 11/18/2017 03:50 AM     (H) 11/18/2017 03:50 AM    BUN 22 11/18/2017 03:50 AM    CREA 0.96 11/18/2017 03:50 AM    GFRAA >60 11/18/2017 03:50 AM    GFRNA >60 11/18/2017 03:50 AM    CA 7.9 (L) 11/18/2017 03:50 AM    MG 2.0 11/18/2017 03:50 AM     CBC: Lab Results   Component Value Date/Time    WBC 10.6 11/18/2017 03:50 AM    HGB 10.0 (L) 11/18/2017 03:50 AM    HCT 31.3 (L) 11/18/2017 03:50 AM     11/18/2017 03:50 AM     All Cardiac Markers in the last 24 hours: No results found for: CPK, CK, CKMMB, CKMB, RCK3, CKMBT, CKNDX, CKND1, CIARA, TROPT, TROIQ, GRECIA, TROPT, TNIPOC, BNP, BNPP  Recent Glucose Results: Lab Results   Component Value Date/Time     (H) 11/18/2017 03:50 AM     (H) 11/17/2017 12:39 PM     ABG: Lab Results   Component Value Date/Time    PH 7.29 (L) 11/17/2017 12:40 PM    PHI 7.308 (L) 11/17/2017 11:45 AM    PCO2 49 (H) 11/17/2017 12:40 PM    PCO2I 50.3 (H) 11/17/2017 11:45 AM    PO2 195 (H) 11/17/2017 12:40 PM    PO2I 505 (H) 11/17/2017 11:45 AM    HCO3 23 11/17/2017 12:40 PM    HCO3I 25.2 11/17/2017 11:45 AM    FIO2 60.0 11/17/2017 12:40 PM     COAGS: Lab Results   Component Value Date/Time    APTT 36.5 (H) 11/17/2017 12:39 PM    PTP 12.6 (H) 11/17/2017 12:39 PM    INR 1.2 11/17/2017 12:39 PM     Liver Panel: No results found for: ALB, CBIL, TBIL, TP, GLOB, AGRAT, SGOT, ASTPOC, ALTPOC, ALT, GPT, AP     Radiology: 0400 AP erect image demonstrates no confluent infiltrate or significant  pleural fluid. Endotracheal and nasogastric tubes as well as Radford-Janis catheter  have been removed. Mediastinal and left chest tubes as well as central venous  sheath remain in place. Heart is mildly enlarged status post CABG without  evidence of congestive heart failure or pneumothorax. There are other overlying  radiopaque support devices.     Assessment:     POD 1 CABG x 4    Plan/Recommendations/Medical Decision Making:     Hemodynamics and labs stable  Small airleak in CT will remove mediastinal and pleural to waterseal until tomorrow  IV lasix x1  To stepdown today        See orders    Stewart Gonzalez, ANNABEL  11/18/2017

## 2017-11-18 NOTE — PROGRESS NOTES
Orders to remove mediastinal chest tube and to keep pleural chest tube to water seal. Pt ambulatory in hallway 100 feet prior to chest tube removal with minimal output after.     Mediastinal chest tube removed per orders during maximum inspiration. Sutures triple tied. Petroleum gauze placed over site and bandaged with 4x4 and tape. Pacer wires isolated and taped. Pleural chest tube to water seal.     SWAN introducer cath removed with cath tip intact. Pressure held until hemostasis achieved. Site bandaged with gauze, antibiotic ointment, and tegaderm.     Pt remains resting in NAD, no s/sx SOB, SaO2 95% on supplemental O2 at 3lnc, breath sounds CTA bilaterally and diminished in bases as prior, no subcutaneous emphysema noted.  Will continue to monitor.

## 2017-11-19 ENCOUNTER — APPOINTMENT (OUTPATIENT)
Dept: GENERAL RADIOLOGY | Age: 82
DRG: 236 | End: 2017-11-19
Attending: NURSE PRACTITIONER
Payer: MEDICARE

## 2017-11-19 LAB
ABO + RH BLD: NORMAL
ANION GAP SERPL CALC-SCNC: 9 MMOL/L (ref 7–16)
BASOPHILS # BLD: 0 K/UL (ref 0–0.2)
BASOPHILS NFR BLD: 0 % (ref 0–2)
BLD PROD TYP BPU: NORMAL
BLOOD GROUP ANTIBODIES SERPL: NORMAL
BPU ID: NORMAL
BUN SERPL-MCNC: 31 MG/DL (ref 8–23)
CALCIUM SERPL-MCNC: 8.5 MG/DL (ref 8.3–10.4)
CHLORIDE SERPL-SCNC: 106 MMOL/L (ref 98–107)
CO2 SERPL-SCNC: 26 MMOL/L (ref 21–32)
CREAT SERPL-MCNC: 1.37 MG/DL (ref 0.8–1.5)
CROSSMATCH RESULT,%XM: NORMAL
DIFFERENTIAL METHOD BLD: ABNORMAL
EOSINOPHIL # BLD: 0 K/UL (ref 0–0.8)
EOSINOPHIL NFR BLD: 0 % (ref 0.5–7.8)
ERYTHROCYTE [DISTWIDTH] IN BLOOD BY AUTOMATED COUNT: 14.1 % (ref 11.9–14.6)
GLUCOSE BLD STRIP.AUTO-MCNC: 143 MG/DL (ref 65–100)
GLUCOSE BLD STRIP.AUTO-MCNC: 149 MG/DL (ref 65–100)
GLUCOSE BLD STRIP.AUTO-MCNC: 156 MG/DL (ref 65–100)
GLUCOSE BLD STRIP.AUTO-MCNC: 157 MG/DL (ref 65–100)
GLUCOSE SERPL-MCNC: 146 MG/DL (ref 65–100)
HCT VFR BLD AUTO: 29.9 % (ref 41.1–50.3)
HGB BLD-MCNC: 9.7 G/DL (ref 13.6–17.2)
IMM GRANULOCYTES # BLD: 0 K/UL (ref 0–0.5)
IMM GRANULOCYTES NFR BLD AUTO: 0 % (ref 0–5)
LYMPHOCYTES # BLD: 0.8 K/UL (ref 0.5–4.6)
LYMPHOCYTES NFR BLD: 6 % (ref 13–44)
MAGNESIUM SERPL-MCNC: 2 MG/DL (ref 1.8–2.4)
MCH RBC QN AUTO: 30 PG (ref 26.1–32.9)
MCHC RBC AUTO-ENTMCNC: 32.4 G/DL (ref 31.4–35)
MCV RBC AUTO: 92.6 FL (ref 79.6–97.8)
MM INDURATION POC: 0 MM (ref 0–5)
MONOCYTES # BLD: 1.1 K/UL (ref 0.1–1.3)
MONOCYTES NFR BLD: 8 % (ref 4–12)
NEUTS SEG # BLD: 11.6 K/UL (ref 1.7–8.2)
NEUTS SEG NFR BLD: 86 % (ref 43–78)
PLATELET # BLD AUTO: 152 K/UL (ref 150–450)
PMV BLD AUTO: 9.8 FL (ref 10.8–14.1)
POTASSIUM SERPL-SCNC: 4.2 MMOL/L (ref 3.5–5.1)
PPD POC: NORMAL NEGATIVE
RBC # BLD AUTO: 3.23 M/UL (ref 4.23–5.67)
SODIUM SERPL-SCNC: 141 MMOL/L (ref 136–145)
SPECIMEN EXP DATE BLD: NORMAL
STATUS OF UNIT,%ST: NORMAL
UNIT DIVISION, %UDIV: 0
WBC # BLD AUTO: 13.6 K/UL (ref 4.3–11.1)

## 2017-11-19 PROCEDURE — 74011250637 HC RX REV CODE- 250/637: Performed by: THORACIC SURGERY (CARDIOTHORACIC VASCULAR SURGERY)

## 2017-11-19 PROCEDURE — 85025 COMPLETE CBC W/AUTO DIFF WBC: CPT | Performed by: NURSE PRACTITIONER

## 2017-11-19 PROCEDURE — 74011250637 HC RX REV CODE- 250/637: Performed by: NURSE PRACTITIONER

## 2017-11-19 PROCEDURE — 83735 ASSAY OF MAGNESIUM: CPT | Performed by: THORACIC SURGERY (CARDIOTHORACIC VASCULAR SURGERY)

## 2017-11-19 PROCEDURE — 36415 COLL VENOUS BLD VENIPUNCTURE: CPT | Performed by: NURSE PRACTITIONER

## 2017-11-19 PROCEDURE — 82962 GLUCOSE BLOOD TEST: CPT

## 2017-11-19 PROCEDURE — 74011636637 HC RX REV CODE- 636/637: Performed by: THORACIC SURGERY (CARDIOTHORACIC VASCULAR SURGERY)

## 2017-11-19 PROCEDURE — 71010 XR CHEST SNGL V: CPT

## 2017-11-19 PROCEDURE — 94760 N-INVAS EAR/PLS OXIMETRY 1: CPT

## 2017-11-19 PROCEDURE — 80048 BASIC METABOLIC PNL TOTAL CA: CPT | Performed by: THORACIC SURGERY (CARDIOTHORACIC VASCULAR SURGERY)

## 2017-11-19 PROCEDURE — 99232 SBSQ HOSP IP/OBS MODERATE 35: CPT | Performed by: INTERNAL MEDICINE

## 2017-11-19 PROCEDURE — 97530 THERAPEUTIC ACTIVITIES: CPT

## 2017-11-19 PROCEDURE — 65660000004 HC RM CVT STEPDOWN

## 2017-11-19 PROCEDURE — 74011250637 HC RX REV CODE- 250/637: Performed by: INTERNAL MEDICINE

## 2017-11-19 PROCEDURE — 74011000250 HC RX REV CODE- 250: Performed by: INTERNAL MEDICINE

## 2017-11-19 PROCEDURE — 94640 AIRWAY INHALATION TREATMENT: CPT

## 2017-11-19 PROCEDURE — 77010033711 HC HIGH FLOW OXYGEN

## 2017-11-19 RX ORDER — GUAIFENESIN 600 MG/1
1200 TABLET, EXTENDED RELEASE ORAL EVERY 12 HOURS
Status: DISCONTINUED | OUTPATIENT
Start: 2017-11-19 | End: 2017-11-22 | Stop reason: HOSPADM

## 2017-11-19 RX ORDER — GUAIFENESIN 600 MG/1
1200 TABLET, EXTENDED RELEASE ORAL 2 TIMES DAILY
Status: DISCONTINUED | OUTPATIENT
Start: 2017-11-19 | End: 2017-11-19

## 2017-11-19 RX ORDER — DIPHENHYDRAMINE HCL 12.5MG/5ML
12.5 ELIXIR ORAL
Status: DISCONTINUED | OUTPATIENT
Start: 2017-11-19 | End: 2017-11-22 | Stop reason: HOSPADM

## 2017-11-19 RX ORDER — FAMOTIDINE 20 MG/1
20 TABLET, FILM COATED ORAL DAILY
Status: DISCONTINUED | OUTPATIENT
Start: 2017-11-20 | End: 2017-11-22 | Stop reason: HOSPADM

## 2017-11-19 RX ADMIN — FERROUS SULFATE TAB 325 MG (65 MG ELEMENTAL FE) 325 MG: 325 (65 FE) TAB at 09:33

## 2017-11-19 RX ADMIN — RIVASTIGMINE TARTRATE 1.5 MG: 1.5 CAPSULE ORAL at 09:27

## 2017-11-19 RX ADMIN — MUPIROCIN: 20 OINTMENT TOPICAL at 21:00

## 2017-11-19 RX ADMIN — Medication 10 ML: at 05:39

## 2017-11-19 RX ADMIN — INSULIN LISPRO 2 UNITS: 100 INJECTION, SOLUTION INTRAVENOUS; SUBCUTANEOUS at 12:24

## 2017-11-19 RX ADMIN — MUPIROCIN: 20 OINTMENT TOPICAL at 09:35

## 2017-11-19 RX ADMIN — POTASSIUM CHLORIDE 10 MEQ: 10 TABLET, EXTENDED RELEASE ORAL at 09:28

## 2017-11-19 RX ADMIN — SERTRALINE HYDROCHLORIDE 150 MG: 100 TABLET ORAL at 09:27

## 2017-11-19 RX ADMIN — AMIODARONE HYDROCHLORIDE 200 MG: 200 TABLET ORAL at 21:05

## 2017-11-19 RX ADMIN — GUAIFENESIN 1200 MG: 600 TABLET, EXTENDED RELEASE ORAL at 13:13

## 2017-11-19 RX ADMIN — METOPROLOL TARTRATE 12.5 MG: 25 TABLET ORAL at 21:05

## 2017-11-19 RX ADMIN — FAMOTIDINE 20 MG: 20 TABLET, FILM COATED ORAL at 09:29

## 2017-11-19 RX ADMIN — INSULIN LISPRO 2 UNITS: 100 INJECTION, SOLUTION INTRAVENOUS; SUBCUTANEOUS at 09:31

## 2017-11-19 RX ADMIN — FUROSEMIDE 40 MG: 40 TABLET ORAL at 09:34

## 2017-11-19 RX ADMIN — AMIODARONE HYDROCHLORIDE 200 MG: 200 TABLET ORAL at 09:28

## 2017-11-19 RX ADMIN — LAMOTRIGINE 200 MG: 100 TABLET ORAL at 09:28

## 2017-11-19 RX ADMIN — ACETAMINOPHEN 650 MG: 325 TABLET ORAL at 05:29

## 2017-11-19 RX ADMIN — ACETAMINOPHEN 650 MG: 325 TABLET ORAL at 13:13

## 2017-11-19 RX ADMIN — Medication 10 ML: at 15:35

## 2017-11-19 RX ADMIN — Medication 10 ML: at 21:07

## 2017-11-19 RX ADMIN — ACETAMINOPHEN 650 MG: 325 TABLET ORAL at 09:28

## 2017-11-19 RX ADMIN — ASPIRIN 81 MG: 81 TABLET, COATED ORAL at 09:29

## 2017-11-19 RX ADMIN — IPRATROPIUM BROMIDE AND ALBUTEROL SULFATE 3 ML: .5; 3 SOLUTION RESPIRATORY (INHALATION) at 23:37

## 2017-11-19 RX ADMIN — IPRATROPIUM BROMIDE AND ALBUTEROL SULFATE 3 ML: .5; 3 SOLUTION RESPIRATORY (INHALATION) at 04:18

## 2017-11-19 RX ADMIN — RIVASTIGMINE TARTRATE 1.5 MG: 1.5 CAPSULE ORAL at 17:48

## 2017-11-19 RX ADMIN — IPRATROPIUM BROMIDE AND ALBUTEROL SULFATE 3 ML: .5; 3 SOLUTION RESPIRATORY (INHALATION) at 15:01

## 2017-11-19 RX ADMIN — ACETAMINOPHEN 650 MG: 325 TABLET ORAL at 17:48

## 2017-11-19 RX ADMIN — IPRATROPIUM BROMIDE AND ALBUTEROL SULFATE 3 ML: .5; 3 SOLUTION RESPIRATORY (INHALATION) at 08:49

## 2017-11-19 RX ADMIN — ATORVASTATIN CALCIUM 40 MG: 40 TABLET, FILM COATED ORAL at 21:08

## 2017-11-19 RX ADMIN — FLUTICASONE PROPIONATE 2 SPRAY: 50 SPRAY, METERED NASAL at 12:26

## 2017-11-19 RX ADMIN — IPRATROPIUM BROMIDE AND ALBUTEROL SULFATE 3 ML: .5; 3 SOLUTION RESPIRATORY (INHALATION) at 20:29

## 2017-11-19 RX ADMIN — METOPROLOL TARTRATE 12.5 MG: 25 TABLET ORAL at 09:28

## 2017-11-19 RX ADMIN — GUAIFENESIN 1200 MG: 600 TABLET, EXTENDED RELEASE ORAL at 21:06

## 2017-11-19 NOTE — PROGRESS NOTES
Pacing wires pulled per Hayden Beal NP. Patient instructed to 1 hour bedrest with blood pressure checks every 15 mins for 1 hour. Patient voices understanding. Chair alarm activated.

## 2017-11-19 NOTE — PROGRESS NOTES
Report from [address] rn . Pt in bed , mildly confused but reorients easily. Wife at bedside. On airvo . resp therapist in room os decreased to 60% . Rashad patent.

## 2017-11-19 NOTE — PROGRESS NOTES
Crepitus noted to left chest beginning just above left nipple to left clavicle. Intermittent air leak noted with coughing. Jefry Barrios NP aware.

## 2017-11-19 NOTE — PROGRESS NOTES
Patient unable to urinate post tucker removal. Bladder scan shows 170 ml in bladder. Will continue to monitor per protocol.

## 2017-11-19 NOTE — PROGRESS NOTES
CV Progress Note    Admit Date: 11/17/2017    Postop: day #2 from CABG x4    Subjective:   Some confusion and shortness of breath overnight    Patient present conditions:   Some sub q emphysema left chest and neck without evidence of PTX but small intermittent air leak on chest tube  Review of Systems   Cardiac:   Respiratory: shortness of breath . .. Better this am  Neuro: confusion   Incision:   GI:   Chest Tubes:    Objective:     Vitals:  Blood pressure 118/57, pulse 79, temperature 98.2 °F (36.8 °C), resp. rate 18, height 5' 11\" (1.803 m), weight 188 lb (85.3 kg), SpO2 95 %.   Vitals:    11/19/17 0352 11/19/17 0425 11/19/17 0600 11/19/17 0857   BP: 118/57      Pulse: 80  79    Resp: 18      Temp: 98.2 °F (36.8 °C)      SpO2: 96% 95% 96% 95%   Weight: 188 lb (85.3 kg)      Height:            I/O:     11/17 1901 - 11/19 0700  In: 1291.6 [P.O.:960; I.V.:331.6]  Out: 2856 [Urine:2611]  Last 3 Recorded Weights in this Encounter    11/17/17 0615 11/18/17 0000 11/19/17 0352   Weight: 180 lb (81.6 kg) 187 lb 6.3 oz (85 kg) 188 lb (85.3 kg)       Intake/Output Summary (Last 24 hours) at 11/19/17 0902  Last data filed at 11/19/17 0529   Gross per 24 hour   Intake              600 ml   Output             1864 ml   Net            -1264 ml           Heart: .difficult to hear sub q air  Lung: poor breath sounds bialterally  Neuro: some confusion and pre exisiting memory issues  Incisions: look fine  Chest Tubes:left CT in place small intermittent air leak    ECG/Telemetry: NSR    Lab/Data Review:  Recent Results (from the past 12 hour(s))   CBC WITH AUTOMATED DIFF    Collection Time: 11/19/17  3:45 AM   Result Value Ref Range    WBC 13.6 (H) 4.3 - 11.1 K/uL    RBC 3.23 (L) 4.23 - 5.67 M/uL    HGB 9.7 (L) 13.6 - 17.2 g/dL    HCT 29.9 (L) 41.1 - 50.3 %    MCV 92.6 79.6 - 97.8 FL    MCH 30.0 26.1 - 32.9 PG    MCHC 32.4 31.4 - 35.0 g/dL    RDW 14.1 11.9 - 14.6 %    PLATELET 827 672 - 838 K/uL    MPV 9.8 (L) 10.8 - 14.1 FL    DF AUTOMATED      NEUTROPHILS 86 (H) 43 - 78 %    LYMPHOCYTES 6 (L) 13 - 44 %    MONOCYTES 8 4.0 - 12.0 %    EOSINOPHILS 0 (L) 0.5 - 7.8 %    BASOPHILS 0 0.0 - 2.0 %    IMMATURE GRANULOCYTES 0 0.0 - 5.0 %    ABS. NEUTROPHILS 11.6 (H) 1.7 - 8.2 K/UL    ABS. LYMPHOCYTES 0.8 0.5 - 4.6 K/UL    ABS. MONOCYTES 1.1 0.1 - 1.3 K/UL    ABS. EOSINOPHILS 0.0 0.0 - 0.8 K/UL    ABS. BASOPHILS 0.0 0.0 - 0.2 K/UL    ABS. IMM.  GRANS. 0.0 0.0 - 0.5 K/UL   METABOLIC PANEL, BASIC    Collection Time: 11/19/17  3:45 AM   Result Value Ref Range    Sodium 141 136 - 145 mmol/L    Potassium 4.2 3.5 - 5.1 mmol/L    Chloride 106 98 - 107 mmol/L    CO2 26 21 - 32 mmol/L    Anion gap 9 7 - 16 mmol/L    Glucose 146 (H) 65 - 100 mg/dL    BUN 31 (H) 8 - 23 MG/DL    Creatinine 1.37 0.8 - 1.5 MG/DL    GFR est AA >60 >60 ml/min/1.73m2    GFR est non-AA 53 (L) >60 ml/min/1.73m2    Calcium 8.5 8.3 - 10.4 MG/DL   MAGNESIUM    Collection Time: 11/19/17  3:45 AM   Result Value Ref Range    Magnesium 2.0 1.8 - 2.4 mg/dL   GLUCOSE, POC    Collection Time: 11/19/17  5:33 AM   Result Value Ref Range    Glucose (POC) 156 (H) 65 - 100 mg/dL     BMP: Lab Results   Component Value Date/Time     11/19/2017 03:45 AM    K 4.2 11/19/2017 03:45 AM     11/19/2017 03:45 AM    CO2 26 11/19/2017 03:45 AM    AGAP 9 11/19/2017 03:45 AM     (H) 11/19/2017 03:45 AM    BUN 31 (H) 11/19/2017 03:45 AM    CREA 1.37 11/19/2017 03:45 AM    GFRAA >60 11/19/2017 03:45 AM    GFRNA 53 (L) 11/19/2017 03:45 AM     CMP: Lab Results   Component Value Date/Time     11/19/2017 03:45 AM    K 4.2 11/19/2017 03:45 AM     11/19/2017 03:45 AM    CO2 26 11/19/2017 03:45 AM    AGAP 9 11/19/2017 03:45 AM     (H) 11/19/2017 03:45 AM    BUN 31 (H) 11/19/2017 03:45 AM    CREA 1.37 11/19/2017 03:45 AM    GFRAA >60 11/19/2017 03:45 AM    GFRNA 53 (L) 11/19/2017 03:45 AM    CA 8.5 11/19/2017 03:45 AM    MG 2.0 11/19/2017 03:45 AM     CBC: Lab Results   Component Value Date/Time    WBC 13.6 (H) 2017 03:45 AM    HGB 9.7 (L) 2017 03:45 AM    HCT 29.9 (L) 2017 03:45 AM     2017 03:45 AM     All Cardiac Markers in the last 24 hours: No results found for: CPK, CK, CKMMB, CKMB, RCK3, CKMBT, CKNDX, CKND1, CIARA, TROPT, TROIQ, GRECIA, TROPT, TNIPOC, BNP, BNPP  Recent Glucose Results: Lab Results   Component Value Date/Time     (H) 2017 03:45 AM     ABG: Lab Results   Component Value Date/Time    PH 7.33 (L) 2017 08:25 PM    PCO2 49 (H) 2017 08:25 PM    PO2 74 (L) 2017 08:25 PM    HCO3 25 2017 08:25 PM    FIO2 70.0 2017 08:25 PM     COAGS: No results found for: APTT, PTP, INR  Liver Panel: No results found for: ALB, CBIL, TBIL, TP, GLOB, AGRAT, SGOT, ASTPOC, ALTPOC, ALT, GPT, AP     Radiology: CXR no PTX    Assessment:   1. S/p CABG  2. Some confusions  3. Sub q emphysema decreased oxygenation      Plan/Recommendations/Medical Decision Makin. Continue present treatment  2. Monitor meds avoid narcotics  3.  Repeat cxr ambulation incentive spirometry      See orders    Erin Yung MD  2017

## 2017-11-19 NOTE — PROGRESS NOTES
11/19/17 0857   Oxygen Therapy   O2 Sat (%) 95 %   Pulse via Oximetry 80 beats per minute   O2 Device Hi flow nasal cannula; Heated;Humidifier   O2 Flow Rate (L/min) 40 l/min   O2 Temperature 87.8 °F (31 °C)   FIO2 (%) 49 %

## 2017-11-19 NOTE — PROGRESS NOTES
Jacqueline López  Admission Date: 11/17/2017             Daily Progress Note: 11/19/2017    The patient's chart is reviewed and the patient is discussed with the staff. 79 yo male with a history of chronic anemia, diverticulosis, emphysema, CRF, HL, and CAD. Patient experienced [place] class 3 symptoms and had elevated calcium score. LHC revelaed multivessel CAD and surgical revascularization was recommended. Patient is now s/p CABG x 4 on 11/17/17 per Dr. Yfn Heck. Post-op course complicated by confusion/agitation    Subjective:   RA sat yesterday in the 70s requiring Airvo to return sats to the 90s. ABG 7.33/49/71. Currently on 40L/50% with o2 sat 96%. + cough productive of copious amounts of thin, clear mucus per nursing. CT with intermittent air leak. Ongoing delirium. Not able to effectively use IS.       Current Facility-Administered Medications   Medication Dose Route Frequency    ferrous sulfate tablet 325 mg  325 mg Oral DAILY    fluticasone (FLONASE) 50 mcg/actuation nasal spray 2 Spray  2 Spray Both Nostrils DAILY    lamoTRIgine (LaMICtal) tablet 200 mg  200 mg Oral DAILY    rivastigmine tartrate (EXELON) capsule 1.5 mg  1.5 mg Oral BID WITH MEALS    sertraline (ZOLOFT) tablet 150 mg  150 mg Oral DAILY    sodium chloride (NS) flush 5-10 mL  5-10 mL IntraVENous Q8H    sodium chloride (NS) flush 5-10 mL  5-10 mL IntraVENous PRN    furosemide (LASIX) tablet 40 mg  40 mg Oral DAILY    magnesium hydroxide (MILK OF MAGNESIA) 400 mg/5 mL oral suspension 30 mL  30 mL Oral DAILY PRN    alum-mag hydroxide-simeth (MYLANTA) oral suspension 30 mL  30 mL Oral Q4H PRN    ondansetron (ZOFRAN) injection 4 mg  4 mg IntraVENous Q6H PRN    acetaminophen (TYLENOL) tablet 650 mg  650 mg Oral Q4H PRN    zolpidem (AMBIEN) tablet 5 mg  5 mg Oral QHS PRN    aspirin delayed-release tablet 81 mg  81 mg Oral DAILY    magnesium oxide (MAG-OX) tablet 400 mg  400 mg Oral TID PRN    magnesium oxide (MAG-OX) tablet 400 mg  400 mg Oral QID PRN    potassium chloride (KLOR-CON) tablet 10 mEq  10 mEq Oral DAILY    potassium chloride (K-DUR, KLOR-CON) SR tablet 20 mEq  20 mEq Oral BID PRN    potassium chloride (K-DUR, KLOR-CON) SR tablet 40 mEq  40 mEq Oral BID PRN    oxyCODONE-acetaminophen (PERCOCET) 5-325 mg per tablet 1 Tab  1 Tab Oral Q4H PRN    traMADol (ULTRAM) tablet 50 mg  50 mg Oral Q6H PRN    amiodarone (CORDARONE) tablet 200 mg  200 mg Oral Q12H    atorvastatin (LIPITOR) tablet 40 mg  40 mg Oral QHS    famotidine (PEPCID) tablet 20 mg  20 mg Oral Q12H    metoprolol tartrate (LOPRESSOR) tablet 12.5 mg  12.5 mg Oral Q12H    mupirocin (BACTROBAN) 2 % ointment   Both Nostrils Q12H    insulin lispro (HUMALOG) injection   SubCUTAneous AC&HS    albuterol-ipratropium (DUO-NEB) 2.5 MG-0.5 MG/3 ML  3 mL Nebulization Q4H RT       Review of Systems  Constitutional: negative for fever, chills, sweats  Cardiovascular: negative for chest pain, palpitations, syncope, edema  Gastrointestinal:  negative for dysphagia, reflux, vomiting, diarrhea, abdominal pain, or melena  Neurologic:  negative for focal weakness, numbness, headache    Objective:     Vitals:    11/19/17 0200 11/19/17 0352 11/19/17 0425 11/19/17 0600   BP:  118/57     Pulse: 78 80  79   Resp:  18     Temp:  98.2 °F (36.8 °C)     SpO2: 95% 96% 95% 96%   Weight:  188 lb (85.3 kg)     Height:         Intake and Output:   11/17 1901 - 11/19 0700  In: 1291.6 [P.O.:960; I.V.:331.6]  Out: 2856 [Urine:2611]       Physical Exam:   Constitution:  the patient is well developed and in no acute distress  EENMT:  Sclera clear, pupils equal, oral mucosa moist  Respiratory: clear to auscultation bilaterally, Airvo, + SQ emphysema L chest and CT with intermittent air leak  Cardiovascular:  RRR without M,G,R  Gastrointestinal: soft and non-tender; with positive bowel sounds. Musculoskeletal: warm without cyanosis.  There is no lower leg edema.  Skin:  no jaundice or rashes, midline sternal incision without redness, swelling, or drainge   Neurologic: no gross neuro deficits     Psychiatric:  alert and oriented x 3    CXR:   pending      LAB  Recent Labs      11/19/17   0533  11/18/17 2029 11/18/17 1713 11/18/17   0631  11/18/17   0527   GLUCPOC  156*  143*  159*  132*  102*      Recent Labs      11/19/17 0345 11/18/17 0350 11/17/17 2126 11/17/17 1711 11/17/17   1239   WBC  13.6*  10.6   --    --   10.3   HGB  9.7*  10.0*  10.2*  10.5*  9.4*   HCT  29.9*  31.3*  31.4*  32.2*  28.6*   PLT  152  152   --    --   134*   INR   --    --    --    --   1.2     Recent Labs      11/19/17 0345 11/18/17 0350 11/17/17 2126 11/17/17   1239   NA  141  145   --    --   144   K  4.2  4.2  4.2   < >  3.8   CL  106  110*   --    --   112*   CO2  26  27   --    --   24   GLU  146*  112*   --    --   126*   BUN  31*  22   --    --   23   CREA  1.37  0.96   --    --   1.15   MG  2.0  2.0  2.1   < >  3.1*   CA  8.5  7.9*   --    --   7.9*    < > = values in this interval not displayed. Recent Labs      11/18/17 2025 11/17/17   1240   PH  7.33*  7.29*   PCO2  49*  49*   PO2  74*  195*   HCO3  25  23     No results for input(s): LCAD, LAC in the last 72 hours. Assessment:  (Medical Decision Making)     Hospital Problems  Date Reviewed: 11/19/2017          Codes Class Noted POA    S/P CABG x 4 ICD-10-CM: Z95.1  ICD-9-CM: V45.81  11/17/2017 No        Hypoxia ICD-10-CM: R09.02  ICD-9-CM: 799.02  11/17/2017 No    Ongoing  Currently requiring AirVo      * (Principal)Coronary artery disease involving native coronary artery of native heart with angina pectoris ([facility] [address] ICD-10-CM: I25.119  ICD-9-CM: 414.01, 413.9  11/10/2017 Yes     11/17/17 (Dr Sunny Petty) Coronary artery bypass grafting x4 grafts   consisting of:   1. Left internal mammary artery to left anterior descending.    2. Reverse saphenous vein graft to diagonal 1.   3. Reverse saphenous vein graft to obtuse marginal.   4. Reverse saphenous vein graft to the posterior descending   artery. Chronic anemia ICD-10-CM: D64.9  ICD-9-CM: 280. 9  Unknown Yes    Hgb 9.7      Cognitive impairment ICD-10-CM: R41.89  ICD-9-CM: 294.9  Unknown Yes         BS range 132-159    Creat slightly elevated at 1.37    Plan:  (Medical Decision Making)     --duo-neb - Ez-PAP  --CXR pending - new SQ air this am and CT with intermittent air leak  --PT following for mobility - ambulated 200' yesterday    More than 50% of the time documented was spent in face-to-face contact with the patient and in the care of the patient on the floor/unit where the patient is located. Meryle Pascal, NP  I have spoken with and examined the patient. I agree with the above assessment and plan as documented. Afebrile overnight. On 40lpm of airvo at 49% FiO2    Gen: pleasant,  Lungs:  500ml IS, decreased B/L  Heart:  RRR with no Murmur/Rubs/Gallops  Abd: NABS  Ext: no edema    11/19: new subcut air but no worsened pneumo. Atelectasis worse bilaterally      Agree with IS, Mucinex  Chest tube likely out soon given improvement in air leak. This may help atelectasis as pain improves.   Monitor for fevers, s/sx of pneumonia as has infiltrates and rising leukocytosis    Daisha Nichols MD

## 2017-11-19 NOTE — PROGRESS NOTES
Patient o2 level in low 80s. Multiple checks on multiple devices. Respiratory called and patient placed on airvo. Respiratory treatments ordered. Will continue to monitor.

## 2017-11-19 NOTE — PROGRESS NOTES
Problem: Mobility Impaired (Adult and Pediatric)  Goal: *Therapy Goal (Edit Goal, Insert Text)    STERNAL PRECAUTIONS   STG:  (1.)Mr. Halina Marley will move from supine to sit and sit to supine , scoot up and down and roll side to side with MODIFIED INDEPENDENCE within 4 day(s). (2.)Mr. Halina Marley will transfer from bed to chair and chair to bed with MODIFIED INDEPENDENCE using the least restrictive device within 4 day(s). (3.)Mr. Halina Marley will ambulate with MODIFIED INDEPENDENCE for 300 feet with the least restrictive device within 4 day(s). LTG:  (1.)Mr. Halina Marley will move from supine to sit and sit to supine , scoot up and down and roll side to side in bed with INDEPENDENT within 7 day(s). (2.)Mr. Halina Marley will transfer from bed to chair and chair to bed with INDEPENDENT using the least restrictive device within 7 day(s). (3.)Mr. Halina Marley will ambulate with INDEPENDENT for 600 feet with the least restrictive device within 7 day(s). ________________________________________________________________________________________________     PHYSICAL THERAPY: Daily Note, Treatment Day: 1st, AM 11/19/2017  INPATIENT: Hospital Day: 3  [place] / Product Type: Medicare /      NAME/AGE/GENDER: Edie Khan is a 80 y.o. male   PRIMARY DIAGNOSIS: Atherosclerosis of native coronary artery of native heart with unstable angina pectoris ([facility] [address] [I25.110] Coronary artery disease involving native coronary artery of native heart with angina pectoris ([facility] [address] Coronary artery disease involving native coronary artery of native heart with angina pectoris (HCC)  Procedure(s) (LRB):  CORONARY ARTERY BYPASS GRAFT (CABG X 4)/ LIMA (N/A)  VEIN HARVEST/ GREATER SAPHANEOUS (Left)  2 Days Post-Op  ICD-10: Treatment Diagnosis:   · Difficulty in walking, Not elsewhere classified (R26.2)   Precaution/Allergies:  Review of patient's allergies indicates no known allergies.       ASSESSMENT:      Christiane Izquierdo is a pleasant elderly male with above diagnosis with decreased transfers, ambulation and mobility with sternal precautions. Pt is sitting in recliner and agreeable to therapy. Patient has catheter, chest tube and O2. Patient requires a little assist for sit to stand. Static and dynamic standing balance activities @ the walker with contact guard assist.  Patient tolerated standing x 12 minutes plus. Gait trainig with rolling walker x 200 feet with comfortable gait. O2 saturations in the high 90's (RN assisted and placed the patient on O2 for gait training). Patient returned to the room and was assisted with functional ADL's in mostly standing. Wife is present. Good session today and patient participates well. After treatment the patient is left with RN present and RT arriving. Pt is motivated and demonstrates potential for improvement with skilled physical therapy. Skilled PT is indicated for his functional mobility deficits and debility for return to normal baseline function. Will continue with PT efforts. This section established at most recent assessment   PROBLEM LIST (Impairments causing functional limitations):  1. Decreased Strength  2. Decreased Transfer Abilities  3. Decreased Ambulation Ability/Technique  4. Decreased Balance  5. Decreased Activity Tolerance    INTERVENTIONS PLANNED: (Benefits and precautions of physical therapy have been discussed with the patient.)  1. Balance Exercise  2. Bed Mobility  3. Gait Training  4. Neuromuscular Re-education/Strengthening  5. Range of Motion (ROM)  6. Therapeutic Activites  7. Therapeutic Exercise/Strengthening  8. Group Therapy      TREATMENT PLAN: Frequency/Duration: Patient to be seen for PT 2 x's per day for the duration of current acute and cardiac rehab hospital episode.         Rehabilitation Potential For Stated Goals: GOOD        RECOMMENDED REHABILITATION/EQUIPMENT: (at time of discharge pending progress): Due to the probability of continued deficits (see above) this patient will likely need continued skilled physical therapy after discharge. Equipment:   [employer], Type: [employer] will need potentially upon D/C                   HISTORY:   History of Present Injury/Illness (Reason for Referral):PER MD H&P   81 y/o white male is seen at the request of Dr. Anahi Horvath for respiratory management status post cardiac surgery. Patient had CABG x4. Currently is sedated in CV-ICU and orally intubated receiving  mechanical ventilation. Patient has been experiencing [place] class 3 symptoms and had elevated calcium score. [facility] revelaed multivessel CAD and surgical revascularization was recommended. We have been asked to see in the CV-ICU for mechanical ventilation management and weaning  Past Medical History/Comorbidities:   Mr. Ruslan Mireles  has a past medical history of Apnea; Asymptomatic gallstones; CAD (coronary artery disease); Centrilobular emphysema CT Findings ([address] Childhood asthma; Chronic anemia; Chronic renal insufficiency, stage III (moderate); Cognitive impairment; Colon polyps; Dyslipidemia; Ex-smoker for more than 1 year; GERD (gastroesophageal reflux disease); History of penile implant; History of stomach ulcers (1959); Iron deficiency anemia; Mood disorder ([address] Sigmoid diverticulosis; and TIA (transient ischemic attack). Mr. Ruslan Mireles  has a past surgical history that includes appendectomy (1938); heart catheterization; gi (1959); urological; orthopaedic (Right); and coronary artery bypass graft (11/17/2017). Social History/Living Environment:   Home Environment: Private residence  # Steps to Enter: 2  One/Two Story Residence: One story  Living Alone: No  Support Systems: Spouse/Significant Other/Partner  Patient Expects to be Discharged to[de-identified] Private residence  Current DME Used/Available at Home: None  Prior level of Function/Work/Activity:  Patient had been independent with all functional mobility.       Dominant Side: RIGHT  Personal Factors:          Sex:  male        Age:  80 y.o. Number of Personal Factors/Comorbidities that affect the Plan of Care: 0: LOW COMPLEXITY   EXAMINATION:   Most Recent Physical Functioning:   Gross Assessment:                  Posture:     Balance:  Sitting: Intact  Sitting - Static: Good (unsupported)  Sitting - Dynamic: Good (unsupported)  Standing: Intact  Standing - Static: Good  Standing - Dynamic : Fair Bed Mobility:     Wheelchair Mobility:     Transfers:  Sit to Stand: Contact guard assistance;Minimum assistance  Stand to Sit: Contact guard assistance;Minimum assistance  Gait:     Base of Support: Center of gravity altered  Speed/Giuliana: Shuffled  Distance (ft): 200 Feet (ft)  Assistive Device: Walker, rolling      Body Structures Involved:  1. Bones  2. Muscles  3. Ligaments Body Functions Affected:  1. Neuromusculoskeletal  2. Movement Related  3. Skin Related  4. Metobolic/Endocrine Activities and Participation Affected:  1. General Tasks and Demands  2. Communication  3. Mobility  4. Interpersonal Interactions and Relationships  5. Community, Social and [place]   Number of elements that affect the Plan of Care: 1-2: LOW COMPLEXITY   CLINICAL PRESENTATION:   Presentation: Stable and uncomplicated: LOW COMPLEXITY   CLINICAL DECISION MAKIN [facility] Short Form  How much difficulty does the patient currently have. .. Unable A Lot A Little None   1. Turning over in bed (including adjusting bedclothes, sheets and blankets)? [] 1   [] 2   [] 3   [x] 4   2. Sitting down on and standing up from a chair with arms ( e.g., wheelchair, bedside commode, etc.)   [] 1   [] 2   [] 3   [x] 4   3. Moving from lying on back to sitting on the side of the bed? [] 1   [] 2   [] 3   [x] 4   How much help from another person does the patient currently need. .. Total A Lot A Little None   4.   Moving to and from a bed to a chair (including a wheelchair)? [] 1   [] 2   [x] 3   [] 4   5. Need to walk in hospital room? [] 1   [] 2   [x] 3   [] 4   6. Climbing 3-5 steps with a railing? [] 1   [] 2   [x] 3   [] 4   © 2007, Trustees of [address] [place] [address] [address] [id number], under license to [employer]. All rights reserved      Score:  Initial: 21 Most Recent: X (Date: -- )    Interpretation of Tool:  Represents activities that are increasingly more difficult (i.e. Bed mobility, Transfers, Gait). Score 24 23 22-20 19-15 14-10 9-7 6     Modifier CH CI CJ CK CL CM CN      ? Mobility - Walking and Moving Around:     - CURRENT STATUS: CJ - 20%-39% impaired, limited or restricted    - GOAL STATUS: CI - 1%-19% impaired, limited or restricted    - D/C STATUS:  ---------------To be determined---------------  [place] / [place] / Product Type: Medicare /      Medical Necessity:     · Patient demonstrates good rehab potential due to higher previous functional level. Reason for Services/Other Comments:  · Patient continues to require skilled intervention due to s/p Cardiac Surgery. .   Use of outcome tool(s) and clinical judgement create a POC that gives a: Clear prediction of patient's progress: LOW COMPLEXITY            TREATMENT:   (In addition to Assessment/Re-Assessment sessions the following treatments were rendered)   Pre-treatment Symptoms/Complaints:  \" hello. How are you? \"  Patient is very talkative  Pain: Initial:   Pain Intensity 1: 0  Post Session:  Voices no c/o     Therapeutic Activity: (    59 minutes): Therapeutic activities including transfers, static/dynamic standing balance activities and gait training to improve mobility, strength, balance and coordination. Required min assist to contact guard assist    to promote static and dynamic balance in standing. Therapeutic Exercise: ( ):  Exercises per grid below to improve mobility, strength, balance and coordination.   Required  visual and verbal cues to promote proper body alignment and promote proper body breathing techniques. Progressed repetitions and complexity of movement as indicated. Date:   Date:   Date:     ACTIVITY/EXERCISE AM PM AM PM AM PM                                                                  B = bilateral; AA = active assistive; A = active; P = passive          Braces/Orthotics/Lines/Etc:   · tucker catheter  · chest tube  · O2 Device: Nasal cannula, Humidifier  Treatment/Session Assessment:    · Response to Treatment:  Improving overall. Initial evaluation substantial ambulatory distance with supervision assistance.     o Interdisciplinary Collaboration: Physical Therapy Assistant  o Registered Nurse  · After treatment position/precautions:   o Up in chair  o Bed alarm/tab alert on  o Bed/Chair-wheels locked  o Call light within reach  o Nurse at bedside   · Compliance with Program/Exercises: Will assess as treatment progresses. · Recommendations/Intent for next treatment session: \"Next visit will focus on advancements to more challenging activities, reduction in assistance provided and cardiac rehabilitation. \".   Total Treatment Duration:  PT Patient Time In/Time Out  Time In: 1100  Time Out: 1159    Amberly Miller-Annabelle, PTA

## 2017-11-19 NOTE — PROGRESS NOTES
Problem: Mobility Impaired (Adult and Pediatric)  Goal: *Therapy Goal (Edit Goal, Insert Text)    STERNAL PRECAUTIONS   STG:  (1.)Mr. Audi Batista will move from supine to sit and sit to supine , scoot up and down and roll side to side with MODIFIED INDEPENDENCE within 4 day(s). (2.)Mr. Audi Batista will transfer from bed to chair and chair to bed with MODIFIED INDEPENDENCE using the least restrictive device within 4 day(s). (3.)Mr. Audi Batista will ambulate with MODIFIED INDEPENDENCE for 300 feet with the least restrictive device within 4 day(s). LTG:  (1.)Mr. Audi Batista will move from supine to sit and sit to supine , scoot up and down and roll side to side in bed with INDEPENDENT within 7 day(s). (2.)Mr. Audi Batista will transfer from bed to chair and chair to bed with INDEPENDENT using the least restrictive device within 7 day(s). (3.)Mr. Audi Batista will ambulate with INDEPENDENT for 600 feet with the least restrictive device within 7 day(s). ________________________________________________________________________________________________     PHYSICAL THERAPY: Daily Note, Treatment Day: 1st, PM 11/19/2017  INPATIENT: Hospital Day: 3  [place] / Product Type: Medicare /      NAME/AGE/GENDER: Jackson Downey is a 80 y.o. male   PRIMARY DIAGNOSIS: Atherosclerosis of native coronary artery of native heart with unstable angina pectoris ([facility] [address] [I25.110] Coronary artery disease involving native coronary artery of native heart with angina pectoris ([facility] [address] Coronary artery disease involving native coronary artery of native heart with angina pectoris (HCC)  Procedure(s) (LRB):  CORONARY ARTERY BYPASS GRAFT (CABG X 4)/ LIMA (N/A)  VEIN HARVEST/ GREATER SAPHANEOUS (Left)  2 Days Post-Op  ICD-10: Treatment Diagnosis:   · Difficulty in walking, Not elsewhere classified (R26.2)   Precaution/Allergies:  Review of patient's allergies indicates no known allergies.       ASSESSMENT:      Everett Escobedo is a pleasant elderly male with above diagnosis with decreased transfers, ambulation and mobility with sternal precautions. Pt is sitting in recliner and agreeable to therapy. Patient has catheter, chest tube and O2. Patient requires a little assist for sit to stand. Static and dynamic standing balance activities @ the walker with contact guard assist.  Patient tolerated standing x 12 minutes plus. Gait trainig with rolling walker x 200 feet with comfortable gait. O2 saturations in the high 90's (RN assisted and placed the patient on O2 for gait training). Patient returned to the room and was assisted with functional ADL's in mostly standing. Wife is present. Good session today and patient participates well. After treatment the patient is left with RN present and RT arriving. Pt is motivated and demonstrates potential for improvement with skilled physical therapy. Skilled PT is indicated for his functional mobility deficits and debility for return to normal baseline function. Will continue with PT efforts. PM treatment:  Patient supine in bed and agreeable to therapy. Patient having some confusion. Family present. Request assistance with bed mobility. Attempted to get the patient to log roll without success. Sitting edge of bed unsupported. Sit to stand with min assist.  Static standing @ walker with close supervision to contact guard assist.b  Gait training with rolling walker with min assist as patient increase in gait williams and with verbal cues this writer could not get the patient to reduce his speed. O2 intact along with chest tube and catheter. RN present during gait training. Patient returned to supine in bed with RN attending. Good progress however patient can be impulsive and confused. Recommend additional therapy to allow the patient to recover fully and increase independence. Will continue PT efforts.   This section established at most recent assessment   PROBLEM LIST (Impairments causing functional limitations):  1. Decreased Strength  2. Decreased Transfer Abilities  3. Decreased Ambulation Ability/Technique  4. Decreased Balance  5. Decreased Activity Tolerance    INTERVENTIONS PLANNED: (Benefits and precautions of physical therapy have been discussed with the patient.)  1. Balance Exercise  2. Bed Mobility  3. Gait Training  4. Neuromuscular Re-education/Strengthening  5. Range of Motion (ROM)  6. Therapeutic Activites  7. Therapeutic Exercise/Strengthening  8. Group Therapy      TREATMENT PLAN: Frequency/Duration: Patient to be seen for PT 2 x's per day for the duration of current acute and cardiac rehab hospital episode. Rehabilitation Potential For Stated Goals: GOOD        RECOMMENDED REHABILITATION/EQUIPMENT: (at time of discharge pending progress): Due to the probability of continued deficits (see above) this patient will likely need continued skilled physical therapy after discharge. Equipment:   [employer], Type: Dyane Pavy will need potentially upon D/C                   HISTORY:   History of Present Injury/Illness (Reason for Referral):PER MD H&P   79 y/o white male is seen at the request of Dr. rPavin Cartwright for respiratory management status post cardiac surgery. Patient had CABG x4. Currently is sedated in CV-ICU and orally intubated receiving  mechanical ventilation. Patient has been experiencing [place] class 3 symptoms and had elevated calcium score. [facility] revelaed multivessel CAD and surgical revascularization was recommended. We have been asked to see in the CV-ICU for mechanical ventilation management and weaning  Past Medical History/Comorbidities:   Mr. Addie Marroquin  has a past medical history of Apnea; Asymptomatic gallstones; CAD (coronary artery disease); Centrilobular emphysema CT Findings ([facility] [address] Childhood asthma; Chronic anemia;  Chronic renal insufficiency, stage III (moderate); Cognitive impairment; Colon polyps; Dyslipidemia; Ex-smoker for more than 1 year; GERD (gastroesophageal reflux disease); History of penile implant; History of stomach ulcers (1959); Iron deficiency anemia; Mood disorder ([facility] [address] Sigmoid diverticulosis; and TIA (transient ischemic attack). Mr. Stephany Washington  has a past surgical history that includes appendectomy (1938); heart catheterization; gi (1959); urological; orthopaedic (Right); and coronary artery bypass graft (11/17/2017). Social History/Living Environment:   Home Environment: Private residence  # Steps to Enter: 2  One/Two Story Residence: One story  Living Alone: No  Support Systems: Spouse/Significant Other/Partner  Patient Expects to be Discharged to[de-identified] Private residence  Current DME Used/Available at Home: None  Prior level of Function/Work/Activity:  Patient had been independent with all functional mobility. Dominant Side:         RIGHT  Personal Factors:          Sex:  male        Age:  80 y.o. Number of Personal Factors/Comorbidities that affect the Plan of Care: 0: LOW COMPLEXITY   EXAMINATION:   Most Recent Physical Functioning:   Gross Assessment:                  Posture:     Balance:  Sitting: Intact  Sitting - Static: Good (unsupported)  Sitting - Dynamic: Good (unsupported)  Standing: Impaired  Standing - Static: Fair  Standing - Dynamic : Poor Bed Mobility:  Rolling: Minimum assistance  Supine to Sit: Minimum assistance  Sit to Supine: Minimum assistance  Scooting: Minimum assistance  Wheelchair Mobility:     Transfers:  Sit to Stand: Minimum assistance  Stand to Sit: Minimum assistance  Gait:     Base of Support: Center of gravity altered  Speed/Giuliana: Shuffled; Slow  Distance (ft): 225 Feet (ft)  Assistive Device: Walker, rolling      Body Structures Involved:  1. Bones  2. Muscles  3. Ligaments Body Functions Affected:  1. Neuromusculoskeletal  2. Movement Related  3. Skin Related  4.  Metobolic/Endocrine Activities and Participation Affected:  1. General Tasks and Demands  2. Communication  3. Mobility  4. Interpersonal Interactions and Relationships  5. Community, Social and [place]   Number of elements that affect the Plan of Care: 1-2: LOW COMPLEXITY   CLINICAL PRESENTATION:   Presentation: Stable and uncomplicated: LOW COMPLEXITY   CLINICAL DECISION MAKIN [facility] Mobility Inpatient Short Form  How much difficulty does the patient currently have. .. Unable A Lot A Little None   1. Turning over in bed (including adjusting bedclothes, sheets and blankets)? [] 1   [] 2   [] 3   [x] 4   2. Sitting down on and standing up from a chair with arms ( e.g., wheelchair, bedside commode, etc.)   [] 1   [] 2   [] 3   [x] 4   3. Moving from lying on back to sitting on the side of the bed? [] 1   [] 2   [] 3   [x] 4   How much help from another person does the patient currently need. .. Total A Lot A Little None   4. Moving to and from a bed to a chair (including a wheelchair)? [] 1   [] 2   [x] 3   [] 4   5. Need to walk in hospital room? [] 1   [] 2   [x] 3   [] 4   6. Climbing 3-5 steps with a railing? [] 1   [] 2   [x] 3   [] 4   © , Trustees of [address] [place] [address] [address] [address], under license to [employer]. All rights reserved      Score:  Initial: 21 Most Recent: X (Date: -- )    Interpretation of Tool:  Represents activities that are increasingly more difficult (i.e. Bed mobility, Transfers, Gait). Score 24 23 22-20 19-15 14-10 9-7 6     Modifier CH CI CJ CK CL CM CN      ?  Mobility - Walking and Moving Around:     - CURRENT STATUS: CJ - 20%-39% impaired, limited or restricted    - GOAL STATUS: CI - 1%-19% impaired, limited or restricted    - D/C STATUS:  ---------------To be determined---------------  [place] / Product Type: Medicare /      Medical Necessity:     · Patient demonstrates good rehab potential due to higher previous functional level.  Reason for Services/Other Comments:  · Patient continues to require skilled intervention due to s/p Cardiac Surgery. .   Use of outcome tool(s) and clinical judgement create a POC that gives a: Clear prediction of patient's progress: LOW COMPLEXITY            TREATMENT:   (In addition to Assessment/Re-Assessment sessions the following treatments were rendered)   Pre-treatment Symptoms/Complaints:  \" OK\" Patient is very talkative  Pain: Initial:   Pain Intensity 1: 3  Post Session:  Voices no c/o     Therapeutic Activity: (    27 minutes): Therapeutic activities including transfers, static/dynamic standing balance activities and gait training to improve mobility, strength, balance and coordination. Required min assist to contact guard assist    to promote static and dynamic balance in standing. Therapeutic Exercise: ( ):  Exercises per grid below to improve mobility, strength, balance and coordination. Required  visual and verbal cues to promote proper body alignment and promote proper body breathing techniques. Progressed repetitions and complexity of movement as indicated. Date:   Date:   Date:     ACTIVITY/EXERCISE AM PM AM PM AM PM                                                                  B = bilateral; AA = active assistive; A = active; P = passive          Braces/Orthotics/Lines/Etc:   · tucker catheter  · chest tube  · O2 Device: Nasal cannula, Humidifier  Treatment/Session Assessment:    · Response to Treatment:  Improving overall. Some confusion   o Interdisciplinary Collaboration: Physical Therapy Assistant  o Registered Nurse  · After treatment position/precautions:   o Supine in bed  o Bed alarm/tab alert on  o Bed/Chair-wheels locked  o Bed in low position  o Call light within reach  o Family at bedside  o Nurse at bedside   · Compliance with Program/Exercises: Will assess as treatment progresses. · Recommendations/Intent for next treatment session:   \"Next visit will focus on advancements to more challenging activities, reduction in assistance provided and cardiac rehabilitation. \".   Total Treatment Duration:  PT Patient Time In/Time Out  Time In: 1300 (3:15)  Time Out: 1312 (3:30)    Veronica Yuan, PTA

## 2017-11-20 ENCOUNTER — APPOINTMENT (OUTPATIENT)
Dept: GENERAL RADIOLOGY | Age: 82
DRG: 236 | End: 2017-11-20
Attending: INTERNAL MEDICINE
Payer: MEDICARE

## 2017-11-20 LAB
BNP SERPL-MCNC: 212 PG/ML
GLUCOSE BLD STRIP.AUTO-MCNC: 103 MG/DL (ref 65–100)
MAGNESIUM SERPL-MCNC: 2.1 MG/DL (ref 1.8–2.4)
POTASSIUM SERPL-SCNC: 3.9 MMOL/L (ref 3.5–5.1)

## 2017-11-20 PROCEDURE — 83880 ASSAY OF NATRIURETIC PEPTIDE: CPT | Performed by: INTERNAL MEDICINE

## 2017-11-20 PROCEDURE — 84132 ASSAY OF SERUM POTASSIUM: CPT | Performed by: NURSE PRACTITIONER

## 2017-11-20 PROCEDURE — 83735 ASSAY OF MAGNESIUM: CPT | Performed by: NURSE PRACTITIONER

## 2017-11-20 PROCEDURE — 82962 GLUCOSE BLOOD TEST: CPT

## 2017-11-20 PROCEDURE — 99232 SBSQ HOSP IP/OBS MODERATE 35: CPT | Performed by: INTERNAL MEDICINE

## 2017-11-20 PROCEDURE — 77010033711 HC HIGH FLOW OXYGEN

## 2017-11-20 PROCEDURE — 65660000004 HC RM CVT STEPDOWN

## 2017-11-20 PROCEDURE — 74011250637 HC RX REV CODE- 250/637: Performed by: THORACIC SURGERY (CARDIOTHORACIC VASCULAR SURGERY)

## 2017-11-20 PROCEDURE — 36415 COLL VENOUS BLD VENIPUNCTURE: CPT | Performed by: NURSE PRACTITIONER

## 2017-11-20 PROCEDURE — 94760 N-INVAS EAR/PLS OXIMETRY 1: CPT

## 2017-11-20 PROCEDURE — 74011250637 HC RX REV CODE- 250/637: Performed by: INTERNAL MEDICINE

## 2017-11-20 PROCEDURE — 94640 AIRWAY INHALATION TREATMENT: CPT

## 2017-11-20 PROCEDURE — 74011000250 HC RX REV CODE- 250: Performed by: INTERNAL MEDICINE

## 2017-11-20 PROCEDURE — 74011250637 HC RX REV CODE- 250/637: Performed by: NURSE PRACTITIONER

## 2017-11-20 PROCEDURE — 77030012891

## 2017-11-20 PROCEDURE — 77010033678 HC OXYGEN DAILY

## 2017-11-20 PROCEDURE — 71010 XR CHEST SNGL V: CPT

## 2017-11-20 PROCEDURE — 87070 CULTURE OTHR SPECIMN AEROBIC: CPT | Performed by: INTERNAL MEDICINE

## 2017-11-20 PROCEDURE — 97530 THERAPEUTIC ACTIVITIES: CPT

## 2017-11-20 RX ORDER — DOCUSATE SODIUM 100 MG/1
100 CAPSULE, LIQUID FILLED ORAL DAILY
Status: DISCONTINUED | OUTPATIENT
Start: 2017-11-21 | End: 2017-11-22 | Stop reason: HOSPADM

## 2017-11-20 RX ORDER — POLYETHYLENE GLYCOL 3350 17 G/17G
17 POWDER, FOR SOLUTION ORAL DAILY
Status: DISCONTINUED | OUTPATIENT
Start: 2017-11-20 | End: 2017-11-22 | Stop reason: HOSPADM

## 2017-11-20 RX ORDER — AMOXICILLIN 250 MG
2 CAPSULE ORAL
Status: DISCONTINUED | OUTPATIENT
Start: 2017-11-20 | End: 2017-11-22 | Stop reason: HOSPADM

## 2017-11-20 RX ORDER — DOCUSATE SODIUM 100 MG/1
100 CAPSULE, LIQUID FILLED ORAL 2 TIMES DAILY
Status: DISCONTINUED | OUTPATIENT
Start: 2017-11-20 | End: 2017-11-20

## 2017-11-20 RX ADMIN — AMIODARONE HYDROCHLORIDE 200 MG: 200 TABLET ORAL at 20:22

## 2017-11-20 RX ADMIN — ASPIRIN 81 MG: 81 TABLET, COATED ORAL at 10:14

## 2017-11-20 RX ADMIN — POLYETHYLENE GLYCOL 3350 17 G: 17 POWDER, FOR SOLUTION ORAL at 13:43

## 2017-11-20 RX ADMIN — AMIODARONE HYDROCHLORIDE 200 MG: 200 TABLET ORAL at 10:14

## 2017-11-20 RX ADMIN — FAMOTIDINE 20 MG: 20 TABLET, FILM COATED ORAL at 10:15

## 2017-11-20 RX ADMIN — METOPROLOL TARTRATE 12.5 MG: 25 TABLET ORAL at 20:23

## 2017-11-20 RX ADMIN — SERTRALINE HYDROCHLORIDE 150 MG: 100 TABLET ORAL at 10:14

## 2017-11-20 RX ADMIN — IPRATROPIUM BROMIDE AND ALBUTEROL SULFATE 3 ML: .5; 3 SOLUTION RESPIRATORY (INHALATION) at 08:39

## 2017-11-20 RX ADMIN — MUPIROCIN: 20 OINTMENT TOPICAL at 10:18

## 2017-11-20 RX ADMIN — Medication 10 ML: at 20:24

## 2017-11-20 RX ADMIN — Medication 10 ML: at 13:47

## 2017-11-20 RX ADMIN — IPRATROPIUM BROMIDE AND ALBUTEROL SULFATE 3 ML: .5; 3 SOLUTION RESPIRATORY (INHALATION) at 19:40

## 2017-11-20 RX ADMIN — MUPIROCIN: 20 OINTMENT TOPICAL at 20:24

## 2017-11-20 RX ADMIN — GUAIFENESIN 1200 MG: 600 TABLET, EXTENDED RELEASE ORAL at 20:22

## 2017-11-20 RX ADMIN — FERROUS SULFATE TAB 325 MG (65 MG ELEMENTAL FE) 325 MG: 325 (65 FE) TAB at 10:14

## 2017-11-20 RX ADMIN — FUROSEMIDE 40 MG: 40 TABLET ORAL at 10:15

## 2017-11-20 RX ADMIN — METOPROLOL TARTRATE 12.5 MG: 25 TABLET ORAL at 10:15

## 2017-11-20 RX ADMIN — IPRATROPIUM BROMIDE AND ALBUTEROL SULFATE 3 ML: .5; 3 SOLUTION RESPIRATORY (INHALATION) at 11:41

## 2017-11-20 RX ADMIN — RIVASTIGMINE TARTRATE 1.5 MG: 1.5 CAPSULE ORAL at 17:24

## 2017-11-20 RX ADMIN — RIVASTIGMINE TARTRATE 1.5 MG: 1.5 CAPSULE ORAL at 10:14

## 2017-11-20 RX ADMIN — LAMOTRIGINE 200 MG: 100 TABLET ORAL at 10:15

## 2017-11-20 RX ADMIN — POTASSIUM CHLORIDE 10 MEQ: 10 TABLET, EXTENDED RELEASE ORAL at 10:15

## 2017-11-20 RX ADMIN — Medication 10 ML: at 05:38

## 2017-11-20 RX ADMIN — POTASSIUM CHLORIDE 40 MEQ: 20 TABLET, EXTENDED RELEASE ORAL at 10:21

## 2017-11-20 RX ADMIN — DOCUSATE SODIUM 100 MG: 100 CAPSULE, LIQUID FILLED ORAL at 13:41

## 2017-11-20 RX ADMIN — GUAIFENESIN 1200 MG: 600 TABLET, EXTENDED RELEASE ORAL at 10:14

## 2017-11-20 RX ADMIN — ACETAMINOPHEN 650 MG: 325 TABLET ORAL at 10:40

## 2017-11-20 RX ADMIN — ATORVASTATIN CALCIUM 40 MG: 40 TABLET, FILM COATED ORAL at 20:22

## 2017-11-20 NOTE — PROGRESS NOTES
Pulled chest tubes per protocol. Patient tolerated well. Applied vaseline gauze and 4x4's and paper tape. Patient stable.

## 2017-11-20 NOTE — PROGRESS NOTES
Problem: Mobility Impaired (Adult and Pediatric)  Goal: *Therapy Goal (Edit Goal, Insert Text)    STERNAL PRECAUTIONS   STG:  (1.)Mr. Chelsie Stark will move from supine to sit and sit to supine , scoot up and down and roll side to side with MODIFIED INDEPENDENCE within 4 day(s). (2.)Mr. Chelsie Stark will transfer from bed to chair and chair to bed with MODIFIED INDEPENDENCE using the least restrictive device within 4 day(s). (3.)Mr. Chelsie Stark will ambulate with MODIFIED INDEPENDENCE for 300 feet with the least restrictive device within 4 day(s). LTG:  (1.)Mr. Chelsie Stark will move from supine to sit and sit to supine , scoot up and down and roll side to side in bed with INDEPENDENT within 7 day(s). (2.)Mr. Chelsie Stark will transfer from bed to chair and chair to bed with INDEPENDENT using the least restrictive device within 7 day(s). (3.)Mr. Chelsie Stark will ambulate with INDEPENDENT for 600 feet with the least restrictive device within 7 day(s). ________________________________________________________________________________________________     PHYSICAL THERAPY: Daily Note, Treatment Day: 2nd, PM 11/20/2017  INPATIENT: Hospital Day: 4  [place] / Product Type: Medicare /      NAME/AGE/GENDER: Cristine Gurrola is a 80 y.o. male   PRIMARY DIAGNOSIS: Atherosclerosis of native coronary artery of native heart with unstable angina pectoris ([facility] [address] [I25.110] Coronary artery disease involving native coronary artery of native heart with angina pectoris ([facility] [address] Coronary artery disease involving native coronary artery of native heart with angina pectoris (HCC)  Procedure(s) (LRB):  CORONARY ARTERY BYPASS GRAFT (CABG X 4)/ LIMA (N/A)  VEIN HARVEST/ GREATER SAPHANEOUS (Left)  3 Days Post-Op  ICD-10: Treatment Diagnosis:   · Difficulty in walking, Not elsewhere classified (R26.2)   Precaution/Allergies:  Review of patient's allergies indicates no known allergies.       ASSESSMENT:      Cecilia Mai is a pleasant elderly male with above diagnosis with decreased transfers, ambulation and mobility with sternal precautions. Pt is in the BR on contact and agreeable to therapy. Catheter and chest tube have been removed. He ambulated out of BR without an assistive devise and had  LOB which he corrected with CGA. He was given  RW and ambulated ~ 225'. O2 sat on room air was 95% after ~100'. Pt sat at EOB and rested. He was postitioned supine with min assist. He is progressing toward gt goals. Will continue PT efforts. This section established at most recent assessment   PROBLEM LIST (Impairments causing functional limitations):  1. Decreased Strength  2. Decreased Transfer Abilities  3. Decreased Ambulation Ability/Technique  4. Decreased Balance  5. Decreased Activity Tolerance    INTERVENTIONS PLANNED: (Benefits and precautions of physical therapy have been discussed with the patient.)  1. Balance Exercise  2. Bed Mobility  3. Gait Training  4. Neuromuscular Re-education/Strengthening  5. Range of Motion (ROM)  6. Therapeutic Activites  7. Therapeutic Exercise/Strengthening  8. Group Therapy      TREATMENT PLAN: Frequency/Duration: Patient to be seen for PT 2 x's per day for the duration of current acute and cardiac rehab hospital episode. Rehabilitation Potential For Stated Goals: GOOD        RECOMMENDED REHABILITATION/EQUIPMENT: (at time of discharge pending progress): Due to the probability of continued deficits (see above) this patient will likely need continued skilled physical therapy after discharge. Equipment:   [employer], Type: Jayson Liebenthal will need potentially upon D/C                   HISTORY:   History of Present Injury/Illness (Reason for Referral):PER MD H&P   81 y/o white male is seen at the request of Dr. Meredith Mathews for respiratory management status post cardiac surgery. Patient had CABG x4.   Currently is sedated in CV-ICU and orally intubated receiving  mechanical ventilation. Patient has been experiencing [place] class 3 symptoms and had elevated calcium score. [facility] revelaed multivessel CAD and surgical revascularization was recommended. We have been asked to see in the CV-ICU for mechanical ventilation management and weaning  Past Medical History/Comorbidities:   Mr. Stephany Washington  has a past medical history of Apnea; Asymptomatic gallstones; CAD (coronary artery disease); Centrilobular emphysema CT Findings ([facility] [address] Childhood asthma; Chronic anemia; Chronic renal insufficiency, stage III (moderate); Cognitive impairment; Colon polyps; Dyslipidemia; Ex-smoker for more than 1 year; GERD (gastroesophageal reflux disease); History of penile implant; History of stomach ulcers (1959); Iron deficiency anemia; Mood disorder ([address] Sigmoid diverticulosis; and TIA (transient ischemic attack). Mr. Stephany Washington  has a past surgical history that includes appendectomy (1938); heart catheterization; gi (1959); urological; orthopaedic (Right); and coronary artery bypass graft (11/17/2017). Social History/Living Environment:   Home Environment: Private residence  # Steps to Enter: 2  One/Two Story Residence: One story  Living Alone: No  Support Systems: Spouse/Significant Other/Partner  Patient Expects to be Discharged to[de-identified] Private residence  Current DME Used/Available at Home: None  Prior level of Function/Work/Activity:  Patient had been independent with all functional mobility. Dominant Side:         RIGHT  Personal Factors:          Sex:  male        Age:  80 y.o.    Number of Personal Factors/Comorbidities that affect the Plan of Care: 0: LOW COMPLEXITY   EXAMINATION:   Most Recent Physical Functioning:   Gross Assessment:                  Posture:     Balance:  Sitting: Intact  Standing: Impaired  Standing - Static: Good  Standing - Dynamic : Fair (+) Bed Mobility:  Sit to Supine: Minimum assistance  Wheelchair Mobility:     Transfers:  Sit to Stand: Contact guard assistance  Stand to Sit: Contact guard assistance  Gait:     Speed/Giuliana: Slow  Step Length: Left shortened;Right shortened  Distance (ft): 225 Feet (ft)  Assistive Device: Walker, rolling  Ambulation - Level of Assistance: Stand-by asssistance  Interventions: Safety awareness training;Verbal cues      Body Structures Involved:  1. Bones  2. Muscles  3. Ligaments Body Functions Affected:  1. Neuromusculoskeletal  2. Movement Related  3. Skin Related  4. Metobolic/Endocrine Activities and Participation Affected:  1. General Tasks and Demands  2. Communication  3. Mobility  4. Interpersonal Interactions and Relationships  5. Community, Social and [place]   Number of elements that affect the Plan of Care: 1-2: LOW COMPLEXITY   CLINICAL PRESENTATION:   Presentation: Stable and uncomplicated: LOW COMPLEXITY   CLINICAL DECISION MAKIN [facility] Inpatient Short Form  How much difficulty does the patient currently have. .. Unable A Lot A Little None   1. Turning over in bed (including adjusting bedclothes, sheets and blankets)? [] 1   [] 2   [] 3   [x] 4   2. Sitting down on and standing up from a chair with arms ( e.g., wheelchair, bedside commode, etc.)   [] 1   [] 2   [] 3   [x] 4   3. Moving from lying on back to sitting on the side of the bed? [] 1   [] 2   [] 3   [x] 4   How much help from another person does the patient currently need. .. Total A Lot A Little None   4. Moving to and from a bed to a chair (including a wheelchair)? [] 1   [] 2   [x] 3   [] 4   5. Need to walk in hospital room? [] 1   [] 2   [x] 3   [] 4   6. Climbing 3-5 steps with a railing? [] 1   [] 2   [x] 3   [] 4   © , Trustees of [address] [place] [address] [address] [address], under license to [employer]. All rights reserved      Score:  Initial: 21 Most Recent: X (Date: -- )    Interpretation of Tool:  Represents activities that are increasingly more difficult (i.e. Bed mobility, Transfers, Gait).    Score 24 23 22-20 19-15 14-10 9-7 6     Modifier CH CI CJ CK CL CM CN      ? Mobility - Walking and Moving Around:     - CURRENT STATUS: CJ - 20%-39% impaired, limited or restricted    - GOAL STATUS: CI - 1%-19% impaired, limited or restricted    - D/C STATUS:  ---------------To be determined---------------  [place] / Product Type: Medicare /      Medical Necessity:     · Patient demonstrates good rehab potential due to higher previous functional level. Reason for Services/Other Comments:  · Patient continues to require skilled intervention due to s/p Cardiac Surgery. .   Use of outcome tool(s) and clinical judgement create a POC that gives a: Clear prediction of patient's progress: LOW COMPLEXITY            TREATMENT:   (In addition to Assessment/Re-Assessment sessions the following treatments were rendered)   Pre-treatment Symptoms/Complaints:  \" OK\"   Pain: Initial: 0     Post Session:  Voices no c/o     Therapeutic Activity: (    18 minutes): Therapeutic activities including transfers, static/dynamic standing balance activities,  and gait training to improve mobility, strength, balance and coordination. Required contact guard assist  Safety awareness training;Verbal cues to promote static and dynamic balance in standing. Date:  11/20/17 AM Date:   Date:     Activity/Exercise Seated Parameters Parameters Parameters   Heel raises X 20 B     Toe raises X 20 B     LAQ's X 20 B     Hip Flex X 20 B     Hip ABD X 20 B                             Braces/Orthotics/Lines/Etc:   · . Treatment/Session Assessment:    · Response to Treatment:  Improving overall.    Some confusion   o Interdisciplinary Collaboration: Physical Therapy Assistant  o Registered Nurse  · After treatment position/precautions:   o Supine in bed  o Bed alarm/tab alert on  o Bed/Chair-wheels locked  o Bed in low position  o Call light within reach  o Family at bedside  o Nurse at bedside · Compliance with Program/Exercises: Will assess as treatment progresses. · Recommendations/Intent for next treatment session: \"Next visit will focus on advancements to more challenging activities, reduction in assistance provided and cardiac rehabilitation. \".   Total Treatment Duration:  PT Patient Time In/Time Out  Time In: 1446  Time Out: Chidi Singh PTA

## 2017-11-20 NOTE — PROGRESS NOTES
[facility] [place]  Admission Date: 11/17/2017             Daily Progress Note: 11/20/2017    The patient's chart is reviewed and the patient is discussed with the staff. 81 yo male with a history of chronic anemia, diverticulosis, emphysema, CRF, HL, and CAD. Patient experienced [place] class 3 symptoms and had elevated calcium score. LHC revelaed multivessel CAD and surgical revascularization was recommended. Patient is now s/p CABG x 4 on 11/17/17 per Dr. Sagrario Hernandez. Post-op course complicated by confusion/agitation    Subjective:   RA sat yesterday in the 70s requiring Airvo to return sats to the 90s. ABG 7.33/49/71. Currently on 40L/50% with o2 sat 96%. + cough productive of copious amounts of thin, clear mucus per nursing.    Using IS   Wife is at bed side    Current Facility-Administered Medications   Medication Dose Route Frequency    diphenhydrAMINE (BENADRYL) 12.5 mg/5 mL oral elixir 12.5 mg  12.5 mg Oral QHS PRN    famotidine (PEPCID) tablet 20 mg  20 mg Oral DAILY    guaiFENesin ER (MUCINEX) tablet 1,200 mg  1,200 mg Oral Q12H    ferrous sulfate tablet 325 mg  325 mg Oral DAILY    fluticasone (FLONASE) 50 mcg/actuation nasal spray 2 Spray  2 Spray Both Nostrils DAILY    lamoTRIgine (LaMICtal) tablet 200 mg  200 mg Oral DAILY    rivastigmine tartrate (EXELON) capsule 1.5 mg  1.5 mg Oral BID WITH MEALS    sertraline (ZOLOFT) tablet 150 mg  150 mg Oral DAILY    sodium chloride (NS) flush 5-10 mL  5-10 mL IntraVENous Q8H    sodium chloride (NS) flush 5-10 mL  5-10 mL IntraVENous PRN    furosemide (LASIX) tablet 40 mg  40 mg Oral DAILY    magnesium hydroxide (MILK OF MAGNESIA) 400 mg/5 mL oral suspension 30 mL  30 mL Oral DAILY PRN    alum-mag hydroxide-simeth (MYLANTA) oral suspension 30 mL  30 mL Oral Q4H PRN    ondansetron (ZOFRAN) injection 4 mg  4 mg IntraVENous Q6H PRN    acetaminophen (TYLENOL) tablet 650 mg  650 mg Oral Q4H PRN    aspirin delayed-release tablet 81 mg  81 mg Oral DAILY    magnesium oxide (MAG-OX) tablet 400 mg  400 mg Oral TID PRN    magnesium oxide (MAG-OX) tablet 400 mg  400 mg Oral QID PRN    potassium chloride (KLOR-CON) tablet 10 mEq  10 mEq Oral DAILY    potassium chloride (K-DUR, KLOR-CON) SR tablet 20 mEq  20 mEq Oral BID PRN    potassium chloride (K-DUR, KLOR-CON) SR tablet 40 mEq  40 mEq Oral BID PRN    oxyCODONE-acetaminophen (PERCOCET) 5-325 mg per tablet 1 Tab  1 Tab Oral Q4H PRN    traMADol (ULTRAM) tablet 50 mg  50 mg Oral Q6H PRN    amiodarone (CORDARONE) tablet 200 mg  200 mg Oral Q12H    atorvastatin (LIPITOR) tablet 40 mg  40 mg Oral QHS    metoprolol tartrate (LOPRESSOR) tablet 12.5 mg  12.5 mg Oral Q12H    mupirocin (BACTROBAN) 2 % ointment   Both Nostrils Q12H    insulin lispro (HUMALOG) injection   SubCUTAneous AC&HS    albuterol-ipratropium (DUO-NEB) 2.5 MG-0.5 MG/3 ML  3 mL Nebulization Q4H RT       Review of Systems  Constitutional: negative for fever, chills, sweats  Cardiovascular: negative for chest pain, palpitations, syncope, edema  Gastrointestinal:  negative for dysphagia, reflux, vomiting, diarrhea, abdominal pain, or melena  Neurologic:  negative for focal weakness, numbness, headache    Objective:     Vitals:    11/19/17 2338 11/20/17 0234 11/20/17 0235 11/20/17 0720   BP:  127/60  122/58   Pulse:  69  68   Resp:  18  16   Temp:  97.8 °F (36.6 °C)  98 °F (36.7 °C)   SpO2: 96% 97%  96%   Weight:   190 lb 9.6 oz (86.5 kg)    Height:         Intake and Output:   11/18 1901 - 11/20 0700  In: -   Out: 2550 [Urine:2340]  11/20 0701 - 11/20 1900  In: 120 [P.O.:120]  Out: 710 [Urine:650]    Physical Exam:   Constitution:  the patient is well developed and in no acute distress  EENMT:  Sclera clear, pupils equal, oral mucosa moist  Respiratory: clear to auscultation bilaterally, Airvo, + SQ emphysema L chest and CT with no air leak  Cardiovascular:  RRR without M,G,R  Gastrointestinal: soft and non-tender; with positive bowel sounds. Musculoskeletal: warm without cyanosis. There is no lower leg edema. Skin:  no jaundice or rashes, midline sternal incision without redness, swelling, or drainge   Neurologic: no gross neuro deficits     Psychiatric:  alert and oriented x 3    CXR:     Examination: Chest, portable AP view     History: Atelectasis, chest tube       Comparison: 11/19/2017     Findings:    Cardiac silhouette is unchanged in size. Median sternotomy wires again noted. There is persistent bibasilar atelectasis, not significantly changed. Left-sided  chest tube overlies the left midlung. No residual pneumothorax identified. Subcutaneous emphysema noted in the left chest wall.       Impression:    No significant interval change in bibasilar atelectasis. Left-sided chest tube  remains in place. No pneumothorax identified. LAB  Recent Labs      11/20/17   0623  11/19/17 2049  11/19/17   1546  11/19/17   1110  11/19/17   0533   GLUCPOC  103*  143*  149*  157*  156*      Recent Labs      11/19/17   0345  11/18/17   0350  11/17/17   2126  11/17/17   1711  11/17/17   1239   WBC  13.6*  10.6   --    --   10.3   HGB  9.7*  10.0*  10.2*  10.5*  9.4*   HCT  29.9*  31.3*  31.4*  32.2*  28.6*   PLT  152  152   --    --   134*   INR   --    --    --    --   1.2     Recent Labs      11/20/17   0522  11/19/17   0345  11/18/17   0350   11/17/17   1239   NA   --   141  145   --   144   K  3.9  4.2  4.2   < >  3.8   CL   --   106  110*   --   112*   CO2   --   26  27   --   24   GLU   --   146*  112*   --   126*   BUN   --   31*  22   --   23   CREA   --   1.37  0.96   --   1.15   MG  2.1  2.0  2.0   < >  3.1*   CA   --   8.5  7.9*   --   7.9*    < > = values in this interval not displayed. Recent Labs      11/18/17 2025 11/17/17   1240   PH  7.33*  7.29*   PCO2  49*  49*   PO2  74*  195*   HCO3  25  23     No results for input(s): LCAD, LAC in the last 72 hours.       Assessment:  (Medical Decision Making) Hospital Problems  Date Reviewed: 11/19/2017          Codes Class Noted POA    S/P CABG x 4 ICD-10-CM: Z95.1  ICD-9-CM: V45.81  11/17/2017 No        Hypoxia ICD-10-CM: R09.02  ICD-9-CM: 799.02  11/17/2017 No       On 40% O2      * (Principal)Coronary artery disease involving native coronary artery of native heart with angina pectoris ([facility] [address] ICD-10-CM: I25.119  ICD-9-CM: 414.01, 413.9  11/10/2017 Yes     11/17/17 (Dr Nae Clarke) Coronary artery bypass grafting x4 grafts   consisting of:   1. Left internal mammary artery to left anterior descending. 2. Reverse saphenous vein graft to diagonal 1.   3. Reverse saphenous vein graft to obtuse marginal.   4. Reverse saphenous vein graft to the posterior descending   artery. Chronic anemia ICD-10-CM: D64.9  ICD-9-CM: 057. 9  Unknown Yes    Hgb 9.7      Cognitive impairment ICD-10-CM: R41.89  ICD-9-CM: 294.9  Unknown Yes             Plan:  (Medical Decision Making)     --duo-neb - Ez-PAP  --change to HF NC  --ok with me to remove CT, defer to surgery  --PT following for mobility - ambulated 200' yesterday    More than 50% of the time documented was spent in face-to-face contact with the patient and in the care of the patient on the floor/unit where the patient is located.     Rubin Barton MD

## 2017-11-20 NOTE — PROGRESS NOTES
Problem: Mobility Impaired (Adult and Pediatric)  Goal: *Therapy Goal (Edit Goal, Insert Text)    STERNAL PRECAUTIONS   STG:  (1.)Mr. Brijesh Green will move from supine to sit and sit to supine , scoot up and down and roll side to side with MODIFIED INDEPENDENCE within 4 day(s). (2.)Mr. Brijesh Green will transfer from bed to chair and chair to bed with MODIFIED INDEPENDENCE using the least restrictive device within 4 day(s). (3.)Mr. Brijesh Green will ambulate with MODIFIED INDEPENDENCE for 300 feet with the least restrictive device within 4 day(s). LTG:  (1.)Mr. Brijesh Green will move from supine to sit and sit to supine , scoot up and down and roll side to side in bed with INDEPENDENT within 7 day(s). (2.)Mr. Brijesh Green will transfer from bed to chair and chair to bed with INDEPENDENT using the least restrictive device within 7 day(s). (3.)Mr. Brijesh Green will ambulate with INDEPENDENT for 600 feet with the least restrictive device within 7 day(s). ________________________________________________________________________________________________     PHYSICAL THERAPY: Daily Note, Treatment Day: 2nd, AM 11/20/2017  INPATIENT: Hospital Day: 4  [place] / Product Type: Medicare /      NAME/AGE/GENDER: Migel Vasquez is a 80 y.o. male   PRIMARY DIAGNOSIS: Atherosclerosis of native coronary artery of native heart with unstable angina pectoris ([facility] [address] [I25.110] Coronary artery disease involving native coronary artery of native heart with angina pectoris ([facility] [address] Coronary artery disease involving native coronary artery of native heart with angina pectoris (HCC)  Procedure(s) (LRB):  CORONARY ARTERY BYPASS GRAFT (CABG X 4)/ LIMA (N/A)  VEIN HARVEST/ GREATER SAPHANEOUS (Left)  3 Days Post-Op  ICD-10: Treatment Diagnosis:   · Difficulty in walking, Not elsewhere classified (R26.2)   Precaution/Allergies:  Review of patient's allergies indicates no known allergies.       ASSESSMENT:      Chelsie Stark is a pleasant elderly male with above diagnosis with decreased transfers, ambulation and mobility with sternal precautions. Pt is sitting in recliner and agreeable to therapy. Patient has catheter, chest tube and O2 on 5 L/min. Pt stood to RW and ambulated ~ 200. O2 sat was 83% after ~100' and was bumped up to 6L/min. Ambulated another 76' and O2 sat was 96%. Decreased back to 5L/min. Ambulated 25' back to room. Pt sat and rested. He was given a HEP with seated ex. He was instructed in these and performed as listed below. Pt left in chair. He is progressing toward gt goals. Will continue PT efforts. This section established at most recent assessment   PROBLEM LIST (Impairments causing functional limitations):  1. Decreased Strength  2. Decreased Transfer Abilities  3. Decreased Ambulation Ability/Technique  4. Decreased Balance  5. Decreased Activity Tolerance    INTERVENTIONS PLANNED: (Benefits and precautions of physical therapy have been discussed with the patient.)  1. Balance Exercise  2. Bed Mobility  3. Gait Training  4. Neuromuscular Re-education/Strengthening  5. Range of Motion (ROM)  6. Therapeutic Activites  7. Therapeutic Exercise/Strengthening  8. Group Therapy      TREATMENT PLAN: Frequency/Duration: Patient to be seen for PT 2 x's per day for the duration of current acute and cardiac rehab hospital episode. Rehabilitation Potential For Stated Goals: GOOD        RECOMMENDED REHABILITATION/EQUIPMENT: (at time of discharge pending progress): Due to the probability of continued deficits (see above) this patient will likely need continued skilled physical therapy after discharge. Equipment:   Shopping Mail, Type: [place] will need potentially upon D/C                   HISTORY:   History of Present Injury/Illness (Reason for Referral):PER MD H&P   79 y/o white male is seen at the request of Dr. Devorah Pierce for respiratory management status post cardiac surgery.   Patient had CABG x4.  Currently is sedated in CV-ICU and orally intubated receiving  mechanical ventilation. Patient has been experiencing [place] class 3 symptoms and had elevated calcium score. C revelaed multivessel CAD and surgical revascularization was recommended. We have been asked to see in the CV-ICU for mechanical ventilation management and weaning  Past Medical History/Comorbidities:   Mr. Candido Hayes  has a past medical history of Apnea; Asymptomatic gallstones; CAD (coronary artery disease); Centrilobular emphysema CT Findings ([facility] [address] Childhood asthma; Chronic anemia; Chronic renal insufficiency, stage III (moderate); Cognitive impairment; Colon polyps; Dyslipidemia; Ex-smoker for more than 1 year; GERD (gastroesophageal reflux disease); History of penile implant; History of stomach ulcers (1959); Iron deficiency anemia; Mood disorder ([facility] [address] Sigmoid diverticulosis; and TIA (transient ischemic attack). Mr. Candido Hayes  has a past surgical history that includes appendectomy (1938); heart catheterization; gi (1959); urological; orthopaedic (Right); and coronary artery bypass graft (11/17/2017). Social History/Living Environment:   Home Environment: Private residence  # Steps to Enter: 2  One/Two Story Residence: One story  Living Alone: No  Support Systems: Spouse/Significant Other/Partner  Patient Expects to be Discharged to[de-identified] Private residence  Current DME Used/Available at Home: None  Prior level of Function/Work/Activity:  Patient had been independent with all functional mobility. Dominant Side:         RIGHT  Personal Factors:          Sex:  male        Age:  80 y.o.    Number of Personal Factors/Comorbidities that affect the Plan of Care: 0: LOW COMPLEXITY   EXAMINATION:   Most Recent Physical Functioning:   Gross Assessment:                  Posture:     Balance:  Sitting: Intact  Standing: Impaired  Standing - Static: Good  Standing - Dynamic : Fair (+) Bed Mobility:     Wheelchair Mobility:     Transfers:  Sit to Stand: Contact guard assistance  Stand to Sit: Contact guard assistance  Gait:     Speed/Giuliana: Slow  Step Length: Left shortened;Right shortened  Distance (ft): 225 Feet (ft)  Assistive Device: Walker, rolling  Ambulation - Level of Assistance: Stand-by asssistance  Interventions: Safety awareness training;Verbal cues      Body Structures Involved:  1. Bones  2. Muscles  3. Ligaments Body Functions Affected:  1. Neuromusculoskeletal  2. Movement Related  3. Skin Related  4. Metobolic/Endocrine Activities and Participation Affected:  1. General Tasks and Demands  2. Communication  3. Mobility  4. Interpersonal Interactions and Relationships  5. Community, Social and [facility] [place] [facility]   Number of elements that affect the Plan of Care: 1-2: LOW COMPLEXITY   CLINICAL PRESENTATION:   Presentation: Stable and uncomplicated: LOW COMPLEXITY   CLINICAL DECISION MAKIN [facility] Inpatient Short Form  How much difficulty does the patient currently have. .. Unable A Lot A Little None   1. Turning over in bed (including adjusting bedclothes, sheets and blankets)? [] 1   [] 2   [] 3   [x] 4   2. Sitting down on and standing up from a chair with arms ( e.g., wheelchair, bedside commode, etc.)   [] 1   [] 2   [] 3   [x] 4   3. Moving from lying on back to sitting on the side of the bed? [] 1   [] 2   [] 3   [x] 4   How much help from another person does the patient currently need. .. Total A Lot A Little None   4. Moving to and from a bed to a chair (including a wheelchair)? [] 1   [] 2   [x] 3   [] 4   5. Need to walk in hospital room? [] 1   [] 2   [x] 3   [] 4   6. Climbing 3-5 steps with a railing? [] 1   [] 2   [x] 3   [] 4   © , Trustees of [address] [place] [address] [address] [id number], under license to [employer].  All rights reserved      Score:  Initial: 21 Most Recent: X (Date: -- )    Interpretation of Tool:  Represents activities that are increasingly more difficult (i.e. Bed mobility, Transfers, Gait). Score 24 23 22-20 19-15 14-10 9-7 6     Modifier CH CI CJ CK CL CM CN      ? Mobility - Walking and Moving Around:     - CURRENT STATUS: CJ - 20%-39% impaired, limited or restricted    - GOAL STATUS: CI - 1%-19% impaired, limited or restricted    - D/C STATUS:  ---------------To be determined---------------  [place] / [place] / Product Type: Medicare /      Medical Necessity:     · Patient demonstrates good rehab potential due to higher previous functional level. Reason for Services/Other Comments:  · Patient continues to require skilled intervention due to s/p Cardiac Surgery. .   Use of outcome tool(s) and clinical judgement create a POC that gives a: Clear prediction of patient's progress: LOW COMPLEXITY            TREATMENT:   (In addition to Assessment/Re-Assessment sessions the following treatments were rendered)   Pre-treatment Symptoms/Complaints:  \" OK\" Patient is very talkative  Pain: Initial:      Post Session:  Voices no c/o     Therapeutic Activity: (    31 minutes): Therapeutic activities including transfers, static/dynamic standing balance activities, seated ex and gait training to improve mobility, strength, balance and coordination. Required contact guard assist  Safety awareness training;Verbal cues to promote static and dynamic balance in standing. Therapeutic Exercise: ( ):  Exercises per grid below to improve mobility, strength, balance and coordination. Required  visual and verbal cues to promote proper body alignment and promote proper body breathing techniques. Progressed repetitions and complexity of movement as indicated.       Date:  11/20/17 Date:   Date:     Activity/Exercise Seated Parameters Parameters Parameters   Heel raises X 20 B     Toe raises X 20 B     LAQ's X 20 B     Hip Flex X 20 B     Hip ABD X 20 B                             Braces/Orthotics/Lines/Etc:   · tucker catheter  · chest tube and O2 via NC  Treatment/Session Assessment:    · Response to Treatment:  Improving overall. Some confusion   o Interdisciplinary Collaboration: Physical Therapy Assistant  o Registered Nurse  · After treatment position/precautions:   o Up in chair  o Bed alarm/tab alert on  o Bed/Chair-wheels locked  o Bed in low position  o Call light within reach  o Family at bedside  o Nurse at bedside   · Compliance with Program/Exercises: Will assess as treatment progresses. · Recommendations/Intent for next treatment session: \"Next visit will focus on advancements to more challenging activities, reduction in assistance provided and cardiac rehabilitation. \".   Total Treatment Duration:  PT Patient Time In/Time Out  Time In: 1038  Time Out: 100 [facility]

## 2017-11-20 NOTE — PROGRESS NOTES
CV Progress Note    Admit Date: 11/17/2017    Postop: day #3  from CABG x4    Subjective:   Some confusion improved today  CT stable, no ptx on CXR    Patient present conditions:   Stable sub q emphysema left chest and neck without evidence of PTX no leak on chest tube    Review of Systems   Cardiac:   Respiratory: shortness of breath - weaned off airvo to 5L NC  Neuro: confusion improved  Incision: dry healing  GI: po diet  Chest Tubes: L to be removed    Objective:     Vitals:  Blood pressure 122/58, pulse 68, temperature 98 °F (36.7 °C), resp. rate 16, height 5' 11\" (1.803 m), weight 190 lb 9.6 oz (86.5 kg), SpO2 98 %. Vitals:    11/20/17 0234 11/20/17 0235 11/20/17 0720 11/20/17 0840   BP: 127/60  122/58    Pulse: 69  68    Resp: 18  16    Temp: 97.8 °F (36.6 °C)  98 °F (36.7 °C)    SpO2: 97%  96% 98%   Weight:  190 lb 9.6 oz (86.5 kg)     Height:            I/O:  11/20 0701 - 11/20 1900  In: 120 [P.O.:120]  Out: 710 [Urine:650]  11/18 1901 - 11/20 0700  In: -   Out: 2550 [Urine:2340]  Last 3 Recorded Weights in this Encounter    11/18/17 0000 11/19/17 0352 11/20/17 0235   Weight: 187 lb 6.3 oz (85 kg) 188 lb (85.3 kg) 190 lb 9.6 oz (86.5 kg)       Intake/Output Summary (Last 24 hours) at 11/20/17 1111  Last data filed at 11/20/17 8340   Gross per 24 hour   Intake              120 ml   Output             1160 ml   Net            -1040 ml           Heart: .difficult to hear sub q air  Lung: poor breath sounds bialterally  Neuro: some confusion and pre exisiting memory issues- ? At baseline  Incisions:dry/healing  Chest Tubes:left CT in place no leak.      ECG/Telemetry: NSR    Lab/Data Review:  Recent Results (from the past 12 hour(s))   MAGNESIUM    Collection Time: 11/20/17  5:22 AM   Result Value Ref Range    Magnesium 2.1 1.8 - 2.4 mg/dL   POTASSIUM    Collection Time: 11/20/17  5:22 AM   Result Value Ref Range    Potassium 3.9 3.5 - 5.1 mmol/L   BNP    Collection Time: 11/20/17  5:22 AM   Result Value Ref Range     pg/mL   GLUCOSE, POC    Collection Time: 17  6:23 AM   Result Value Ref Range    Glucose (POC) 103 (H) 65 - 100 mg/dL     BMP:   Lab Results   Component Value Date/Time    K 3.9 2017 05:22 AM     CMP:   Lab Results   Component Value Date/Time    K 3.9 2017 05:22 AM    MG 2.1 2017 05:22 AM     CBC: No results found for: WBC, HGB, HGBEXT, HCT, HCTEXT, PLT, PLTEXT, HGBEXT, HCTEXT, PLTEXT, HGBEXT, HCTEXT, PLTEXT  All Cardiac Markers in the last 24 hours:   Lab Results   Component Value Date/Time     2017 05:22 AM     Recent Glucose Results: No results found for: GLU  ABG: No results found for: PH, PHI, PCO2, PCO2I, PO2, PO2I, HCO3, HCO3I, FIO2, FIO2I  COAGS: No results found for: APTT, PTP, INR  Liver Panel: No results found for: ALB, CBIL, TBIL, TP, GLOB, AGRAT, SGOT, ASTPOC, ALTPOC, ALT, GPT, AP     Radiology: CXR no PTX    Assessment:   1. S/p CABG  2. Baseline confusion  3. Sub q emphysema decreased oxygenation- stable      Plan/Recommendations/Medical Decision Makin. Continue present treatment- dc CT per Pulmonary ok to do   2. Monitor meds avoid narcotics  3.  Ambulation & incentive spirometry  Home tomorrow if remains stable      See orders    Tennis Huang, NP  2017

## 2017-11-21 ENCOUNTER — HOME HEALTH ADMISSION (OUTPATIENT)
Dept: HOME HEALTH SERVICES | Facility: HOME HEALTH | Age: 82
End: 2017-11-21
Payer: MEDICARE

## 2017-11-21 ENCOUNTER — APPOINTMENT (OUTPATIENT)
Dept: GENERAL RADIOLOGY | Age: 82
DRG: 236 | End: 2017-11-21
Attending: THORACIC SURGERY (CARDIOTHORACIC VASCULAR SURGERY)
Payer: MEDICARE

## 2017-11-21 LAB
MAGNESIUM SERPL-MCNC: 1.9 MG/DL (ref 1.8–2.4)
POTASSIUM SERPL-SCNC: 4.2 MMOL/L (ref 3.5–5.1)

## 2017-11-21 PROCEDURE — 36415 COLL VENOUS BLD VENIPUNCTURE: CPT | Performed by: NURSE PRACTITIONER

## 2017-11-21 PROCEDURE — 94761 N-INVAS EAR/PLS OXIMETRY MLT: CPT

## 2017-11-21 PROCEDURE — 94760 N-INVAS EAR/PLS OXIMETRY 1: CPT

## 2017-11-21 PROCEDURE — 99232 SBSQ HOSP IP/OBS MODERATE 35: CPT | Performed by: INTERNAL MEDICINE

## 2017-11-21 PROCEDURE — 84132 ASSAY OF SERUM POTASSIUM: CPT | Performed by: NURSE PRACTITIONER

## 2017-11-21 PROCEDURE — 74011250637 HC RX REV CODE- 250/637: Performed by: INTERNAL MEDICINE

## 2017-11-21 PROCEDURE — 97530 THERAPEUTIC ACTIVITIES: CPT

## 2017-11-21 PROCEDURE — 77010033678 HC OXYGEN DAILY

## 2017-11-21 PROCEDURE — 74011250637 HC RX REV CODE- 250/637: Performed by: NURSE PRACTITIONER

## 2017-11-21 PROCEDURE — 83735 ASSAY OF MAGNESIUM: CPT | Performed by: NURSE PRACTITIONER

## 2017-11-21 PROCEDURE — 97110 THERAPEUTIC EXERCISES: CPT

## 2017-11-21 PROCEDURE — 74011250637 HC RX REV CODE- 250/637: Performed by: THORACIC SURGERY (CARDIOTHORACIC VASCULAR SURGERY)

## 2017-11-21 PROCEDURE — 71020 XR CHEST PA LAT: CPT

## 2017-11-21 PROCEDURE — 77030012891

## 2017-11-21 PROCEDURE — 74011000250 HC RX REV CODE- 250: Performed by: INTERNAL MEDICINE

## 2017-11-21 PROCEDURE — 94640 AIRWAY INHALATION TREATMENT: CPT

## 2017-11-21 PROCEDURE — 65660000004 HC RM CVT STEPDOWN

## 2017-11-21 RX ORDER — ASPIRIN 81 MG/1
81 TABLET ORAL DAILY
Qty: 30 TAB | Refills: 12 | Status: SHIPPED | OUTPATIENT
Start: 2017-11-22 | End: 2019-05-20

## 2017-11-21 RX ORDER — ACETAMINOPHEN 325 MG/1
TABLET ORAL
Qty: 1 TAB | Refills: 0 | Status: SHIPPED
Start: 2017-11-21 | End: 2017-12-12

## 2017-11-21 RX ORDER — ATORVASTATIN CALCIUM 40 MG/1
40 TABLET, FILM COATED ORAL
Qty: 30 TAB | Refills: 5 | Status: SHIPPED | OUTPATIENT
Start: 2017-11-21 | End: 2018-01-26 | Stop reason: SDUPTHER

## 2017-11-21 RX ORDER — AMOXICILLIN 250 MG
CAPSULE ORAL
Qty: 1 TAB | Refills: 0 | Status: SHIPPED
Start: 2017-11-21 | End: 2017-12-12

## 2017-11-21 RX ORDER — METOPROLOL SUCCINATE 25 MG/1
25 TABLET, EXTENDED RELEASE ORAL DAILY
Qty: 30 TAB | Refills: 5 | Status: SHIPPED | OUTPATIENT
Start: 2017-11-21 | End: 2017-11-29

## 2017-11-21 RX ORDER — TRAMADOL HYDROCHLORIDE 50 MG/1
50 TABLET ORAL
Qty: 30 TAB | Refills: 0 | Status: SHIPPED | OUTPATIENT
Start: 2017-11-21 | End: 2017-12-12

## 2017-11-21 RX ADMIN — IPRATROPIUM BROMIDE AND ALBUTEROL SULFATE 3 ML: .5; 3 SOLUTION RESPIRATORY (INHALATION) at 11:20

## 2017-11-21 RX ADMIN — FERROUS SULFATE TAB 325 MG (65 MG ELEMENTAL FE) 325 MG: 325 (65 FE) TAB at 08:51

## 2017-11-21 RX ADMIN — ASPIRIN 81 MG: 81 TABLET, COATED ORAL at 08:49

## 2017-11-21 RX ADMIN — FLUTICASONE PROPIONATE 2 SPRAY: 50 SPRAY, METERED NASAL at 08:55

## 2017-11-21 RX ADMIN — IPRATROPIUM BROMIDE AND ALBUTEROL SULFATE 3 ML: .5; 3 SOLUTION RESPIRATORY (INHALATION) at 19:42

## 2017-11-21 RX ADMIN — METOPROLOL TARTRATE 12.5 MG: 25 TABLET ORAL at 08:50

## 2017-11-21 RX ADMIN — FUROSEMIDE 40 MG: 40 TABLET ORAL at 08:50

## 2017-11-21 RX ADMIN — AMIODARONE HYDROCHLORIDE 200 MG: 200 TABLET ORAL at 20:39

## 2017-11-21 RX ADMIN — POLYETHYLENE GLYCOL 3350 17 G: 17 POWDER, FOR SOLUTION ORAL at 08:49

## 2017-11-21 RX ADMIN — STANDARDIZED SENNA CONCENTRATE AND DOCUSATE SODIUM 2 TABLET: 8.6; 5 TABLET, FILM COATED ORAL at 20:39

## 2017-11-21 RX ADMIN — Medication 10 ML: at 05:13

## 2017-11-21 RX ADMIN — FAMOTIDINE 20 MG: 20 TABLET, FILM COATED ORAL at 08:49

## 2017-11-21 RX ADMIN — SERTRALINE HYDROCHLORIDE 150 MG: 100 TABLET ORAL at 08:51

## 2017-11-21 RX ADMIN — POTASSIUM CHLORIDE 10 MEQ: 10 TABLET, EXTENDED RELEASE ORAL at 08:51

## 2017-11-21 RX ADMIN — RIVASTIGMINE TARTRATE 1.5 MG: 1.5 CAPSULE ORAL at 08:50

## 2017-11-21 RX ADMIN — DOCUSATE SODIUM 100 MG: 100 CAPSULE, LIQUID FILLED ORAL at 08:50

## 2017-11-21 RX ADMIN — IPRATROPIUM BROMIDE AND ALBUTEROL SULFATE 3 ML: .5; 3 SOLUTION RESPIRATORY (INHALATION) at 08:07

## 2017-11-21 RX ADMIN — IPRATROPIUM BROMIDE AND ALBUTEROL SULFATE 3 ML: .5; 3 SOLUTION RESPIRATORY (INHALATION) at 23:13

## 2017-11-21 RX ADMIN — GUAIFENESIN 1200 MG: 600 TABLET, EXTENDED RELEASE ORAL at 20:39

## 2017-11-21 RX ADMIN — IPRATROPIUM BROMIDE AND ALBUTEROL SULFATE 3 ML: .5; 3 SOLUTION RESPIRATORY (INHALATION) at 15:13

## 2017-11-21 RX ADMIN — AMIODARONE HYDROCHLORIDE 200 MG: 200 TABLET ORAL at 08:51

## 2017-11-21 RX ADMIN — ATORVASTATIN CALCIUM 40 MG: 40 TABLET, FILM COATED ORAL at 20:38

## 2017-11-21 RX ADMIN — METOPROLOL TARTRATE 12.5 MG: 25 TABLET ORAL at 20:36

## 2017-11-21 RX ADMIN — LAMOTRIGINE 200 MG: 100 TABLET ORAL at 08:50

## 2017-11-21 RX ADMIN — GUAIFENESIN 1200 MG: 600 TABLET, EXTENDED RELEASE ORAL at 08:51

## 2017-11-21 RX ADMIN — RIVASTIGMINE TARTRATE 1.5 MG: 1.5 CAPSULE ORAL at 16:35

## 2017-11-21 NOTE — PROGRESS NOTES
Cardiac Rehab:  Spoke with patient regarding referral to cardiac rehab. Patient meets admission criteria based on CABGx4(date11/17/17). Discussed lifestyle modifications to promote cardiac wellness. Patient indicated that he wants to participate in cardiac rehab program.  Orientation scheduled for 12/12/17 @ 10:15am.  Cardiologist is Dr Tiera Burgess.

## 2017-11-21 NOTE — PROGRESS NOTES
Oxygen sat 91-92 % on room air while sitting, Ambulated around nursing unit with sat's increased to 94%.

## 2017-11-21 NOTE — PROGRESS NOTES
Problem: Mobility Impaired (Adult and Pediatric)  Goal: *Therapy Goal (Edit Goal, Insert Text)    STERNAL PRECAUTIONS   STG:  (1.)Mr. Matty Rocha will move from supine to sit and sit to supine , scoot up and down and roll side to side with MODIFIED INDEPENDENCE within 4 day(s). (2.)Mr. Matty Rocha will transfer from bed to chair and chair to bed with MODIFIED INDEPENDENCE using the least restrictive device within 4 day(s). (3.)Mr. Matty Rocha will ambulate with MODIFIED INDEPENDENCE for 300 feet with the least restrictive device within 4 day(s). LTG:  (1.)Mr. Matty Rocha will move from supine to sit and sit to supine , scoot up and down and roll side to side in bed with INDEPENDENT within 7 day(s). (2.)Mr. Matty Rocha will transfer from bed to chair and chair to bed with INDEPENDENT using the least restrictive device within 7 day(s). (3.)Mr. Matty Rocha will ambulate with INDEPENDENT for 600 feet with the least restrictive device within 7 day(s). ________________________________________________________________________________________________     PHYSICAL THERAPY: Daily Note, Treatment Day: 3rd, PM 11/21/2017  INPATIENT: Hospital Day: 5  [place] / Product Type: Medicare /      NAME/AGE/GENDER: Kina Kay is a 80 y.o. male   PRIMARY DIAGNOSIS: Atherosclerosis of native coronary artery of native heart with unstable angina pectoris ([facility] [address] [I25.110] Coronary artery disease involving native coronary artery of native heart with angina pectoris ([facility] [address] Coronary artery disease involving native coronary artery of native heart with angina pectoris (HCC)  Procedure(s) (LRB):  CORONARY ARTERY BYPASS GRAFT (CABG X 4)/ LIMA (N/A)  VEIN HARVEST/ GREATER SAPHANEOUS (Left)  4 Days Post-Op  ICD-10: Treatment Diagnosis:   · Difficulty in walking, Not elsewhere classified (R26.2)   Precaution/Allergies:  Review of patient's allergies indicates no known allergies.       ASSESSMENT:      Manuel Jennings presents sitting up in bedside chair. He is agreeable to treatment. Patient's O2 sats on room air seated is 92% and then during ambulation his O2 sats were 94%. He increased his ambulation distance this treatment with no assistive device. He ascended/descended 3 steps with verbal cues and 1 handrail with SBA. He was instructed in bed mobility and performed it with good technique and Supervision. Good progress noted with mobility. Gave patient a home exercise program consisting of seated and standing exercises. He verbalized understanding of exercises. Will continue PT efforts. PM NOTE: Patient present sitting up in bedside chair. He is agreeable to treatment. He increased his ambulation distance with handheld assistance. He is easily distracted during ambulation. He had several mini loss of balance episodes but was able to self-correct or with CGA. He returned to room and was left up in chair with all needs within reach. This section established at most recent assessment   PROBLEM LIST (Impairments causing functional limitations):  1. Decreased Strength  2. Decreased Transfer Abilities  3. Decreased Ambulation Ability/Technique  4. Decreased Balance  5. Decreased Activity Tolerance    INTERVENTIONS PLANNED: (Benefits and precautions of physical therapy have been discussed with the patient.)  1. Balance Exercise  2. Bed Mobility  3. Gait Training  4. Neuromuscular Re-education/Strengthening  5. Range of Motion (ROM)  6. Therapeutic Activites  7. Therapeutic Exercise/Strengthening  8. Group Therapy      TREATMENT PLAN: Frequency/Duration: Patient to be seen for PT 2 x's per day for the duration of current acute and cardiac rehab hospital episode.         Rehabilitation Potential For Stated Goals: GOOD        RECOMMENDED REHABILITATION/EQUIPMENT: (at time of discharge pending progress): Due to the probability of continued deficits (see above) this patient will likely need continued skilled physical therapy after discharge. Equipment:   [employer], Type: Selina Pham will need potentially upon D/C                   HISTORY:   History of Present Injury/Illness (Reason for Referral):PER MD H&P   81 y/o white male is seen at the request of Dr. Gabi Sung for respiratory management status post cardiac surgery. Patient had CABG x4. Currently is sedated in CV-ICU and orally intubated receiving  mechanical ventilation. Patient has been experiencing [place] class 3 symptoms and had elevated calcium score. [facility] revelaed multivessel CAD and surgical revascularization was recommended. We have been asked to see in the CV-ICU for mechanical ventilation management and weaning  Past Medical History/Comorbidities:   Mr. Everett Escobedo  has a past medical history of Apnea; Asymptomatic gallstones; CAD (coronary artery disease); Centrilobular emphysema CT Findings ([facility] [address] Childhood asthma; Chronic anemia; Chronic renal insufficiency, stage III (moderate); Cognitive impairment; Colon polyps; Dyslipidemia; Ex-smoker for more than 1 year; GERD (gastroesophageal reflux disease); History of penile implant; History of stomach ulcers (1959); Iron deficiency anemia; Mood disorder ([facility] [address] Sigmoid diverticulosis; and TIA (transient ischemic attack). Mr. Everett Escobedo  has a past surgical history that includes appendectomy (1938); heart catheterization; gi (1959); urological; orthopaedic (Right); and coronary artery bypass graft (11/17/2017). Social History/Living Environment:   Home Environment: Private residence  # Steps to Enter: 2  One/Two Story Residence: One story  Living Alone: No  Support Systems: Spouse/Significant Other/Partner  Patient Expects to be Discharged to[de-identified] Private residence  Current DME Used/Available at Home: None  Prior level of Function/Work/Activity:  Patient had been independent with all functional mobility. Dominant Side:         RIGHT  Personal Factors:          Sex:  male        Age:  80 y.o.    Number of Personal Factors/Comorbidities that affect the Plan of Care: 0: LOW COMPLEXITY   EXAMINATION:   Most Recent Physical Functioning:   Gross Assessment:                  Posture:  Posture (WDL): Exceptions to WDL  Posture Assessment: Forward head  Balance:  Sitting: Intact  Sitting - Static: Good (unsupported)  Sitting - Dynamic: Good (unsupported)  Standing: Impaired  Standing - Static: Good  Standing - Dynamic : Fair Bed Mobility:  Rolling: Supervision  Supine to Sit: Supervision  Sit to Supine: Supervision  Scooting: Supervision  Wheelchair Mobility:     Transfers:  Sit to Stand: Supervision  Stand to Sit: Supervision  Gait:     Base of Support: Center of gravity altered  Speed/Giuliana: Slow;Shuffled  Step Length: Left shortened;Right shortened  Gait Abnormalities: Decreased step clearance;Shuffling gait  Distance (ft): 500 Feet (ft)  Assistive Device:  (handheld assistance)  Ambulation - Level of Assistance: Stand-by asssistance  Interventions: Safety awareness training;Verbal cues      Body Structures Involved:  1. Bones  2. Muscles  3. Ligaments Body Functions Affected:  1. Neuromusculoskeletal  2. Movement Related  3. Skin Related  4. Metobolic/Endocrine Activities and Participation Affected:  1. General Tasks and Demands  2. Communication  3. Mobility  4. Interpersonal Interactions and Relationships  5. Community, Social and [facility] [place]   Number of elements that affect the Plan of Care: 1-2: LOW COMPLEXITY   CLINICAL PRESENTATION:   Presentation: Stable and uncomplicated: LOW COMPLEXITY   CLINICAL DECISION MAKIN [facility] Inpatient Short Form  How much difficulty does the patient currently have. .. Unable A Lot A Little None   1. Turning over in bed (including adjusting bedclothes, sheets and blankets)? [] 1   [] 2   [] 3   [x] 4   2.   Sitting down on and standing up from a chair with arms ( e.g., wheelchair, bedside commode, etc.)   [] 1   [] 2   [] 3   [x] 4 3. Moving from lying on back to sitting on the side of the bed? [] 1   [] 2   [] 3   [x] 4   How much help from another person does the patient currently need. .. Total A Lot A Little None   4. Moving to and from a bed to a chair (including a wheelchair)? [] 1   [] 2   [x] 3   [] 4   5. Need to walk in hospital room? [] 1   [] 2   [x] 3   [] 4   6. Climbing 3-5 steps with a railing? [] 1   [] 2   [x] 3   [] 4   © 2007, Trustees of [address] [place] [address] [address] [address], under license to [employer]. All rights reserved      Score:  Initial: 21 Most Recent: X (Date: -- )    Interpretation of Tool:  Represents activities that are increasingly more difficult (i.e. Bed mobility, Transfers, Gait). Score 24 23 22-20 19-15 14-10 9-7 6     Modifier CH CI CJ CK CL CM CN      ? Mobility - Walking and Moving Around:     - CURRENT STATUS: CJ - 20%-39% impaired, limited or restricted    - GOAL STATUS: CI - 1%-19% impaired, limited or restricted    - D/C STATUS:  ---------------To be determined---------------  [place] / Product Type: Medicare /      Medical Necessity:     · Patient demonstrates good rehab potential due to higher previous functional level. Reason for Services/Other Comments:  · Patient continues to require skilled intervention due to s/p Cardiac Surgery. .   Use of outcome tool(s) and clinical judgement create a POC that gives a: Clear prediction of patient's progress: LOW COMPLEXITY            TREATMENT:   (In addition to Assessment/Re-Assessment sessions the following treatments were rendered)   Pre-treatment Symptoms/Complaints: \"My wife's grocery store is the [employer] store \"   Pain: Initial: 0  Pain Intensity 1: 0  Post Session:  Voices no c/o     Therapeutic Activity: (    15 minutes): Therapeutic activities including transfers, and gait training to improve mobility, strength, balance and coordination.   Required contact guard assist  Safety awareness training;Verbal cues to promote static and dynamic balance in standing. Date:  11/20/17 AM Date:   Date:     Activity/Exercise Seated Parameters Parameters Parameters   Heel raises X 20 B     Toe raises X 20 B     LAQ's X 20 B     Hip Flex X 20 B     Hip ABD X 20 B                             Braces/Orthotics/Lines/Etc:   · None  Treatment/Session Assessment:    · Response to Treatment:  Improving overall.      o Interdisciplinary Collaboration:  o Physical Therapy Assistant  o Registered Nurse  · After treatment position/precautions:   o Up in chair  o Bed/Chair-wheels locked  o Call light within reach  o RN notified  o Family at bedside   · Compliance with Program/Exercises: Will assess as treatment progresses. · Recommendations/Intent for next treatment session: \"Next visit will focus on advancements to more challenging activities, reduction in assistance provided and cardiac rehabilitation. \".   Total Treatment Duration:  PT Patient Time In/Time Out  Time In: 1500  Time Out: [address] [facility]

## 2017-11-21 NOTE — PROGRESS NOTES
Bedside and Verbal shift change report given to Roney King RN (oncoming nurse) by Luciano Tomlinson RN (offgoing nurse)

## 2017-11-21 NOTE — PROGRESS NOTES
Verbal report received from LAYO Zaman(name) on Shira Wooten received from CVICU(unit) for routine progression of care        Report consisted of patients Situation, Background, Assessment and   Recommendations(SBAR).     Information from the following report(s) SBAR, Kardex, Recent Results and Cardiac Rhythm NSR was reviewed with the receiving nurse.      Opportunity for questions and clarification was provided.        Assessment completed upon patients arrival to unit and care assumed.       Dual skin assessment completed with RN. Allevyn pulled back to view sacrum and then replaced. Sacrum visualized and is benign. Patient has MS and LL incisions covered with dressings that are clean, dry, and intact. Chest tube present. Site is clean, dry, and intact. No redness or breakdown noted.

## 2017-11-21 NOTE — DISCHARGE SUMMARY
Physician Discharge Summary     Patient: Clinton Coleman MRN: [id number]  SSN: [id number]    YOB: 1934  Age: 80 y.o. Sex: male       Admit Date: 11/17/2017    Discharge Date: 11/22/2017      Admitting Physician: Stephanie Bradshaw MD     Discharge Physician: Ankit Salazar NP    Admission Diagnoses: Atherosclerosis of native coronary artery of native heart with unstable angina pectoris [facility]) [I25.110]    Discharge Diagnoses:   Problem List as of 11/22/2017  Date Reviewed: 11/21/2017          Codes Class Noted - Resolved    S/P CABG x 4 ICD-10-CM: Z95.1  ICD-9-CM: V45.81  11/17/2017 - Present        Nocturnal hypoxemia ICD-10-CM: G47.34  ICD-9-CM: 327.24  11/17/2017 - Present        CAD (coronary artery disease) (Chronic) ICD-10-CM: I25.10  ICD-9-CM: 414.00  11/17/2017 - Present        * (Principal)Coronary artery disease involving native coronary artery of native heart with angina pectoris ([facility] ICD-10-CM: I25.119  ICD-9-CM: 414.01, 413.9  11/10/2017 - Present    Overview Signed 11/18/2017  7:49 AM by Ankit Salazar NP     11/17/17 (Dr Nila Pelletier) Coronary artery bypass grafting x4 grafts   consisting of:   1. Left internal mammary artery to left anterior descending. 2. Reverse saphenous vein graft to diagonal 1.   3. Reverse saphenous vein graft to obtuse marginal.   4. Reverse saphenous vein graft to the posterior descending   artery. Chronic fatigue ICD-10-CM: R53.82  ICD-9-CM: 780.79  11/10/2017 - Present        Sigmoid diverticulosis (Chronic) ICD-10-CM: K57.30  ICD-9-CM: 562.10  Unknown - Present        Mood disorder ([facility] ICD-10-CM: F39  ICD-9-CM: 296.90  Unknown - Present        Dyslipidemia (Chronic) ICD-10-CM: E78.5  ICD-9-CM: 272.4  Unknown - Present        Chronic anemia ICD-10-CM: D64.9  ICD-9-CM: 772. 9  Unknown - Present        History of penile implant ICD-10-CM: Z96.89  ICD-9-CM: V45.89  Unknown - Present        Centrilobular emphysema CT Findings ([facility] [address] (Chronic) ICD-10-CM: J43.2  ICD-9-CM: 492.8  Unknown - Present        Asymptomatic gallstones ICD-10-CM: K80.20  ICD-9-CM: 574.20  Unknown - Present        Cognitive impairment ICD-10-CM: R41.89  ICD-9-CM: 294.9  Unknown - Present        Chronic renal insufficiency, stage III (moderate) (Chronic) ICD-10-CM: N18.3  ICD-9-CM: 091. 3  Unknown - Present        Colon polyps ICD-10-CM: K63.5  ICD-9-CM: 211.3  Unknown - Present        RESOLVED: Encounter for weaning from ventilator [facility]) ICD-10-CM: Z99.11  ICD-9-CM: V46.13  11/17/2017 - 11/18/2017        RESOLVED: Diverticulosis ICD-10-CM: K57.90  ICD-9-CM: 562.10  Unknown - 11/2/2017        RESOLVED: Sinus bradycardia ICD-10-CM: R00.1  ICD-9-CM: 427.89  8/24/2017 - 11/2/2017        RESOLVED: Hypertension ICD-10-CM: I10  ICD-9-CM: 401.9  8/24/2017 - 11/2/2017        RESOLVED: GI bleed ICD-10-CM: K92.2  ICD-9-CM: 578.9  8/24/2017 - 11/2/2017        RESOLVED: Acute blood loss anemia ICD-10-CM: D62  ICD-9-CM: 285.1  8/24/2017 - 11/2/2017        RESOLVED: Near syncope ICD-10-CM: R55  ICD-9-CM: 780.2  8/23/2017 - 11/2/2017               Procedures for this admission: Procedure(s):  CORONARY ARTERY BYPASS GRAFT (CABG X 4)/ LIMA  VEIN HARVEST/ GREATER SAPHANEOUS    Discharged Condition: stable    Hospital Course: The patient is a very pleasant 80-year-old   gentleman who is very active, who presented with [place] Class   III symptoms, underwent calcium scoring which was positive at   1779. Left heart cath confirmed severe multivessel disease with   preserved LV function. 11/17/17 Coronary artery bypass grafting x4 grafts   consisting of:   1. Left internal mammary artery to left anterior descending. 2. Reverse saphenous vein graft to diagonal 1.   3. Reverse saphenous vein graft to obtuse marginal.   4. Reverse saphenous vein graft to the posterior descending   Artery.     The patient tolerated the surgery well and transitioned to stepdown POD 1 with one pleural chest tube remaining for a noted small air leak. He then developed a small amount of subcutaneous emphysema near the left neck and upper chest.  Chest tube removed POD 2 and the subq air remained stable, no Ptx on CXR. He was slow to progress off oxygen, is now stable on RA during daytime, however he requires oxygen for sleep as SHEFALI study showed 38 minutes of desaturation into the 80's. 2L oxygen at night is recommended. [facility] has followed him throughout his hospital course and will continue to follow him as an outpatient for nocturnal hypoxia and Centrilobular emphysema. His recovery has otherwise been uneventful with him remaining in NSR, afebrile, and hemodynamically stable. He tolerated progression of Beta blocker, Statin and ASA. All incisions are healing. Pain controlled without narcotics at this point. His baseline dementia is stable and at baseline per family. He has done well participating in PT and IS. He is stable for discharge today. Consults: Cardiac Rehab and Pulmonary/Critical Care    Significant Diagnostic Studies: labs  HgbA1C: 5.4  microbiolog  radiology: CXR 11/21/17 Negative for pneumothorax. Small amounts of basilar atelectasis. Gas  in the left chest wall.  cardiac graphics: Telemetry and ECG    Treatments:   Cardiac Meds: metoprolol, furosemide, amiodarone and Toprol XL for discharge  Anticoagulation: ASA  Statins: atorvastatin  IV Hydration  Antibiotics  Analgesia  Insulin: Regular - Sliding Scale  Respiratory Therapy: O2 and albuterol/atropine nebulizer  Therapies: PT  Surgery: as above    Discharge Exam:Blood pressure 129/63, pulse 82, temperature 98.1 °F (36.7 °C), resp. rate 18, height 5' 11\" (1.803 m), weight 179 lb 9.6 oz (81.5 kg), SpO2 91 %.          Vitals:     11/22/17 0406 11/22/17 0718 11/22/17 0734 11/22/17 0841   BP:   129/63       Pulse:   69   82   Resp:   18       Temp:   98.1 °F (36.7 °C)       SpO2: 91% 96% 91%     Weight:           Height:            I/O:  11/22 0701 - 11/22 1900  In: 240 [P.O.:240]  Out: -   11/20 1901 - 11/22 0700  In: 1380 [P.O.:1380]  Out: 600 [Urine:600]  Last 3 Recorded Weights in this Encounter     11/20/17 0235 11/21/17 0316 11/22/17 0345   Weight: 190 lb 9.6 oz (86.5 kg) 180 lb (81.6 kg) 179 lb 9.6 oz (81.5 kg)         Intake/Output Summary (Last 24 hours) at 11/22/17 0900  Last data filed at 11/22/17 0845    Gross per 24 hour   Intake              960 ml   Output              600 ml   Net              360 ml         Heart:RRR, sub q air upper L chest  Lung: poor breath sounds bialterally  Neuro: some confusion at baseline  Incisions:dry/healing  Chest Tubes:out      Disposition: [facility]     Patient Instructions:   Current Discharge Medication List      START taking these medications    Details   acetaminophen (TYLENOL) 325 mg tablet May take for pain as directed on label. Qty: 1 Tab, Refills: 0      aspirin delayed-release 81 mg tablet Take 1 Tab by mouth daily. Qty: 30 Tab, Refills: 12      guaiFENesin ER (MUCINEX) 1,200 mg Ta12 ER tablet Over the counter Mucinex (Guaifenesin) take to keep secretions thin and easy to cough up  Qty: 30 Tab, Refills: 0      senna-docusate (PERICOLACE) 8.6-50 mg per tablet Over the counter Colace and Senakot - take for constipation as needed on label  Qty: 1 Tab, Refills: 0      traMADol (ULTRAM) 50 mg tablet Take 1 Tab by mouth every six (6) hours as needed. Max Daily Amount: 200 mg. Qty: 30 Tab, Refills: 0         CONTINUE these medications which have CHANGED    Details   atorvastatin (LIPITOR) 40 mg tablet Take 1 Tab by mouth nightly. Qty: 30 Tab, Refills: 5      metoprolol succinate (TOPROL-XL) 25 mg XL tablet Take 1 Tab by mouth daily. Qty: 30 Tab, Refills: 5         CONTINUE these medications which have NOT CHANGED    Details   ferrous fumarate (HEMOCYTE) 324 mg (106 mg iron) tab Take 1 Tab by mouth daily.   Qty: 90 Tab, Refills: 1      rivastigmine tartrate (EXELON) 1.5 mg capsule Take 1.5 mg by mouth two (2) times daily (with meals). fluticasone (FLONASE) 50 mcg/actuation nasal spray 2 Sprays by Both Nostrils route daily. dextran 70-hypromellose (ARTIFICIAL TEARS) ophthalmic solution Administer  to both eyes as needed. Blood Pressure Test Kit-Wrist (BLOOD PRESSURE UNIT) kit Check BP 2 times per day and write it down  Qty: 1 Kit, Refills: 0      cholecalciferol (VITAMIN D3) 1,000 unit cap Take 1,000 Units by mouth daily. raNITIdine hcl 150 mg capsule Take 150 mg by mouth two (2) times a day. sertraline (ZOLOFT) 100 mg tablet Take 150 mg by mouth daily. lamoTRIgine (LAMICTAL) 200 mg tablet Take 200 mg by mouth daily. STOP taking these medications       chlorhexidine (HIBICLENS) 4 % liquid Comments:   Reason for Stopping:         mupirocin (BACTROBAN) 2 % ointment Comments:   Reason for Stopping:         aspirin (ASPIRIN) 325 mg tablet Comments:   Reason for Stopping:         amiodarone (CORDARONE) 200 mg tablet Comments:   Reason for Stopping:               Reference discharge instructions as provided by nursing for diet, labs, medications, activity, wound care and any outpatient referrals. Follow-up Appointments   Procedures    FOLLOW UP VISIT Appointment in: [facility] [facility]     Standing Status:   Standing     Number of Occurrences:   1     Order Specific Question:   Appointment in     Answer: One Week    FOLLOW UP VISIT Appointment in: Other (Specify) Dr Marian Curiel     Standing Status:   Standing     Number of Occurrences:   1     Standing Expiration Date:   11/22/2017     Order Specific Question:   Appointment in     Answer:    Other (Specify)        Signed:  Prisca Mixon NP  11/22/2017  9:55 AM

## 2017-11-21 NOTE — PROGRESS NOTES
Petra Burger  Admission Date: 11/17/2017             Daily Progress Note: 11/21/2017    The patient's chart is reviewed and the patient is discussed with the staff. 81 yo male with a history of chronic anemia, diverticulosis, emphysema, CRF, HL, and CAD. Patient experienced [place] class 3 symptoms and had elevated calcium score. LHC revelaed multivessel CAD and surgical revascularization was recommended. Patient is now s/p CABG x 4 on 11/17/17 per Dr. Chasity Patel.   Post-op course complicated by confusion/agitation    Subjective:   On 1L only  No complains      Current Facility-Administered Medications   Medication Dose Route Frequency    senna-docusate (PERICOLACE) 8.6-50 mg per tablet 2 Tab  2 Tab Oral QHS    polyethylene glycol (MIRALAX) packet 17 g  17 g Oral DAILY    docusate sodium (COLACE) capsule 100 mg  100 mg Oral DAILY    diphenhydrAMINE (BENADRYL) 12.5 mg/5 mL oral elixir 12.5 mg  12.5 mg Oral QHS PRN    famotidine (PEPCID) tablet 20 mg  20 mg Oral DAILY    guaiFENesin ER (MUCINEX) tablet 1,200 mg  1,200 mg Oral Q12H    ferrous sulfate tablet 325 mg  325 mg Oral DAILY    fluticasone (FLONASE) 50 mcg/actuation nasal spray 2 Spray  2 Spray Both Nostrils DAILY    lamoTRIgine (LaMICtal) tablet 200 mg  200 mg Oral DAILY    rivastigmine tartrate (EXELON) capsule 1.5 mg  1.5 mg Oral BID WITH MEALS    sertraline (ZOLOFT) tablet 150 mg  150 mg Oral DAILY    sodium chloride (NS) flush 5-10 mL  5-10 mL IntraVENous Q8H    sodium chloride (NS) flush 5-10 mL  5-10 mL IntraVENous PRN    furosemide (LASIX) tablet 40 mg  40 mg Oral DAILY    magnesium hydroxide (MILK OF MAGNESIA) 400 mg/5 mL oral suspension 30 mL  30 mL Oral DAILY PRN    alum-mag hydroxide-simeth (MYLANTA) oral suspension 30 mL  30 mL Oral Q4H PRN    ondansetron (ZOFRAN) injection 4 mg  4 mg IntraVENous Q6H PRN    acetaminophen (TYLENOL) tablet 650 mg  650 mg Oral Q4H PRN    aspirin delayed-release tablet 81 mg  81 mg Oral DAILY    magnesium oxide (MAG-OX) tablet 400 mg  400 mg Oral TID PRN    magnesium oxide (MAG-OX) tablet 400 mg  400 mg Oral QID PRN    potassium chloride (KLOR-CON) tablet 10 mEq  10 mEq Oral DAILY    potassium chloride (K-DUR, KLOR-CON) SR tablet 20 mEq  20 mEq Oral BID PRN    potassium chloride (K-DUR, KLOR-CON) SR tablet 40 mEq  40 mEq Oral BID PRN    oxyCODONE-acetaminophen (PERCOCET) 5-325 mg per tablet 1 Tab  1 Tab Oral Q4H PRN    traMADol (ULTRAM) tablet 50 mg  50 mg Oral Q6H PRN    amiodarone (CORDARONE) tablet 200 mg  200 mg Oral Q12H    atorvastatin (LIPITOR) tablet 40 mg  40 mg Oral QHS    metoprolol tartrate (LOPRESSOR) tablet 12.5 mg  12.5 mg Oral Q12H    albuterol-ipratropium (DUO-NEB) 2.5 MG-0.5 MG/3 ML  3 mL Nebulization Q4H RT       Review of Systems  Constitutional: negative for fever, chills, sweats  Cardiovascular: negative for chest pain, palpitations, syncope, edema  Gastrointestinal:  negative for dysphagia, reflux, vomiting, diarrhea, abdominal pain, or melena  Neurologic:  negative for focal weakness, numbness, headache    Objective:     Vitals:    11/20/17 1940 11/20/17 2252 11/21/17 0316 11/21/17 0719   BP:  114/59 94/54 134/63   Pulse:  62 67 65   Resp:  18 18 18   Temp:  98.3 °F (36.8 °C) 97 °F (36.1 °C) 98 °F (36.7 °C)   SpO2: 92% 93% 91% 92%   Weight:   180 lb (81.6 kg)    Height:         Intake and Output:   11/19 1901 - 11/21 0700  In: 960 [P.O.:960]  Out: 1335 [Urine:1150]       Physical Exam:   Constitution:  the patient is well developed and in no acute distress  EENMT:  Sclera clear, pupils equal, oral mucosa moist  Respiratory: clear to auscultation bilateral  Cardiovascular:  RRR without M,G,R  Gastrointestinal: soft and non-tender; with positive bowel sounds. Musculoskeletal: warm without cyanosis. There is no lower leg edema.   Skin:  no jaundice or rashes, midline sternal incision without redness, swelling, or drainge   Neurologic: no gross neuro deficits     Psychiatric:  alert and oriented x 3    CXR:     Examination: Chest, portable AP view     History: Atelectasis, chest tube       Comparison: 11/19/2017     Findings:    Cardiac silhouette is unchanged in size. Median sternotomy wires again noted. There is persistent bibasilar atelectasis, not significantly changed. Left-sided  chest tube overlies the left midlung. No residual pneumothorax identified. Subcutaneous emphysema noted in the left chest wall.       Impression:    No significant interval change in bibasilar atelectasis. Left-sided chest tube  remains in place. No pneumothorax identified. LAB  Recent Labs      11/20/17   0623  11/19/17   2049  11/19/17   1546  11/19/17   1110  11/19/17   0533   GLUCPOC  103*  143*  149*  157*  156*      Recent Labs      11/19/17   0345   WBC  13.6*   HGB  9.7*   HCT  29.9*   PLT  152     Recent Labs      11/21/17   0352  11/20/17   0522  11/19/17   0345   NA   --    --   141   K  4.2  3.9  4.2   CL   --    --   106   CO2   --    --   26   GLU   --    --   146*   BUN   --    --   31*   CREA   --    --   1.37   MG  1.9  2.1  2.0   CA   --    --   8.5     Recent Labs      11/18/17 2025   PH  7.33*   PCO2  49*   PO2  74*   HCO3  25       Assessment:  (Medical Decision Making)     Hospital Problems  Date Reviewed: 11/19/2017          Codes Class Noted POA    S/P CABG x 4 ICD-10-CM: Z95.1  ICD-9-CM: V45.81  11/17/2017 No        Hypoxia ICD-10-CM: R09.02  ICD-9-CM: 799.02  11/17/2017 No       On 40% O2      * (Principal)Coronary artery disease involving native coronary artery of native heart with angina pectoris ([facility] [address] ICD-10-CM: I25.119  ICD-9-CM: 414.01, 413.9  11/10/2017 Yes     11/17/17 (Dr Feliciano Bartholomew) Coronary artery bypass grafting x4 grafts   consisting of:   1. Left internal mammary artery to left anterior descending.    2. Reverse saphenous vein graft to diagonal 1.   3. Reverse saphenous vein graft to obtuse marginal.   4. Reverse saphenous vein graft to the posterior descending   artery. Chronic anemia ICD-10-CM: D64.9  ICD-9-CM: 399. 9  Unknown Yes    Hgb 9.7      Cognitive impairment ICD-10-CM: R41.89  ICD-9-CM: 294.9  Unknown Yes             Plan:  (Medical Decision Making)   - wean 02- only on 1L  - IS, pulmonary toilet       More than 50% of the time documented was spent in face-to-face contact with the patient and in the care of the patient on the floor/unit where the patient is located.     Lucy Hebert MD

## 2017-11-21 NOTE — PROGRESS NOTES
Oxygen Qualifier       Room air: SpO2 with O2 and liter flow   Resting SpO2  96%    Ambulating SpO2 94%          Completed by:    Ez Lincoln

## 2017-11-21 NOTE — PROGRESS NOTES
Patient drifted to sleep sitting up, sat dropped 85% on room air. Encouraged deep breathing and sats increased to 93% on room air.

## 2017-11-21 NOTE — PROGRESS NOTES
Problem: Mobility Impaired (Adult and Pediatric)  Goal: *Therapy Goal (Edit Goal, Insert Text)    STERNAL PRECAUTIONS   STG:  (1.)Mr. Sue Galarza will move from supine to sit and sit to supine , scoot up and down and roll side to side with MODIFIED INDEPENDENCE within 4 day(s). (2.)Mr. Sue Galarza will transfer from bed to chair and chair to bed with MODIFIED INDEPENDENCE using the least restrictive device within 4 day(s). (3.)Mr. Sue Galarza will ambulate with MODIFIED INDEPENDENCE for 300 feet with the least restrictive device within 4 day(s). LTG:  (1.)Mr. Sue Galarza will move from supine to sit and sit to supine , scoot up and down and roll side to side in bed with INDEPENDENT within 7 day(s). (2.)Mr. Sue Galarza will transfer from bed to chair and chair to bed with INDEPENDENT using the least restrictive device within 7 day(s). (3.)Mr. Sue Galarza will ambulate with INDEPENDENT for 600 feet with the least restrictive device within 7 day(s). ________________________________________________________________________________________________     PHYSICAL THERAPY: Daily Note, Treatment Day: 3rd, AM 11/21/2017  INPATIENT: Hospital Day: 5  [place] / [place] / Product Type: Medicare /      NAME/AGE/GENDER: Juan Conrad is a 80 y.o. male   PRIMARY DIAGNOSIS: Atherosclerosis of native coronary artery of native heart with unstable angina pectoris ([facility] [I25.110] Coronary artery disease involving native coronary artery of native heart with angina pectoris ([facility] [address] Coronary artery disease involving native coronary artery of native heart with angina pectoris (HCC)  Procedure(s) (LRB):  CORONARY ARTERY BYPASS GRAFT (CABG X 4)/ LIMA (N/A)  VEIN HARVEST/ GREATER SAPHANEOUS (Left)  4 Days Post-Op  ICD-10: Treatment Diagnosis:   · Difficulty in walking, Not elsewhere classified (R26.2)   Precaution/Allergies:  Review of patient's allergies indicates no known allergies.       ASSESSMENT:      [facility] presents sitting up in bedside chair. He is agreeable to treatment. Patient's O2 sats on room air seated is 92% and then during ambulation his O2 sats were 94%. He increased his ambulation distance this treatment with no assistive device. He ascended/descended 3 steps with verbal cues and 1 handrail with SBA. He was instructed in bed mobility and performed it with good technique and Supervision. Good progress noted with mobility. Gave patient a home exercise program consisting of seated and standing exercises. He verbalized understanding of exercises. Will continue PT efforts. This section established at most recent assessment   PROBLEM LIST (Impairments causing functional limitations):  1. Decreased Strength  2. Decreased Transfer Abilities  3. Decreased Ambulation Ability/Technique  4. Decreased Balance  5. Decreased Activity Tolerance    INTERVENTIONS PLANNED: (Benefits and precautions of physical therapy have been discussed with the patient.)  1. Balance Exercise  2. Bed Mobility  3. Gait Training  4. Neuromuscular Re-education/Strengthening  5. Range of Motion (ROM)  6. Therapeutic Activites  7. Therapeutic Exercise/Strengthening  8. Group Therapy      TREATMENT PLAN: Frequency/Duration: Patient to be seen for PT 2 x's per day for the duration of current acute and cardiac rehab hospital episode. Rehabilitation Potential For Stated Goals: GOOD        RECOMMENDED REHABILITATION/EQUIPMENT: (at time of discharge pending progress): Due to the probability of continued deficits (see above) this patient will likely need continued skilled physical therapy after discharge. Equipment:   Rosalea Peaches, Type: [employer] will need potentially upon D/C                   HISTORY:   History of Present Injury/Illness (Reason for Referral):PER MD H&P   81 y/o white male is seen at the request of Dr. Lisa Clemente for respiratory management status post cardiac surgery. Patient had CABG x4.   Currently is sedated in CV-ICU and orally intubated receiving  mechanical ventilation. Patient has been experiencing [place] class 3 symptoms and had elevated calcium score. [facility] revelaed multivessel CAD and surgical revascularization was recommended. We have been asked to see in the CV-ICU for mechanical ventilation management and weaning  Past Medical History/Comorbidities:   Mr. Jose Barrios  has a past medical history of Apnea; Asymptomatic gallstones; CAD (coronary artery disease); Centrilobular emphysema CT Findings ([facility] [address] Childhood asthma; Chronic anemia; Chronic renal insufficiency, stage III (moderate); Cognitive impairment; Colon polyps; Dyslipidemia; Ex-smoker for more than 1 year; GERD (gastroesophageal reflux disease); History of penile implant; History of stomach ulcers (1959); Iron deficiency anemia; Mood disorder ([address] Sigmoid diverticulosis; and TIA (transient ischemic attack). Mr. Jose Barrios  has a past surgical history that includes appendectomy (1938); heart catheterization; gi (1959); urological; orthopaedic (Right); and coronary artery bypass graft (11/17/2017). Social History/Living Environment:   Home Environment: Private residence  # Steps to Enter: 2  One/Two Story Residence: One story  Living Alone: No  Support Systems: Spouse/Significant Other/Partner  Patient Expects to be Discharged to[de-identified] Private residence  Current DME Used/Available at Home: None  Prior level of Function/Work/Activity:  Patient had been independent with all functional mobility. Dominant Side:         RIGHT  Personal Factors:          Sex:  male        Age:  80 y.o. Number of Personal Factors/Comorbidities that affect the Plan of Care: 0: LOW COMPLEXITY   EXAMINATION:   Most Recent Physical Functioning:   Gross Assessment:                  Posture:  Posture (WDL): Exceptions to WDL  Posture Assessment:  Forward head  Balance:  Sitting: Intact  Sitting - Static: Good (unsupported)  Sitting - Dynamic: Good (unsupported)  Standing: Impaired  Standing - Static: Good  Standing - Dynamic : Fair Bed Mobility:  Rolling: Supervision  Supine to Sit: Supervision  Sit to Supine: Supervision  Scooting: Supervision  Wheelchair Mobility:     Transfers:  Sit to Stand: Supervision  Stand to Sit: Supervision  Gait:     Base of Support: Center of gravity altered  Speed/Giuliana: Slow  Step Length: Left shortened;Right shortened  Distance (ft): 450 Feet (ft)  Assistive Device:  (none)  Ambulation - Level of Assistance: Stand-by asssistance  Interventions: Safety awareness training;Verbal cues      Body Structures Involved:  1. Bones  2. Muscles  3. Ligaments Body Functions Affected:  1. Neuromusculoskeletal  2. Movement Related  3. Skin Related  4. Metobolic/Endocrine Activities and Participation Affected:  1. General Tasks and Demands  2. Communication  3. Mobility  4. Interpersonal Interactions and Relationships  5. Community, Social and [place]   Number of elements that affect the Plan of Care: 1-2: LOW COMPLEXITY   CLINICAL PRESENTATION:   Presentation: Stable and uncomplicated: LOW COMPLEXITY   CLINICAL DECISION MAKIN [facility] Inpatient Short Form  How much difficulty does the patient currently have. .. Unable A Lot A Little None   1. Turning over in bed (including adjusting bedclothes, sheets and blankets)? [] 1   [] 2   [] 3   [x] 4   2. Sitting down on and standing up from a chair with arms ( e.g., wheelchair, bedside commode, etc.)   [] 1   [] 2   [] 3   [x] 4   3. Moving from lying on back to sitting on the side of the bed? [] 1   [] 2   [] 3   [x] 4   How much help from another person does the patient currently need. .. Total A Lot A Little None   4. Moving to and from a bed to a chair (including a wheelchair)? [] 1   [] 2   [x] 3   [] 4   5. Need to walk in hospital room? [] 1   [] 2   [x] 3   [] 4   6. Climbing 3-5 steps with a railing?    [] 1   [] 2   [x] 3   [] 4   © 2007, Trustees of [address] [place] [address] [address] [address], under license to [employer]. All rights reserved      Score:  Initial: 21 Most Recent: X (Date: -- )    Interpretation of Tool:  Represents activities that are increasingly more difficult (i.e. Bed mobility, Transfers, Gait). Score 24 23 22-20 19-15 14-10 9-7 6     Modifier CH CI CJ CK CL CM CN      ? Mobility - Walking and Moving Around:     - CURRENT STATUS: CJ - 20%-39% impaired, limited or restricted    - GOAL STATUS: CI - 1%-19% impaired, limited or restricted    - D/C STATUS:  ---------------To be determined---------------  [place] / [place] / Product Type: Medicare /      Medical Necessity:     · Patient demonstrates good rehab potential due to higher previous functional level. Reason for Services/Other Comments:  · Patient continues to require skilled intervention due to s/p Cardiac Surgery. .   Use of outcome tool(s) and clinical judgement create a POC that gives a: Clear prediction of patient's progress: LOW COMPLEXITY            TREATMENT:   (In addition to Assessment/Re-Assessment sessions the following treatments were rendered)   Pre-treatment Symptoms/Complaints:  \"I can hold on to the wall. \"   Pain: Initial: 0  Pain Intensity 1: 0  Post Session:  Voices no c/o     Therapeutic Activity: (    25 minutes): Therapeutic activities including transfers, stair negotiation, and gait training to improve mobility, strength, balance and coordination. Required contact guard assist  Safety awareness training;Verbal cues to promote static and dynamic balance in standing.              Date:  11/20/17 AM Date:   Date:     Activity/Exercise Seated Parameters Parameters Parameters   Heel raises X 20 B     Toe raises X 20 B     LAQ's X 20 B     Hip Flex X 20 B     Hip ABD X 20 B                             Braces/Orthotics/Lines/Etc:   · None  Treatment/Session Assessment:    · Response to Treatment:  Improving overall.      o Interdisciplinary Collaboration:  o Physical Therapy Assistant  o Registered Nurse  · After treatment position/precautions:   o Up in chair  o Bed/Chair-wheels locked  o Call light within reach  o RN notified  o Family at bedside   · Compliance with Program/Exercises: Will assess as treatment progresses. · Recommendations/Intent for next treatment session: \"Next visit will focus on advancements to more challenging activities, reduction in assistance provided and cardiac rehabilitation. \".   Total Treatment Duration:  PT Patient Time In/Time Out  Time In: 1050  Time Out: [address] [facility]

## 2017-11-21 NOTE — PROGRESS NOTES
Per Kathleen Cavazos RN, pt needs home O2. SW called Umair Branch RRT and requested a note with respiratory smart phrase so we can get it ordered.

## 2017-11-21 NOTE — PROGRESS NOTES
Bedside and Verbal shift change report given to LAYO Thomson (oncoming nurse) by Alaina Thompson RN (offgoing nurse).

## 2017-11-21 NOTE — PROGRESS NOTES
[address] [facility]  Face to Face Encounter    Patients Name: Landon Osorio    YOB: 1934    Ordering Physician: Shin Manrique    Primary Diagnosis: Atherosclerosis of native coronary artery of native heart with unstable angina pectoris [facility]) [I25.110]    Date of Face to Face:   11/21/2017                                  Face to Face Encounter findings are related to primary reason for home care:   yes. 1. I certify that the patient needs intermittent care as follows: skilled nursing care:  skilled observation/assessment, patient education and rehabilitative nursing  physical therapy: strengthening, transfer training, pre-prosthetic/prosthetic education and balance training    2. I certify that this patient is homebound, that is: 1) patient requires the use of a None. device, special transportation, or assistance of another to leave the home; or 2) patient's condition makes leaving the home medically contraindicated; and 3) patient has a normal inability to leave the home and leaving the home requires considerable and taxing effort. Patient may leave the home for infrequent and short duration for medical reasons, and occasional absences for non-medical reasons. Homebound status is due to the following functional limitations: Patient currently under activity restrictions secondary to recent surgical procedure, this hinders their ability to safely leave the home. Patient with strength deficits limiting the performance of all ADL's without caregiver assistance or the use of an assistive device. 3. I certify that this patient is under my care and that I, or a nurse practitioner or [phone], or clinical nurse specialist, or certified nurse midwife, working with me, had a Face-to-Face Encounter that meets the physician Face-to-Face Encounter requirements.   The following are the clinical findings from the [address] [place] [address] encounter that support the need for skilled services and is a summary of the encounter:     See discharge summary      Connie Ovalle LMSW  11/21/2017      THE FOLLOWING TO BE COMPLETED BY THE COMMUNITY PHYSICIAN:    I concur with the findings described above from the F2F encounter that this patient is homebound and in need of a skilled service.     Certifying Physician: _____________________________________      Printed Certifying Physician Name: _____________________________________    Date: _________________

## 2017-11-21 NOTE — PROGRESS NOTES
Problem: Falls - Risk of  Goal: *Absence of Falls  Document Girish Fall Risk and appropriate interventions in the flowsheet.    Outcome: Progressing Towards Goal  Fall Risk Interventions:  Mobility Interventions: Bed/chair exit alarm, Communicate number of staff needed for ambulation/transfer, Patient to call before getting OOB    Mentation Interventions: Bed/chair exit alarm, Door open when patient unattended    Medication Interventions: Patient to call before getting OOB, Bed/chair exit alarm, Teach patient to arise slowly    Elimination Interventions: Call light in reach, Bed/chair exit alarm, Elevated toilet seat, Patient to call for help with toileting needs, Toilet paper/wipes in reach, Toileting schedule/hourly rounds, Urinal in reach

## 2017-11-21 NOTE — PROGRESS NOTES
CV Progress Note    Admit Date: 11/17/2017    Postop: day #4  from CABG x4    Subjective:   Mentation is clear  On 1L NC  Would like to go home    Patient present conditions:   Stable sub q emphysema left chest, neck area resolved    Review of Systems   Cardiac: NSR  Respiratory: shortness of breath - weaned down to 1L NC  Neuro: confusion improved, per family at baseline  Incision: dry healing  GI: po diet  Chest Tubes: out    Objective:     Vitals:  Blood pressure 134/63, pulse 65, temperature 98 °F (36.7 °C), resp. rate 18, height 5' 11\" (1.803 m), weight 180 lb (81.6 kg), SpO2 96 %.   Vitals:    11/20/17 2252 11/21/17 0316 11/21/17 0719 11/21/17 0810   BP: 114/59 94/54 134/63    Pulse: 62 67 65    Resp: 18 18 18    Temp: 98.3 °F (36.8 °C) 97 °F (36.1 °C) 98 °F (36.7 °C)    SpO2: 93% 91% 92% 96%   Weight:  180 lb (81.6 kg)     Height:            I/O:     11/19 1901 - 11/21 0700  In: 960 [P.O.:960]  Out: 1335 [Urine:1150]  Last 3 Recorded Weights in this Encounter    11/19/17 0352 11/20/17 0235 11/21/17 0316   Weight: 188 lb (85.3 kg) 190 lb 9.6 oz (86.5 kg) 180 lb (81.6 kg)       Intake/Output Summary (Last 24 hours) at 11/21/17 0837  Last data filed at 11/21/17 0316   Gross per 24 hour   Intake              840 ml   Output              625 ml   Net              215 ml           Heart:RRR, sub q air upper L chest  Lung: poor breath sounds bialterally  Neuro: some confusion at baseline  Incisions:dry/healing  Chest Tubes:out    ECG/Telemetry: NSR    Lab/Data Review:  Recent Results (from the past 12 hour(s))   MAGNESIUM    Collection Time: 11/21/17  3:52 AM   Result Value Ref Range    Magnesium 1.9 1.8 - 2.4 mg/dL   POTASSIUM    Collection Time: 11/21/17  3:52 AM   Result Value Ref Range    Potassium 4.2 3.5 - 5.1 mmol/L     BMP:   Lab Results   Component Value Date/Time    K 4.2 11/21/2017 03:52 AM     CMP:   Lab Results   Component Value Date/Time    K 4.2 11/21/2017 03:52 AM    MG 1.9 11/21/2017 03:52 AM CBC: No results found for: WBC, HGB, HGBEXT, HCT, HCTEXT, PLT, PLTEXT, HGBEXT, HCTEXT, PLTEXT, HGBEXT, HCTEXT, PLTEXT  All Cardiac Markers in the last 24 hours:   No results found for: CPK, CK, CKMMB, CKMB, RCK3, CKMBT, CKNDX, CKND1, CIARA, TROPT, TROIQ, GRECIA, TROPT, TNIPOC, BNP, BNPP  Recent Glucose Results: No results found for: GLU  ABG: No results found for: PH, PHI, PCO2, PCO2I, PO2, PO2I, HCO3, HCO3I, FIO2, FIO2I  COAGS: No results found for: APTT, PTP, INR  Liver Panel: No results found for: ALB, CBIL, TBIL, TP, GLOB, AGRAT, SGOT, ASTPOC, ALTPOC, ALT, GPT, AP     Radiology: CXR no PTX    Assessment:   1. S/p CABG  2. Baseline confusion  3. Sub q emphysema stable, resolving      Plan/Recommendations/Medical Decision Makin. Ambulate, sitting and standing SpO2- for possible home oxygen  2. Monitor meds avoid narcotics  3. Ambulation & incentive spirometry  Home today with oxygen    ADDENDUM:  Requires nocturnal oxygen, will stay tonight for SHEFALI with dc planned for tomorrow. Pt and wife aware.        See orders    Catherine Martínez NP  2017

## 2017-11-22 VITALS
WEIGHT: 179.6 LBS | HEIGHT: 71 IN | SYSTOLIC BLOOD PRESSURE: 129 MMHG | DIASTOLIC BLOOD PRESSURE: 63 MMHG | OXYGEN SATURATION: 91 % | BODY MASS INDEX: 25.15 KG/M2 | RESPIRATION RATE: 18 BRPM | HEART RATE: 82 BPM | TEMPERATURE: 98.1 F

## 2017-11-22 PROBLEM — G47.34 NOCTURNAL HYPOXEMIA: Status: ACTIVE | Noted: 2017-11-17

## 2017-11-22 PROCEDURE — 99232 SBSQ HOSP IP/OBS MODERATE 35: CPT | Performed by: INTERNAL MEDICINE

## 2017-11-22 PROCEDURE — 74011250637 HC RX REV CODE- 250/637: Performed by: NURSE PRACTITIONER

## 2017-11-22 PROCEDURE — 94760 N-INVAS EAR/PLS OXIMETRY 1: CPT

## 2017-11-22 PROCEDURE — 74011250637 HC RX REV CODE- 250/637: Performed by: INTERNAL MEDICINE

## 2017-11-22 PROCEDURE — 97530 THERAPEUTIC ACTIVITIES: CPT

## 2017-11-22 PROCEDURE — 94762 N-INVAS EAR/PLS OXIMTRY CONT: CPT

## 2017-11-22 PROCEDURE — 94640 AIRWAY INHALATION TREATMENT: CPT

## 2017-11-22 PROCEDURE — 97110 THERAPEUTIC EXERCISES: CPT

## 2017-11-22 PROCEDURE — 77030012891

## 2017-11-22 PROCEDURE — 77010033678 HC OXYGEN DAILY

## 2017-11-22 PROCEDURE — 74011000250 HC RX REV CODE- 250: Performed by: INTERNAL MEDICINE

## 2017-11-22 PROCEDURE — 74011250637 HC RX REV CODE- 250/637: Performed by: THORACIC SURGERY (CARDIOTHORACIC VASCULAR SURGERY)

## 2017-11-22 RX ADMIN — METOPROLOL TARTRATE 12.5 MG: 25 TABLET ORAL at 08:40

## 2017-11-22 RX ADMIN — DOCUSATE SODIUM 100 MG: 100 CAPSULE, LIQUID FILLED ORAL at 08:40

## 2017-11-22 RX ADMIN — AMIODARONE HYDROCHLORIDE 200 MG: 200 TABLET ORAL at 08:41

## 2017-11-22 RX ADMIN — SERTRALINE HYDROCHLORIDE 150 MG: 100 TABLET ORAL at 08:39

## 2017-11-22 RX ADMIN — IPRATROPIUM BROMIDE AND ALBUTEROL SULFATE 3 ML: .5; 3 SOLUTION RESPIRATORY (INHALATION) at 04:04

## 2017-11-22 RX ADMIN — POLYETHYLENE GLYCOL 3350 17 G: 17 POWDER, FOR SOLUTION ORAL at 08:29

## 2017-11-22 RX ADMIN — RIVASTIGMINE TARTRATE 1.5 MG: 1.5 CAPSULE ORAL at 08:40

## 2017-11-22 RX ADMIN — FERROUS SULFATE TAB 325 MG (65 MG ELEMENTAL FE) 325 MG: 325 (65 FE) TAB at 08:40

## 2017-11-22 RX ADMIN — IPRATROPIUM BROMIDE AND ALBUTEROL SULFATE 3 ML: .5; 3 SOLUTION RESPIRATORY (INHALATION) at 07:33

## 2017-11-22 RX ADMIN — FAMOTIDINE 20 MG: 20 TABLET, FILM COATED ORAL at 08:40

## 2017-11-22 RX ADMIN — GUAIFENESIN 1200 MG: 600 TABLET, EXTENDED RELEASE ORAL at 08:40

## 2017-11-22 RX ADMIN — ASPIRIN 81 MG: 81 TABLET, COATED ORAL at 08:39

## 2017-11-22 RX ADMIN — LAMOTRIGINE 200 MG: 100 TABLET ORAL at 08:39

## 2017-11-22 NOTE — PROGRESS NOTES
Per Ashlie Hoskins and Dr. Gayatri Alcantara, pt is ready for d/c and needs home O2 per sleep study with 2L nasal canula. Order sent to [facility] with sleep study attached. Requested the items be delivered to his home since he only needs it at night. Verified home address with pt for delivery.

## 2017-11-22 NOTE — PROGRESS NOTES
Cayetano Gosselin at [facility] called back and requested the Oxygen order to show nocturnal rather than continuous. Order updated and faxed to her.

## 2017-11-22 NOTE — PROGRESS NOTES
Nickolas Tello  Admission Date: 11/17/2017             Daily Progress Note: 11/22/2017    The patient's chart is reviewed and the patient is discussed with the staff. 79 yo male with a history of chronic anemia, diverticulosis, emphysema, CRF, HL, and CAD. Patient experienced [place] class 3 symptoms and had elevated calcium score. LHC revelaed multivessel CAD and surgical revascularization was recommended. Patient is now s/p CABG x 4 on 11/17/17 per Dr. Eduin Ferguson.   Post-op course complicated by confusion/agitation    Subjective:     SHEFALI was reviewed and had 38 min desaturation less than 88%  Less sob  Wants to go home      Current Facility-Administered Medications   Medication Dose Route Frequency    senna-docusate (PERICOLACE) 8.6-50 mg per tablet 2 Tab  2 Tab Oral QHS    polyethylene glycol (MIRALAX) packet 17 g  17 g Oral DAILY    docusate sodium (COLACE) capsule 100 mg  100 mg Oral DAILY    diphenhydrAMINE (BENADRYL) 12.5 mg/5 mL oral elixir 12.5 mg  12.5 mg Oral QHS PRN    famotidine (PEPCID) tablet 20 mg  20 mg Oral DAILY    guaiFENesin ER (MUCINEX) tablet 1,200 mg  1,200 mg Oral Q12H    ferrous sulfate tablet 325 mg  325 mg Oral DAILY    fluticasone (FLONASE) 50 mcg/actuation nasal spray 2 Spray  2 Spray Both Nostrils DAILY    lamoTRIgine (LaMICtal) tablet 200 mg  200 mg Oral DAILY    rivastigmine tartrate (EXELON) capsule 1.5 mg  1.5 mg Oral BID WITH MEALS    sertraline (ZOLOFT) tablet 150 mg  150 mg Oral DAILY    sodium chloride (NS) flush 5-10 mL  5-10 mL IntraVENous PRN    magnesium hydroxide (MILK OF MAGNESIA) 400 mg/5 mL oral suspension 30 mL  30 mL Oral DAILY PRN    alum-mag hydroxide-simeth (MYLANTA) oral suspension 30 mL  30 mL Oral Q4H PRN    ondansetron (ZOFRAN) injection 4 mg  4 mg IntraVENous Q6H PRN    acetaminophen (TYLENOL) tablet 650 mg  650 mg Oral Q4H PRN    aspirin delayed-release tablet 81 mg  81 mg Oral DAILY    magnesium oxide (MAG-OX) tablet 400 mg  400 mg Oral TID PRN    magnesium oxide (MAG-OX) tablet 400 mg  400 mg Oral QID PRN    potassium chloride (K-DUR, KLOR-CON) SR tablet 20 mEq  20 mEq Oral BID PRN    potassium chloride (K-DUR, KLOR-CON) SR tablet 40 mEq  40 mEq Oral BID PRN    oxyCODONE-acetaminophen (PERCOCET) 5-325 mg per tablet 1 Tab  1 Tab Oral Q4H PRN    traMADol (ULTRAM) tablet 50 mg  50 mg Oral Q6H PRN    amiodarone (CORDARONE) tablet 200 mg  200 mg Oral Q12H    atorvastatin (LIPITOR) tablet 40 mg  40 mg Oral QHS    metoprolol tartrate (LOPRESSOR) tablet 12.5 mg  12.5 mg Oral Q12H    albuterol-ipratropium (DUO-NEB) 2.5 MG-0.5 MG/3 ML  3 mL Nebulization Q4H RT       Review of Systems  Constitutional: negative for fever, chills, sweats  Cardiovascular: negative for chest pain, palpitations, syncope, edema  Gastrointestinal:  negative for dysphagia, reflux, vomiting, diarrhea, abdominal pain, or melena  Neurologic:  negative for focal weakness, numbness, headache    Objective:     Vitals:    11/22/17 0406 11/22/17 0718 11/22/17 0734 11/22/17 0841   BP:  129/63     Pulse:  69  82   Resp:  18     Temp:  98.1 °F (36.7 °C)     SpO2: 91% 96% 91%    Weight:       Height:         Intake and Output:   11/20 1901 - 11/22 0700  In: 1380 [P.O.:1380]  Out: 600 [Urine:600]  11/22 0701 - 11/22 1900  In: 240 [P.O.:240]  Out: -     Physical Exam:   Constitution:  the patient is well developed and in no acute distress  EENMT:  Sclera clear, pupils equal, oral mucosa moist  Respiratory: clear to auscultation bilateral  Cardiovascular:  RRR without M,G,R  Gastrointestinal: soft and non-tender; with positive bowel sounds. Musculoskeletal: warm without cyanosis. There is no lower leg edema.   Skin:  no jaundice or rashes, midline sternal incision without redness, swelling, or drainge   Neurologic: no gross neuro deficits     Psychiatric:  alert and oriented x 3    CXR:     Examination: Chest, portable AP view     History: Atelectasis, chest tube       Comparison: 11/19/2017     Findings:    Cardiac silhouette is unchanged in size. Median sternotomy wires again noted. There is persistent bibasilar atelectasis, not significantly changed. Left-sided  chest tube overlies the left midlung. No residual pneumothorax identified. Subcutaneous emphysema noted in the left chest wall.       Impression:    No significant interval change in bibasilar atelectasis. Left-sided chest tube  remains in place. No pneumothorax identified. LAB  Recent Labs      11/20/17   0623  11/19/17   2049  11/19/17   1546  11/19/17   1110   GLUCPOC  103*  143*  149*  157*      No results for input(s): WBC, HGB, HCT, PLT, INR, HGBEXT, HCTEXT, PLTEXT, HGBEXT, HCTEXT, PLTEXT in the last 72 hours. No lab exists for component: Karine Joseph  Recent Labs      11/21/17   0352  11/20/17   0522   K  4.2  3.9   MG  1.9  2.1     No results for input(s): PH, PCO2, PO2, HCO3 in the last 72 hours. Assessment:  (Medical Decision Making)     Hospital Problems  Date Reviewed: 11/19/2017          Codes Class Noted POA    S/P CABG x 4 ICD-10-CM: Z95.1  ICD-9-CM: V45.81  11/17/2017 No        Nocturnal Hypoxia ICD-10-CM: R09.02  ICD-9-CM: 799.02  11/17/2017 No              * (Principal)Coronary artery disease involving native coronary artery of native heart with angina pectoris ([facility] [address] ICD-10-CM: I25.119  ICD-9-CM: 414.01, 413.9  11/10/2017 Yes     11/17/17 (Dr Makayla White) Coronary artery bypass grafting x4 grafts   consisting of:   1. Left internal mammary artery to left anterior descending. 2. Reverse saphenous vein graft to diagonal 1.   3. Reverse saphenous vein graft to obtuse marginal.   4. Reverse saphenous vein graft to the posterior descending   artery. Chronic anemia ICD-10-CM: D64.9  ICD-9-CM: 097. 9  Unknown Yes    Hgb 9.7      Cognitive impairment ICD-10-CM: R41.89  ICD-9-CM: 294.9  Unknown Yes         Emphysema, ? severity      Plan:  (Medical Decision Making)     - IS  -Home O2 @ 2 LPM  -PSG in 2 months  --F/U in the office in 2 months with Spirometry      More than 50% of the time documented was spent in face-to-face contact with the patient and in the care of the patient on the floor/unit where the patient is located.     Esthela Onofre MD

## 2017-11-22 NOTE — PROGRESS NOTES
Problem: Mobility Impaired (Adult and Pediatric)  Goal: *Therapy Goal (Edit Goal, Insert Text)    STERNAL PRECAUTIONS   STG:  (1.)Mr. Matty Rocha will move from supine to sit and sit to supine , scoot up and down and roll side to side with MODIFIED INDEPENDENCE within 4 day(s). (2.)Mr. Matty Rocha will transfer from bed to chair and chair to bed with MODIFIED INDEPENDENCE using the least restrictive device within 4 day(s). (3.)Mr. Matty Rocha will ambulate with MODIFIED INDEPENDENCE for 300 feet with the least restrictive device within 4 day(s). LTG:  (1.)Mr. Matty Rocha will move from supine to sit and sit to supine , scoot up and down and roll side to side in bed with INDEPENDENT within 7 day(s). (2.)Mr. Matty Rocha will transfer from bed to chair and chair to bed with INDEPENDENT using the least restrictive device within 7 day(s). (3.)Mr. Matty Rocha will ambulate with INDEPENDENT for 600 feet with the least restrictive device within 7 day(s). ________________________________________________________________________________________________     PHYSICAL THERAPY: Daily Note, Treatment Day: 4th, AM 11/22/2017  INPATIENT: Hospital Day: 6  [place] / [place] / Product Type: Medicare /      NAME/AGE/GENDER: Kina Kay is a 80 y.o. male   PRIMARY DIAGNOSIS: Atherosclerosis of native coronary artery of native heart with unstable angina pectoris ([facility] [address] [I25.110] Coronary artery disease involving native coronary artery of native heart with angina pectoris ([facility] [address] Coronary artery disease involving native coronary artery of native heart with angina pectoris (HCC)  Procedure(s) (LRB):  CORONARY ARTERY BYPASS GRAFT (CABG X 4)/ LIMA (N/A)  VEIN HARVEST/ GREATER SAPHANEOUS (Left)  5 Days Post-Op  ICD-10: Treatment Diagnosis:   · Difficulty in walking, Not elsewhere classified (R26.2)   Precaution/Allergies:  Review of patient's allergies indicates no known allergies.       ASSESSMENT:      Noemí Walsh presents sitting up in bedside chair agreeable to treatment. He was steadier during ambulation this morning with no assistive device. He returned to room and performed seated exercises with good techniques. Good progress with mobility and activity tolerance. Will continue PT efforts. This section established at most recent assessment   PROBLEM LIST (Impairments causing functional limitations):  1. Decreased Strength  2. Decreased Transfer Abilities  3. Decreased Ambulation Ability/Technique  4. Decreased Balance  5. Decreased Activity Tolerance    INTERVENTIONS PLANNED: (Benefits and precautions of physical therapy have been discussed with the patient.)  1. Balance Exercise  2. Bed Mobility  3. Gait Training  4. Neuromuscular Re-education/Strengthening  5. Range of Motion (ROM)  6. Therapeutic Activites  7. Therapeutic Exercise/Strengthening  8. Group Therapy      TREATMENT PLAN: Frequency/Duration: Patient to be seen for PT 2 x's per day for the duration of current acute and cardiac rehab hospital episode. Rehabilitation Potential For Stated Goals: GOOD        RECOMMENDED REHABILITATION/EQUIPMENT: (at time of discharge pending progress): Due to the probability of continued deficits (see above) this patient will likely need continued skilled physical therapy after discharge. Equipment:   [employer]ec, Type: [employer] will need potentially upon D/C                   HISTORY:   History of Present Injury/Illness (Reason for Referral):PER MD H&P   79 y/o white male is seen at the request of Dr. Jina Whitten for respiratory management status post cardiac surgery. Patient had CABG x4. Currently is sedated in CV-ICU and orally intubated receiving  mechanical ventilation. Patient has been experiencing [place] class 3 symptoms and had elevated calcium score. [facility] revelaed multivessel CAD and surgical revascularization was recommended.   We have been asked to see in the CV-ICU for mechanical ventilation management and weaning  Past Medical History/Comorbidities:   Mr. Berneda Leyden  has a past medical history of Apnea; Asymptomatic gallstones; CAD (coronary artery disease); [facility]ob[facility] CT [facility] Childhood asthma; Chronic anemia; Chronic renal insufficiency, stage III (moderate); Cognitive impairment; Colon polyps; Dyslipidemia; Ex-smoker for more than 1 year; GERD (gastroesophageal reflux disease); History of penile implant; History of stomach ulcers (1959); Iron deficiency anemia; Mood disorder ([facility] [address] Sigmoid diverticulosis; and TIA (transient ischemic attack). Mr. Berneda Leyden  has a past surgical history that includes appendectomy (1938); heart catheterization; gi (1959); urological; orthopaedic (Right); and coronary artery bypass graft (11/17/2017). Social History/Living Environment:   Home Environment: Private residence  # Steps to Enter: 2  One/Two Story Residence: One story  Living Alone: No  Support Systems: Spouse/Significant Other/Partner  Patient Expects to be Discharged to[de-identified] Private residence  Current DME Used/Available at Home: None  Prior level of Function/Work/Activity:  Patient had been independent with all functional mobility. Dominant Side:         RIGHT  Personal Factors:          Sex:  male        Age:  80 y.o. Number of Personal Factors/Comorbidities that affect the Plan of Care: 0: LOW COMPLEXITY   EXAMINATION:   Most Recent Physical Functioning:   Gross Assessment:                  Posture:  Posture (WDL): Exceptions to WDL  Posture Assessment:  Forward head  Balance:  Sitting: Intact  Sitting - Static: Good (unsupported)  Sitting - Dynamic: Good (unsupported)  Standing: Impaired  Standing - Static: Good  Standing - Dynamic : Fair Bed Mobility:     Wheelchair Mobility:     Transfers:  Sit to Stand: Supervision  Stand to Sit: Supervision  Bed to Chair: Stand-by asssistance  Gait:     Base of Support: Center of gravity altered  Speed/Giuliana: Shuffled;Pace decreased (<100 feet/min)  Step Length: Left shortened;Right shortened  Gait Abnormalities: Decreased step clearance  Distance (ft): 250 Feet (ft)  Assistive Device:  (none)  Ambulation - Level of Assistance: Stand-by asssistance  Interventions: Safety awareness training;Verbal cues      Body Structures Involved:  1. Bones  2. Muscles  3. Ligaments Body Functions Affected:  1. Neuromusculoskeletal  2. Movement Related  3. Skin Related  4. Metobolic/Endocrine Activities and Participation Affected:  1. General Tasks and Demands  2. Communication  3. Mobility  4. Interpersonal Interactions and Relationships  5. Community, Social and [place]   Number of elements that affect the Plan of Care: 1-2: LOW COMPLEXITY   CLINICAL PRESENTATION:   Presentation: Stable and uncomplicated: LOW COMPLEXITY   CLINICAL DECISION MAKIN [facility] Inpatient Short Form  How much difficulty does the patient currently have. .. Unable A Lot A Little None   1. Turning over in bed (including adjusting bedclothes, sheets and blankets)? [] 1   [] 2   [] 3   [x] 4   2. Sitting down on and standing up from a chair with arms ( e.g., wheelchair, bedside commode, etc.)   [] 1   [] 2   [] 3   [x] 4   3. Moving from lying on back to sitting on the side of the bed? [] 1   [] 2   [] 3   [x] 4   How much help from another person does the patient currently need. .. Total A Lot A Little None   4. Moving to and from a bed to a chair (including a wheelchair)? [] 1   [] 2   [x] 3   [] 4   5. Need to walk in hospital room? [] 1   [] 2   [x] 3   [] 4   6. Climbing 3-5 steps with a railing? [] 1   [] 2   [x] 3   [] 4   © , Trustees of [address] [place] [address] [address] [address], under license to [employer]. All rights reserved      Score:  Initial: 21 Most Recent: X (Date: -- )    Interpretation of Tool:  Represents activities that are increasingly more difficult (i.e. Bed mobility, Transfers, Gait).    Score 24 23 22-20 19-15 14-10 9-7 6     Modifier CH CI CJ CK CL CM CN      ? Mobility - Walking and Moving Around:     - CURRENT STATUS: CJ - 20%-39% impaired, limited or restricted    - GOAL STATUS: CI - 1%-19% impaired, limited or restricted    - D/C STATUS:  ---------------To be determined---------------  [place] / [place] / Product Type: Medicare /      Medical Necessity:     · Patient demonstrates good rehab potential due to higher previous functional level. Reason for Services/Other Comments:  · Patient continues to require skilled intervention due to s/p Cardiac Surgery. .   Use of outcome tool(s) and clinical judgement create a POC that gives a: Clear prediction of patient's progress: LOW COMPLEXITY            TREATMENT:   (In addition to Assessment/Re-Assessment sessions the following treatments were rendered)   Pre-treatment Symptoms/Complaints:  \"She is talking to her boyfriend. \"   Pain: Initial: 0  Pain Intensity 1: 0  Post Session:  Voices no c/o     Therapeutic Activity: (    10 minutes): Therapeutic activities including transfers, and gait training to improve mobility, strength, balance and coordination. Required contact guard assist  Safety awareness training;Verbal cues to promote static and dynamic balance in standing. Therapeutic Exercise: (15 Minutes):  Exercises per grid below to improve mobility, strength and activity tolerance. Required minimal verbal cues to good technique with all exercises. Progressed repetitions and complexity of movement as indicated. Date:  11/20/17 AM Date:  11-22-17 Date:     Activity/Exercise Seated Parameters Parameters Parameters   Heel raises X 20 B x20    Toe raises X 20 B x20    LAQ's X 20 B x20    Hip Flex X 20 B x20    Hip ABD X 20 B x20    Shoulder shrugs  x20    Glut set  x20                Braces/Orthotics/Lines/Etc:   · None  Treatment/Session Assessment:    · Response to Treatment:  Improving overall. o Interdisciplinary Collaboration:  o Physical Therapy Assistant  o Registered Nurse  · After treatment position/precautions:   o Up in chair  o Bed/Chair-wheels locked  o Call light within reach  o RN notified  o Family at bedside   · Compliance with Program/Exercises: Will assess as treatment progresses. · Recommendations/Intent for next treatment session: \"Next visit will focus on advancements to more challenging activities, reduction in assistance provided and cardiac rehabilitation. \".   Total Treatment Duration:PT Patient Time In/Time Out  Time In: 1010  Time Out: [address] [facility]

## 2017-11-22 NOTE — PROGRESS NOTES
CV Progress Note    Admit Date: 11/17/2017    Postop: day #5  from CABG x4    Subjective:   Mentation is clear  SHEFALI last night qualifies for home oxygen at night  Would like to go home    Patient present conditions:   Stable sub q emphysema left chest, neck area resolved    Review of Systems   Cardiac: NSR  Respiratory: shortness of breath - weaned down prn1L NC  Neuro: at baseline  Incision: dry healing  GI: po diet  Chest Tubes: out    Objective:     Vitals:  Blood pressure 129/63, pulse 82, temperature 98.1 °F (36.7 °C), resp. rate 18, height 5' 11\" (1.803 m), weight 179 lb 9.6 oz (81.5 kg), SpO2 91 %. Vitals:    11/22/17 0406 11/22/17 0718 11/22/17 0734 11/22/17 0841   BP:  129/63     Pulse:  69  82   Resp:  18     Temp:  98.1 °F (36.7 °C)     SpO2: 91% 96% 91%    Weight:       Height:            I/O:  11/22 0701 - 11/22 1900  In: 240 [P.O.:240]  Out: -   11/20 1901 - 11/22 0700  In: 1380 [P.O.:1380]  Out: 600 [Urine:600]  Last 3 Recorded Weights in this Encounter    11/20/17 0235 11/21/17 0316 11/22/17 0345   Weight: 190 lb 9.6 oz (86.5 kg) 180 lb (81.6 kg) 179 lb 9.6 oz (81.5 kg)       Intake/Output Summary (Last 24 hours) at 11/22/17 0900  Last data filed at 11/22/17 0845   Gross per 24 hour   Intake              960 ml   Output              600 ml   Net              360 ml           Heart:RRR, sub q air upper L chest  Lung: poor breath sounds bialterally  Neuro: some confusion at baseline  Incisions:dry/healing  Chest Tubes:out    ECG/Telemetry: NSR    Lab/Data Review:  No results found for this or any previous visit (from the past 12 hour(s)).   BMP:   No results found for: NA, K, CL, CO2, AGAP, GLU, BUN, CREA, GFRAA, GFRNA  CMP:   No results found for: NA, K, CL, CO2, AGAP, GLU, BUN, CREA, GFRAA, GFRNA, CA, MG, PHOS, ALB, TBIL, TP, ALB, GLOB, AGRAT, SGOT, ALT, GPT  CBC: No results found for: WBC, HGB, HGBEXT, HCT, HCTEXT, PLT, PLTEXT, HGBEXT, HCTEXT, PLTEXT, HGBEXT, HCTEXT, PLTEXT  All Cardiac Markers in the last 24 hours:   No results found for: CPK, CK, CKMMB, CKMB, RCK3, CKMBT, CKNDX, CKND1, CIARA, TROPT, TROIQ, GRECIA, TROPT, TNIPOC, BNP, BNPP  Recent Glucose Results: No results found for: GLU  ABG: No results found for: PH, PHI, PCO2, PCO2I, PO2, PO2I, HCO3, HCO3I, FIO2, FIO2I  COAGS: No results found for: APTT, PTP, INR  Liver Panel: No results found for: ALB, CBIL, TBIL, TP, GLOB, AGRAT, SGOT, ASTPOC, ALTPOC, ALT, GPT, AP     Radiology: CXR no PTX    Assessment:   1. S/p CABG  2. Baseline confusion  3. Sub q emphysema stable, resolving      Plan/Recommendations/Medical Decision Makin. Home oxygen for night time, sleep study in the future  2. avoid narcotics  3. Ambulation & incentive spirometry  Home today with oxygen for hs.           See orders    Guillermo Kaiser NP  2017

## 2017-11-22 NOTE — DISCHARGE INSTRUCTIONS
DISCHARGE SUMMARY from Nurse    PATIENT INSTRUCTIONS:    After general anesthesia or intravenous sedation, for 24 hours or while taking prescription Narcotics:  · Limit your activities  · Do not drive and operate hazardous machinery  · Do not make important personal or business decisions  · Do  not drink alcoholic beverages  · If you have not urinated within 8 hours after discharge, please contact your surgeon on call. Report the following to your surgeon:  · Excessive pain, swelling, redness or odor of or around the surgical area  · Temperature over 100.5  · Nausea and vomiting lasting longer than 4 hours or if unable to take medications  · Any signs of decreased circulation or nerve impairment to extremity: change in color, persistent  numbness, tingling, coldness or increase pain  · Any questions      *  Please give a list of your current medications to your Primary Care Provider. *  Please update this list whenever your medications are discontinued, doses are      changed, or new medications (including over-the-counter products) are added. *  Please carry medication information at all times in case of emergency situations. These are general instructions for a healthy lifestyle:    No smoking/ No tobacco products/ Avoid exposure to second hand smoke  Surgeon General's Warning:  Quitting smoking now greatly reduces serious risk to your health. Obesity, smoking, and sedentary lifestyle greatly increases your risk for illness    A healthy diet, regular physical exercise & weight monitoring are important for maintaining a healthy lifestyle    You may be retaining fluid if you have a history of heart failure or if you experience any of the following symptoms:  Weight gain of 3 pounds or more overnight or 5 pounds in a week, increased swelling in our hands or feet or shortness of breath while lying flat in bed.   Please call your doctor as soon as you notice any of these symptoms; do not wait until your next office visit. Recognize signs and symptoms of STROKE:    F-face looks uneven    A-arms unable to move or move unevenly    S-speech slurred or non-existent    T-time-call 911 as soon as signs and symptoms begin-DO NOT go       Back to bed or wait to see if you get better-TIME IS BRAIN. Warning Signs of HEART ATTACK     Call 911 if you have these symptoms:   Chest discomfort. Most heart attacks involve discomfort in the center of the chest that lasts more than a few minutes, or that goes away and comes back. It can feel like uncomfortable pressure, squeezing, fullness, or pain.  Discomfort in other areas of the upper body. Symptoms can include pain or discomfort in one or both arms, the back, neck, jaw, or stomach.  Shortness of breath with or without chest discomfort.  Other signs may include breaking out in a cold sweat, nausea, or lightheadedness. Don't wait more than five minutes to call 911 - MINUTES MATTER! Fast action can save your life. Calling 911 is almost always the fastest way to get lifesaving treatment. Emergency Medical Services staff can begin treatment when they arrive -- up to an hour sooner than if someone gets to the hospital by car. The discharge information has been reviewed with the patient. The patient verbalized understanding. Discharge medications reviewed with the patient and appropriate educational materials and side effects teaching were provided.         ___________________________________________________________________________________________________________________________________

## 2017-11-23 ENCOUNTER — HOME CARE VISIT (OUTPATIENT)
Dept: SCHEDULING | Facility: HOME HEALTH | Age: 82
End: 2017-11-23
Payer: MEDICARE

## 2017-11-23 LAB
BACTERIA SPEC CULT: NORMAL
GRAM STN SPEC: NORMAL
SERVICE CMNT-IMP: NORMAL

## 2017-11-23 PROCEDURE — 400013 HH SOC

## 2017-11-23 PROCEDURE — 3331090002 HH PPS REVENUE DEBIT

## 2017-11-23 PROCEDURE — 3331090001 HH PPS REVENUE CREDIT

## 2017-11-23 PROCEDURE — G0299 HHS/HOSPICE OF RN EA 15 MIN: HCPCS

## 2017-11-24 ENCOUNTER — PATIENT OUTREACH (OUTPATIENT)
Dept: CASE MANAGEMENT | Age: 82
End: 2017-11-24

## 2017-11-24 VITALS
SYSTOLIC BLOOD PRESSURE: 122 MMHG | OXYGEN SATURATION: 95 % | RESPIRATION RATE: 17 BRPM | HEART RATE: 61 BPM | TEMPERATURE: 97.3 F | DIASTOLIC BLOOD PRESSURE: 60 MMHG

## 2017-11-24 PROCEDURE — 3331090001 HH PPS REVENUE CREDIT

## 2017-11-24 PROCEDURE — 3331090002 HH PPS REVENUE DEBIT

## 2017-11-24 NOTE — PROGRESS NOTES
This note will not be viewable in [address] [facility]RE[facility]2  Left message to call back. 2nd outreach attempt unsuccessful. Will schedule 3rd outreach attempt within 5 business days.

## 2017-11-24 NOTE — PROGRESS NOTES
This note will not be viewable in [address] [facility]REACH [facility]1  Initial outreach attempt unsuccessful. Left message for call back. Will attempt 2nd outreach within 24 hrs.

## 2017-11-25 PROCEDURE — 3331090001 HH PPS REVENUE CREDIT

## 2017-11-25 PROCEDURE — 3331090002 HH PPS REVENUE DEBIT

## 2017-11-26 PROCEDURE — 3331090001 HH PPS REVENUE CREDIT

## 2017-11-26 PROCEDURE — 3331090002 HH PPS REVENUE DEBIT

## 2017-11-27 ENCOUNTER — TELEPHONE (OUTPATIENT)
Dept: HOME HEALTH SERVICES | Facility: HOME HEALTH | Age: 82
End: 2017-11-27

## 2017-11-27 ENCOUNTER — HOME CARE VISIT (OUTPATIENT)
Dept: SCHEDULING | Facility: HOME HEALTH | Age: 82
End: 2017-11-27
Payer: MEDICARE

## 2017-11-27 VITALS
HEIGHT: 71 IN | RESPIRATION RATE: 17 BRPM | DIASTOLIC BLOOD PRESSURE: 64 MMHG | BODY MASS INDEX: 24.64 KG/M2 | HEART RATE: 55 BPM | WEIGHT: 176 LBS | SYSTOLIC BLOOD PRESSURE: 122 MMHG | OXYGEN SATURATION: 97 % | TEMPERATURE: 97.2 F

## 2017-11-27 PROCEDURE — 3331090002 HH PPS REVENUE DEBIT

## 2017-11-27 PROCEDURE — 3331090001 HH PPS REVENUE CREDIT

## 2017-11-27 PROCEDURE — G0151 HHCP-SERV OF PT,EA 15 MIN: HCPCS

## 2017-11-27 NOTE — TELEPHONE ENCOUNTER
[address] Pharmacist consult. Mr. Brijesh Green was discharged prior to my consult. I have called and only gotten voice mail. I have left my name and cell phone number. I asked him to call me with any medication questions or issues.  11/27/17 1200

## 2017-11-28 PROBLEM — I77.9 BILATERAL CAROTID ARTERY DISEASE (HCC): Status: ACTIVE | Noted: 2017-11-28

## 2017-11-28 PROBLEM — I25.119 CORONARY ARTERY DISEASE INVOLVING NATIVE CORONARY ARTERY OF NATIVE HEART WITH ANGINA PECTORIS (HCC): Status: RESOLVED | Noted: 2017-11-10 | Resolved: 2017-11-28

## 2017-11-28 PROCEDURE — 3331090002 HH PPS REVENUE DEBIT

## 2017-11-28 PROCEDURE — 3331090001 HH PPS REVENUE CREDIT

## 2017-11-29 ENCOUNTER — HOME CARE VISIT (OUTPATIENT)
Dept: SCHEDULING | Facility: HOME HEALTH | Age: 82
End: 2017-11-29
Payer: MEDICARE

## 2017-11-29 ENCOUNTER — PATIENT OUTREACH (OUTPATIENT)
Dept: CASE MANAGEMENT | Age: 82
End: 2017-11-29

## 2017-11-29 ENCOUNTER — HOME CARE VISIT (OUTPATIENT)
Dept: HOME HEALTH SERVICES | Facility: HOME HEALTH | Age: 82
End: 2017-11-29
Payer: MEDICARE

## 2017-11-29 ENCOUNTER — TELEPHONE (OUTPATIENT)
Dept: HOME HEALTH SERVICES | Facility: HOME HEALTH | Age: 82
End: 2017-11-29

## 2017-11-29 PROCEDURE — G0157 HHC PT ASSISTANT EA 15: HCPCS

## 2017-11-29 PROCEDURE — 3331090001 HH PPS REVENUE CREDIT

## 2017-11-29 PROCEDURE — 3331090002 HH PPS REVENUE DEBIT

## 2017-11-29 NOTE — PROGRESS NOTES
This note will not be viewable in [address] [facility]REACH [facility]3  Final outreach call unsuccessful. Unable to reach patient after several attempts. Will close case at this time. Will reopen case if return call is received.

## 2017-11-30 VITALS
DIASTOLIC BLOOD PRESSURE: 60 MMHG | TEMPERATURE: 97 F | HEART RATE: 68 BPM | SYSTOLIC BLOOD PRESSURE: 118 MMHG | RESPIRATION RATE: 18 BRPM

## 2017-11-30 PROCEDURE — 3331090002 HH PPS REVENUE DEBIT

## 2017-11-30 PROCEDURE — 3331090001 HH PPS REVENUE CREDIT

## 2017-12-01 ENCOUNTER — HOME CARE VISIT (OUTPATIENT)
Dept: SCHEDULING | Facility: HOME HEALTH | Age: 82
End: 2017-12-01
Payer: MEDICARE

## 2017-12-01 VITALS
TEMPERATURE: 97.9 F | RESPIRATION RATE: 18 BRPM | DIASTOLIC BLOOD PRESSURE: 62 MMHG | HEART RATE: 57 BPM | SYSTOLIC BLOOD PRESSURE: 132 MMHG | OXYGEN SATURATION: 95 %

## 2017-12-01 PROCEDURE — 3331090002 HH PPS REVENUE DEBIT

## 2017-12-01 PROCEDURE — 3331090001 HH PPS REVENUE CREDIT

## 2017-12-01 PROCEDURE — G0299 HHS/HOSPICE OF RN EA 15 MIN: HCPCS

## 2017-12-01 NOTE — TELEPHONE ENCOUNTER
I spoke with Mr. Brooks Media today. I explained my part of the [facility] team.  I reviewed his medications. He said the [facility] nurse was there this morning and reviewed. He saw Dr. Juju Heart this week and had no new medications added. He sounded very  and was fun to talk with. I gave him my cell phone number and asked him to call with any medication questions or issues.

## 2017-12-02 PROCEDURE — 3331090001 HH PPS REVENUE CREDIT

## 2017-12-02 PROCEDURE — 3331090002 HH PPS REVENUE DEBIT

## 2017-12-03 PROCEDURE — 3331090002 HH PPS REVENUE DEBIT

## 2017-12-03 PROCEDURE — 3331090001 HH PPS REVENUE CREDIT

## 2017-12-04 PROCEDURE — 3331090002 HH PPS REVENUE DEBIT

## 2017-12-04 PROCEDURE — 3331090001 HH PPS REVENUE CREDIT

## 2017-12-05 ENCOUNTER — HOME CARE VISIT (OUTPATIENT)
Dept: SCHEDULING | Facility: HOME HEALTH | Age: 82
End: 2017-12-05
Payer: MEDICARE

## 2017-12-05 VITALS
OXYGEN SATURATION: 93 % | HEART RATE: 60 BPM | TEMPERATURE: 98.1 F | RESPIRATION RATE: 18 BRPM | DIASTOLIC BLOOD PRESSURE: 64 MMHG | WEIGHT: 173 LBS | BODY MASS INDEX: 24.13 KG/M2 | SYSTOLIC BLOOD PRESSURE: 134 MMHG

## 2017-12-05 PROCEDURE — G0299 HHS/HOSPICE OF RN EA 15 MIN: HCPCS

## 2017-12-05 PROCEDURE — 3331090001 HH PPS REVENUE CREDIT

## 2017-12-05 PROCEDURE — 3331090002 HH PPS REVENUE DEBIT

## 2017-12-06 ENCOUNTER — HOME CARE VISIT (OUTPATIENT)
Dept: SCHEDULING | Facility: HOME HEALTH | Age: 82
End: 2017-12-06
Payer: MEDICARE

## 2017-12-06 VITALS
HEART RATE: 72 BPM | TEMPERATURE: 98.3 F | RESPIRATION RATE: 19 BRPM | SYSTOLIC BLOOD PRESSURE: 118 MMHG | DIASTOLIC BLOOD PRESSURE: 60 MMHG | OXYGEN SATURATION: 98 %

## 2017-12-06 PROCEDURE — G0151 HHCP-SERV OF PT,EA 15 MIN: HCPCS

## 2017-12-06 PROCEDURE — 3331090002 HH PPS REVENUE DEBIT

## 2017-12-06 PROCEDURE — 3331090001 HH PPS REVENUE CREDIT

## 2017-12-07 ENCOUNTER — HOME CARE VISIT (OUTPATIENT)
Dept: SCHEDULING | Facility: HOME HEALTH | Age: 82
End: 2017-12-07
Payer: MEDICARE

## 2017-12-07 VITALS
HEART RATE: 58 BPM | TEMPERATURE: 98.1 F | DIASTOLIC BLOOD PRESSURE: 62 MMHG | RESPIRATION RATE: 18 BRPM | OXYGEN SATURATION: 95 % | SYSTOLIC BLOOD PRESSURE: 130 MMHG

## 2017-12-07 PROCEDURE — 3331090001 HH PPS REVENUE CREDIT

## 2017-12-07 PROCEDURE — G0299 HHS/HOSPICE OF RN EA 15 MIN: HCPCS

## 2017-12-07 PROCEDURE — 3331090002 HH PPS REVENUE DEBIT

## 2017-12-08 PROCEDURE — 3331090001 HH PPS REVENUE CREDIT

## 2017-12-08 PROCEDURE — 3331090002 HH PPS REVENUE DEBIT

## 2017-12-09 PROCEDURE — 3331090002 HH PPS REVENUE DEBIT

## 2017-12-09 PROCEDURE — 3331090001 HH PPS REVENUE CREDIT

## 2017-12-10 PROCEDURE — 3331090001 HH PPS REVENUE CREDIT

## 2017-12-10 PROCEDURE — 3331090002 HH PPS REVENUE DEBIT

## 2017-12-11 PROCEDURE — 3331090001 HH PPS REVENUE CREDIT

## 2017-12-11 PROCEDURE — 3331090002 HH PPS REVENUE DEBIT

## 2017-12-12 ENCOUNTER — HOSPITAL ENCOUNTER (OUTPATIENT)
Dept: CARDIAC REHAB | Age: 82
Discharge: HOME | End: 2017-12-12

## 2017-12-12 PROCEDURE — 3331090002 HH PPS REVENUE DEBIT

## 2017-12-12 PROCEDURE — 3331090001 HH PPS REVENUE CREDIT

## 2017-12-12 RX ORDER — METOPROLOL SUCCINATE 25 MG/1
12.5 TABLET, EXTENDED RELEASE ORAL DAILY
COMMUNITY
End: 2017-12-19 | Stop reason: SDUPTHER

## 2017-12-13 PROCEDURE — 3331090001 HH PPS REVENUE CREDIT

## 2017-12-13 PROCEDURE — 3331090002 HH PPS REVENUE DEBIT

## 2017-12-14 PROCEDURE — 3331090001 HH PPS REVENUE CREDIT

## 2017-12-14 PROCEDURE — 3331090002 HH PPS REVENUE DEBIT

## 2017-12-15 ENCOUNTER — HOSPITAL ENCOUNTER (OUTPATIENT)
Dept: SLEEP MEDICINE | Age: 82
Discharge: HOME | End: 2017-12-15
Payer: MEDICARE

## 2017-12-15 PROCEDURE — 3331090001 HH PPS REVENUE CREDIT

## 2017-12-15 PROCEDURE — 95810 POLYSOM 6/> YRS 4/> PARAM: CPT

## 2017-12-15 PROCEDURE — 3331090002 HH PPS REVENUE DEBIT

## 2017-12-16 PROCEDURE — 3331090002 HH PPS REVENUE DEBIT

## 2017-12-16 PROCEDURE — 3331090001 HH PPS REVENUE CREDIT

## 2017-12-17 PROCEDURE — 3331090001 HH PPS REVENUE CREDIT

## 2017-12-17 PROCEDURE — 3331090002 HH PPS REVENUE DEBIT

## 2017-12-19 ENCOUNTER — HOSPITAL ENCOUNTER (OUTPATIENT)
Dept: CARDIAC REHAB | Age: 82
Discharge: HOME | End: 2017-12-19
Payer: MEDICARE

## 2017-12-19 ENCOUNTER — HOSPITAL ENCOUNTER (OUTPATIENT)
Dept: LAB | Age: 82
Discharge: HOME | End: 2017-12-19
Payer: MEDICARE

## 2017-12-19 VITALS — WEIGHT: 169.8 LBS | BODY MASS INDEX: 23.77 KG/M2 | HEIGHT: 71 IN

## 2017-12-19 DIAGNOSIS — Z95.1 S/P CABG X 4: ICD-10-CM

## 2017-12-19 DIAGNOSIS — E78.5 DYSLIPIDEMIA: Chronic | ICD-10-CM

## 2017-12-19 DIAGNOSIS — I25.10 CORONARY ARTERY DISEASE INVOLVING NATIVE CORONARY ARTERY OF NATIVE HEART WITHOUT ANGINA PECTORIS: Chronic | ICD-10-CM

## 2017-12-19 DIAGNOSIS — I77.9 BILATERAL CAROTID ARTERY DISEASE (HCC): ICD-10-CM

## 2017-12-19 LAB — BNP SERPL-MCNC: 96 PG/ML

## 2017-12-19 PROCEDURE — 36415 COLL VENOUS BLD VENIPUNCTURE: CPT | Performed by: INTERNAL MEDICINE

## 2017-12-19 PROCEDURE — 93798 PHYS/QHP OP CAR RHAB W/ECG: CPT

## 2017-12-19 PROCEDURE — 83880 ASSAY OF NATRIURETIC PEPTIDE: CPT | Performed by: INTERNAL MEDICINE

## 2017-12-20 ENCOUNTER — HOSPITAL ENCOUNTER (OUTPATIENT)
Dept: CARDIAC REHAB | Age: 82
Discharge: HOME | End: 2017-12-20
Payer: MEDICARE

## 2017-12-20 VITALS — BODY MASS INDEX: 23.68 KG/M2 | WEIGHT: 169.8 LBS

## 2017-12-20 PROBLEM — R53.82 CHRONIC FATIGUE: Status: RESOLVED | Noted: 2017-11-10 | Resolved: 2017-12-20

## 2017-12-20 PROCEDURE — 93798 PHYS/QHP OP CAR RHAB W/ECG: CPT

## 2017-12-22 ENCOUNTER — HOSPITAL ENCOUNTER (OUTPATIENT)
Dept: CARDIAC REHAB | Age: 82
Discharge: HOME | End: 2017-12-22
Payer: MEDICARE

## 2017-12-22 PROCEDURE — 93798 PHYS/QHP OP CAR RHAB W/ECG: CPT

## 2017-12-27 ENCOUNTER — HOSPITAL ENCOUNTER (OUTPATIENT)
Dept: CARDIAC REHAB | Age: 82
Discharge: HOME | End: 2017-12-27
Payer: MEDICARE

## 2017-12-27 VITALS — WEIGHT: 170.2 LBS | BODY MASS INDEX: 23.74 KG/M2

## 2017-12-27 PROCEDURE — 93798 PHYS/QHP OP CAR RHAB W/ECG: CPT

## 2017-12-29 ENCOUNTER — HOSPITAL ENCOUNTER (OUTPATIENT)
Dept: CARDIAC REHAB | Age: 82
Discharge: HOME | End: 2017-12-29
Payer: MEDICARE

## 2017-12-29 PROCEDURE — 93798 PHYS/QHP OP CAR RHAB W/ECG: CPT

## 2018-01-03 ENCOUNTER — HOSPITAL ENCOUNTER (OUTPATIENT)
Dept: CARDIAC REHAB | Age: 83
Discharge: HOME | End: 2018-01-03
Payer: MEDICARE

## 2018-01-03 VITALS — WEIGHT: 171.5 LBS | BODY MASS INDEX: 23.92 KG/M2

## 2018-01-03 PROCEDURE — 93798 PHYS/QHP OP CAR RHAB W/ECG: CPT

## 2018-01-05 ENCOUNTER — HOSPITAL ENCOUNTER (OUTPATIENT)
Dept: CARDIAC REHAB | Age: 83
Discharge: HOME | End: 2018-01-05
Payer: MEDICARE

## 2018-01-05 PROCEDURE — 93798 PHYS/QHP OP CAR RHAB W/ECG: CPT

## 2018-01-08 ENCOUNTER — HOSPITAL ENCOUNTER (OUTPATIENT)
Dept: CARDIAC REHAB | Age: 83
Discharge: HOME | End: 2018-01-08
Payer: MEDICARE

## 2018-01-08 PROCEDURE — 93798 PHYS/QHP OP CAR RHAB W/ECG: CPT

## 2018-01-10 ENCOUNTER — HOSPITAL ENCOUNTER (OUTPATIENT)
Dept: CARDIAC REHAB | Age: 83
Discharge: HOME | End: 2018-01-10
Payer: MEDICARE

## 2018-01-10 VITALS — BODY MASS INDEX: 23.92 KG/M2 | WEIGHT: 171.5 LBS

## 2018-01-10 PROCEDURE — 93798 PHYS/QHP OP CAR RHAB W/ECG: CPT

## 2018-01-12 ENCOUNTER — HOSPITAL ENCOUNTER (OUTPATIENT)
Dept: CARDIAC REHAB | Age: 83
Discharge: HOME | End: 2018-01-12
Payer: MEDICARE

## 2018-01-12 VITALS — BODY MASS INDEX: 23.99 KG/M2 | WEIGHT: 172 LBS

## 2018-01-12 PROCEDURE — 93798 PHYS/QHP OP CAR RHAB W/ECG: CPT

## 2018-01-12 NOTE — PROGRESS NOTES
Client History:  Current Medical Diagnosis: see ITP  Pertinent Medications: see review PTA meds      Have you gained or lost any weight in the past 3 months: lost 38 lbs     What do you attribute it to:surgery  What is your weight goal: see cardiac ITP    Have you made any changes in your eating habits since your heart problems began: lowered condiments like mustard and relish which caused him heartburn. Aware he needs to lower salt. Describe your appetite: good    Supplements/Vitamins/Herbs:Vitamin D3    Food allergies: no    Problems with digestion, N/V/C/D: no    Alcohol use: see cardiac ITP      Dietary recall:  Breakfast is oatmeal with cinnamon and sugar and fruit and 1 cup coffee  2 cups Gatorade/day  Lunch is peanut butter on [employer] or [employer] is homemade soup or ham and baked potato with butter  Snack might be 3 cookies or ice cream  Pt adds salt at the table  Water/day is 16 oz            Anthropometric Data: see cardiac ITP  Ht:   Wt:   Age:  BMI:  Waist measurement:     Estimated Nutritional Needs:  Calories: 25 kcal/kg FOQ=1180 kcals  Protein:0.8gm/kg IBW=62gm (CKD stage 3)   CHO:   Fluids: 1ml/kcal unless restricted by MD      Nutrition Diagnosis:  Food and nutrition knowledge deficit related to therapeutic diet as evidenced by lack of formal prior education and diet high in sodium and saturated fats. Nutrition Intervention:  Reviewed lab/body comp results/goals. Reviewed rate your plate and heart healthy choices. Reviewed fiber and sodium guidelines. Reviewed label reading. Reviewed high sodium in restaurant meals. Handouts: lab/body comp, heart healthy, grocery guide, fiber, herbs and spices, recipes, recipe modification tips, plate method. Gave web site to look up sodium in restaurant meals.     Goals: See cardiac ITP         Monitoring/Evaluation: Follow-up appointment: RD to follow up with participant during cardiac rehab sessions for questions and assessment of progression toward goals.     Toi Diaz RD, LD, CDE  Phone: [phone]

## 2018-01-15 ENCOUNTER — HOSPITAL ENCOUNTER (OUTPATIENT)
Dept: CARDIAC REHAB | Age: 83
Discharge: HOME | End: 2018-01-15
Payer: MEDICARE

## 2018-01-15 VITALS — WEIGHT: 172.7 LBS | BODY MASS INDEX: 24.09 KG/M2

## 2018-01-15 PROCEDURE — 93798 PHYS/QHP OP CAR RHAB W/ECG: CPT

## 2018-01-19 ENCOUNTER — HOSPITAL ENCOUNTER (OUTPATIENT)
Dept: CARDIAC REHAB | Age: 83
Discharge: HOME | End: 2018-01-19
Payer: MEDICARE

## 2018-01-22 ENCOUNTER — HOSPITAL ENCOUNTER (OUTPATIENT)
Dept: CARDIAC REHAB | Age: 83
Discharge: HOME | End: 2018-01-22
Payer: MEDICARE

## 2018-01-22 VITALS — WEIGHT: 172.7 LBS | BODY MASS INDEX: 24.09 KG/M2

## 2018-01-22 PROBLEM — J44.9 COPD, SEVERE (HCC): Chronic | Status: ACTIVE | Noted: 2018-01-22

## 2018-01-22 PROCEDURE — 93798 PHYS/QHP OP CAR RHAB W/ECG: CPT

## 2018-01-23 ENCOUNTER — HOSPITAL ENCOUNTER (OUTPATIENT)
Dept: LAB | Age: 83
Discharge: HOME | End: 2018-01-23
Payer: MEDICARE

## 2018-01-23 DIAGNOSIS — E78.5 DYSLIPIDEMIA: Chronic | ICD-10-CM

## 2018-01-23 DIAGNOSIS — I25.10 CORONARY ARTERY DISEASE INVOLVING NATIVE CORONARY ARTERY OF NATIVE HEART WITHOUT ANGINA PECTORIS: Chronic | ICD-10-CM

## 2018-01-23 LAB
ALBUMIN SERPL-MCNC: 3.7 G/DL (ref 3.2–4.6)
ALBUMIN/GLOB SERPL: 0.9 {RATIO}
ALP SERPL-CCNC: 83 U/L (ref 50–136)
ALT SERPL-CCNC: 16 U/L (ref 12–65)
ANION GAP SERPL CALC-SCNC: 6 MMOL/L
AST SERPL-CCNC: 19 U/L (ref 15–37)
BASOPHILS # BLD: 0 K/UL (ref 0–0.2)
BASOPHILS NFR BLD: 1 % (ref 0–2)
BILIRUB SERPL-MCNC: 0.3 MG/DL (ref 0.2–1.1)
BUN SERPL-MCNC: 26 MG/DL (ref 8–23)
CALCIUM SERPL-MCNC: 8.8 MG/DL (ref 8.3–10.4)
CHLORIDE SERPL-SCNC: 105 MMOL/L (ref 98–107)
CHOLEST SERPL-MCNC: 169 MG/DL
CO2 SERPL-SCNC: 30 MMOL/L (ref 21–32)
CREAT SERPL-MCNC: 1.5 MG/DL (ref 0.8–1.5)
DIFFERENTIAL METHOD BLD: ABNORMAL
EOSINOPHIL # BLD: 0.2 K/UL (ref 0–0.8)
EOSINOPHIL NFR BLD: 3 % (ref 0.5–7.8)
ERYTHROCYTE [DISTWIDTH] IN BLOOD BY AUTOMATED COUNT: 14.5 % (ref 11.9–14.6)
GLOBULIN SER CALC-MCNC: 3.9 G/DL
GLUCOSE SERPL-MCNC: 162 MG/DL (ref 65–100)
HCT VFR BLD AUTO: 38.2 % (ref 41.1–50.3)
HDLC SERPL-MCNC: 69 MG/DL (ref 40–60)
HDLC SERPL: 2.4 {RATIO}
HGB BLD-MCNC: 12.1 G/DL (ref 13.6–17.2)
LDLC SERPL CALC-MCNC: 81 MG/DL
LIPID PROFILE,FLP: ABNORMAL
LYMPHOCYTES # BLD: 1.6 K/UL (ref 0.5–4.6)
LYMPHOCYTES NFR BLD: 25 % (ref 13–44)
MAGNESIUM SERPL-MCNC: 2 MG/DL (ref 1.8–2.4)
MCH RBC QN AUTO: 29.8 PG (ref 26.1–32.9)
MCHC RBC AUTO-ENTMCNC: 31.7 G/DL (ref 31.4–35)
MCV RBC AUTO: 94.1 FL (ref 79.6–97.8)
MONOCYTES # BLD: 0.4 K/UL (ref 0.1–1.3)
MONOCYTES NFR BLD: 5 % (ref 4–12)
NEUTS SEG # BLD: 4.2 K/UL (ref 1.7–8.2)
NEUTS SEG NFR BLD: 66 % (ref 43–78)
PLATELET # BLD AUTO: 245 K/UL (ref 150–450)
PMV BLD AUTO: 9.4 FL (ref 10.8–14.1)
POTASSIUM SERPL-SCNC: 4.2 MMOL/L (ref 3.5–5.1)
PROT SERPL-MCNC: 7.6 G/DL (ref 6.3–8.2)
RBC # BLD AUTO: 4.06 M/UL (ref 4.23–5.67)
SODIUM SERPL-SCNC: 141 MMOL/L (ref 136–145)
TRIGL SERPL-MCNC: 95 MG/DL (ref 35–150)
VLDLC SERPL CALC-MCNC: 19 MG/DL (ref 6–23)
WBC # BLD AUTO: 6.4 K/UL (ref 4.3–11.1)

## 2018-01-23 PROCEDURE — 83735 ASSAY OF MAGNESIUM: CPT | Performed by: INTERNAL MEDICINE

## 2018-01-23 PROCEDURE — 80061 LIPID PANEL: CPT | Performed by: INTERNAL MEDICINE

## 2018-01-23 PROCEDURE — 80053 COMPREHEN METABOLIC PANEL: CPT | Performed by: INTERNAL MEDICINE

## 2018-01-23 PROCEDURE — 36415 COLL VENOUS BLD VENIPUNCTURE: CPT | Performed by: INTERNAL MEDICINE

## 2018-01-23 PROCEDURE — 85025 COMPLETE CBC W/AUTO DIFF WBC: CPT | Performed by: INTERNAL MEDICINE

## 2018-01-24 ENCOUNTER — HOSPITAL ENCOUNTER (OUTPATIENT)
Dept: CARDIAC REHAB | Age: 83
Discharge: HOME | End: 2018-01-24
Payer: MEDICARE

## 2018-01-24 VITALS — WEIGHT: 170 LBS | BODY MASS INDEX: 24.39 KG/M2

## 2018-01-24 PROCEDURE — 93798 PHYS/QHP OP CAR RHAB W/ECG: CPT

## 2018-01-26 ENCOUNTER — HOSPITAL ENCOUNTER (OUTPATIENT)
Dept: CARDIAC REHAB | Age: 83
Discharge: HOME | End: 2018-01-26
Payer: MEDICARE

## 2018-01-26 ENCOUNTER — HOSPITAL ENCOUNTER (OUTPATIENT)
Dept: SLEEP MEDICINE | Age: 83
Discharge: HOME | End: 2018-01-26
Payer: MEDICARE

## 2018-01-26 PROCEDURE — 95811 POLYSOM 6/>YRS CPAP 4/> PARM: CPT

## 2018-01-26 PROCEDURE — 93798 PHYS/QHP OP CAR RHAB W/ECG: CPT

## 2018-01-29 ENCOUNTER — HOSPITAL ENCOUNTER (OUTPATIENT)
Dept: CARDIAC REHAB | Age: 83
Discharge: HOME | End: 2018-01-29
Payer: MEDICARE

## 2018-01-29 PROCEDURE — 93798 PHYS/QHP OP CAR RHAB W/ECG: CPT

## 2018-01-31 ENCOUNTER — HOSPITAL ENCOUNTER (OUTPATIENT)
Dept: CARDIAC REHAB | Age: 83
Discharge: HOME | End: 2018-01-31
Payer: MEDICARE

## 2018-01-31 VITALS — BODY MASS INDEX: 24.48 KG/M2 | WEIGHT: 170.6 LBS

## 2018-01-31 PROCEDURE — 93798 PHYS/QHP OP CAR RHAB W/ECG: CPT

## 2018-02-02 ENCOUNTER — HOSPITAL ENCOUNTER (OUTPATIENT)
Dept: CARDIAC REHAB | Age: 83
Discharge: HOME | End: 2018-02-02
Payer: MEDICARE

## 2018-02-02 PROCEDURE — 93798 PHYS/QHP OP CAR RHAB W/ECG: CPT

## 2018-02-05 ENCOUNTER — HOSPITAL ENCOUNTER (OUTPATIENT)
Dept: CARDIAC REHAB | Age: 83
Discharge: HOME | End: 2018-02-05
Payer: MEDICARE

## 2018-02-05 VITALS — WEIGHT: 169.8 LBS | BODY MASS INDEX: 24.36 KG/M2

## 2018-02-05 PROCEDURE — 93798 PHYS/QHP OP CAR RHAB W/ECG: CPT

## 2018-02-07 ENCOUNTER — HOSPITAL ENCOUNTER (OUTPATIENT)
Dept: CARDIAC REHAB | Age: 83
Discharge: HOME | End: 2018-02-07
Payer: MEDICARE

## 2018-02-07 VITALS — WEIGHT: 169 LBS | BODY MASS INDEX: 24.25 KG/M2

## 2018-02-07 PROCEDURE — 93798 PHYS/QHP OP CAR RHAB W/ECG: CPT

## 2018-02-09 ENCOUNTER — HOSPITAL ENCOUNTER (OUTPATIENT)
Dept: CARDIAC REHAB | Age: 83
Discharge: HOME | End: 2018-02-09
Payer: MEDICARE

## 2018-02-09 PROCEDURE — 93798 PHYS/QHP OP CAR RHAB W/ECG: CPT

## 2018-02-12 ENCOUNTER — HOSPITAL ENCOUNTER (OUTPATIENT)
Dept: CARDIAC REHAB | Age: 83
Discharge: HOME | End: 2018-02-12
Payer: MEDICARE

## 2018-02-12 PROCEDURE — 93798 PHYS/QHP OP CAR RHAB W/ECG: CPT

## 2018-02-14 ENCOUNTER — HOSPITAL ENCOUNTER (OUTPATIENT)
Dept: CARDIAC REHAB | Age: 83
Discharge: HOME | End: 2018-02-14
Payer: MEDICARE

## 2018-02-14 VITALS — BODY MASS INDEX: 24.54 KG/M2 | WEIGHT: 171 LBS

## 2018-02-14 PROCEDURE — 93798 PHYS/QHP OP CAR RHAB W/ECG: CPT

## 2018-02-16 ENCOUNTER — HOSPITAL ENCOUNTER (OUTPATIENT)
Dept: CARDIAC REHAB | Age: 83
Discharge: HOME | End: 2018-02-16
Payer: MEDICARE

## 2018-02-16 PROCEDURE — 93798 PHYS/QHP OP CAR RHAB W/ECG: CPT

## 2018-02-19 ENCOUNTER — HOSPITAL ENCOUNTER (OUTPATIENT)
Dept: CARDIAC REHAB | Age: 83
Discharge: HOME | End: 2018-02-19
Payer: MEDICARE

## 2018-02-19 PROCEDURE — 93798 PHYS/QHP OP CAR RHAB W/ECG: CPT

## 2018-02-21 ENCOUNTER — HOSPITAL ENCOUNTER (OUTPATIENT)
Dept: CARDIAC REHAB | Age: 83
Discharge: HOME | End: 2018-02-21
Payer: MEDICARE

## 2018-02-21 VITALS — BODY MASS INDEX: 24.56 KG/M2 | WEIGHT: 171.2 LBS

## 2018-02-21 PROCEDURE — 93798 PHYS/QHP OP CAR RHAB W/ECG: CPT

## 2018-02-23 ENCOUNTER — HOSPITAL ENCOUNTER (OUTPATIENT)
Dept: CARDIAC REHAB | Age: 83
Discharge: HOME | End: 2018-02-23
Payer: MEDICARE

## 2018-02-23 VITALS — BODY MASS INDEX: 24.56 KG/M2 | WEIGHT: 171.2 LBS

## 2018-02-23 PROCEDURE — 93798 PHYS/QHP OP CAR RHAB W/ECG: CPT

## 2018-02-26 ENCOUNTER — HOSPITAL ENCOUNTER (OUTPATIENT)
Dept: CARDIAC REHAB | Age: 83
Discharge: HOME | End: 2018-02-26
Payer: MEDICARE

## 2018-02-26 PROCEDURE — 93798 PHYS/QHP OP CAR RHAB W/ECG: CPT

## 2018-02-28 ENCOUNTER — HOSPITAL ENCOUNTER (OUTPATIENT)
Dept: CARDIAC REHAB | Age: 83
Discharge: HOME | End: 2018-02-28
Payer: MEDICARE

## 2018-02-28 VITALS — WEIGHT: 171.2 LBS | BODY MASS INDEX: 24.56 KG/M2

## 2018-02-28 PROCEDURE — 93798 PHYS/QHP OP CAR RHAB W/ECG: CPT

## 2018-03-02 ENCOUNTER — HOSPITAL ENCOUNTER (OUTPATIENT)
Dept: CARDIAC REHAB | Age: 83
Discharge: HOME | End: 2018-03-02
Payer: MEDICARE

## 2018-03-02 PROCEDURE — 93798 PHYS/QHP OP CAR RHAB W/ECG: CPT

## 2018-03-05 ENCOUNTER — HOSPITAL ENCOUNTER (OUTPATIENT)
Dept: CARDIAC REHAB | Age: 83
Discharge: HOME | End: 2018-03-05
Payer: MEDICARE

## 2018-03-05 PROCEDURE — 93798 PHYS/QHP OP CAR RHAB W/ECG: CPT

## 2018-03-07 ENCOUNTER — HOSPITAL ENCOUNTER (OUTPATIENT)
Dept: CARDIAC REHAB | Age: 83
Discharge: HOME | End: 2018-03-07
Payer: MEDICARE

## 2018-03-07 PROCEDURE — 93798 PHYS/QHP OP CAR RHAB W/ECG: CPT

## 2018-03-09 ENCOUNTER — HOSPITAL ENCOUNTER (OUTPATIENT)
Dept: CARDIAC REHAB | Age: 83
Discharge: HOME | End: 2018-03-09
Payer: MEDICARE

## 2018-03-09 VITALS — BODY MASS INDEX: 24.42 KG/M2 | WEIGHT: 170.2 LBS

## 2018-03-09 PROCEDURE — 93798 PHYS/QHP OP CAR RHAB W/ECG: CPT

## 2018-03-12 ENCOUNTER — HOSPITAL ENCOUNTER (OUTPATIENT)
Dept: CARDIAC REHAB | Age: 83
Discharge: HOME | End: 2018-03-12
Payer: MEDICARE

## 2018-03-12 PROCEDURE — 93798 PHYS/QHP OP CAR RHAB W/ECG: CPT

## 2018-03-14 ENCOUNTER — HOSPITAL ENCOUNTER (OUTPATIENT)
Dept: CARDIAC REHAB | Age: 83
Discharge: HOME | End: 2018-03-14
Payer: MEDICARE

## 2018-03-14 VITALS — BODY MASS INDEX: 24.28 KG/M2 | WEIGHT: 169.2 LBS

## 2018-03-14 PROCEDURE — 93798 PHYS/QHP OP CAR RHAB W/ECG: CPT

## 2018-03-16 ENCOUNTER — APPOINTMENT (OUTPATIENT)
Dept: CARDIAC REHAB | Age: 83
End: 2018-03-16
Payer: MEDICARE

## 2018-03-19 ENCOUNTER — APPOINTMENT (OUTPATIENT)
Dept: CARDIAC REHAB | Age: 83
End: 2018-03-19
Payer: MEDICARE

## 2018-03-21 ENCOUNTER — APPOINTMENT (OUTPATIENT)
Dept: CARDIAC REHAB | Age: 83
End: 2018-03-21
Payer: MEDICARE

## 2018-03-26 ENCOUNTER — HOSPITAL ENCOUNTER (OUTPATIENT)
Dept: CARDIAC REHAB | Age: 83
Discharge: HOME | End: 2018-03-26
Payer: MEDICARE

## 2018-03-26 PROCEDURE — 93798 PHYS/QHP OP CAR RHAB W/ECG: CPT

## 2018-03-28 ENCOUNTER — APPOINTMENT (OUTPATIENT)
Dept: CARDIAC REHAB | Age: 83
End: 2018-03-28
Payer: MEDICARE

## 2018-04-16 ENCOUNTER — HOSPITAL ENCOUNTER (OUTPATIENT)
Dept: CARDIAC REHAB | Age: 83
Discharge: HOME | End: 2018-04-16
Payer: MEDICARE

## 2018-04-16 VITALS — BODY MASS INDEX: 23.68 KG/M2 | WEIGHT: 169.8 LBS

## 2018-04-16 PROCEDURE — 93798 PHYS/QHP OP CAR RHAB W/ECG: CPT

## 2018-06-25 ENCOUNTER — HOSPITAL ENCOUNTER (OUTPATIENT)
Dept: SLEEP MEDICINE | Age: 83
Discharge: HOME | End: 2018-06-25
Attending: INTERNAL MEDICINE
Payer: MEDICARE

## 2018-06-25 PROCEDURE — 95811 POLYSOM 6/>YRS CPAP 4/> PARM: CPT

## 2018-09-21 ENCOUNTER — APPOINTMENT (RX ONLY)
Dept: URBAN - METROPOLITAN AREA CLINIC 349 | Facility: CLINIC | Age: 83
Setting detail: DERMATOLOGY
End: 2018-09-21

## 2018-09-21 DIAGNOSIS — L57.0 ACTINIC KERATOSIS: ICD-10-CM

## 2018-09-21 DIAGNOSIS — L84 CORNS AND CALLOSITIES: ICD-10-CM

## 2018-09-21 DIAGNOSIS — Z71.89 OTHER SPECIFIED COUNSELING: ICD-10-CM

## 2018-09-21 DIAGNOSIS — Z85.828 PERSONAL HISTORY OF OTHER MALIGNANT NEOPLASM OF SKIN: ICD-10-CM

## 2018-09-21 DIAGNOSIS — D22 MELANOCYTIC NEVI: ICD-10-CM

## 2018-09-21 DIAGNOSIS — L82.1 OTHER SEBORRHEIC KERATOSIS: ICD-10-CM

## 2018-09-21 DIAGNOSIS — L70.8 OTHER ACNE: ICD-10-CM

## 2018-09-21 PROBLEM — J30.1 ALLERGIC RHINITIS DUE TO POLLEN: Status: ACTIVE | Noted: 2018-09-21

## 2018-09-21 PROBLEM — D22.5 MELANOCYTIC NEVI OF TRUNK: Status: ACTIVE | Noted: 2018-09-21

## 2018-09-21 PROCEDURE — 99202 OFFICE O/P NEW SF 15 MIN: CPT | Mod: 25

## 2018-09-21 PROCEDURE — ? OBSERVATION

## 2018-09-21 PROCEDURE — 17003 DESTRUCT PREMALG LES 2-14: CPT

## 2018-09-21 PROCEDURE — 17000 DESTRUCT PREMALG LESION: CPT

## 2018-09-21 PROCEDURE — ? LIQUID NITROGEN

## 2018-09-21 PROCEDURE — ? COUNSELING

## 2018-09-21 ASSESSMENT — LOCATION DETAILED DESCRIPTION DERM
LOCATION DETAILED: RIGHT VENTRAL PROXIMAL FOREARM
LOCATION DETAILED: RIGHT VENTRAL DISTAL FOREARM
LOCATION DETAILED: RIGHT CENTRAL TEMPLE
LOCATION DETAILED: LEFT LATERAL ZYGOMA
LOCATION DETAILED: LEFT DISTAL DORSAL FOREARM
LOCATION DETAILED: LEFT PROXIMAL PRETIBIAL REGION
LOCATION DETAILED: RIGHT PLANTAR FOREFOOT OVERLYING 5TH METATARSAL
LOCATION DETAILED: RIGHT PROXIMAL PRETIBIAL REGION
LOCATION DETAILED: RIGHT SUPERIOR HELIX
LOCATION DETAILED: SUPERIOR THORACIC SPINE
LOCATION DETAILED: RIGHT DISTAL RADIAL DORSAL FOREARM
LOCATION DETAILED: LEFT DISTAL RADIAL DORSAL FOREARM
LOCATION DETAILED: LEFT SUPERIOR MEDIAL MALAR CHEEK
LOCATION DETAILED: LEFT LATERAL ABDOMEN
LOCATION DETAILED: RIGHT MEDIAL SUPERIOR CHEST
LOCATION DETAILED: RIGHT SUPERIOR FOREHEAD
LOCATION DETAILED: LEFT LATERAL ELBOW
LOCATION DETAILED: LEFT SUPERIOR HELIX
LOCATION DETAILED: LEFT RADIAL DORSAL HAND
LOCATION DETAILED: RIGHT CENTRAL MALAR CHEEK
LOCATION DETAILED: LEFT ANTIHELIX
LOCATION DETAILED: STERNUM
LOCATION DETAILED: RIGHT CENTRAL PARIETAL SCALP

## 2018-09-21 ASSESSMENT — LOCATION SIMPLE DESCRIPTION DERM
LOCATION SIMPLE: LEFT CHEEK
LOCATION SIMPLE: LEFT FOREARM
LOCATION SIMPLE: CHEST
LOCATION SIMPLE: LEFT PRETIBIAL REGION
LOCATION SIMPLE: RIGHT EAR
LOCATION SIMPLE: LEFT ELBOW
LOCATION SIMPLE: RIGHT FOREHEAD
LOCATION SIMPLE: LEFT HAND
LOCATION SIMPLE: UPPER BACK
LOCATION SIMPLE: RIGHT TEMPLE
LOCATION SIMPLE: RIGHT PRETIBIAL REGION
LOCATION SIMPLE: ABDOMEN
LOCATION SIMPLE: RIGHT PLANTAR SURFACE
LOCATION SIMPLE: SCALP
LOCATION SIMPLE: LEFT EAR
LOCATION SIMPLE: LEFT ZYGOMA
LOCATION SIMPLE: RIGHT FOREARM
LOCATION SIMPLE: RIGHT CHEEK

## 2018-09-21 ASSESSMENT — LOCATION ZONE DERM
LOCATION ZONE: FEET
LOCATION ZONE: LEG
LOCATION ZONE: TRUNK
LOCATION ZONE: SCALP
LOCATION ZONE: EAR
LOCATION ZONE: HAND
LOCATION ZONE: FACE
LOCATION ZONE: ARM

## 2018-09-21 NOTE — PROCEDURE: LIQUID NITROGEN
Patient is DNR at nursing home and confirms that she would like to remain DNR while hospitalized.    
Detail Level: Detailed
Number Of Freeze-Thaw Cycles: 1 freeze-thaw cycle
Post-Care Instructions: I reviewed with the patient in detail post-care instructions. Patient is to wear sunprotection, and avoid picking at any of the treated lesions. Pt may apply Vaseline to crusted or scabbing areas.
Render Post-Care Instructions In Note?: no
Duration Of Freeze Thaw-Cycle (Seconds): 3
Consent: The patient's consent was obtained including but not limited to risks of crusting, scabbing, blistering, scarring, darker or lighter pigmentary change, recurrence, incomplete removal and infection.

## 2019-01-22 ENCOUNTER — APPOINTMENT (RX ONLY)
Dept: URBAN - METROPOLITAN AREA CLINIC 349 | Facility: CLINIC | Age: 84
Setting detail: DERMATOLOGY
End: 2019-01-22

## 2019-01-22 DIAGNOSIS — D22 MELANOCYTIC NEVI: ICD-10-CM

## 2019-01-22 DIAGNOSIS — Z85.828 PERSONAL HISTORY OF OTHER MALIGNANT NEOPLASM OF SKIN: ICD-10-CM

## 2019-01-22 DIAGNOSIS — L20.89 OTHER ATOPIC DERMATITIS: ICD-10-CM

## 2019-01-22 PROBLEM — D22.5 MELANOCYTIC NEVI OF TRUNK: Status: ACTIVE | Noted: 2019-01-22

## 2019-01-22 PROBLEM — L30.9 DERMATITIS, UNSPECIFIED: Status: ACTIVE | Noted: 2019-01-22

## 2019-01-22 PROCEDURE — 99214 OFFICE O/P EST MOD 30 MIN: CPT | Mod: 25

## 2019-01-22 PROCEDURE — ? COUNSELING

## 2019-01-22 PROCEDURE — ? PRESCRIPTION

## 2019-01-22 PROCEDURE — 11301 SHAVE SKIN LESION 0.6-1.0 CM: CPT

## 2019-01-22 PROCEDURE — ? SHAVE REMOVAL

## 2019-01-22 PROCEDURE — A4550 SURGICAL TRAYS: HCPCS

## 2019-01-22 PROCEDURE — ? OTHER

## 2019-01-22 RX ORDER — BETAMETHASONE DIPROPIONATE 0.5 MG/G
CREAM TOPICAL
Qty: 1 | Refills: 1 | COMMUNITY
Start: 2019-01-22

## 2019-01-22 RX ADMIN — BETAMETHASONE DIPROPIONATE: 0.5 CREAM TOPICAL at 00:00

## 2019-01-22 ASSESSMENT — LOCATION DETAILED DESCRIPTION DERM
LOCATION DETAILED: RIGHT SUPERIOR HELIX
LOCATION DETAILED: RIGHT ANTERIOR SHOULDER
LOCATION DETAILED: LEFT CLAVICULAR SKIN
LOCATION DETAILED: LEFT LATERAL ABDOMEN

## 2019-01-22 ASSESSMENT — LOCATION SIMPLE DESCRIPTION DERM
LOCATION SIMPLE: ABDOMEN
LOCATION SIMPLE: LEFT CLAVICULAR SKIN
LOCATION SIMPLE: RIGHT SHOULDER
LOCATION SIMPLE: RIGHT EAR

## 2019-01-22 ASSESSMENT — LOCATION ZONE DERM
LOCATION ZONE: ARM
LOCATION ZONE: TRUNK
LOCATION ZONE: EAR

## 2019-01-22 ASSESSMENT — SEVERITY ASSESSMENT: SEVERITY: MILD TO MODERATE

## 2019-01-22 NOTE — PROCEDURE: SHAVE REMOVAL
Notification Instructions: Patient will be notified of biopsy results. However, patient instructed to call the office if not contacted within 2 weeks.
Hemostasis: Aluminum Chloride
Render Post-Care Instructions In Note?: yes
X Size Of Lesion In Cm (Optional): 0
Anesthesia Type: 1% lidocaine with 1:100,000 epinephrine and a 1:10 solution of 8.4% sodium bicarbonate
Post-Care Instructions: I reviewed with the patient in detail post-care instructions. Patient is to keep the biopsy site dry overnight, and then apply Vaseline daily until healed. Patient may apply hydrogen peroxide soaks to remove any crusting. After the procedure, the patient was oriented to person, place, and time. Patient denied feeling dizzy, queasy, and and declined further observation after initial 5 minute observation time.
Size Of Lesion In Cm (Required): 0.8
Bill 17836 For Specimen Handling/Conveyance To Laboratory?: no
Biopsy Method: Dermablade
Billing Type: Third-Party Bill
Consent was obtained from the patient. The risks and benefits to therapy were discussed in detail. Specifically, the risks of infection, scarring, bleeding, prolonged wound healing, incomplete removal, allergy to anesthesia, nerve injury and recurrence were addressed. Prior to the procedure, the treatment site was clearly identified and confirmed by the patient. All components of Universal Protocol/PAUSE Rule completed.
Anesthesia Volume In Cc: 1.5
Accession #: [facility]
Medical Necessity Information: It is in your best interest to select a reason for this procedure from the list below. All of these items fulfill various CMS LCD requirements except the new and changing color options.
Wound Care: Vaseline
Detail Level: Detailed
Medical Necessity Clause: This procedure was medically necessary because the lesion has a history of change

## 2019-01-22 NOTE — PROCEDURE: OTHER
Detail Level: Detailed
Other (Free Text): Patient believes this may be caused due to adhesive on his medication patches.
Note Text (......Xxx Chief Complaint.): This diagnosis correlates with the

## 2019-03-18 ENCOUNTER — APPOINTMENT (RX ONLY)
Dept: URBAN - METROPOLITAN AREA CLINIC 349 | Facility: CLINIC | Age: 84
Setting detail: DERMATOLOGY
End: 2019-03-18

## 2019-03-18 DIAGNOSIS — D22 MELANOCYTIC NEVI: ICD-10-CM

## 2019-03-18 PROBLEM — D22.5 MELANOCYTIC NEVI OF TRUNK: Status: ACTIVE | Noted: 2019-03-18

## 2019-03-18 PROCEDURE — ? COUNSELING

## 2019-03-18 PROCEDURE — ? OTHER

## 2019-03-18 ASSESSMENT — LOCATION DETAILED DESCRIPTION DERM: LOCATION DETAILED: LEFT LATERAL ABDOMEN

## 2019-03-18 ASSESSMENT — LOCATION SIMPLE DESCRIPTION DERM: LOCATION SIMPLE: ABDOMEN

## 2019-03-18 ASSESSMENT — LOCATION ZONE DERM: LOCATION ZONE: TRUNK

## 2019-03-18 NOTE — PROCEDURE: OTHER
Detail Level: Detailed
Other (Free Text): Biopsy proven. Nevus extended to base of shave. Patient chose to observe this lesion. Patient had this appointment unnecessarily since the mole was removed. Site is healing. No sign of recurrence.
Note Text (......Xxx Chief Complaint.): This diagnosis correlates with the

## 2019-05-18 ENCOUNTER — HOSPITAL ENCOUNTER (EMERGENCY)
Age: 84
Discharge: ANOTHER HOSPITAL | DRG: 379 | End: 2019-05-19
Payer: MEDICARE

## 2019-05-18 DIAGNOSIS — K92.2 ACUTE GI BLEEDING: Primary | ICD-10-CM

## 2019-05-18 LAB
ANION GAP SERPL CALC-SCNC: 6 MMOL/L (ref 7–16)
BASOPHILS # BLD: 0 K/UL (ref 0–0.2)
BASOPHILS NFR BLD: 1 % (ref 0–2)
BUN SERPL-MCNC: 30 MG/DL (ref 8–23)
CALCIUM SERPL-MCNC: 8.9 MG/DL (ref 8.3–10.4)
CHLORIDE SERPL-SCNC: 106 MMOL/L (ref 98–107)
CO2 SERPL-SCNC: 30 MMOL/L (ref 21–32)
CREAT SERPL-MCNC: 1.09 MG/DL (ref 0.8–1.5)
DIFFERENTIAL METHOD BLD: ABNORMAL
EOSINOPHIL # BLD: 0.2 K/UL (ref 0–0.8)
EOSINOPHIL NFR BLD: 4 % (ref 0.5–7.8)
ERYTHROCYTE [DISTWIDTH] IN BLOOD BY AUTOMATED COUNT: 13.3 % (ref 11.9–14.6)
GLUCOSE SERPL-MCNC: 116 MG/DL (ref 65–100)
HCT VFR BLD AUTO: 34.6 % (ref 41.1–50.3)
HGB BLD-MCNC: 11.1 G/DL (ref 13.6–17.2)
IMM GRANULOCYTES # BLD AUTO: 0 K/UL (ref 0–0.5)
IMM GRANULOCYTES NFR BLD AUTO: 0 % (ref 0–5)
LYMPHOCYTES # BLD: 1.6 K/UL (ref 0.5–4.6)
LYMPHOCYTES NFR BLD: 32 % (ref 13–44)
MCH RBC QN AUTO: 31.2 PG (ref 26.1–32.9)
MCHC RBC AUTO-ENTMCNC: 32.1 G/DL (ref 31.4–35)
MCV RBC AUTO: 97.2 FL (ref 79.6–97.8)
MONOCYTES # BLD: 0.6 K/UL (ref 0.1–1.3)
MONOCYTES NFR BLD: 11 % (ref 4–12)
NEUTS SEG # BLD: 2.6 K/UL (ref 1.7–8.2)
NEUTS SEG NFR BLD: 52 % (ref 43–78)
NRBC # BLD: 0 K/UL (ref 0–0.2)
PLATELET # BLD AUTO: 217 K/UL (ref 150–450)
PMV BLD AUTO: 9.3 FL (ref 9.4–12.3)
POTASSIUM SERPL-SCNC: 4.2 MMOL/L (ref 3.5–5.1)
RBC # BLD AUTO: 3.56 M/UL (ref 4.23–5.6)
SODIUM SERPL-SCNC: 142 MMOL/L (ref 136–145)
WBC # BLD AUTO: 4.9 K/UL (ref 4.3–11.1)

## 2019-05-18 PROCEDURE — 99285 EMERGENCY DEPT VISIT HI MDM: CPT

## 2019-05-18 PROCEDURE — 74011250637 HC RX REV CODE- 250/637

## 2019-05-18 PROCEDURE — 80048 BASIC METABOLIC PNL TOTAL CA: CPT

## 2019-05-18 PROCEDURE — 85025 COMPLETE CBC W/AUTO DIFF WBC: CPT

## 2019-05-18 RX ORDER — PANTOPRAZOLE SODIUM 40 MG/1
40 TABLET, DELAYED RELEASE ORAL
Status: COMPLETED | OUTPATIENT
Start: 2019-05-18 | End: 2019-05-18

## 2019-05-18 RX ADMIN — PANTOPRAZOLE SODIUM 40 MG: 40 TABLET, DELAYED RELEASE ORAL at 23:53

## 2019-05-19 ENCOUNTER — HOSPITAL ENCOUNTER (INPATIENT)
Age: 84
LOS: 1 days | Discharge: HOME | DRG: 379 | End: 2019-05-20
Attending: INTERNAL MEDICINE | Admitting: HOSPITALIST
Payer: MEDICARE

## 2019-05-19 VITALS
DIASTOLIC BLOOD PRESSURE: 56 MMHG | SYSTOLIC BLOOD PRESSURE: 132 MMHG | WEIGHT: 168 LBS | OXYGEN SATURATION: 95 % | BODY MASS INDEX: 23.52 KG/M2 | HEIGHT: 71 IN | TEMPERATURE: 98.4 F | HEART RATE: 52 BPM | RESPIRATION RATE: 16 BRPM

## 2019-05-19 PROBLEM — K21.9 GERD (GASTROESOPHAGEAL REFLUX DISEASE): Status: ACTIVE | Noted: 2019-05-19

## 2019-05-19 LAB
ABO + RH BLD: NORMAL
BLOOD GROUP ANTIBODIES SERPL: NORMAL
ERYTHROCYTE [DISTWIDTH] IN BLOOD BY AUTOMATED COUNT: 13.2 % (ref 11.9–14.6)
HCT VFR BLD AUTO: 29.2 % (ref 41.1–50.3)
HGB BLD-MCNC: 9.2 G/DL (ref 13.6–17.2)
INR PPP: 1.1
MAGNESIUM SERPL-MCNC: 2.3 MG/DL (ref 1.8–2.4)
MCH RBC QN AUTO: 30.7 PG (ref 26.1–32.9)
MCHC RBC AUTO-ENTMCNC: 31.5 G/DL (ref 31.4–35)
MCV RBC AUTO: 97.3 FL (ref 79.6–97.8)
NRBC # BLD: 0 K/UL (ref 0–0.2)
PLATELET # BLD AUTO: 201 K/UL (ref 150–450)
PMV BLD AUTO: 9.4 FL (ref 9.4–12.3)
PROTHROMBIN TIME: 13.6 SEC (ref 11.7–14.5)
RBC # BLD AUTO: 3 M/UL (ref 4.23–5.6)
SPECIMEN EXP DATE BLD: NORMAL
WBC # BLD AUTO: 4.7 K/UL (ref 4.3–11.1)

## 2019-05-19 PROCEDURE — 74011250637 HC RX REV CODE- 250/637: Performed by: INTERNAL MEDICINE

## 2019-05-19 PROCEDURE — 86900 BLOOD TYPING SEROLOGIC ABO: CPT

## 2019-05-19 PROCEDURE — 77030020263 HC SOL INJ SOD CL0.9% LFCR 1000ML

## 2019-05-19 PROCEDURE — 36415 COLL VENOUS BLD VENIPUNCTURE: CPT

## 2019-05-19 PROCEDURE — 74011250636 HC RX REV CODE- 250/636: Performed by: HOSPITALIST

## 2019-05-19 PROCEDURE — 85027 COMPLETE CBC AUTOMATED: CPT

## 2019-05-19 PROCEDURE — 74011000250 HC RX REV CODE- 250: Performed by: HOSPITALIST

## 2019-05-19 PROCEDURE — 83735 ASSAY OF MAGNESIUM: CPT

## 2019-05-19 PROCEDURE — 65270000029 HC RM PRIVATE

## 2019-05-19 PROCEDURE — 77030032490 HC SLV COMPR SCD KNE COVD -B

## 2019-05-19 PROCEDURE — 85610 PROTHROMBIN TIME: CPT

## 2019-05-19 PROCEDURE — C9113 INJ PANTOPRAZOLE SODIUM, VIA: HCPCS | Performed by: HOSPITALIST

## 2019-05-19 RX ORDER — ONDANSETRON 2 MG/ML
4 INJECTION INTRAMUSCULAR; INTRAVENOUS
Status: DISCONTINUED | OUTPATIENT
Start: 2019-05-19 | End: 2019-05-20 | Stop reason: HOSPADM

## 2019-05-19 RX ORDER — SODIUM CHLORIDE 0.9 % (FLUSH) 0.9 %
5-40 SYRINGE (ML) INJECTION EVERY 8 HOURS
Status: DISCONTINUED | OUTPATIENT
Start: 2019-05-19 | End: 2019-05-20 | Stop reason: HOSPADM

## 2019-05-19 RX ORDER — SODIUM CHLORIDE 0.9 % (FLUSH) 0.9 %
5-40 SYRINGE (ML) INJECTION AS NEEDED
Status: DISCONTINUED | OUTPATIENT
Start: 2019-05-19 | End: 2019-05-20 | Stop reason: HOSPADM

## 2019-05-19 RX ORDER — SODIUM CHLORIDE 9 MG/ML
1000 INJECTION, SOLUTION INTRAVENOUS CONTINUOUS
Status: DISCONTINUED | OUTPATIENT
Start: 2019-05-19 | End: 2019-05-20

## 2019-05-19 RX ORDER — ACETAMINOPHEN 325 MG/1
650 TABLET ORAL
Status: DISCONTINUED | OUTPATIENT
Start: 2019-05-19 | End: 2019-05-20 | Stop reason: HOSPADM

## 2019-05-19 RX ADMIN — Medication 10 ML: at 17:01

## 2019-05-19 RX ADMIN — PANTOPRAZOLE SODIUM 40 MG: 40 INJECTION, POWDER, FOR SOLUTION INTRAVENOUS at 08:55

## 2019-05-19 RX ADMIN — Medication 5 ML: at 21:35

## 2019-05-19 RX ADMIN — PANTOPRAZOLE SODIUM 40 MG: 40 INJECTION, POWDER, FOR SOLUTION INTRAVENOUS at 21:35

## 2019-05-19 RX ADMIN — SODIUM CHLORIDE 1000 ML: 900 INJECTION, SOLUTION INTRAVENOUS at 01:59

## 2019-05-19 RX ADMIN — SODIUM CHLORIDE 1000 ML: 900 INJECTION, SOLUTION INTRAVENOUS at 12:50

## 2019-05-19 RX ADMIN — ACETAMINOPHEN 650 MG: 325 TABLET, FILM COATED ORAL at 21:34

## 2019-05-19 RX ADMIN — PANTOPRAZOLE SODIUM 40 MG: 40 INJECTION, POWDER, FOR SOLUTION INTRAVENOUS at 01:59

## 2019-05-19 RX ADMIN — Medication 5 ML: at 02:06

## 2019-05-19 NOTE — ED NOTES
TRANSFER - OUT REPORT: 
 
Verbal report given to [employer] on Meme Fontenot  being transferred to [id number] for routine progression of care Report consisted of patients Situation, Background, Assessment and  
Recommendations(SBAR). Information from the following report(s) SBAR, Kardex, ED Summary and MAR was reviewed with the receiving nurse. Lines:  
Peripheral IV 05/18/19 (Active) Site Assessment Clean, dry, & intact 5/18/2019 11:14 PM  
Phlebitis Assessment 0 5/18/2019 11:14 PM  
Infiltration Assessment 0 5/18/2019 11:14 PM  
Dressing Status Clean 5/18/2019 11:14 PM  
Hub Color/Line Status Blue 5/18/2019 11:14 PM  
Alcohol Cap Used Yes 5/18/2019 11:14 PM  
  
 
Opportunity for questions and clarification was provided. Patient transported with: 
 [facility] [employer] [facility]

## 2019-05-19 NOTE — PROGRESS NOTES
Patient in good spirits  Receptive to visit  Expressed his henrry in the [address] [place] [address] by [employer], volunteer    Fawn Gonsalez, staff Zaid hebert [address], [address] [facility]  /   [email or web address]

## 2019-05-19 NOTE — ED PROVIDER NOTES
60-year-old male complaining of right red blood per rectum. Patient has small bowel movement that blood intermixed with it earlier tonight. He had 2 more bowel movements with last one being nothing that appeared blood in the toilet. Patient has a history of GI bleeding many years ago requiring transfusion. From the description it sounds like possible AVM was not told he had polyps or other problems. The history is provided by the patient. Melena The current episode started today. The onset was sudden. The problem has been rapidly worsening. The patient is experiencing no pain. Pertinent negatives include no anorexia, no fever, no abdominal pain, no diarrhea, no hematemesis, no hemorrhoids, no nausea, no rectal pain and no vomiting. Past Medical History:  
Diagnosis Date  Asymptomatic gallstones  CAD (coronary artery disease) 11/17/2017  Centrilobular emphysema CT Findings ([address]  Chronic anemia  Chronic renal insufficiency, stage III (moderate) (HCC)  Colon polyps  Dyslipidemia  Ex-smoker for more than 1 year Quit 1977 -  15 cig. daily x 25 yrs  GERD (gastroesophageal reflux disease)  History of penile implant  History of stomach ulcers 1959  Iron deficiency anemia  Late onset Alzheimer's disease without behavioral disturbance  Mood disorder ([address]  RONY on CPAP   
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  
Procedure Laterality Date  HX APPENDECTOMY  [id number]  HX CATARACT REMOVAL Bilateral 2007  HX CORONARY ARTERY BYPASS GRAFT  11/17/2017 CABG x4, Dr. Yanely Au  HX GI  1959  
 peptic ulcer  HX HEART CATHETERIZATION  11/15/2017  
 mulivessel disease  HX ORTHOPAEDIC Right   
 fx R arm x 2- -required hardware and later removed  HX UROLOGICAL    
 penile implant Family History:  
Problem Relation Age of Onset  Heart Disease Father  Stroke Brother Social History Socioeconomic History  Marital status:  Spouse name: Not on file  Number of children: 3  
 Years of education: Not on file  Highest education level: Not on file Occupational History  Occupation: [employer] Social Needs  Financial resource strain: Not on file  Food insecurity:  
  Worry: Not on file Inability: Not on file  Transportation needs:  
  Medical: Not on file Non-medical: Not on file Tobacco Use  Smoking status: Former Smoker Packs/day: 1.50 Years: 25.00 Pack years: 37.50 Types: Cigarettes Last attempt to quit:  Years since quittin.4  Smokeless tobacco: Never Used Substance and Sexual Activity  Alcohol use: No  
 Drug use: No  
 Sexual activity: Not on file Lifestyle  Physical activity:  
  Days per week: Not on file Minutes per session: Not on file  Stress: Not on file Relationships  Social connections:  
  Talks on phone: Not on file Gets together: Not on file Attends Religion service: Not on file Active member of club or organization: Not on file Attends meetings of clubs or organizations: Not on file Relationship status: Not on file  Intimate partner violence:  
  Fear of current or ex partner: Not on file Emotionally abused: Not on file Physically abused: Not on file Forced sexual activity: Not on file Other Topics Concern  Not on file Social History Narrative Has 3 biologic children, 3 step kids.  and lives with wife. Retired from sales. No pets. ALLERGIES: Patient has no known allergies. Review of Systems Constitutional: Negative. Negative for activity change and fever. HENT: Negative. Eyes: Negative. Respiratory: Negative. Cardiovascular: Negative. Gastrointestinal: Positive for melena.  Negative for abdominal pain, anorexia, diarrhea, hematemesis, hemorrhoids, nausea, rectal pain and vomiting. Genitourinary: Negative. Musculoskeletal: Negative. Skin: Negative. Neurological: Negative. Psychiatric/Behavioral: Negative. All other systems reviewed and are negative. Vitals:  
 05/18/19 2215 BP: 156/72 Pulse: (!) 52 Resp: 16 Temp: 98.4 °F (36.9 °C) SpO2: 94% Weight: 76.2 kg (168 lb) Height: 5' 11\" (1.803 m) Physical Exam  
Constitutional: He is oriented to person, place, and time. He appears well-developed and well-nourished. No distress. HENT:  
Head: Normocephalic and atraumatic. Right Ear: External ear normal.  
Left Ear: External ear normal.  
Nose: Nose normal.  
Mouth/Throat: Oropharynx is clear and moist. No oropharyngeal exudate. Eyes: Pupils are equal, round, and reactive to light. Conjunctivae and EOM are normal. Right eye exhibits no discharge. Left eye exhibits no discharge. No scleral icterus. Neck: Normal range of motion. Neck supple. No JVD present. No tracheal deviation present. Cardiovascular: Normal rate, regular rhythm and intact distal pulses. Pulmonary/Chest: Effort normal and breath sounds normal. No stridor. No respiratory distress. He has no wheezes. He exhibits no tenderness. Abdominal: Soft. Bowel sounds are normal. He exhibits no distension and no mass. There is no tenderness. Genitourinary: Rectal exam shows guaiac positive stool. Musculoskeletal: Normal range of motion. He exhibits no edema or tenderness. Neurological: He is alert and oriented to person, place, and time. No cranial nerve deficit. Skin: Skin is warm and dry. No rash noted. He is not diaphoretic. No erythema. No pallor. Psychiatric: He has a normal mood and affect. His behavior is normal. Thought content normal.  
Nursing note and vitals reviewed. MDM Number of Diagnoses or Management Options Diagnosis management comments: Medications here/ 
 
 Blood per rectum without obvious hemorrhoidal bleeding. Consult to GI admission to hospitalist transfer downtown Amount and/or Complexity of Data Reviewed Clinical lab tests: ordered and reviewed Tests in the radiology section of CPT®: ordered and reviewed Tests in the medicine section of CPT®: ordered and reviewed Independent visualization of images, tracings, or specimens: yes Risk of Complications, Morbidity, and/or Mortality Presenting problems: high Diagnostic procedures: high Management options: high Procedures

## 2019-05-19 NOTE — H&P
Hospitalist H&P/Consult Note     Admit Date:  No admission date for patient encounter. Name:  Surinder Centeno   Age:  80 y.o.  :  1934   MRN:  [id number]   PCP:  Nasreen Wilson MD  Treatment Team: Attending Provider: Jeniffer Lau DO    HPI:   Patient is an 81 y/o male with hx CAD, CABG, lower GI bleed requiring multiple transfusions and colonoscopy with cauterization, CKD, COPD, GERD, diverticulosis who presents to ED tonight after the sudden onset of profuse lower GI bleeding tonight. He has had several episodes of bright blood per rectum. Denies pain but has a sense of urgency. Denies chest pain or shortness of breath. No hx problems with anesthesia. Hemoglobin tonight is 11.1. He has had an additional episode of profuse GI bleed in ED after this lab performed. Blood pressure is 132/56. GI has been notified who prefers patient be transferred to [facility] in case needs to got to GI lab. 10 systems reviewed and negative except as noted in HPI.   Past Medical History:   Diagnosis Date    Asymptomatic gallstones     CAD (coronary artery disease) 2017    Centrilobular emphysema CT Findings [address]     Chronic anemia     Chronic renal insufficiency, stage III (moderate) (HCC)     Colon polyps     Dyslipidemia     Ex-smoker for more than 1 year     Quit  -  15 cig. 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 Vriaj Fenton  11/15/2017 mulivessel disease    HX ORTHOPAEDIC Right     fx R arm x 2- -required hardware and later removed    HX UROLOGICAL      penile implant      Cannot display prior to admission medications because the patient has not been admitted in this contact. No Known Allergies   Social History     Tobacco Use    Smoking status: Former Smoker     Packs/day: 1.50     Years: 25.00     Pack years: 37.50     Types: Cigarettes     Last attempt to quit: 1977     Years since quittin.4    Smokeless tobacco: Never Used   Substance Use Topics    Alcohol use: No      Family History   Problem Relation Age of Onset    Heart Disease Father     Stroke Brother       Immunization History   Administered Date(s) Administered    TB Skin Test (PPD) Intradermal 2017       Objective:   No data found. No intake or output data in the 24 hours ending 19 0102    Physical Exam:  General:    Well nourished. Alert. Eyes:   Normal sclera. Extraocular movements intact. ENT:  Normocephalic, atraumatic. Moist mucous membranes  CV:   RRR. No murmur, rub, or gallop. Lungs:  CTAB. No wheezing, rhonchi, or rales. Abdomen: Soft, nontender, nondistended. Bowel sounds normal.   Extremities: Warm and dry. No cyanosis or edema. Neurologic: CN II-XII grossly intact. Sensation intact. Skin:     No rashes or jaundice. No wounds. Psych:  Normal mood and affect. I reviewed the labs, imaging, EKGs, telemetry, and other studies done this admission.   Data Review:   Recent Results (from the past 24 hour(s))   CBC WITH AUTOMATED DIFF    Collection Time: 19 10:42 PM   Result Value Ref Range    WBC 4.9 4.3 - 11.1 K/uL    RBC 3.56 (L) 4.23 - 5.6 M/uL    HGB 11.1 (L) 13.6 - 17.2 g/dL    HCT 34.6 (L) 41.1 - 50.3 %    MCV 97.2 79.6 - 97.8 FL    MCH 31.2 26.1 - 32.9 PG    MCHC 32.1 31.4 - 35.0 g/dL    RDW 13.3 11.9 - 14.6 %    PLATELET 618 160 - 669 K/uL    MPV 9.3 (L) 9.4 - 12.3 FL    ABSOLUTE NRBC 0.00 0.0 - 0.2 K/uL    DF AUTOMATED      NEUTROPHILS 52 43 - 78 %    LYMPHOCYTES 32 13 - 44 %    MONOCYTES 11 4.0 - 12.0 %    EOSINOPHILS 4 0.5 - 7.8 %    BASOPHILS 1 0.0 - 2.0 %    IMMATURE GRANULOCYTES 0 0.0 - 5.0 %    ABS. NEUTROPHILS 2.6 1.7 - 8.2 K/UL    ABS. LYMPHOCYTES 1.6 0.5 - 4.6 K/UL    ABS. MONOCYTES 0.6 0.1 - 1.3 K/UL    ABS. EOSINOPHILS 0.2 0.0 - 0.8 K/UL    ABS. BASOPHILS 0.0 0.0 - 0.2 K/UL    ABS. IMM.  GRANS. 0.0 0.0 - 0.5 K/UL   METABOLIC PANEL, BASIC    Collection Time: 05/18/19 10:42 PM   Result Value Ref Range    Sodium 142 136 - 145 mmol/L    Potassium 4.2 3.5 - 5.1 mmol/L    Chloride 106 98 - 107 mmol/L    CO2 30 21 - 32 mmol/L    Anion gap 6 (L) 7 - 16 mmol/L    Glucose 116 (H) 65 - 100 mg/dL    BUN 30 (H) 8 - 23 MG/DL    Creatinine 1.09 0.8 - 1.5 MG/DL    GFR est AA >60 >60 ml/min/1.73m2    GFR est non-AA >60 >60 ml/min/1.73m2    Calcium 8.9 8.3 - 10.4 MG/DL       Imaging Studies:  CXR Results  (Last 48 hours)    None        CT Results  (Last 48 hours)    None          Assessment and Plan:     Hospital Problems as of 5/19/2019 Date Reviewed: 1/30/2019          Codes Class Noted - Resolved POA    Lower GI bleed ICD-10-CM: K92.2  ICD-9-CM: 578.9  8/24/2017 - Present Yes        Sigmoid diverticulosis (Chronic) ICD-10-CM: K57.30  ICD-9-CM: 562.10  Unknown - Present Yes        GERD (gastroesophageal reflux disease) ICD-10-CM: K21.9  ICD-9-CM: 530.81  5/19/2019 - Present Yes        RONY on CPAP ICD-10-CM: G47.33, Z99.89  ICD-9-CM: 327.23, V46.8  Unknown - Present Yes        COPD, severe (HCC) (Chronic) ICD-10-CM: J44.9  ICD-9-CM: 496  1/22/2018 - Present Yes        Bilateral carotid artery disease ([facility] [address] ICD-10-CM: I77.9  ICD-9-CM: 447.9  11/28/2017 - Present Yes        S/P CABG x 4 ICD-10-CM: Z95.1  ICD-9-CM: V45.81  11/17/2017 - Present Yes        CAD (coronary artery disease) (Chronic) ICD-10-CM: I25.10  ICD-9-CM: 414.00  11/17/2017 - Present Yes    Overview Signed 12/13/2017  2:42 PM by Miri Padgett NP     11/17/17 (Dr Pallavi Ge) Coronary artery bypass grafting x4 grafts   consisting of:   1. Left internal mammary artery to left anterior descending. 2. Reverse saphenous vein graft to diagonal 1.   3. Reverse saphenous vein graft to obtuse marginal.   4. Reverse saphenous vein graft to the posterior descending   artery.                Chronic anemia ICD-10-CM: D64.9  ICD-9-CM: 285. 9  Unknown - Present Yes              PLAN:  · Admit inpatient to medical bed  · GI prefers patient be transferred to [facility] for possible GI procedure  · NPO for now. Bedrest with bedside commode  · Serial CBC, type and screen.  Transfuse if necessary  · IV protonix 40 mg BID  · GI consult in am  · Place SCDs for DVT prophylaxis      Estimated LOS:  2 midnights    Signed:  Gilda Weller MD

## 2019-05-19 NOTE — PROGRESS NOTES
Problem: Falls - Risk of  Goal: *Absence of Falls  Description  Document Lyndsey Bryant Fall Risk and appropriate interventions in the flowsheet.   Outcome: Progressing Towards Goal     Problem: Patient Education: Go to Patient Education Activity  Goal: Patient/Family Education  Outcome: Progressing Towards Goal

## 2019-05-20 VITALS
HEART RATE: 47 BPM | SYSTOLIC BLOOD PRESSURE: 166 MMHG | TEMPERATURE: 96.4 F | RESPIRATION RATE: 17 BRPM | DIASTOLIC BLOOD PRESSURE: 66 MMHG | OXYGEN SATURATION: 94 %

## 2019-05-20 LAB
HCT VFR BLD AUTO: 32.9 % (ref 41.1–50.3)
HGB BLD-MCNC: 10.5 G/DL (ref 13.6–17.2)

## 2019-05-20 PROCEDURE — 85018 HEMOGLOBIN: CPT

## 2019-05-20 PROCEDURE — 94760 N-INVAS EAR/PLS OXIMETRY 1: CPT

## 2019-05-20 PROCEDURE — 36415 COLL VENOUS BLD VENIPUNCTURE: CPT

## 2019-05-20 PROCEDURE — 74011250636 HC RX REV CODE- 250/636: Performed by: HOSPITALIST

## 2019-05-20 PROCEDURE — 74011250637 HC RX REV CODE- 250/637: Performed by: HOSPITALIST

## 2019-05-20 RX ORDER — ATORVASTATIN CALCIUM 10 MG/1
20 TABLET, FILM COATED ORAL DAILY
Status: DISCONTINUED | OUTPATIENT
Start: 2019-05-20 | End: 2019-05-20 | Stop reason: HOSPADM

## 2019-05-20 RX ORDER — FERROUS FUMARATE 324(106)MG
1 TABLET ORAL DAILY
Qty: 90 TAB | Refills: 3 | Status: SHIPPED | OUTPATIENT
Start: 2019-05-20 | End: 2019-12-18

## 2019-05-20 RX ORDER — PANTOPRAZOLE SODIUM 40 MG/1
40 TABLET, DELAYED RELEASE ORAL
Status: DISCONTINUED | OUTPATIENT
Start: 2019-05-21 | End: 2019-05-20 | Stop reason: HOSPADM

## 2019-05-20 RX ADMIN — Medication 5 ML: at 15:51

## 2019-05-20 RX ADMIN — ATORVASTATIN CALCIUM 20 MG: 10 TABLET, FILM COATED ORAL at 11:28

## 2019-05-20 RX ADMIN — SODIUM CHLORIDE 1000 ML: 900 INJECTION, SOLUTION INTRAVENOUS at 05:19

## 2019-05-20 NOTE — DISCHARGE INSTRUCTIONS
Come back to er if any new rectal bleeding- persistent. Avoid NSAID. Report to physician: back and tarry stools, vomiting coffee ground material,  extreme dizziness/fatigue, shortness of breath, or feeling faint    Go to emergency room if you are vomiting bright red blood or having bright red blood in stools     Avoid over the counter antiinflammatory medications : Motrin, Ibuprofen, Aleve     Avoid smoking    Avoid use of alcohol      Patient Education        Gastrointestinal Bleeding: Care Instructions  Your Care Instructions    The digestive or gastrointestinal tract goes from the mouth to the anus. It is often called the GI tract. Bleeding can happen anywhere in the GI tract. It may be caused by an ulcer, an infection, or cancer. It may also be caused by medicines such as aspirin or ibuprofen. Light bleeding may not cause any symptoms at first. But if you continue to bleed for a while, you may feel very weak or tired. Sudden, heavy bleeding means you need to see a doctor right away. This kind of bleeding can be very dangerous. But it can usually be cured or controlled. The doctor may do some tests to find the cause of your bleeding. Follow-up care is a key part of your treatment and safety. Be sure to make and go to all appointments, and call your doctor if you are having problems. It's also a good idea to know your test results and keep a list of the medicines you take. How can you care for yourself at home? · Be safe with medicines. Take your medicines exactly as prescribed. Call your doctor if you think you are having a problem with your medicine. You will get more details on the specific medicines your doctor prescribes. · Do not take aspirin or other anti-inflammatory medicines, such as naproxen (Aleve) or ibuprofen (Advil, Motrin), without talking to your doctor first. Ask your doctor if it is okay to use acetaminophen (Tylenol). · Do not drink alcohol. · The bleeding may make you lose iron. So it's important to eat foods that have a lot of iron. These include red meat, shellfish, poultry, and eggs. They also include beans, raisins, whole-grain breads, and leafy green vegetables. If you want help planning meals, you can make an appointment with a dietitian. When should you call for help? Call 911 anytime you think you may need emergency care. For example, call if:    · You have sudden, severe belly pain.     · You vomit blood or what looks like coffee grounds.     · You passed out (lost consciousness).     · Your stools are maroon or very bloody.    Call your doctor now or seek immediate medical care if:    · You are dizzy or lightheaded, or you feel like you may faint.     · Your stools are black and look like tar, or they have streaks of blood.     · You have belly pain.     · You vomit or have nausea.     · You have trouble swallowing, or it hurts when you swallow.    Watch closely for changes in your health, and be sure to contact your doctor if:    · You do not get better as expected. Where can you learn more? Go to http://sylvia-hector.info/. Enter H503 in the search box to learn more about \"Gastrointestinal Bleeding: Care Instructions. \"  Current as of: September 23, 2018  Content Version: 11.9  © 0135-6284 [employer]. Care instructions adapted under license by [employer] (which disclaims liability or warranty for this information). If you have questions about a medical condition or this instruction, always ask your healthcare professional. [place] [address] any warranty or liability for your use of this information.          DISCHARGE SUMMARY from Nurse    PATIENT INSTRUCTIONS:    After general anesthesia or intravenous sedation, for 24 hours or while taking prescription Narcotics:  · Limit your activities  · Do not drive and operate hazardous machinery  · Do not make important personal or business decisions  · Do  not drink alcoholic beverages  · If you have not urinated within 8 hours after discharge, please contact your surgeon on call. Report the following to your surgeon:  · Excessive pain, swelling, redness or odor of or around the surgical area  · Temperature over 100.5  · Nausea and vomiting lasting longer than 4 hours or if unable to take medications  · Any signs of decreased circulation or nerve impairment to extremity: change in color, persistent  numbness, tingling, coldness or increase pain  · Any questions    What to do at Home:  Recommended activity: Activity as tolerated,     If you experience any of the following symptoms see discharg einstructions, please follow up with primary care. *  Please give a list of your current medications to your Primary Care Provider. *  Please update this list whenever your medications are discontinued, doses are      changed, or new medications (including over-the-counter products) are added. *  Please carry medication information at all times in case of emergency situations. These are general instructions for a healthy lifestyle:    No smoking/ No tobacco products/ Avoid exposure to second hand smoke  Surgeon General's Warning:  Quitting smoking now greatly reduces serious risk to your health. Obesity, smoking, and sedentary lifestyle greatly increases your risk for illness    A healthy diet, regular physical exercise & weight monitoring are important for maintaining a healthy lifestyle    You may be retaining fluid if you have a history of heart failure or if you experience any of the following symptoms:  Weight gain of 3 pounds or more overnight or 5 pounds in a week, increased swelling in our hands or feet or shortness of breath while lying flat in bed. Please call your doctor as soon as you notice any of these symptoms; do not wait until your next office visit.     Recognize signs and symptoms of STROKE:    F-face looks uneven    A-arms unable to move or move unevenly    S-speech slurred or non-existent    T-time-call 911 as soon as signs and symptoms begin-DO NOT go       Back to bed or wait to see if you get better-TIME IS BRAIN. Warning Signs of HEART ATTACK     Call 911 if you have these symptoms:   Chest discomfort. Most heart attacks involve discomfort in the center of the chest that lasts more than a few minutes, or that goes away and comes back. It can feel like uncomfortable pressure, squeezing, fullness, or pain.  Discomfort in other areas of the upper body. Symptoms can include pain or discomfort in one or both arms, the back, neck, jaw, or stomach.  Shortness of breath with or without chest discomfort.  Other signs may include breaking out in a cold sweat, nausea, or lightheadedness. Don't wait more than five minutes to call 911 - MINUTES MATTER! Fast action can save your life. Calling 911 is almost always the fastest way to get lifesaving treatment. Emergency Medical Services staff can begin treatment when they arrive -- up to an hour sooner than if someone gets to the hospital by car. The discharge information has been reviewed with the patient. The patient verbalized understanding. Discharge medications reviewed with the patient and appropriate educational materials and side effects teaching were provided.   ___________________________________________________________________________________________________________________________________

## 2019-05-20 NOTE — PROGRESS NOTES
Discharge instructions  all new medications,medication side effects sheet, follow up appointment and  prescriptions reviewed and explained to the patient and spouse. Patient verbalizes understanding of instructions. A copy of discharge instructions and prescriptions  have been given to patient. Opportunity for questions provided.

## 2019-05-20 NOTE — PROGRESS NOTES
Problem: Falls - Risk of  Goal: *Absence of Falls  Description  Document [place] Getting Fall Risk and appropriate interventions in the flowsheet.   Outcome: Progressing Towards Goal

## 2019-05-20 NOTE — INTERDISCIPLINARY ROUNDS
Interdisciplinary team rounds were held 5/20/2019 with the following team members:Care Management, Nursing, Occupational Therapy and Physician and the patient. Plan of care discussed. See clinical pathway and/or care plan for interventions and desired outcomes.

## 2019-05-20 NOTE — PROGRESS NOTES
[facility] Progress Note         Admit Date:  5/19/2019    Today's Date:  5/20/2019    CC:  GI bleed    Subjective:      Last BMs yesterday afternoon with tinge of blood. No BMs or GI bleeding since. He continues to deny abdominal pain, N/V, chest pain or shortness of breath. During interview lab came to draw f/u labs. Medications:   Current Facility-Administered Medications   Medication Dose Route Frequency    sodium chloride (NS) flush 5-40 mL  5-40 mL IntraVENous Q8H    sodium chloride (NS) flush 5-40 mL  5-40 mL IntraVENous PRN    0.9% sodium chloride infusion 1,000 mL  1,000 mL IntraVENous CONTINUOUS    ondansetron (ZOFRAN) injection 4 mg  4 mg IntraVENous Q4H PRN    pantoprazole (PROTONIX) 40 mg in sodium chloride 0.9% 10 mL injection  40 mg IntraVENous Q12H    acetaminophen (TYLENOL) tablet 650 mg  650 mg Oral Q6H PRN       Review of Systems:  ROS was obtained, with pertinent positives as listed above. No chest pain or SOB. Diet:  Clear liquids    Objective:   Vitals:  Visit Vitals  /66 (BP 1 Location: Left arm, BP Patient Position: At rest)   Pulse (!) 48   Temp 98.3 °F (36.8 °C)   Resp 19   SpO2 90%     Intake/Output:  No intake/output data recorded. No intake/output data recorded. Exam:  General appearance: alert, cooperative, no distress. Sitting up in chair. Wife at bedside. Lungs: clear to auscultation bilaterally anteriorly  Heart: regular rate and rhythm  Abdomen: soft, non-tender.  Bowel sounds normal. 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 Left internal mammary artery to left anterior descending. 2. Reverse saphenous vein graft to diagonal 1.       3. Reverse saphenous vein graft to obtuse marginal.       4. Reverse saphenous vein graft to the posterior descending       artery.             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 male patient of Dr. Natividad Clay with a history of CAD s/p CABGx4 2017, TIA,  GI bleeding in 2017 likely secondary to diverticulosis who was seen in GI consultation 5/19/19  at the request of Dr. Mason Monahan for hematochezia and lower GI bleed. He came into the ER the previous night after having a very large episode of hematochezia. Hgb 11.1, down to Hgb 9.2 on 5/19/19. Hgb AM 5/20 pending. He had 4-5 total episodes, none in about 24 hours. EGD by Dr. Sindi Yates during hospitalization 8/2017 with biopsy proven mild gastritis, no source of bleeding. Colonoscopy 10/2017 with Dr. Romayne Hun - fair prep, severe right and left colon diverticulosis, multiple small tubular adenomas. He recommended no further colonoscopy due to age. Suspect diverticular bleed which appears to have resolved.       Plan:         Follow for recurrent overt GI bleeding  Monitor H/H and consider transfusion pRBC if needed.   If H/H stable and no ongoing GI bleeding may advance diet as tolerated  Consider IR evaluation if rebleeds, especially since his last preparation was inadequate for small lesions    Jed Thompson PA-C  [facility]

## 2019-05-20 NOTE — PROGRESS NOTES
Hospitalist Progress Note    2019  Admit Date: 2019  1:13 AM   NAME: Marija Traylor   :  1934   MRN:  [id number]   Attending: Tej Turner DO  PCP:  Mj Barbosa MD    SUBJECTIVE:     Marija Traylor is a 80 years male with hx of CAD s/p CABG, lower GI bleed requiring multiple transfusion and colonoscopy with cauterization, CKD, COPD, GERD, diverticulosis admitted on  for acute lower GI bleed and several episode of bright blood per rectum. Pt was evaluated by GI, recommended for conservative treatment. IR consult if pt rebleeds. :  Pt seen at bedside. Reports feeling okay. No bloody stool since yesterday. Denies abdominal pain, nausea/vomiting, fever, chills. Review of Systems negative with exception of pertinent positives noted above      PHYSICAL EXAM       Visit Vitals  /73 (BP 1 Location: Left arm, BP Patient Position: At rest)   Pulse (!) 49   Temp 97.6 °F (36.4 °C)   Resp 20   SpO2 92%      Temp (24hrs), Av.2 °F (36.8 °C), Min:97.6 °F (36.4 °C), Max:98.5 °F (36.9 °C)    Oxygen Therapy  O2 Sat (%): 92 % (19 0324)  No intake or output data in the 24 hours ending 19 0753       General: No acute distress. Head:  Atraumatic Normocephalic. Eyes:  PERRLA, EOMI, Anicteric. ENT:  No discharges/lesions. Lungs:  CTA Bilaterally. CVS:  Regular rate and rhythm,  No murmur, rub, or gallop, No JVD, No lower   extremity edema. Abdomen: Soft, Non distended, Non tender, Positive bowel sounds. MSK:  No deformities, lesions, Spontaneously moves extremities. Neurologic:  AOx3. No focal deficits  Psychiatry:      No anxiety/Depression  Skin:   No rash/lesions. Good skin turgor  Heme/Lymph/Immune:  No petechiae, ecchymoses, overt signs of bleeding or    lymphadenopathy noted. No results found for this or any previous visit (from the past 24 hour(s)).       Imaging /Procedures [address] Problems as of 2019 Date Reviewed: 1/30/2019          Codes Class Noted - Resolved POA    GERD (gastroesophageal reflux disease) ICD-10-CM: K21.9  ICD-9-CM: 530.81  5/19/2019 - Present Yes        RONY on CPAP ICD-10-CM: G47.33, Z99.89  ICD-9-CM: 327.23, V46.8  Unknown - Present Yes        COPD, severe ([facility] [address] (Chronic) ICD-10-CM: J44.9  ICD-9-CM: 472  1/22/2018 - Present Yes        Bilateral carotid artery disease ([facility] [address] ICD-10-CM: I77.9  ICD-9-CM: 447.9  11/28/2017 - Present Yes        S/P CABG x 4 ICD-10-CM: Z95.1  ICD-9-CM: V45.81  11/17/2017 - Present Yes        CAD (coronary artery disease) (Chronic) ICD-10-CM: I25.10  ICD-9-CM: 414.00  11/17/2017 - Present Yes    Overview Signed 12/13/2017  2:42 PM by Miri Padgett NP     11/17/17 (Dr Nae Clarke) Coronary artery bypass grafting x4 grafts   consisting of:   1. Left internal mammary artery to left anterior descending. 2. Reverse saphenous vein graft to diagonal 1.   3. Reverse saphenous vein graft to obtuse marginal.   4. Reverse saphenous vein graft to the posterior descending   artery.                Sigmoid diverticulosis (Chronic) ICD-10-CM: K57.30  ICD-9-CM: 562.10  Unknown - Present Yes        Chronic anemia ICD-10-CM: D64.9  ICD-9-CM: 285. 9  Unknown - Present Yes        * (Principal) Lower GI bleed ICD-10-CM: K92.2  ICD-9-CM: 578.9  8/24/2017 - Present Yes                  Plan:  Pt likely had diverticular bleed which has spontaneously stopped now. If pt rebleeds, IR eval for tagged RBC scan  - start GI soft diet  - latest Hb 10.2  - monitor Hb q12h  - restart home meds once reconciled in [facility] D/w pt's nurse  - switch protonix from IV to oral.    DVT Prophylaxis: SCD's  Dispo: discharge to home tomorrow.     Nadine Devi MD

## 2019-05-20 NOTE — PROGRESS NOTES
Chart review completed, MD and CM spoke about pt during IDT rounds, possible DC in am, no anticipated DC needs. CM will remain available for DC needs if needed.

## 2019-05-21 ENCOUNTER — PATIENT OUTREACH (OUTPATIENT)
Dept: CASE MANAGEMENT | Age: 84
End: 2019-05-21

## 2019-05-21 NOTE — PROGRESS NOTES
This note will not be viewable in [address] Transition of Care Discharge Follow-up Questionnaire   Date/Time of Call:   5/21/19   1141am   What was the patient hospitalized for? Lower GI Bleed     Does the patient understand his/her diagnosis and/or treatment and what happened during the hospitalization? Yes, spoke with patient, he states understanding of diagnosis and treatment; and is agreeable to call. Patient states he is doing well   Did the patient receive discharge instructions? Yes    CM Assessed Risk for Readmission:       Patient stated Risk for Readmission:      Low r/t diagnosis and/or comorbidities     None stated     Review any discharge instructions (see discharge instructions/AVS in [facility]). Ask patient if they understand these. Do they have any questions? Reviewed, understanding is stated, no questions at this time       Were home services ordered (nursing, PT, OT, ST, etc.)? No     If so, has the first visit occurred? If not, why? (Assist with coordination of services if necessary.)   N/A     Was any DME ordered? No     If so, has it been received? If not, why?  (Assist patient in obtaining DME orders &/or equipment if necessary.) N/A   Complete a review of all medications (new, continued and discontinued meds per the D/C instructions and medication tab in [facility]). Completed  STOP taking:  aspirin delayed-release 81 mg tablet  EXELON 9.5 mg/24 hr patch   Were all new prescriptions filled? If not, why?  (Assist patient in obtaining medications if necessary  escalate for CCM &/or SW if ongoing issues are verbalized by pt or anticipated)   Yes    Does the patient understand the purpose and dosing instructions for all medications? (If patient has questions, provide explanation and education.)   Yes   Does the patient have any problems in performing ADLs? (If patient is unable to perform ADLs  what is the limiting factor(s)?   Do they have a support system that can assist? If no support system is present, discuss possible assistance that they may be able to obtain. Escalate for CCM/SW if ongoing issues are verbalized by pt or anticipated)    Independent with ADLs    Does the patient have all follow-up appointments scheduled? 7 day f/up with PCP?   (f/up with PCP may be w/in 14 days if patient has a f/up with their specialist w/in 7 days)    7-14 day f/up with specialist?   (or per discharge instructions)    If f/up has not been made  what actions has the care coordinator made to accomplish this? Has transportation been arranged? Yes        Dr. Altehea Fuentes 5/28/19    [facility] patient states flash knows, I have a couple appointments, she handles all that              Yes, there are no transportation needs at this time   Any other questions or concerns expressed by the patient? No other needs or concerns identified. Patient states his gratitude for follow up. Contact information for [facility] was given, instructed to call with new questions or concerns. Schedule next appointment with LINDSAY MURILLO Coordinator or refer to RN Case Manager/ per the workflow guidelines. When is care coordinators next follow-up call scheduled? If referred for CCM  what RN care manager was the referral assigned? Patient indicated no need for further follow up.    SHILO Call Completed By: Rosalio Duong, [address] [place] [address] Coordinator

## 2019-05-21 NOTE — PHYSICIAN ADVISORY
Letter of Determination: Inpatient Status Appropriate    This patient was originally hospitalized as Inpatient Status on 5/18/2019 for acute lower gastrointestinal bleed. This patient is appropriate for Inpatient Admission in accordance with CMS regulation Section 43 .3. Specifically, patient's stay is expected to be more than Two Midnights and was medically necessary. The patient's stay was medically necessary based on advanced age of 80 years, a history of coronary artery disease with prior coronary artery bypass graft, history of transient ischemic attack, and a two gram decrease in hemoglobin from 11.1 mg/dl, to 9.2 mg/dl. Consistent with CMS guidelines, patient meets for inpatient status. It is our recommendation that this patient's hospitalization status should be INPATIENT status.      The final decision regarding the patient's hospitalization status depends on the attending physician's judgement.     Mary Santana MD, ANJANA,   Physician Chidi Taylor.

## 2019-05-30 ENCOUNTER — APPOINTMENT (RX ONLY)
Dept: URBAN - METROPOLITAN AREA CLINIC 349 | Facility: CLINIC | Age: 84
Setting detail: DERMATOLOGY
End: 2019-05-30

## 2019-05-30 DIAGNOSIS — L90.5 SCAR CONDITIONS AND FIBROSIS OF SKIN: ICD-10-CM

## 2019-05-30 DIAGNOSIS — Z71.89 OTHER SPECIFIED COUNSELING: ICD-10-CM

## 2019-05-30 DIAGNOSIS — L57.0 ACTINIC KERATOSIS: ICD-10-CM

## 2019-05-30 DIAGNOSIS — D485 NEOPLASM OF UNCERTAIN BEHAVIOR OF SKIN: ICD-10-CM

## 2019-05-30 PROBLEM — D48.5 NEOPLASM OF UNCERTAIN BEHAVIOR OF SKIN: Status: ACTIVE | Noted: 2019-05-30

## 2019-05-30 PROBLEM — D04.5 CARCINOMA IN SITU OF SKIN OF TRUNK: Status: ACTIVE | Noted: 2019-05-30

## 2019-05-30 PROBLEM — D04.72 CARCINOMA IN SITU OF SKIN OF LEFT LOWER LIMB, INCLUDING HIP: Status: ACTIVE | Noted: 2019-05-30

## 2019-05-30 PROCEDURE — 99213 OFFICE O/P EST LOW 20 MIN: CPT | Mod: 25

## 2019-05-30 PROCEDURE — ? OTHER

## 2019-05-30 PROCEDURE — ? COUNSELING

## 2019-05-30 PROCEDURE — ? BIOPSY BY SHAVE METHOD

## 2019-05-30 PROCEDURE — ? PATHOLOGY BILLING

## 2019-05-30 PROCEDURE — ? OBSERVATION

## 2019-05-30 PROCEDURE — ? DEFER

## 2019-05-30 PROCEDURE — 17000 DESTRUCT PREMALG LESION: CPT | Mod: 59

## 2019-05-30 PROCEDURE — ? PHOTO-DOCUMENTATION

## 2019-05-30 PROCEDURE — 11103 TANGNTL BX SKIN EA SEP/ADDL: CPT

## 2019-05-30 PROCEDURE — A4550 SURGICAL TRAYS: HCPCS

## 2019-05-30 PROCEDURE — ? LIQUID NITROGEN

## 2019-05-30 PROCEDURE — 17003 DESTRUCT PREMALG LES 2-14: CPT

## 2019-05-30 PROCEDURE — 11102 TANGNTL BX SKIN SINGLE LES: CPT

## 2019-05-30 PROCEDURE — 88305 TISSUE EXAM BY PATHOLOGIST: CPT

## 2019-05-30 ASSESSMENT — LOCATION DETAILED DESCRIPTION DERM
LOCATION DETAILED: RIGHT INFERIOR CRUS OF ANTIHELIX
LOCATION DETAILED: POSTERIOR MID-PARIETAL SCALP
LOCATION DETAILED: RIGHT SUPERIOR CRUS OF ANTIHELIX
LOCATION DETAILED: RIGHT TRIANGULAR FOSSA
LOCATION DETAILED: PERIUMBILICAL SKIN
LOCATION DETAILED: EPIGASTRIC SKIN
LOCATION DETAILED: LEFT PROXIMAL PRETIBIAL REGION
LOCATION DETAILED: LEFT MEDIAL FRONTAL SCALP

## 2019-05-30 ASSESSMENT — LOCATION ZONE DERM
LOCATION ZONE: SCALP
LOCATION ZONE: EAR
LOCATION ZONE: LEG
LOCATION ZONE: TRUNK

## 2019-05-30 ASSESSMENT — LOCATION SIMPLE DESCRIPTION DERM
LOCATION SIMPLE: POSTERIOR SCALP
LOCATION SIMPLE: LEFT PRETIBIAL REGION
LOCATION SIMPLE: ABDOMEN
LOCATION SIMPLE: LEFT SCALP
LOCATION SIMPLE: RIGHT EAR

## 2019-05-30 NOTE — PROCEDURE: PATHOLOGY BILLING
Immunohistochemistry ([id number] and [id number]) billing is not performed here. Please use the Immunohistochemistry Stain Billing plan to accomplish this.

## 2019-05-30 NOTE — PROCEDURE: DEFER
Detail Level: Detailed
Introduction Text (Please End With A Colon): Procedure to be performed: Kenalog injection

## 2019-05-30 NOTE — PROCEDURE: OTHER
Other (Free Text): Pt stated lesion has been present for over twenty plus years and only bleeds when he picks at it. Pt denies any changes in the area\\n\\nOffered biopsy but he is okay with observing.
Detail Level: Detailed
Other (Free Text): Pt had surgery in November 2018. Christina reassured pt that it looked like normal scar tissue and mentioned she could inject it to flatten it out. Pt declined.
Note Text (......Xxx Chief Complaint.): This diagnosis correlates with the

## 2019-05-30 NOTE — PROCEDURE: BIOPSY BY SHAVE METHOD
Type Of Destruction Used: Curettage
Bill For Surgical Tray: yes
Detail Level: Detailed
X Size Of Lesion In Cm: 0
Consent: Written consent was obtained and risks were reviewed including but not limited to scarring, infection, bleeding, scabbing, incomplete removal, nerve damage and allergy to anesthesia.
Depth Of Biopsy: dermis
Electrodesiccation Text: The wound bed was treated with electrodesiccation after the biopsy was performed.
Anesthesia Type: 2% lidocaine with epinephrine
Anesthesia Volume In Cc (Will Not Render If 0): 0.5
Bill 81653 For Specimen Handling/Conveyance To Laboratory?: no
Hemostasis: Electrocautery
Silver Nitrate Text: The wound bed was treated with silver nitrate after the biopsy was performed.
Post-Care Instructions: I reviewed with the patient in detail post-care instructions. Patient is to keep the biopsy site dry overnight, and then apply bacitracin twice daily until healed. Patient may apply hydrogen peroxide soaks to remove any crusting.
Wound Care: Vaseline
Curettage Text: The wound bed was treated with curettage after the biopsy was performed.
Electrodesiccation And Curettage Text: The wound bed was treated with electrodesiccation and curettage after the biopsy was performed.
Billing Type: Third-Party Bill
Biopsy Type: H and E
Biopsy Method: Dermablade
Accession #: [id number]
Notification Instructions: Patient will be notified of biopsy results. However, patient instructed to call the office if not contacted within 2 weeks.
Dressing: pressure dressing
Cryotherapy Text: The wound bed was treated with cryotherapy after the biopsy was performed.

## 2019-05-30 NOTE — HPI: SKIN LESIONS
How Severe Is Your Skin Lesion?: mild
Have Your Skin Lesions Been Treated?: not been treated
Is This A New Presentation, Or A Follow-Up?: Skin Lesion
Additional History: Pt has a lesion in his scalp, right ear and left lower leg he would like to have looked at. Pt stated the lesion on his left leg appeared about a month and six weeks ago. Pt states it has gotten larger and won’t heal. The lesion on his ear has been present for years and will come and go.

## 2019-05-30 NOTE — PROCEDURE: LIQUID NITROGEN
Post-Care Instructions: I reviewed with the patient in detail post-care instructions. Patient is to wear sunprotection, and avoid picking at any of the treated lesions. Pt may apply Vaseline to crusted or scabbing areas.
Render Note In Bullet Format When Appropriate: No
Duration Of Freeze Thaw-Cycle (Seconds): 3
Consent: The patient's consent was obtained including but not limited to risks of crusting, scabbing, blistering, scarring, darker or lighter pigmentary change, recurrence, incomplete removal and infection.
Number Of Freeze-Thaw Cycles: 1 freeze-thaw cycle
Detail Level: Detailed

## 2019-06-05 ENCOUNTER — APPOINTMENT (RX ONLY)
Dept: URBAN - METROPOLITAN AREA CLINIC 349 | Facility: CLINIC | Age: 84
Setting detail: DERMATOLOGY
End: 2019-06-05

## 2019-06-05 PROBLEM — D04.72 CARCINOMA IN SITU OF SKIN OF LEFT LOWER LIMB, INCLUDING HIP: Status: ACTIVE | Noted: 2019-06-05

## 2019-06-05 PROBLEM — D04.5 CARCINOMA IN SITU OF SKIN OF TRUNK: Status: ACTIVE | Noted: 2019-06-05

## 2019-06-05 PROCEDURE — 17262 DSTRJ MAL LES T/A/L 1.1-2.0: CPT | Mod: 79

## 2019-06-05 PROCEDURE — ? OTHER

## 2019-06-05 PROCEDURE — 17262 DSTRJ MAL LES T/A/L 1.1-2.0: CPT | Mod: 76,79

## 2019-06-05 PROCEDURE — ? COUNSELING

## 2019-06-05 PROCEDURE — ? CURETTAGE AND DESTRUCTION

## 2019-06-05 PROCEDURE — A4550 SURGICAL TRAYS: HCPCS

## 2019-06-05 NOTE — PROCEDURE: COUNSELING
Total Volume (Ccs): 1
Detail Level: Detailed
Bill For Surgical Tray: yes
Add Ability To Document Additional Intralesional Injection: No
Anesthesia Volume In Cc: 0.5
Consent was obtained from the patient. The risks, benefits and alternatives to therapy were discussed in detail. Specifically, the risks of infection, scarring, bleeding, prolonged wound healing, nerve injury, incomplete removal, allergy to anesthesia and recurrence were addressed. Alternatives to ED&C, such as: surgical removal and XRT were also discussed.  Prior to the procedure, the treatment site was clearly identified and confirmed by the patient. All components of Universal Protocol/PAUSE Rule completed.
Bill As A Line Item Or As Units: Line Item
Post-Care Instructions: I reviewed with the patient in detail post-care instructions. Patient is to keep the area dry for 48 hours, and not to engage in any swimming until the area is healed. Should the patient develop any fevers, chills, bleeding, severe pain patient will contact the office immediately.
What Was Performed First?: Curettage
Anesthesia Type: 2% lidocaine with epinephrine
Cautery Type: electrodesiccation
Size Of Lesion After Curettage: 0
Number Of Curettages: 3
Additional Information: (Optional): The wound was cleaned, and a pressure dressing was applied.  The patient received detailed post-op instructions.

## 2019-06-05 NOTE — PROCEDURE: CURETTAGE AND DESTRUCTION
What Was Performed First?: Curettage
Detail Level: Detailed
Size Of Lesion In Cm: 1.1
Biopsy Photograph Reviewed: Yes
Add Ability To Document Additional Intralesional Injection: No
Bill As A Line Item Or As Units: Line Item
Additional Information: (Optional): The wound was cleaned, and a pressure dressing was applied.  The patient received detailed post-op instructions.
Total Volume (Ccs): 1
Size Of Lesion In Cm: 1.2
Number Of Curettages: 3
Anesthesia Type: 2% lidocaine with epinephrine
Cautery Type: electrodesiccation
Post-Care Instructions: I reviewed with the patient in detail post-care instructions. Patient is to keep the area dry for 48 hours, and not to engage in any swimming until the area is healed. Should the patient develop any fevers, chills, bleeding, severe pain patient will contact the office immediately.
Consent was obtained from the patient. The risks, benefits and alternatives to therapy were discussed in detail. Specifically, the risks of infection, scarring, bleeding, prolonged wound healing, nerve injury, incomplete removal, allergy to anesthesia and recurrence were addressed. Alternatives to ED&C, such as: surgical removal and XRT were also discussed.  Prior to the procedure, the treatment site was clearly identified and confirmed by the patient. All components of Universal Protocol/PAUSE Rule completed.

## 2019-06-05 NOTE — PROCEDURE: OTHER
Note Text (......Xxx Chief Complaint.): This diagnosis correlates with the
Detail Level: Detailed
Other (Free Text): Mentioned to pt that the lower leg tends to take longer to heal and scars. Pt expressed understanding.
Other (Free Text): Discussed the risk of scarring and discoloration with procedure. Pt expressed understanding.

## 2019-08-02 PROBLEM — K92.2 LOWER GI BLEED: Status: RESOLVED | Noted: 2017-08-24 | Resolved: 2019-08-02

## 2019-09-16 ENCOUNTER — APPOINTMENT (RX ONLY)
Dept: URBAN - METROPOLITAN AREA CLINIC 349 | Facility: CLINIC | Age: 84
Setting detail: DERMATOLOGY
End: 2019-09-16

## 2019-09-16 DIAGNOSIS — B00.1 HERPESVIRAL VESICULAR DERMATITIS: ICD-10-CM

## 2019-09-16 DIAGNOSIS — L08.9 LOCAL INFECTION OF THE SKIN AND SUBCUTANEOUS TISSUE, UNSPECIFIED: ICD-10-CM

## 2019-09-16 DIAGNOSIS — L82.1 OTHER SEBORRHEIC KERATOSIS: ICD-10-CM

## 2019-09-16 PROCEDURE — ? TREATMENT REGIMEN

## 2019-09-16 PROCEDURE — 99213 OFFICE O/P EST LOW 20 MIN: CPT

## 2019-09-16 PROCEDURE — ? COUNSELING

## 2019-09-16 PROCEDURE — ? OTHER

## 2019-09-16 PROCEDURE — ? ORDER TESTS

## 2019-09-16 PROCEDURE — ? PRESCRIPTION

## 2019-09-16 RX ORDER — VALACYCLOVIR 1 G/1
TABLET ORAL
Qty: 4 | Refills: 0 | Status: ERX | COMMUNITY
Start: 2019-09-16

## 2019-09-16 RX ORDER — MUPIROCIN 20 MG/G
OINTMENT TOPICAL
Qty: 1 | Refills: 0 | Status: ERX | COMMUNITY
Start: 2019-09-16

## 2019-09-16 RX ADMIN — VALACYCLOVIR: 1 TABLET ORAL at 00:00

## 2019-09-16 RX ADMIN — MUPIROCIN: 20 OINTMENT TOPICAL at 00:00

## 2019-09-16 ASSESSMENT — LOCATION DETAILED DESCRIPTION DERM
LOCATION DETAILED: LEFT MID TEMPLE
LOCATION DETAILED: RIGHT INFERIOR LATERAL FOREHEAD
LOCATION DETAILED: RIGHT PROXIMAL DORSAL INDEX FINGER
LOCATION DETAILED: LEFT SUPERIOR VERMILION BORDER

## 2019-09-16 ASSESSMENT — LOCATION ZONE DERM
LOCATION ZONE: FACE
LOCATION ZONE: LIP
LOCATION ZONE: FINGER

## 2019-09-16 ASSESSMENT — LOCATION SIMPLE DESCRIPTION DERM
LOCATION SIMPLE: RIGHT INDEX FINGER
LOCATION SIMPLE: LEFT UPPER LIP
LOCATION SIMPLE: LEFT TEMPLE
LOCATION SIMPLE: RIGHT FOREHEAD

## 2019-09-16 NOTE — PROCEDURE: TREATMENT REGIMEN
Detail Level: Zone
Initiate Treatment: Mupirocin ointment apply to affected area twice daily until follow up
Initiate Treatment: Valacyclovoir 1 gram take two pills twice daily for one day

## 2019-09-16 NOTE — HPI: SKIN LESION
How Severe Is Your Skin Lesion?: mild
Is This A New Presentation, Or A Follow-Up?: Skin Lesion
Additional History: Patient is here for a spot on his right hand. Patient states it appeared several months ago and will not heal. Patient states it gets irritated and bleeds from time to time. Patient states he put Vaseline on it last night and today it looks the best it has. Patient states it blisters but denies any burning or pain. Patient also has a spot on his scalp that he would like looked at. Patient denies family or personal history of melanoma.

## 2019-09-16 NOTE — PROCEDURE: OTHER
Note Text (......Xxx Chief Complaint.): This diagnosis correlates with the
Detail Level: Zone
Other (Free Text): Christina offered to treat but patient declined.
Other (Free Text): Patient is here for a spot on his right hand. Patient states it appeared several months ago and will not heal. Patient states it gets irritated and bleeds from time to time. Patient states he put Vaseline on it last night and today it looks the best it has. Patient states it blisters but denies any burning or pain. Patient also has a spot on his scalp that he would like looked at. Patient denies family or personal history of melanoma. \\n\\nWe will culture the area today.\\n\\nAdvised patient to do diluted vinegar soaks at home until follow up. Gave patient written instructions.\\n\\nDenies vesicles or blisters. Considered herpetic jeff. Covering with Valtrex for herpes labialis.

## 2020-01-29 ENCOUNTER — APPOINTMENT (RX ONLY)
Dept: URBAN - METROPOLITAN AREA CLINIC 349 | Facility: CLINIC | Age: 85
Setting detail: DERMATOLOGY
End: 2020-01-29

## 2020-01-29 DIAGNOSIS — Z85.828 PERSONAL HISTORY OF OTHER MALIGNANT NEOPLASM OF SKIN: ICD-10-CM

## 2020-01-29 DIAGNOSIS — L82.0 INFLAMED SEBORRHEIC KERATOSIS: ICD-10-CM

## 2020-01-29 DIAGNOSIS — D22 MELANOCYTIC NEVI: ICD-10-CM | Status: STABLE

## 2020-01-29 DIAGNOSIS — D485 NEOPLASM OF UNCERTAIN BEHAVIOR OF SKIN: ICD-10-CM

## 2020-01-29 DIAGNOSIS — L57.0 ACTINIC KERATOSIS: ICD-10-CM

## 2020-01-29 DIAGNOSIS — B00.1 HERPESVIRAL VESICULAR DERMATITIS: ICD-10-CM | Status: WELL CONTROLLED

## 2020-01-29 PROBLEM — D22.5 MELANOCYTIC NEVI OF TRUNK: Status: ACTIVE | Noted: 2020-01-29

## 2020-01-29 PROBLEM — C44.622 SQUAMOUS CELL CARCINOMA OF SKIN OF RIGHT UPPER LIMB, INCLUDING SHOULDER: Status: ACTIVE | Noted: 2020-01-29

## 2020-01-29 PROBLEM — J45.909 UNSPECIFIED ASTHMA, UNCOMPLICATED: Status: ACTIVE | Noted: 2020-01-29

## 2020-01-29 PROBLEM — D48.5 NEOPLASM OF UNCERTAIN BEHAVIOR OF SKIN: Status: ACTIVE | Noted: 2020-01-29

## 2020-01-29 PROCEDURE — 17110 DESTRUCTION B9 LES UP TO 14: CPT

## 2020-01-29 PROCEDURE — 17003 DESTRUCT PREMALG LES 2-14: CPT | Mod: 59

## 2020-01-29 PROCEDURE — 99214 OFFICE O/P EST MOD 30 MIN: CPT | Mod: 25

## 2020-01-29 PROCEDURE — ? PATHOLOGY BILLING

## 2020-01-29 PROCEDURE — 88305 TISSUE EXAM BY PATHOLOGIST: CPT

## 2020-01-29 PROCEDURE — ? MEDICAL PHOTOGRAPHY REVIEW

## 2020-01-29 PROCEDURE — ? TREATMENT REGIMEN

## 2020-01-29 PROCEDURE — A4550 SURGICAL TRAYS: HCPCS

## 2020-01-29 PROCEDURE — 11102 TANGNTL BX SKIN SINGLE LES: CPT | Mod: 59

## 2020-01-29 PROCEDURE — 17000 DESTRUCT PREMALG LESION: CPT | Mod: 59

## 2020-01-29 PROCEDURE — ? OTHER

## 2020-01-29 PROCEDURE — ? COUNSELING

## 2020-01-29 PROCEDURE — ? LIQUID NITROGEN

## 2020-01-29 PROCEDURE — ? OBSERVATION

## 2020-01-29 PROCEDURE — ? BODY PHOTOGRAPHY

## 2020-01-29 PROCEDURE — ? BIOPSY BY SHAVE METHOD

## 2020-01-29 ASSESSMENT — LOCATION DETAILED DESCRIPTION DERM
LOCATION DETAILED: RIGHT LATERAL FOREHEAD
LOCATION DETAILED: LEFT PROXIMAL RADIAL DORSAL FOREARM
LOCATION DETAILED: LEFT DORSAL SMALL METACARPOPHALANGEAL JOINT
LOCATION DETAILED: RIGHT CENTRAL TEMPLE
LOCATION DETAILED: RIGHT MEDIAL SUPERIOR CHEST
LOCATION DETAILED: LEFT INGUINAL CREASE
LOCATION DETAILED: LEFT LATERAL PROXIMAL PRETIBIAL REGION
LOCATION DETAILED: LEFT SUPERIOR VERMILION BORDER
LOCATION DETAILED: INFERIOR THORACIC SPINE
LOCATION DETAILED: RIGHT SUPERIOR HELIX
LOCATION DETAILED: LEFT SUPERIOR MEDIAL UPPER BACK
LOCATION DETAILED: LEFT LATERAL ABDOMEN
LOCATION DETAILED: LEFT FOREHEAD

## 2020-01-29 ASSESSMENT — LOCATION SIMPLE DESCRIPTION DERM
LOCATION SIMPLE: RIGHT TEMPLE
LOCATION SIMPLE: LEFT PRETIBIAL REGION
LOCATION SIMPLE: ABDOMEN
LOCATION SIMPLE: UPPER BACK
LOCATION SIMPLE: LEFT FOREHEAD
LOCATION SIMPLE: LEFT UPPER BACK
LOCATION SIMPLE: RIGHT FOREHEAD
LOCATION SIMPLE: RIGHT EAR
LOCATION SIMPLE: LEFT UPPER LIP
LOCATION SIMPLE: LEFT HAND
LOCATION SIMPLE: GROIN
LOCATION SIMPLE: CHEST
LOCATION SIMPLE: LEFT FOREARM

## 2020-01-29 ASSESSMENT — LOCATION ZONE DERM
LOCATION ZONE: ARM
LOCATION ZONE: HAND
LOCATION ZONE: EAR
LOCATION ZONE: TRUNK
LOCATION ZONE: FACE
LOCATION ZONE: LIP
LOCATION ZONE: LEG

## 2020-01-29 NOTE — PROCEDURE: LIQUID NITROGEN
Medical Necessity Clause: This procedure was medically necessary because the lesions that were treated were:
Detail Level: Detailed
Post-Care Instructions: I reviewed with the patient in detail post-care instructions. Patient is to wear sunprotection, and avoid picking at any of the treated lesions. Pt may apply Vaseline to crusted or scabbing areas.
Consent: The patient's consent was obtained including but not limited to risks of crusting, scabbing, blistering, scarring, darker or lighter pigmentary change, recurrence, incomplete removal and infection.
Render Note In Bullet Format When Appropriate: No
Number Of Freeze-Thaw Cycles: 2 freeze-thaw cycles
Duration Of Freeze Thaw-Cycle (Seconds): 5-10
Duration Of Freeze Thaw-Cycle (Seconds): 3
Medical Necessity Information: It is in your best interest to select a reason for this procedure from the list below. All of these items fulfill various CMS LCD requirements except the new and changing color options.
Render Post-Care Instructions In Note?: yes

## 2020-01-29 NOTE — PROCEDURE: PATHOLOGY BILLING
Immunohistochemistry ([id number] and [id number]) billing is not performed here. Please use the Immunohistochemistry Stain Billing plan to accomplish this. Immunohistochemistry ([id number] and [id number]) billing is not performed here. Please use the Immunohistochemistry Stain Billing plan to accomplish this.

## 2020-01-29 NOTE — PROCEDURE: BIOPSY BY SHAVE METHOD
Was A Bandage Applied: Yes
Electrodesiccation And Curettage Text: The wound bed was treated with electrodesiccation and curettage after the biopsy was performed.
Type Of Destruction Used: Curettage
Wound Care: Vaseline
X Size Of Lesion In Cm: 0
Cryotherapy Text: The wound bed was treated with cryotherapy after the biopsy was performed.
Anesthesia Volume In Cc (Will Not Render If 0): 1
Hide Accession Number?: No
Notification Instructions: Patient will be notified of biopsy results. However, patient instructed to call the office if not contacted within 2 weeks. After the procedure, the patient was oriented to person, place, and time. Patient denied feeling dizzy, queasy, and and declined further observation after initial 5 minute observation time.
Additional Anesthesia Volume In Cc (Will Not Render If 0): 1.5
Depth Of Biopsy: dermis
Hemostasis: Aluminum Chloride
Silver Nitrate Text: The wound bed was treated with silver nitrate after the biopsy was performed.
Billing Type: Third-Party Bill
Anesthesia Type: 1% lidocaine with 1:100,000 epinephrine and a 1:10 solution of 8.4% sodium bicarbonate
Biopsy Method: Personna blade
Accession #: [id number]
Electrodesiccation Text: The wound bed was treated with electrodesiccation after the biopsy was performed.
Consent: Written consent was obtained and risks were reviewed including but not limited to scarring, infection, bleeding, scabbing, incomplete removal, nerve damage and allergy to anesthesia.
Detail Level: Detailed
Curettage Text: The wound bed was treated with curettage after the biopsy was performed.
Dressing: bandage
Post-Care Instructions: I reviewed with the patient in detail post-care instructions. Patient is to keep the biopsy site dry overnight, and then apply Vaseline  daily until healed. Patient may apply hydrogen peroxide soaks to remove any crusting. After the procedure, the patient was oriented to person, place, and time. Patient denied feeling dizzy, queasy, and and declined further observation after initial 5 minute observation time.
Biopsy Type: H and E

## 2020-01-29 NOTE — PROCEDURE: BODY PHOTOGRAPHY
Was The Entire Body Photographed (Cannot Bill Unless Entire Body Photographed)?: No
Number Of Photographs (Optional- Will Not Render If 0): 1
Whole Body Statement: The whole body was photographed today.
Reason For Photography: The patient is obtaining body photography to observe existing suspicious moles and or monitor for the appearance of any new lesions.
Detail Level: Simple
Consent: Written consent obtained, risks reviewed for whole body photography. Patient understands that photograph costs may not be covered by insurance, and patient is ultimately responsible for payment.

## 2020-01-29 NOTE — PROCEDURE: TREATMENT REGIMEN
Detail Level: Zone
Continue Regimen: Valacyclovoir 1 gram take two pills twice daily for one day. Patient declines needing refills at this time

## 2020-03-05 ENCOUNTER — APPOINTMENT (RX ONLY)
Dept: URBAN - METROPOLITAN AREA CLINIC 349 | Facility: CLINIC | Age: 85
Setting detail: DERMATOLOGY
End: 2020-03-05

## 2020-03-05 PROBLEM — C44.622 SQUAMOUS CELL CARCINOMA OF SKIN OF RIGHT UPPER LIMB, INCLUDING SHOULDER: Status: ACTIVE | Noted: 2020-03-05

## 2020-03-05 PROCEDURE — A4550 SURGICAL TRAYS: HCPCS

## 2020-03-05 PROCEDURE — 11602 EXC TR-EXT MAL+MARG 1.1-2 CM: CPT

## 2020-03-05 PROCEDURE — ? EXCISION

## 2020-03-05 PROCEDURE — ? COUNSELING

## 2020-03-05 PROCEDURE — 12032 INTMD RPR S/A/T/EXT 2.6-7.5: CPT

## 2020-03-05 NOTE — PROCEDURE: EXCISION
Biopsy Photograph Reviewed: Yes
Deep Sutures: 4-0 Vicryl
Fusiform Excision Additional Text (Leave Blank If You Do Not Want): The margin was drawn around the clinically apparent lesion.  A fusiform shape was then drawn on the skin incorporating the lesion and margins.  Incisions were then made along these lines to the appropriate tissue plane and the lesion was extirpated.
Wound Care: Mastisol
Bill [id number] For Specimen Handling/Conveyance To Laboratory?: no
Complex Repair And Xenograft Text: The defect edges were debeveled with a #15 scalpel blade.  The primary defect was closed partially with a complex linear closure.  Given the location of the defect, shape of the defect and the proximity to free margins an tissue cultured epidermal autograft was deemed most appropriate to repair the remaining defect.  The graft was trimmed to fit the size of the remaining defect.  The graft was then placed in the primary defect, oriented appropriately, and sutured into place.
M-Plasty Complex Repair Preamble Text (Leave Blank If You Do Not Want): Extensive wide undermining was performed.
Path Notes (To The Dermatopathologist): Please check margins.
Epidermal Closure Graft Donor Site (Optional): simple interrupted
Purse String (Intermediate) Text: Given the location of the defect and the characteristics of the surrounding skin a pursestring intermediate closure was deemed most appropriate.  Undermining was performed circumfirentially around the surgical defect.  A purstring suture was then placed and tightened.
Rhombic Flap Text: The defect edges were debeveled with a #15 scalpel blade.  Given the location of the defect and the proximity to free margins a rhombic flap was deemed most appropriate.  Using a sterile surgical marker, an appropriate rhombic flap was drawn incorporating the defect.    The area thus outlined was incised deep to adipose tissue with a #15 scalpel blade.  The skin margins were undermined to an appropriate distance in all directions utilizing iris scissors.
O-Z Flap Text: The defect edges were debeveled with a #15 scalpel blade.  Given the location of the defect, shape of the defect and the proximity to free margins an O-Z flap was deemed most appropriate.  Using a sterile surgical marker, an appropriate transposition flap was drawn incorporating the defect and placing the expected incisions within the relaxed skin tension lines where possible. The area thus outlined was incised deep to adipose tissue with a #15 scalpel blade.  The skin margins were undermined to an appropriate distance in all directions utilizing iris scissors.
Hatchet Flap Text: The defect edges were debeveled with a #15 scalpel blade.  Given the location of the defect, shape of the defect and the proximity to free margins a hatchet flap was deemed most appropriate.  Using a sterile surgical marker, an appropriate hatchet flap was drawn incorporating the defect and placing the expected incisions within the relaxed skin tension lines where possible.    The area thus outlined was incised deep to adipose tissue with a #15 scalpel blade.  The skin margins were undermined to an appropriate distance in all directions utilizing iris scissors.
Cartilage Graft Text: The defect edges were debeveled with a #15 scalpel blade.  Given the location of the defect, shape of the defect, the fact the defect involved a full thickness cartilage defect a cartilage graft was deemed most appropriate.  An appropriate donor site was identified, cleansed, and anesthetized. The cartilage graft was then harvested and transferred to the recipient site, oriented appropriately and then sutured into place.  The secondary defect was then repaired using a primary closure.
Complex Repair And Transposition Flap Text: The defect edges were debeveled with a #15 scalpel blade.  The primary defect was closed partially with a complex linear closure.  Given the location of the remaining defect, shape of the defect and the proximity to free margins a transposition flap was deemed most appropriate for complete closure of the defect.  Using a sterile surgical marker, an appropriate advancement flap was drawn incorporating the defect and placing the expected incisions within the relaxed skin tension lines where possible.    The area thus outlined was incised deep to adipose tissue with a #15 scalpel blade.  The skin margins were undermined to an appropriate distance in all directions utilizing iris scissors.
Excision Method: Fusiform
Double Island Pedicle Flap Text: The defect edges were debeveled with a #15 scalpel blade.  Given the location of the defect, shape of the defect and the proximity to free margins a double island pedicle advancement flap was deemed most appropriate.  Using a sterile surgical marker, an appropriate advancement flap was drawn incorporating the defect, outlining the appropriate donor tissue and placing the expected incisions within the relaxed skin tension lines where possible.    The area thus outlined was incised deep to adipose tissue with a #15 scalpel blade.  The skin margins were undermined to an appropriate distance in all directions around the primary defect and laterally outward around the island pedicle utilizing iris scissors.  There was minimal undermining beneath the pedicle flap.
Bilobed Flap Text: The defect edges were debeveled with a #15 scalpel blade.  Given the location of the defect and the proximity to free margins a bilobe flap was deemed most appropriate.  Using a sterile surgical marker, an appropriate bilobe flap drawn around the defect.    The area thus outlined was incised deep to adipose tissue with a #15 scalpel blade.  The skin margins were undermined to an appropriate distance in all directions utilizing iris scissors.
Complex Repair And A-T Advancement Flap Text: The defect edges were debeveled with a #15 scalpel blade.  The primary defect was closed partially with a complex linear closure.  Given the location of the remaining defect, shape of the defect and the proximity to free margins an A-T advancement flap was deemed most appropriate for complete closure of the defect.  Using a sterile surgical marker, an appropriate advancement flap was drawn incorporating the defect and placing the expected incisions within the relaxed skin tension lines where possible.    The area thus outlined was incised deep to adipose tissue with a #15 scalpel blade.  The skin margins were undermined to an appropriate distance in all directions utilizing iris scissors.
Advancement Flap (Double) Text: The defect edges were debeveled with a #15 scalpel blade.  Given the location of the defect and the proximity to free margins a double advancement flap was deemed most appropriate.  Using a sterile surgical marker, the appropriate advancement flaps were drawn incorporating the defect and placing the expected incisions within the relaxed skin tension lines where possible.    The area thus outlined was incised deep to adipose tissue with a #15 scalpel blade.  The skin margins were undermined to an appropriate distance in all directions utilizing iris scissors.
Undermining Type: Entire Wound
Melolabial Interpolation Flap Text: A decision was made to reconstruct the defect utilizing an interpolation axial flap and a staged reconstruction.  A telfa template was made of the defect.  This telfa template was then used to outline the melolabial interpolation flap.  The donor area for the pedicle flap was then injected with anesthesia.  The flap was excised through the skin and subcutaneous tissue down to the layer of the underlying musculature.  The pedicle flap was carefully excised within this deep plane to maintain its blood supply.  The edges of the donor site were undermined.   The donor site was closed in a primary fashion.  The pedicle was then rotated into position and sutured.  Once the tube was sutured into place, adequate blood supply was confirmed with blanching and refill.  The pedicle was then wrapped with xeroform gauze and dressed appropriately with a telfa and gauze bandage to ensure continued blood supply and protect the attached pedicle.
V-Y Plasty Text: The defect edges were debeveled with a #15 scalpel blade.  Given the location of the defect, shape of the defect and the proximity to free margins an V-Y advancement flap was deemed most appropriate.  Using a sterile surgical marker, an appropriate advancement flap was drawn incorporating the defect and placing the expected incisions within the relaxed skin tension lines where possible.    The area thus outlined was incised deep to adipose tissue with a #15 scalpel blade.  The skin margins were undermined to an appropriate distance in all directions utilizing iris scissors.
Double M-Plasty Intermediate Repair Preamble Text (Leave Blank If You Do Not Want): Undermining was performed with blunt dissection.
Vermilion Border Text: The closure involved the vermilion border.
Perilesional Excision Additional Text (Leave Blank If You Do Not Want): The margin was drawn around the clinically apparent lesion. Incisions were then made along these lines to the appropriate tissue plane and the lesion was extirpated.
Complex Repair And Dermal Autograft Text: The defect edges were debeveled with a #15 scalpel blade.  The primary defect was closed partially with a complex linear closure.  Given the location of the defect, shape of the defect and the proximity to free margins an dermal autograft was deemed most appropriate to repair the remaining defect.  The graft was trimmed to fit the size of the remaining defect.  The graft was then placed in the primary defect, oriented appropriately, and sutured into place.
Additional Anesthesia Volume In Cc: 9
Island Pedicle Flap With Canthal Suspension Text: The defect edges were debeveled with a #15 scalpel blade.  Given the location of the defect, shape of the defect and the proximity to free margins an island pedicle advancement flap was deemed most appropriate.  Using a sterile surgical marker, an appropriate advancement flap was drawn incorporating the defect, outlining the appropriate donor tissue and placing the expected incisions within the relaxed skin tension lines where possible. The area thus outlined was incised deep to adipose tissue with a #15 scalpel blade.  The skin margins were undermined to an appropriate distance in all directions around the primary defect and laterally outward around the island pedicle utilizing iris scissors.  There was minimal undermining beneath the pedicle flap. A suspension suture was placed in the canthal tendon to prevent tension and prevent ectropion.
Complex Repair And Z Plasty Text: The defect edges were debeveled with a #15 scalpel blade.  The primary defect was closed partially with a complex linear closure.  Given the location of the remaining defect, shape of the defect and the proximity to free margins a Z plasty was deemed most appropriate for complete closure of the defect.  Using a sterile surgical marker, an appropriate advancement flap was drawn incorporating the defect and placing the expected incisions within the relaxed skin tension lines where possible.    The area thus outlined was incised deep to adipose tissue with a #15 scalpel blade.  The skin margins were undermined to an appropriate distance in all directions utilizing iris scissors.
X Size Of Lesion In Cm (Optional): 0.6
Ear Star Wedge Flap Text: The defect edges were debeveled with a #15 blade scalpel.  Given the location of the defect and the proximity to free margins (helical rim) an ear star wedge flap was deemed most appropriate.  Using a sterile surgical marker, the appropriate flap was drawn incorporating the defect and placing the expected incisions between the helical rim and antihelix where possible.  The area thus outlined was incised through and through with a #15 scalpel blade.
Complex Repair And Rotation Flap Text: The defect edges were debeveled with a #15 scalpel blade.  The primary defect was closed partially with a complex linear closure.  Given the location of the remaining defect, shape of the defect and the proximity to free margins a rotation flap was deemed most appropriate for complete closure of the defect.  Using a sterile surgical marker, an appropriate advancement flap was drawn incorporating the defect and placing the expected incisions within the relaxed skin tension lines where possible.    The area thus outlined was incised deep to adipose tissue with a #15 scalpel blade.  The skin margins were undermined to an appropriate distance in all directions utilizing iris scissors.
O-T Advancement Flap Text: The defect edges were debeveled with a #15 scalpel blade.  Given the location of the defect, shape of the defect and the proximity to free margins an O-T advancement flap was deemed most appropriate.  Using a sterile surgical marker, an appropriate advancement flap was drawn incorporating the defect and placing the expected incisions within the relaxed skin tension lines where possible.    The area thus outlined was incised deep to adipose tissue with a #15 scalpel blade.  The skin margins were undermined to an appropriate distance in all directions utilizing iris scissors.
Cheek-To-Nose Interpolation Flap Text: A decision was made to reconstruct the defect utilizing an interpolation axial flap and a staged reconstruction.  A telfa template was made of the defect.  This telfa template was then used to outline the Cheek-To-Nose Interpolation flap.  The donor area for the pedicle flap was then injected with anesthesia.  The flap was excised through the skin and subcutaneous tissue down to the layer of the underlying musculature.  The interpolation flap was carefully excised within this deep plane to maintain its blood supply.  The edges of the donor site were undermined.   The donor site was closed in a primary fashion.  The pedicle was then rotated into position and sutured.  Once the tube was sutured into place, adequate blood supply was confirmed with blanching and refill.  The pedicle was then wrapped with xeroform gauze and dressed appropriately with a telfa and gauze bandage to ensure continued blood supply and protect the attached pedicle.
Double O-Z Plasty Text: The defect edges were debeveled with a #15 scalpel blade.  Given the location of the defect, shape of the defect and the proximity to free margins a Double O-Z plasty (double transposition flap) was deemed most appropriate.  Using a sterile surgical marker, the appropriate transposition flaps were drawn incorporating the defect and placing the expected incisions within the relaxed skin tension lines where possible. The area thus outlined was incised deep to adipose tissue with a #15 scalpel blade.  The skin margins were undermined to an appropriate distance in all directions utilizing iris scissors.  Hemostasis was achieved with electrocautery.  The flaps were then transposed into place, one clockwise and the other counterclockwise, and anchored with interrupted buried subcutaneous sutures.
No Repair - Repaired With Adjacent Surgical Defect Text (Leave Blank If You Do Not Want): After the excision the defect was repaired concurrently with another surgical defect which was in close approximation.
Anesthesia Type: 1% lidocaine with 1:100,000 epinephrine and a 1:10 solution of 8.4% sodium bicarbonate
Tissue Cultured Epidermal Autograft Text: The defect edges were debeveled with a #15 scalpel blade.  Given the location of the defect, shape of the defect and the proximity to free margins a tissue cultured epidermal autograft was deemed most appropriate.  The graft was then trimmed to fit the size of the defect.  The graft was then placed in the primary defect and oriented appropriately.
Muscle Hinge Flap Text: The defect edges were debeveled with a #15 scalpel blade.  Given the size, depth and location of the defect and the proximity to free margins a muscle hinge flap was deemed most appropriate.  Using a sterile surgical marker, an appropriate hinge flap was drawn incorporating the defect. The area thus outlined was incised with a #15 scalpel blade.  The skin margins were undermined to an appropriate distance in all directions utilizing iris scissors.
Complex Repair And Double Advancement Flap Text: The defect edges were debeveled with a #15 scalpel blade.  The primary defect was closed partially with a complex linear closure.  Given the location of the remaining defect, shape of the defect and the proximity to free margins a double advancement flap was deemed most appropriate for complete closure of the defect.  Using a sterile surgical marker, an appropriate advancement flap was drawn incorporating the defect and placing the expected incisions within the relaxed skin tension lines where possible.    The area thus outlined was incised deep to adipose tissue with a #15 scalpel blade.  The skin margins were undermined to an appropriate distance in all directions utilizing iris scissors.
Ftsg Text: The defect edges were debeveled with a #15 scalpel blade.  Given the location of the defect, shape of the defect and the proximity to free margins a full thickness skin graft was deemed most appropriate.  Using a sterile surgical marker, the primary defect shape was transferred to the donor site. The area thus outlined was incised deep to adipose tissue with a #15 scalpel blade.  The harvested graft was then trimmed of adipose tissue until only dermis and epidermis was left.  The skin margins of the secondary defect were undermined to an appropriate distance in all directions utilizing iris scissors.  The secondary defect was closed with interrupted buried subcutaneous sutures.  The skin edges were then re-apposed with running  sutures.  The skin graft was then placed in the primary defect and oriented appropriately.
Modified Advancement Flap Text: The defect edges were debeveled with a #15 scalpel blade.  Given the location of the defect, shape of the defect and the proximity to free margins a modified advancement flap was deemed most appropriate.  Using a sterile surgical marker, an appropriate advancement flap was drawn incorporating the defect and placing the expected incisions within the relaxed skin tension lines where possible.    The area thus outlined was incised deep to adipose tissue with a #15 scalpel blade.  The skin margins were undermined to an appropriate distance in all directions utilizing iris scissors.
Slit Excision Additional Text (Leave Blank If You Do Not Want): A linear line was drawn on the skin overlying the lesion. An incision was made slowly until the lesion was visualized.  Once visualized, the lesion was removed with blunt dissection.
Detail Level: Detailed
Skin Substitute Units (Will Override Primary Defect Units If Greater Than 0): 0
Hemostasis: Electrocautery
Information: Selecting Yes will display possible errors in your note based on the variables you have selected. This validation is only offered as a suggestion for you. PLEASE NOTE THAT THE VALIDATION TEXT WILL BE REMOVED WHEN YOU FINALIZE YOUR NOTE. IF YOU WANT TO FAX A PRELIMINARY NOTE YOU WILL NEED TO TOGGLE THIS TO 'NO' IF YOU DO NOT WANT IT IN YOUR FAXED NOTE.
Consent was obtained from the patient. The risks and benefits to therapy were discussed in detail. Specifically, the risks of infection, scarring, bleeding, prolonged wound healing, incomplete removal, allergy to anesthesia, nerve injury and recurrence were addressed. Prior to the procedure, the treatment site was clearly identified and confirmed by the patient. All components of Universal Protocol/PAUSE Rule completed.
Complex Repair And Split-Thickness Skin Graft Text: The defect edges were debeveled with a #15 scalpel blade.  The primary defect was closed partially with a complex linear closure.  Given the location of the defect, shape of the defect and the proximity to free margins a split thickness skin graft was deemed most appropriate to repair the remaining defect.  The graft was trimmed to fit the size of the remaining defect.  The graft was then placed in the primary defect, oriented appropriately, and sutured into place.
Partial Purse String (Simple) Text: Given the location of the defect and the characteristics of the surrounding skin a simple purse string closure was deemed most appropriate.  Undermining was performed circumferentially around the surgical defect.  A purse string suture was then placed and tightened. Wound tension of the circular defect prevented complete closure of the wound.
Billing Type: Third-Party Bill
Helical Rim Advancement Flap Text: The defect edges were debeveled with a #15 blade scalpel.  Given the location of the defect and the proximity to free margins (helical rim) a double helical rim advancement flap was deemed most appropriate.  Using a sterile surgical marker, the appropriate advancement flaps were drawn incorporating the defect and placing the expected incisions between the helical rim and antihelix where possible.  The area thus outlined was incised through and through with a #15 scalpel blade.  With a skin hook and iris scissors, the flaps were gently and sharply undermined and freed up.
Dermal Autograft Text: The defect edges were debeveled with a #15 scalpel blade.  Given the location of the defect, shape of the defect and the proximity to free margins a dermal autograft was deemed most appropriate.  Using a sterile surgical marker, the primary defect shape was transferred to the donor site. The area thus outlined was incised deep to adipose tissue with a #15 scalpel blade.  The harvested graft was then trimmed of adipose and epidermal tissue until only dermis was left.  The skin graft was then placed in the primary defect and oriented appropriately.
Star Wedge Flap Text: The defect edges were debeveled with a #15 scalpel blade.  Given the location of the defect, shape of the defect and the proximity to free margins a star wedge flap was deemed most appropriate.  Using a sterile surgical marker, an appropriate rotation flap was drawn incorporating the defect and placing the expected incisions within the relaxed skin tension lines where possible. The area thus outlined was incised deep to adipose tissue with a #15 scalpel blade.  The skin margins were undermined to an appropriate distance in all directions utilizing iris scissors.
Complex Repair And Double M Plasty Text: The defect edges were debeveled with a #15 scalpel blade.  The primary defect was closed partially with a complex linear closure.  Given the location of the remaining defect, shape of the defect and the proximity to free margins a double M plasty was deemed most appropriate for complete closure of the defect.  Using a sterile surgical marker, an appropriate advancement flap was drawn incorporating the defect and placing the expected incisions within the relaxed skin tension lines where possible.    The area thus outlined was incised deep to adipose tissue with a #15 scalpel blade.  The skin margins were undermined to an appropriate distance in all directions utilizing iris scissors.
O-T Plasty Text: The defect edges were debeveled with a #15 scalpel blade.  Given the location of the defect, shape of the defect and the proximity to free margins an O-T plasty was deemed most appropriate.  Using a sterile surgical marker, an appropriate O-T plasty was drawn incorporating the defect and placing the expected incisions within the relaxed skin tension lines where possible.    The area thus outlined was incised deep to adipose tissue with a #15 scalpel blade.  The skin margins were undermined to an appropriate distance in all directions utilizing iris scissors.
Complex Repair And Bilobe Flap Text: The defect edges were debeveled with a #15 scalpel blade.  The primary defect was closed partially with a complex linear closure.  Given the location of the remaining defect, shape of the defect and the proximity to free margins a bilobe flap was deemed most appropriate for complete closure of the defect.  Using a sterile surgical marker, an appropriate advancement flap was drawn incorporating the defect and placing the expected incisions within the relaxed skin tension lines where possible.    The area thus outlined was incised deep to adipose tissue with a #15 scalpel blade.  The skin margins were undermined to an appropriate distance in all directions utilizing iris scissors.
Dorsal Nasal Flap Text: The defect edges were debeveled with a #15 scalpel blade.  Given the location of the defect and the proximity to free margins a dorsal nasal flap was deemed most appropriate.  Using a sterile surgical marker, an appropriate dorsal nasal flap was drawn around the defect.    The area thus outlined was incised deep to adipose tissue with a #15 scalpel blade.  The skin margins were undermined to an appropriate distance in all directions utilizing iris scissors.
Excision Depth: adipose tissue
Paramedian Forehead Flap Text: A decision was made to reconstruct the defect utilizing an interpolation axial flap and a staged reconstruction.  A telfa template was made of the defect.  This telfa template was then used to outline the paramedian forehead pedicle flap.  The donor area for the pedicle flap was then injected with anesthesia.  The flap was excised through the skin and subcutaneous tissue down to the layer of the underlying musculature.  The pedicle flap was carefully excised within this deep plane to maintain its blood supply.  The edges of the donor site were undermined.   The donor site was closed in a primary fashion.  The pedicle was then rotated into position and sutured.  Once the tube was sutured into place, adequate blood supply was confirmed with blanching and refill.  The pedicle was then wrapped with xeroform gauze and dressed appropriately with a telfa and gauze bandage to ensure continued blood supply and protect the attached pedicle.
Advancement-Rotation Flap Text: The defect edges were debeveled with a #15 scalpel blade.  Given the location of the defect, shape of the defect and the proximity to free margins an advancement-rotation flap was deemed most appropriate.  Using a sterile surgical marker, an appropriate flap was drawn incorporating the defect and placing the expected incisions within the relaxed skin tension lines where possible. The area thus outlined was incised deep to adipose tissue with a #15 scalpel blade.  The skin margins were undermined to an appropriate distance in all directions utilizing iris scissors.
Crescentic Advancement Flap Text: The defect edges were debeveled with a #15 scalpel blade.  Given the location of the defect and the proximity to free margins a crescentic advancement flap was deemed most appropriate.  Using a sterile surgical marker, the appropriate advancement flap was drawn incorporating the defect and placing the expected incisions within the relaxed skin tension lines where possible.    The area thus outlined was incised deep to adipose tissue with a #15 scalpel blade.  The skin margins were undermined to an appropriate distance in all directions utilizing iris scissors.
Z Plasty Text: The lesion was extirpated to the level of the fat with a #15 scalpel blade.  Given the location of the defect, shape of the defect and the proximity to free margins a Z-plasty was deemed most appropriate for repair.  Using a sterile surgical marker, the appropriate transposition arms of the Z-plasty were drawn incorporating the defect and placing the expected incisions within the relaxed skin tension lines where possible.    The area thus outlined was incised deep to adipose tissue with a #15 scalpel blade.  The skin margins were undermined to an appropriate distance in all directions utilizing iris scissors.  The opposing transposition arms were then transposed into place in opposite direction and anchored with interrupted buried subcutaneous sutures.
Elliptical Excision Additional Text (Leave Blank If You Do Not Want): The margin was drawn around the clinically apparent lesion.  An elliptical shape was then drawn on the skin incorporating the lesion and margins.  Incisions were then made along these lines to the appropriate tissue plane and the lesion was extirpated.
Post-Care Instructions: I reviewed with the patient in detail post-care instructions. Patient is not to engage in any heavy lifting, exercise, or swimming for the next 14 days. Should the patient develop any fevers, chills, bleeding, severe pain patient will contact the office immediately. After the procedure, the patient was oriented to person, place, and time. Patient denied feeling dizzy, queasy, and and declined further observation after initial 5 minute observation time.
Complex Repair And Dorsal Nasal Flap Text: The defect edges were debeveled with a #15 scalpel blade.  The primary defect was closed partially with a complex linear closure.  Given the location of the remaining defect, shape of the defect and the proximity to free margins a dorsal nasal flap was deemed most appropriate for complete closure of the defect.  Using a sterile surgical marker, an appropriate flap was drawn incorporating the defect and placing the expected incisions within the relaxed skin tension lines where possible.    The area thus outlined was incised deep to adipose tissue with a #15 scalpel blade.  The skin margins were undermined to an appropriate distance in all directions utilizing iris scissors.
Positioning (Leave Blank If You Do Not Want): The patient was placed in a comfortable position exposing the surgical site.
Complex Repair And Skin Substitute Graft Text: The defect edges were debeveled with a #15 scalpel blade.  The primary defect was closed partially with a complex linear closure.  Given the location of the remaining defect, shape of the defect and the proximity to free margins a skin substitute graft was deemed most appropriate to repair the remaining defect.  The graft was trimmed to fit the size of the remaining defect.  The graft was then placed in the primary defect, oriented appropriately, and sutured into place.
Island Pedicle Flap-Requiring Vessel Identification Text: The defect edges were debeveled with a #15 scalpel blade.  Given the location of the defect, shape of the defect and the proximity to free margins an island pedicle advancement flap was deemed most appropriate.  Using a sterile surgical marker, an appropriate advancement flap was drawn, based on the axial vessel mentioned above, incorporating the defect, outlining the appropriate donor tissue and placing the expected incisions within the relaxed skin tension lines where possible.    The area thus outlined was incised deep to adipose tissue with a #15 scalpel blade.  The skin margins were undermined to an appropriate distance in all directions around the primary defect and laterally outward around the island pedicle utilizing iris scissors.  There was minimal undermining beneath the pedicle flap.
Intermediate / Complex Repair - Final Wound Length In Cm: 4.1
Bilobed Transposition Flap Text: The defect edges were debeveled with a #15 scalpel blade.  Given the location of the defect and the proximity to free margins a bilobed transposition flap was deemed most appropriate.  Using a sterile surgical marker, an appropriate bilobe flap drawn around the defect.    The area thus outlined was incised deep to adipose tissue with a #15 scalpel blade.  The skin margins were undermined to an appropriate distance in all directions utilizing iris scissors.
Complex Repair And V-Y Plasty Text: The defect edges were debeveled with a #15 scalpel blade.  The primary defect was closed partially with a complex linear closure.  Given the location of the remaining defect, shape of the defect and the proximity to free margins a V-Y plasty was deemed most appropriate for complete closure of the defect.  Using a sterile surgical marker, an appropriate advancement flap was drawn incorporating the defect and placing the expected incisions within the relaxed skin tension lines where possible.    The area thus outlined was incised deep to adipose tissue with a #15 scalpel blade.  The skin margins were undermined to an appropriate distance in all directions utilizing iris scissors.
Double O-Z Flap Text: The defect edges were debeveled with a #15 scalpel blade.  Given the location of the defect, shape of the defect and the proximity to free margins a Double O-Z flap was deemed most appropriate.  Using a sterile surgical marker, an appropriate transposition flap was drawn incorporating the defect and placing the expected incisions within the relaxed skin tension lines where possible. The area thus outlined was incised deep to adipose tissue with a #15 scalpel blade.  The skin margins were undermined to an appropriate distance in all directions utilizing iris scissors.
Composite Graft Text: The defect edges were debeveled with a #15 scalpel blade.  Given the location of the defect, shape of the defect, the proximity to free margins and the fact the defect was full thickness a composite graft was deemed most appropriate.  The defect was outline and then transferred to the donor site.  A full thickness graft was then excised from the donor site. The graft was then placed in the primary defect, oriented appropriately and then sutured into place.  The secondary defect was then repaired using a primary closure.
H Plasty Text: Given the location of the defect, shape of the defect and the proximity to free margins a H-plasty was deemed most appropriate for repair.  Using a sterile surgical marker, the appropriate advancement arms of the H-plasty were drawn incorporating the defect and placing the expected incisions within the relaxed skin tension lines where possible. The area thus outlined was incised deep to adipose tissue with a #15 scalpel blade. The skin margins were undermined to an appropriate distance in all directions utilizing iris scissors.  The opposing advancement arms were then advanced into place in opposite direction and anchored with interrupted buried subcutaneous sutures.
Burow's Advancement Flap Text: The defect edges were debeveled with a #15 scalpel blade.  Given the location of the defect and the proximity to free margins a Burow's advancement flap was deemed most appropriate.  Using a sterile surgical marker, the appropriate advancement flap was drawn incorporating the defect and placing the expected incisions within the relaxed skin tension lines where possible.    The area thus outlined was incised deep to adipose tissue with a #15 scalpel blade.  The skin margins were undermined to an appropriate distance in all directions utilizing iris scissors.
Mastoid Interpolation Flap Text: A decision was made to reconstruct the defect utilizing an interpolation axial flap and a staged reconstruction.  A telfa template was made of the defect.  This telfa template was then used to outline the mastoid interpolation flap.  The donor area for the pedicle flap was then injected with anesthesia.  The flap was excised through the skin and subcutaneous tissue down to the layer of the underlying musculature.  The pedicle flap was carefully excised within this deep plane to maintain its blood supply.  The edges of the donor site were undermined.   The donor site was closed in a primary fashion.  The pedicle was then rotated into position and sutured.  Once the tube was sutured into place, adequate blood supply was confirmed with blanching and refill.  The pedicle was then wrapped with xeroform gauze and dressed appropriately with a telfa and gauze bandage to ensure continued blood supply and protect the attached pedicle.
Rhomboid Transposition Flap Text: The defect edges were debeveled with a #15 scalpel blade.  Given the location of the defect and the proximity to free margins a rhomboid transposition flap was deemed most appropriate.  Using a sterile surgical marker, an appropriate rhomboid flap was drawn incorporating the defect.    The area thus outlined was incised deep to adipose tissue with a #15 scalpel blade.  The skin margins were undermined to an appropriate distance in all directions utilizing iris scissors.
Complex Repair And O-T Advancement Flap Text: The defect edges were debeveled with a #15 scalpel blade.  The primary defect was closed partially with a complex linear closure.  Given the location of the remaining defect, shape of the defect and the proximity to free margins an O-T advancement flap was deemed most appropriate for complete closure of the defect.  Using a sterile surgical marker, an appropriate advancement flap was drawn incorporating the defect and placing the expected incisions within the relaxed skin tension lines where possible.    The area thus outlined was incised deep to adipose tissue with a #15 scalpel blade.  The skin margins were undermined to an appropriate distance in all directions utilizing iris scissors.
Rotation Flap Text: The defect edges were debeveled with a #15 scalpel blade.  Given the location of the defect, shape of the defect and the proximity to free margins a rotation flap was deemed most appropriate.  Using a sterile surgical marker, an appropriate rotation flap was drawn incorporating the defect and placing the expected incisions within the relaxed skin tension lines where possible.    The area thus outlined was incised deep to adipose tissue with a #15 scalpel blade.  The skin margins were undermined to an appropriate distance in all directions utilizing iris scissors.
Purse String (Simple) Text: Given the location of the defect and the characteristics of the surrounding skin a purse string simple closure was deemed most appropriate.  Undermining was performed circumferentially around the surgical defect.  A purse string suture was then placed and tightened.
Surgeon Performing The Repair (Optional): Michel
Nostril Rim Text: The closure involved the nostril rim.
Curvilinear Excision Additional Text (Leave Blank If You Do Not Want): The margin was drawn around the clinically apparent lesion.  A curvilinear shape was then drawn on the skin incorporating the lesion and margins.  Incisions were then made along these lines to the appropriate tissue plane and the lesion was extirpated.
Complex Repair And Modified Advancement Flap Text: The defect edges were debeveled with a #15 scalpel blade.  The primary defect was closed partially with a complex linear closure.  Given the location of the remaining defect, shape of the defect and the proximity to free margins a modified advancement flap was deemed most appropriate for complete closure of the defect.  Using a sterile surgical marker, an appropriate advancement flap was drawn incorporating the defect and placing the expected incisions within the relaxed skin tension lines where possible.    The area thus outlined was incised deep to adipose tissue with a #15 scalpel blade.  The skin margins were undermined to an appropriate distance in all directions utilizing iris scissors.
Banner Transposition Flap Text: The defect edges were debeveled with a #15 scalpel blade.  Given the location of the defect and the proximity to free margins a Banner transposition flap was deemed most appropriate.  Using a sterile surgical marker, an appropriate flap drawn around the defect. The area thus outlined was incised deep to adipose tissue with a #15 scalpel blade.  The skin margins were undermined to an appropriate distance in all directions utilizing iris scissors.
Size Of Margin In Cm: 0.4
Xenograft Text: The defect edges were debeveled with a #15 scalpel blade.  Given the location of the defect, shape of the defect and the proximity to free margins a xenograft was deemed most appropriate.  The graft was then trimmed to fit the size of the defect.  The graft was then placed in the primary defect and oriented appropriately.
Melolabial Transposition Flap Text: The defect edges were debeveled with a #15 scalpel blade.  Given the location of the defect and the proximity to free margins a melolabial flap was deemed most appropriate.  Using a sterile surgical marker, an appropriate melolabial transposition flap was drawn incorporating the defect.    The area thus outlined was incised deep to adipose tissue with a #15 scalpel blade.  The skin margins were undermined to an appropriate distance in all directions utilizing iris scissors.
Accession #: global
Complex Repair And Rhombic Flap Text: The defect edges were debeveled with a #15 scalpel blade.  The primary defect was closed partially with a complex linear closure.  Given the location of the remaining defect, shape of the defect and the proximity to free margins a rhombic flap was deemed most appropriate for complete closure of the defect.  Using a sterile surgical marker, an appropriate advancement flap was drawn incorporating the defect and placing the expected incisions within the relaxed skin tension lines where possible.    The area thus outlined was incised deep to adipose tissue with a #15 scalpel blade.  The skin margins were undermined to an appropriate distance in all directions utilizing iris scissors.
Alar Island Pedicle Flap Text: The defect edges were debeveled with a #15 scalpel blade.  Given the location of the defect, shape of the defect and the proximity to the alar rim an island pedicle advancement flap was deemed most appropriate.  Using a sterile surgical marker, an appropriate advancement flap was drawn incorporating the defect, outlining the appropriate donor tissue and placing the expected incisions within the nasal ala running parallel to the alar rim. The area thus outlined was incised with a #15 scalpel blade.  The skin margins were undermined minimally to an appropriate distance in all directions around the primary defect and laterally outward around the island pedicle utilizing iris scissors.  There was minimal undermining beneath the pedicle flap.
Debridement Text: The wound edges were debrided prior to proceeding with the closure to facilitate wound healing.
S Plasty Text: Given the location and shape of the defect, and the orientation of relaxed skin tension lines, an S-plasty was deemed most appropriate for repair.  Using a sterile surgical marker, the appropriate outline of the S-plasty was drawn, incorporating the defect and placing the expected incisions within the relaxed skin tension lines where possible.  The area thus outlined was incised deep to adipose tissue with a #15 scalpel blade.  The skin margins were undermined to an appropriate distance in all directions utilizing iris scissors. The skin flaps were advanced over the defect.  The opposing margins were then approximated with interrupted buried subcutaneous sutures.
Advancement Flap (Single) Text: The defect edges were debeveled with a #15 scalpel blade.  Given the location of the defect and the proximity to free margins a single advancement flap was deemed most appropriate.  Using a sterile surgical marker, an appropriate advancement flap was drawn incorporating the defect and placing the expected incisions within the relaxed skin tension lines where possible.    The area thus outlined was incised deep to adipose tissue with a #15 scalpel blade.  The skin margins were undermined to an appropriate distance in all directions utilizing iris scissors.
Dressing: steri-strips
Split-Thickness Skin Graft Text: The defect edges were debeveled with a #15 scalpel blade.  Given the location of the defect, shape of the defect and the proximity to free margins a split thickness skin graft was deemed most appropriate.  Using a sterile surgical marker, the primary defect shape was transferred to the donor site. The split thickness graft was then harvested.  The skin graft was then placed in the primary defect and oriented appropriately.
Mucosal Advancement Flap Text: Given the location of the defect, shape of the defect and the proximity to free margins a mucosal advancement flap was deemed most appropriate. Incisions were made with a 15 blade scalpel in the appropriate fashion along the cutaneous vermillion border and the mucosal lip. The remaining actinically damaged mucosal tissue was excised.  The mucosal advancement flap was then elevated to the gingival sulcus with care taken to preserve the neurovascular structures and advanced into the primary defect. Care was taken to ensure that precise realignment of the vermillion border was achieved.
Retention Suture Text: Retention sutures were placed to support the closure and prevent dehiscence.
Interpolation Flap Text: A decision was made to reconstruct the defect utilizing an interpolation axial flap and a staged reconstruction.  A telfa template was made of the defect.  This telfa template was then used to outline the interpolation flap.  The donor area for the pedicle flap was then injected with anesthesia.  The flap was excised through the skin and subcutaneous tissue down to the layer of the underlying musculature.  The interpolation flap was carefully excised within this deep plane to maintain its blood supply.  The edges of the donor site were undermined.   The donor site was closed in a primary fashion.  The pedicle was then rotated into position and sutured.  Once the tube was sutured into place, adequate blood supply was confirmed with blanching and refill.  The pedicle was then wrapped with xeroform gauze and dressed appropriately with a telfa and gauze bandage to ensure continued blood supply and protect the attached pedicle.
O-L Flap Text: The defect edges were debeveled with a #15 scalpel blade.  Given the location of the defect, shape of the defect and the proximity to free margins an O-L flap was deemed most appropriate.  Using a sterile surgical marker, an appropriate advancement flap was drawn incorporating the defect and placing the expected incisions within the relaxed skin tension lines where possible.    The area thus outlined was incised deep to adipose tissue with a #15 scalpel blade.  The skin margins were undermined to an appropriate distance in all directions utilizing iris scissors.
Excisional Biopsy Additional Text (Leave Blank If You Do Not Want): The margin was drawn around the clinically apparent lesion. An elliptical shape was then drawn on the skin incorporating the lesion and margins.  Incisions were then made along these lines to the appropriate tissue plane and the lesion was extirpated.
Epidermal Closure: running subcuticular
Epidermal Sutures: 4-0 Prolene
Complex Repair And Epidermal Autograft Text: The defect edges were debeveled with a #15 scalpel blade.  The primary defect was closed partially with a complex linear closure.  Given the location of the defect, shape of the defect and the proximity to free margins an epidermal autograft was deemed most appropriate to repair the remaining defect.  The graft was trimmed to fit the size of the remaining defect.  The graft was then placed in the primary defect, oriented appropriately, and sutured into place.
Helical Rim Text: The closure involved the helical rim.
Suture Removal: 14 days
Complex Repair And W Plasty Text: The defect edges were debeveled with a #15 scalpel blade.  The primary defect was closed partially with a complex linear closure.  Given the location of the remaining defect, shape of the defect and the proximity to free margins a W plasty was deemed most appropriate for complete closure of the defect.  Using a sterile surgical marker, an appropriate advancement flap was drawn incorporating the defect and placing the expected incisions within the relaxed skin tension lines where possible.    The area thus outlined was incised deep to adipose tissue with a #15 scalpel blade.  The skin margins were undermined to an appropriate distance in all directions utilizing iris scissors.
O-Z Plasty Text: The defect edges were debeveled with a #15 scalpel blade.  Given the location of the defect, shape of the defect and the proximity to free margins an O-Z plasty (double transposition flap) was deemed most appropriate.  Using a sterile surgical marker, the appropriate transposition flaps were drawn incorporating the defect and placing the expected incisions within the relaxed skin tension lines where possible.    The area thus outlined was incised deep to adipose tissue with a #15 scalpel blade.  The skin margins were undermined to an appropriate distance in all directions utilizing iris scissors.  Hemostasis was achieved with electrocautery.  The flaps were then transposed into place, one clockwise and the other counterclockwise, and anchored with interrupted buried subcutaneous sutures.
Mercedes Flap Text: The defect edges were debeveled with a #15 scalpel blade.  Given the location of the defect, shape of the defect and the proximity to free margins a Mercedes flap was deemed most appropriate.  Using a sterile surgical marker, an appropriate advancement flap was drawn incorporating the defect and placing the expected incisions within the relaxed skin tension lines where possible. The area thus outlined was incised deep to adipose tissue with a #15 scalpel blade.  The skin margins were undermined to an appropriate distance in all directions utilizing iris scissors.
Skin Substitute Text: The defect edges were debeveled with a #15 scalpel blade.  Given the location of the defect, shape of the defect and the proximity to free margins a skin substitute graft was deemed most appropriate.  The graft material was trimmed to fit the size of the defect. The graft was then placed in the primary defect and oriented appropriately.
A-T Advancement Flap Text: The defect edges were debeveled with a #15 scalpel blade.  Given the location of the defect, shape of the defect and the proximity to free margins an A-T advancement flap was deemed most appropriate.  Using a sterile surgical marker, an appropriate advancement flap was drawn incorporating the defect and placing the expected incisions within the relaxed skin tension lines where possible.    The area thus outlined was incised deep to adipose tissue with a #15 scalpel blade.  The skin margins were undermined to an appropriate distance in all directions utilizing iris scissors.
Lip Wedge Excision Repair Text: Given the location of the defect and the proximity to free margins a full thickness wedge repair was deemed most appropriate.  Using a sterile surgical marker, the appropriate repair was drawn incorporating the defect and placing the expected incisions perpendicular to the vermillion border.  The vermillion border was also meticulously outlined to ensure appropriate reapproximation during the repair.  The area thus outlined was incised through and through with a #15 scalpel blade.  The muscularis and dermis were reaproximated with deep sutures following hemostasis. Care was taken to realign the vermillion border before proceeding with the superficial closure.  Once the vermillion was realigned the superfical and mucosal closure was finished.
Bilateral Helical Rim Advancement Flap Text: The defect edges were debeveled with a #15 blade scalpel.  Given the location of the defect and the proximity to free margins (helical rim) a bilateral helical rim advancement flap was deemed most appropriate.  Using a sterile surgical marker, the appropriate advancement flaps were drawn incorporating the defect and placing the expected incisions between the helical rim and antihelix where possible.  The area thus outlined was incised through and through with a #15 scalpel blade.  With a skin hook and iris scissors, the flaps were gently and sharply undermined and freed up.
Size Of Lesion In Cm: 1.1
Complex Repair And Melolabial Flap Text: The defect edges were debeveled with a #15 scalpel blade.  The primary defect was closed partially with a complex linear closure.  Given the location of the remaining defect, shape of the defect and the proximity to free margins a melolabial flap was deemed most appropriate for complete closure of the defect.  Using a sterile surgical marker, an appropriate advancement flap was drawn incorporating the defect and placing the expected incisions within the relaxed skin tension lines where possible.    The area thus outlined was incised deep to adipose tissue with a #15 scalpel blade.  The skin margins were undermined to an appropriate distance in all directions utilizing iris scissors.
Island Pedicle Flap Text: The defect edges were debeveled with a #15 scalpel blade.  Given the location of the defect, shape of the defect and the proximity to free margins an island pedicle advancement flap was deemed most appropriate.  Using a sterile surgical marker, an appropriate advancement flap was drawn incorporating the defect, outlining the appropriate donor tissue and placing the expected incisions within the relaxed skin tension lines where possible.    The area thus outlined was incised deep to adipose tissue with a #15 scalpel blade.  The skin margins were undermined to an appropriate distance in all directions around the primary defect and laterally outward around the island pedicle utilizing iris scissors.  There was minimal undermining beneath the pedicle flap.
Transposition Flap Text: The defect edges were debeveled with a #15 scalpel blade.  Given the location of the defect and the proximity to free margins a transposition flap was deemed most appropriate.  Using a sterile surgical marker, an appropriate transposition flap was drawn incorporating the defect.    The area thus outlined was incised deep to adipose tissue with a #15 scalpel blade.  The skin margins were undermined to an appropriate distance in all directions utilizing iris scissors.
Complex Repair And Single Advancement Flap Text: The defect edges were debeveled with a #15 scalpel blade.  The primary defect was closed partially with a complex linear closure.  Given the location of the remaining defect, shape of the defect and the proximity to free margins a single advancement flap was deemed most appropriate for complete closure of the defect.  Using a sterile surgical marker, an appropriate advancement flap was drawn incorporating the defect and placing the expected incisions within the relaxed skin tension lines where possible.    The area thus outlined was incised deep to adipose tissue with a #15 scalpel blade.  The skin margins were undermined to an appropriate distance in all directions utilizing iris scissors.
Anesthesia Type: 1% lidocaine without epinephrine
Repair Performed By Another Provider Text (Leave Blank If You Do Not Want): After the tissue was excised the defect was repaired by another provider.
Cheek Interpolation Flap Text: A decision was made to reconstruct the defect utilizing an interpolation axial flap and a staged reconstruction.  A telfa template was made of the defect.  This telfa template was then used to outline the Cheek Interpolation flap.  The donor area for the pedicle flap was then injected with anesthesia.  The flap was excised through the skin and subcutaneous tissue down to the layer of the underlying musculature.  The interpolation flap was carefully excised within this deep plane to maintain its blood supply.  The edges of the donor site were undermined.   The donor site was closed in a primary fashion.  The pedicle was then rotated into position and sutured.  Once the tube was sutured into place, adequate blood supply was confirmed with blanching and refill.  The pedicle was then wrapped with xeroform gauze and dressed appropriately with a telfa and gauze bandage to ensure continued blood supply and protect the attached pedicle.
Repair Type: Intermediate
Pre-Excision Curettage Text (Leave Blank If You Do Not Want): Prior to drawing the surgical margin the visible lesion was removed with electrodesiccation and curettage to clearly define the lesion size.
Scalpel Size: 15 blade
Where Do You Want The Question To Include Opioid Counseling Located?: Case Summary Tab
Saucerization Excision Additional Text (Leave Blank If You Do Not Want): The margin was drawn around the clinically apparent lesion.  Incisions were then made along these lines, in a tangential fashion, to the appropriate tissue plane and the lesion was extirpated.
Complex Repair And Ftsg Text: The defect edges were debeveled with a #15 scalpel blade.  The primary defect was closed partially with a complex linear closure.  Given the location of the defect, shape of the defect and the proximity to free margins a full thickness skin graft was deemed most appropriate to repair the remaining defect.  The graft was trimmed to fit the size of the remaining defect.  The graft was then placed in the primary defect, oriented appropriately, and sutured into place.
Trilobed Flap Text: The defect edges were debeveled with a #15 scalpel blade.  Given the location of the defect and the proximity to free margins a trilobed flap was deemed most appropriate.  Using a sterile surgical marker, an appropriate trilobed flap drawn around the defect.    The area thus outlined was incised deep to adipose tissue with a #15 scalpel blade.  The skin margins were undermined to an appropriate distance in all directions utilizing iris scissors.
Complex Repair And O-L Flap Text: The defect edges were debeveled with a #15 scalpel blade.  The primary defect was closed partially with a complex linear closure.  Given the location of the remaining defect, shape of the defect and the proximity to free margins an O-L flap was deemed most appropriate for complete closure of the defect.  Using a sterile surgical marker, an appropriate flap was drawn incorporating the defect and placing the expected incisions within the relaxed skin tension lines where possible.    The area thus outlined was incised deep to adipose tissue with a #15 scalpel blade.  The skin margins were undermined to an appropriate distance in all directions utilizing iris scissors.
Partial Purse String (Intermediate) Text: Given the location of the defect and the characteristics of the surrounding skin an intermediate purse string closure was deemed most appropriate.  Undermining was performed circumferentially around the surgical defect.  A purse string suture was then placed and tightened. Wound tension of the circular defect prevented complete closure of the wound.
Bi-Rhombic Flap Text: The defect edges were debeveled with a #15 scalpel blade.  Given the location of the defect and the proximity to free margins a bi-rhombic flap was deemed most appropriate.  Using a sterile surgical marker, an appropriate rhombic flap was drawn incorporating the defect. The area thus outlined was incised deep to adipose tissue with a #15 scalpel blade.  The skin margins were undermined to an appropriate distance in all directions utilizing iris scissors.
Estimated Blood Loss (Cc): minimal
Keystone Flap Text: The defect edges were debeveled with a #15 scalpel blade.  Given the location of the defect, shape of the defect a keystone flap was deemed most appropriate.  Using a sterile surgical marker, an appropriate keystone flap was drawn incorporating the defect, outlining the appropriate donor tissue and placing the expected incisions within the relaxed skin tension lines where possible. The area thus outlined was incised deep to adipose tissue with a #15 scalpel blade.  The skin margins were undermined to an appropriate distance in all directions around the primary defect and laterally outward around the flap utilizing iris scissors.
Complex Repair And M Plasty Text: The defect edges were debeveled with a #15 scalpel blade.  The primary defect was closed partially with a complex linear closure.  Given the location of the remaining defect, shape of the defect and the proximity to free margins an M plasty was deemed most appropriate for complete closure of the defect.  Using a sterile surgical marker, an appropriate advancement flap was drawn incorporating the defect and placing the expected incisions within the relaxed skin tension lines where possible.    The area thus outlined was incised deep to adipose tissue with a #15 scalpel blade.  The skin margins were undermined to an appropriate distance in all directions utilizing iris scissors.
W Plasty Text: The lesion was extirpated to the level of the fat with a #15 scalpel blade.  Given the location of the defect, shape of the defect and the proximity to free margins a W-plasty was deemed most appropriate for repair.  Using a sterile surgical marker, the appropriate transposition arms of the W-plasty were drawn incorporating the defect and placing the expected incisions within the relaxed skin tension lines where possible.    The area thus outlined was incised deep to adipose tissue with a #15 scalpel blade.  The skin margins were undermined to an appropriate distance in all directions utilizing iris scissors.  The opposing transposition arms were then transposed into place in opposite direction and anchored with interrupted buried subcutaneous sutures.
Chonodrocutaneous Helical Advancement Flap Text: The defect edges were debeveled with a #15 scalpel blade.  Given the location of the defect and the proximity to free margins a chondrocutaneous helical advancement flap was deemed most appropriate.  Using a sterile surgical marker, the appropriate advancement flap was drawn incorporating the defect and placing the expected incisions within the relaxed skin tension lines where possible.    The area thus outlined was incised deep to adipose tissue with a #15 scalpel blade.  The skin margins were undermined to an appropriate distance in all directions utilizing iris scissors.
Graft Donor Site Bandage (Optional-Leave Blank If You Don't Want In Note): Steri-strips and a pressure bandage were applied to the donor site.
Epidermal Autograft Text: The defect edges were debeveled with a #15 scalpel blade.  Given the location of the defect, shape of the defect and the proximity to free margins an epidermal autograft was deemed most appropriate.  Using a sterile surgical marker, the primary defect shape was transferred to the donor site. The epidermal graft was then harvested.  The skin graft was then placed in the primary defect and oriented appropriately.
Spiral Flap Text: The defect edges were debeveled with a #15 scalpel blade.  Given the location of the defect, shape of the defect and the proximity to free margins a spiral flap was deemed most appropriate.  Using a sterile surgical marker, an appropriate rotation flap was drawn incorporating the defect and placing the expected incisions within the relaxed skin tension lines where possible. The area thus outlined was incised deep to adipose tissue with a #15 scalpel blade.  The skin margins were undermined to an appropriate distance in all directions utilizing iris scissors.
Posterior Auricular Interpolation Flap Text: A decision was made to reconstruct the defect utilizing an interpolation axial flap and a staged reconstruction.  A telfa template was made of the defect.  This telfa template was then used to outline the posterior auricular interpolation flap.  The donor area for the pedicle flap was then injected with anesthesia.  The flap was excised through the skin and subcutaneous tissue down to the layer of the underlying musculature.  The pedicle flap was carefully excised within this deep plane to maintain its blood supply.  The edges of the donor site were undermined.   The donor site was closed in a primary fashion.  The pedicle was then rotated into position and sutured.  Once the tube was sutured into place, adequate blood supply was confirmed with blanching and refill.  The pedicle was then wrapped with xeroform gauze and dressed appropriately with a telfa and gauze bandage to ensure continued blood supply and protect the attached pedicle.
V-Y Flap Text: The defect edges were debeveled with a #15 scalpel blade.  Given the location of the defect, shape of the defect and the proximity to free margins a V-Y flap was deemed most appropriate.  Using a sterile surgical marker, an appropriate advancement flap was drawn incorporating the defect and placing the expected incisions within the relaxed skin tension lines where possible.    The area thus outlined was incised deep to adipose tissue with a #15 scalpel blade.  The skin margins were undermined to an appropriate distance in all directions utilizing iris scissors.

## 2020-03-09 ENCOUNTER — APPOINTMENT (RX ONLY)
Dept: URBAN - METROPOLITAN AREA CLINIC 349 | Facility: CLINIC | Age: 85
Setting detail: DERMATOLOGY
End: 2020-03-09

## 2020-03-09 DIAGNOSIS — Z48.817 ENCOUNTER FOR SURGICAL AFTERCARE FOLLOWING SURGERY ON THE SKIN AND SUBCUTANEOUS TISSUE: ICD-10-CM

## 2020-03-09 PROCEDURE — ? COUNSELING

## 2020-03-09 PROCEDURE — ? OTHER

## 2020-03-09 PROCEDURE — ? PHOTO-DOCUMENTATION

## 2020-03-09 PROCEDURE — ? PRESCRIPTION

## 2020-03-09 ASSESSMENT — LOCATION DETAILED DESCRIPTION DERM
LOCATION DETAILED: RIGHT DISTAL DORSAL FOREARM
LOCATION DETAILED: RIGHT PROXIMAL RADIAL DORSAL FOREARM

## 2020-03-09 ASSESSMENT — LOCATION ZONE DERM: LOCATION ZONE: ARM

## 2020-03-09 ASSESSMENT — LOCATION SIMPLE DESCRIPTION DERM: LOCATION SIMPLE: RIGHT FOREARM

## 2020-03-09 NOTE — PROCEDURE: OTHER
Other (Free Text): Patient complains of pain. No evidence of infection at today’s visit. \\nOffered to send prescription for antibiotics in the instance that the area begins to look worse. He will consider taking it. He will return next week for recheck and suture removal.
Detail Level: Detailed
Note Text (......Xxx Chief Complaint.): This diagnosis correlates with the

## 2020-03-20 ENCOUNTER — APPOINTMENT (RX ONLY)
Dept: URBAN - METROPOLITAN AREA CLINIC 349 | Facility: CLINIC | Age: 85
Setting detail: DERMATOLOGY
End: 2020-03-20

## 2020-03-20 DIAGNOSIS — F42.4 EXCORIATION (SKIN-PICKING) DISORDER: ICD-10-CM

## 2020-03-20 DIAGNOSIS — Z48.02 ENCOUNTER FOR REMOVAL OF SUTURES: ICD-10-CM

## 2020-03-20 PROBLEM — Z85.828 PERSONAL HISTORY OF OTHER MALIGNANT NEOPLASM OF SKIN: Status: ACTIVE | Noted: 2020-03-20

## 2020-03-20 PROBLEM — S50.911A UNSPECIFIED SUPERFICIAL INJURY OF RIGHT FOREARM, INITIAL ENCOUNTER: Status: ACTIVE | Noted: 2020-03-20

## 2020-03-20 PROCEDURE — ? COUNSELING

## 2020-03-20 PROCEDURE — ? SUTURE REMOVAL (GLOBAL PERIOD)

## 2020-03-20 PROCEDURE — 99024 POSTOP FOLLOW-UP VISIT: CPT

## 2020-03-20 PROCEDURE — ? OTHER

## 2020-03-20 ASSESSMENT — LOCATION DETAILED DESCRIPTION DERM
LOCATION DETAILED: RIGHT DISTAL RADIAL DORSAL FOREARM
LOCATION DETAILED: RIGHT PROXIMAL RADIAL DORSAL FOREARM

## 2020-03-20 ASSESSMENT — LOCATION SIMPLE DESCRIPTION DERM: LOCATION SIMPLE: RIGHT FOREARM

## 2020-03-20 ASSESSMENT — LOCATION ZONE DERM: LOCATION ZONE: ARM

## 2020-03-20 NOTE — PROCEDURE: OTHER
Note Text (......Xxx Chief Complaint.): This diagnosis correlates with the
Detail Level: Detailed
Other (Free Text): Not suspicious for infection. Advised patient he can discontinue cefdinir. Patient took this for 10 days but states he has 10 pills remaining.

## 2020-03-20 NOTE — PROCEDURE: SUTURE REMOVAL (GLOBAL PERIOD)
Detail Level: Detailed
Add 73793 Cpt? (Important Note: In 2017 The Use Of 14459 Is Being Tracked By Cms To Determine Future Global Period Reimbursement For Global Periods): yes

## 2020-05-28 PROBLEM — R00.1 BRADYCARDIA: Status: ACTIVE | Noted: 2020-05-28

## 2020-10-08 ENCOUNTER — HOSPITAL ENCOUNTER (OUTPATIENT)
Dept: PHYSICAL THERAPY | Age: 85
End: 2020-10-08
Payer: MEDICARE

## 2020-10-14 ENCOUNTER — HOSPITAL ENCOUNTER (OUTPATIENT)
Dept: PHYSICAL THERAPY | Age: 85
Discharge: HOME | End: 2020-10-14
Payer: MEDICARE

## 2020-10-14 ENCOUNTER — HOSPITAL ENCOUNTER (OUTPATIENT)
Dept: PHYSICAL THERAPY | Age: 85
End: 2020-10-14
Payer: MEDICARE

## 2020-10-14 PROCEDURE — 97112 NEUROMUSCULAR REEDUCATION: CPT

## 2020-10-14 PROCEDURE — 97162 PT EVAL MOD COMPLEX 30 MIN: CPT

## 2020-10-14 NOTE — THERAPY EVALUATION
Vanessa Mi  : 1934  [place]  Secondary: Иван Brown [address] at [facility] Therapy  [address], [address]  [phone]   [phone]          OUTPATIENT PHYSICAL THERAPY:Initial Assessment 10/14/2020   ICD-10: Treatment Diagnosis: R26.2, R00.1, I25.10  Precautions/Allergies:   Patient has no known allergies. TREATMENT PLAN:  Effective Dates: 10/14/2020 TO 2021 (90 days). Frequency/Duration: 2 times a week for 90 Day(s) MEDICAL/REFERRING DIAGNOSIS:  Atherosclerotic heart disease of native coronary artery without angina pectoris [I25.10]  Bradycardia, unspecified [R00.1]   DATE OF ONSET: 1-2 years  REFERRING PHYSICIAN: Sona Santana DO  MD Orders: Lilia Canes and treat  Return MD Appointment:      INITIAL ASSESSMENT:  Mr. Steve Eaton presents to physical therapy with MD diagnosis of a gait and balance impairments. Pt demonstrated signs and symptoms consistent with this diagnosis. On objective examination, the patient demonstrated deficits in strength, balance, functional mobility, trasnfers, gait. The patient is limited in the following activities: walking, standing, balance, functional tasks, stairs. The patient has a good  prognosis for recovery based on the examination findings and may be limited by: N/A. Patient requires skilled physical therapy services in order to return to prior level of function. PROBLEM LIST (Impacting functional limitations):  1. Decreased Strength  2. Decreased ADL/Functional Activities  3. Decreased Transfer Abilities  4. Decreased Ambulation Ability/Technique  5. Decreased Balance  6. Decreased Activity Tolerance  7. Increased Fatigue  8. Decreased Longs with Home Exercise Program INTERVENTIONS PLANNED: (Treatment may consist of any combination of the following)  1. Balance Exercise  2. Gait Training  3. Home Exercise Program (HEP)  4.  Neuromuscular Re-education/Strengthening  5. Therapeutic Activites  6. Therapeutic Exercise/Strengthening     GOALS: (Goals have been discussed and agreed upon with patient.)  Short-Term Functional Goals: Time Frame: 4 weeks  1. Pt will be compliant with progressive HEP  2. Pt will improve tandem balance to > 10sec R and L  Discharge Goals: Time Frame: 10 weeks  1. Pt will improve Tinetti Score by 4 points to decrease fall risk  2. Pt will improve DGI score by 5 points to reduce fall risk  3. Pt will improve tandem balance to > 25sec on R and L    OUTCOME MEASURE:   Tool Used: Linda Balance Scale  Score:  Initial: 42/56 Most Recent: X/56 (Date: -- )   Interpretation of Score: Each section is scored on a 0-4 scale, 0 representing the patients inability to perform the task and 4 representing independence. The scores of each section are added together for a total score of 56. The higher the patients score, the more independent the patient is. Any score below 45 indicates increased risk for falls. MEDICAL NECESSITY:   · Patient is expected to demonstrate progress in strength, balance, coordination and functional technique to increase independence with ADLs and functional tasks. REASON FOR SERVICES/OTHER COMMENTS:  · Patient requires skilled physical therapy to return to prior level of function and decrease fall risk. Total Duration:       Rehabilitation Potential For Stated Goals: Good  Regarding Lorayne Juliana therapy, I certify that the treatment plan above will be carried out by a therapist or under their direction.   Thank you for this referral,  Thomas Campbell PT, DPT, OCS  Referring Physician Signature: Lele Colon DO _______________________________ Date _____________     PAIN/SUBJECTIVE:   Initial:   0/10 Post Session:  0/10   HISTORY:   History of Injury/Illness (Reason for Referral):  Pt states he has had some balance problems in the past. Pt reports he has a stability problem, will be walking and will suddenly have difficulty remaining upright. Is unsure specifically what is causing it. Has been going on for about a year but has gotten worse over the last 4 months. Pt has had multiple falls in the last year, none resulting in injury. Pt is unsure of what caused him to fall, but was working outside. Feels unstable while walking, but this is random. Works out regularly. Pt reports history of dizziness, stopped about 4-5mo ago. Would happen if he sat up too quickly. Pt takes about 10 different medications throughout the day  Past Medical History/Comorbidities:   Mr. Bal Lopez  has a past medical history of Asymptomatic gallstones, CAD (coronary artery disease) (11/17/2017), Centrilobular emphysema CT Findings ([facility] [address] Chronic anemia, Chronic renal disease, stage II, Colon polyps, Dyslipidemia, Ex-smoker for more than 1 year, GERD (gastroesophageal reflux disease), History of penile implant, History of stomach ulcers (1959), Iron deficiency anemia, Late onset Alzheimer's disease without behavioral disturbance ([facility] [address] Mood disorder ([facility] [address] RONY on CPAP, 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 Mr. Bal Lopez  has a past surgical history that includes hx appendectomy (1938); hx gi (1959); hx urological; hx orthopaedic (Right); hx heart catheterization (11/15/2017); hx coronary artery bypass graft (11/17/2017); and hx cataract removal (Bilateral, 2007). Social History/Living Environment:     Lives with wife, 6 stairs to get into house 15 stairs inside house  Prior Level of Function/Work/Activity:  Goes to gym, yard work and house work  Personal Factors:          Sex:  male        Age:  80 y.o.    Ambulatory/Rehab Services H2 Model Falls Risk Assessment   Risk Factors:       (1)  Gender [Male]       (5)  History of Recent Falls [w/in 3 months] Ability to Rise from Chair:       (1)  Pushes up, successful in one attempt   Falls Prevention Plan:       No modifications necessary Total: (5 or greater = High Risk): 7    [facility] States Patent #[id number]. Federal Law prohibits the replication, distribution or use without written permission from [facility] [employer]   Current Medications:       Current Outpatient Medications:     midodrine (PROAMATINE) 5 mg tablet, Take 1 Tab by mouth three (3) times daily (with meals). May take up to 2 tablets 3 times daily as needed for low BP and dizziness. , Disp: 90 Tab, Rfl: 5    beta-carotene,A,-vits C,E/mins (OCUVITE PO), Take  by mouth., Disp: , Rfl:     famotidine (PEPCID) 20 mg tablet, Take 20 mg by mouth two (2) times a day., Disp: , Rfl:     aspirin delayed-release 81 mg tablet, Take  by mouth every other day., Disp: , Rfl:     ergocalciferol, vitamin D2, (VITAMIN D2 PO), Take  by mouth., Disp: , Rfl:     memantine HCl (MEMANTINE PO), Take 10 mg by mouth two (2) times a day., Disp: , Rfl:     vitamin E, dl,tocopheryl acet, (VITAMIN E ACETATE) 400 unit cap capsule, Take 200 Units by mouth two (2) times a day., Disp: , Rfl:     atorvastatin (LIPITOR) 20 mg tablet, Take  by mouth daily. , Disp: , Rfl:     sertraline (ZOLOFT) 100 mg tablet, Take 150 mg by mouth daily. , Disp: , Rfl:     lamoTRIgine (LAMICTAL) 200 mg tablet, Take 200 mg by mouth daily. , Disp: , Rfl:    Date Last Reviewed:  10/14/2020   Number of Personal Factors/Comorbidities that affect the Plan of Care: 3+: HIGH COMPLEXITY   EXAMINATION:   *= Painful     WNL= within normal limits     NT= not tested    TU.5sec  30sec Sit to Stand: 7x    MEJIAS:   Tinetti: 21  Dynamic Gait Index: 13pts    STATIC BALANCE:  Eyes Open: Body Structures Involved:  1. Nerves  2. Muscles Body Functions Affected:  1. Sensory/Pain  2. Neuromusculoskeletal  3. Movement Related Activities and Participation Affected:  1. General Tasks and Demands  2. Mobility  3. Self Care  4. Domestic Life  5.  Interpersonal Interactions and Relationships  6.  Community, Social and [place]   Number of elements (examined above) that affect the Plan of Care: 3: MODERATE COMPLEXITY   CLINICAL PRESENTATION:   Presentation: Evolving clinical presentation with changing clinical characteristics: MODERATE COMPLEXITY   CLINICAL DECISION MAKING:   Use of outcome tool(s) and clinical judgement create a POC that gives a: Questionable prediction of patient's progress: MODERATE COMPLEXITY     Ellie Hilda PT, DPT, OCS

## 2020-10-14 NOTE — PROGRESS NOTES
Sofia Garcia  : 1934  [place]  Secondary: Иван Brown [address] at [facility] Therapy  [address], [facility] [address]  [phone]   [phone]         OUTPATIENT PHYSICAL THERAPY:Daily Note 10/14/2020      TREATMENT:   PT Patient Time In/Time Out  Time In: 0815  Time Out: 0900      Total Time: 45min  Visit Count:  1     ICD-10: Treatment Diagnosis: R26.2, R00.1, I25.10  Medication Last Reviewed: 10/14/20      TREATMENT PLAN  Effective Dates: 10/14/2020 TO 2021 (90 days). Frequency/Duration:1- 2 times a week for 90 Day(s)         Subjective: See Evaluation Note dtated 10/14/2020 for details   Pain:     Objective: See Evaluation Note dtated 10/14/2020 for details      Therapeutic Exercise/Neuro Re-Education: (0min/15min) Done in order to improve strength, ROM and understanding of current condition.     Date:  10/14 Date:   Date:   Date:     Activity/Exercise Parameters      Education Discussed examination findings, HEP, plan of care      Standing Balance EO: tandem, head turns/nods                                      Manual Therapy: (0min) Done in order to improve joint and soft tissue mobility,reduce muscle guarding, and decrease muscle tone   Date:   Date:   Date:   Date:     Type Parameters      Joint Mobilization       Soft Tissue Mobilization           Modalities: (-) Done in order to reduce swelling and pain    Assessment: See Evaluation Note dtated 10/14/2020 for details    Plan: See Evaluation Note dtated 10/14/2020 for details    Future Appointments   Date Time Provider Lita Garcia   10/21/2020  1:00 PM Modesto Hughes PT [facility]   10/28/2020  3:00 PM Modesto Hughes PT [facility] EFREN Krause Time: 30min eval  Units: 1 Eval Mod/1Neuro      Johana Lion, PT, DPT, OCS

## 2020-10-21 ENCOUNTER — HOSPITAL ENCOUNTER (OUTPATIENT)
Dept: PHYSICAL THERAPY | Age: 85
Discharge: HOME | End: 2020-10-21
Payer: MEDICARE

## 2020-10-21 PROCEDURE — 97110 THERAPEUTIC EXERCISES: CPT

## 2020-10-21 PROCEDURE — 97112 NEUROMUSCULAR REEDUCATION: CPT

## 2020-10-21 NOTE — PROGRESS NOTES
Maite Yanez  : 1934  [place]  Secondary: Иван Brown 75 at [place] [address] Therapy  [address], [address]  [phone]   [phone]         OUTPATIENT PHYSICAL THERAPY:Daily Note 10/21/2020      TREATMENT:   PT Patient Time In/Time Out  Time In: 1300  Time Out: 1345      Total Time: 45min  Visit Count:  2     ICD-10: Treatment Diagnosis: R26.2, R00.1, I25.10  Medication Last Reviewed: 10/21/20      TREATMENT PLAN  Effective Dates: 10/14/2020 TO 2021 (90 days). Frequency/Duration:1- 2 times a week for 90 Day(s)         Subjective: Pt states he is doing fine   Pain:     Objective: Therapeutic Exercise/Neuro Re-Education: (12min/33min) Done in order to improve strength, ROM and understanding of current condition. Date:  10/14 Date:   Date:   Date:     Activity/Exercise Parameters      Education Discussed examination findings, HEP, plan of care      Standing Balance EO: tandem, head turns/nods · EO: tandem/DL head movements; ball passes, lifts  · EC: DL/Tandem, small head movements  · Foam: DL and tandem     NuStep  x8min lvl 5     Sit to Stand  2x10                       Manual Therapy: (0min) Done in order to improve joint and soft tissue mobility,reduce muscle guarding, and decrease muscle tone   Date:   Date:   Date:   Date:     Type Parameters      Joint Mobilization       Soft Tissue Mobilization           Modalities: (-) Done in order to reduce swelling and pain    Assessment: Pt with slight improvements in balance, difficulty while on foam or with eyes closed.  Some difficulty with ball passes with R leg in front on tandem    Plan: continue with balance    Future Appointments   Date Time Provider Lita Garcia   10/28/2020  7:00 PM Lindy Nixon PT [facility]   2020 11:00 AM Lindy Nixon PT [facility]   2020  3:15 PM Leslie Owens DO [facility] DG D   11/11/2020  1:00 PM PRITESH Chapman [facility]   11/18/2020  1:00 PM PRITESH Chapman [facility]   11/25/2020  1:00 PM PRITESH Chapman [facility]       Units: 1TE/ 2 Rosendo Duffy, PT, DPT, OCS

## 2020-10-28 ENCOUNTER — APPOINTMENT (OUTPATIENT)
Dept: PHYSICAL THERAPY | Age: 85
End: 2020-10-28
Payer: MEDICARE

## 2020-11-04 ENCOUNTER — HOSPITAL ENCOUNTER (OUTPATIENT)
Dept: PHYSICAL THERAPY | Age: 85
Discharge: HOME | End: 2020-11-04
Payer: MEDICARE

## 2020-11-04 PROCEDURE — 97110 THERAPEUTIC EXERCISES: CPT

## 2020-11-04 PROCEDURE — 97112 NEUROMUSCULAR REEDUCATION: CPT

## 2020-11-04 NOTE — PROGRESS NOTES
Haris Gutierrez  : 1934  [place]  Secondary: Иван Brown 75 at [facility] Therapy  [address], [address]  [phone]   [phone]         OUTPATIENT PHYSICAL THERAPY:Daily Note 2020      TREATMENT:   PT Patient Time In/Time Out  Time In: 1106  Time Out: 1151      Total Time: 45min  Visit Count:  3     ICD-10: Treatment Diagnosis: R26.2, R00.1, I25.10  Medication Last Reviewed: 20      TREATMENT PLAN  Effective Dates: 10/14/2020 TO 2021 (90 days). Frequency/Duration:1- 2 times a week for 90 Day(s)         Subjective: Pt states he is doing well, has been doing his balance exercises   Pain:     Objective: Therapeutic Exercise/Neuro Re-Education: (15min/30min) Done in order to improve strength, ROM and understanding of current condition.     Date:  10/14 Date:  10/21 Date:   Date:     Activity/Exercise Parameters      Education Discussed examination findings, HEP, plan of care      Standing Balance EO: tandem, head turns/nods · EO: tandem/DL head movements; ball passes, lifts  · EC: DL/Tandem, small head movements  · Foam: DL and tandem · EO: tandem/DL head movements  · EC: DL/Tandem, small head movements  · Foam: DL and tandem  · Split Stance: EO, head movements, EC  · Perturbations  · Tandem Walking 4x45    NuStep  x8min lvl 5     Sit to Stand  2x10 3x15    Glute Extension   3x15 37.5lbs B    Hip Ext/ABD   3x10                                           Manual Therapy: (0min) Done in order to improve joint and soft tissue mobility,reduce muscle guarding, and decrease muscle tone   Date:   Date:   Date:   Date:     Type Parameters      Joint Mobilization       Soft Tissue Mobilization           Modalities: (-) Done in order to reduce swelling and pain    Assessment: Pt had improvements with his balance, able to perform better with EC    Plan: continue with balance    Future Appointments   Date Time Provider Lita Radha   11/9/2020  3:15 PM DO MOLLY Hu [facility]   11/11/2020  1:00 [facility]   11/18/2020  1:00 PM Jonita Dryer, PT [facility]   11/25/2020  1:00 PM Jonita Dryer, PT [facility]       Units: 1TE/ 2 Neuro      Ciara Hadley, PT, DPT, OCS

## 2020-11-11 ENCOUNTER — HOSPITAL ENCOUNTER (OUTPATIENT)
Dept: PHYSICAL THERAPY | Age: 85
Discharge: HOME | End: 2020-11-11
Payer: MEDICARE

## 2020-11-11 PROCEDURE — 97112 NEUROMUSCULAR REEDUCATION: CPT

## 2020-11-11 PROCEDURE — 97110 THERAPEUTIC EXERCISES: CPT

## 2020-11-11 NOTE — PROGRESS NOTES
Ike Laureano  : 1934  [place]  Secondary: Иван Brown 75 at [facility] Therapy  [address], [facility], [address]  [phone]   [phone]         OUTPATIENT PHYSICAL THERAPY:Daily Note 2020      TREATMENT:   PT Patient Time In/Time Out  Time In: 1302  Time Out: 1345      Total Time: 43min  Visit Count:  4     ICD-10: Treatment Diagnosis: R26.2, R00.1, I25.10  Medication Last Reviewed: 20      TREATMENT PLAN  Effective Dates: 10/14/2020 TO 2021 (90 days). Frequency/Duration:1- 2 times a week for 90 Day(s)         Subjective: Pt states he is doing fine   Pain:     Objective: Therapeutic Exercise/Neuro Re-Education: (25min/18min) Done in order to improve strength, ROM and understanding of current condition.     Date:  10/14 Date:  10/21 Date:   Date:     Activity/Exercise Parameters      Education Discussed examination findings, HEP, plan of care      Standing Balance EO: tandem, head turns/nods · EO: tandem/DL head movements; ball passes, lifts  · EC: DL/Tandem, small head movements  · Foam: DL and tandem · EO: tandem/DL head movements  · EC: DL/Tandem, small head movements  · Foam: DL and tandem  · Split Stance: EO, head movements, EC  · Perturbations  · Tandem Walking 4x45 · Foam: DL and tandem, head movements, pushes  · Floor: EO/EC, DL and tandem, hard pushes  · SL for duration   NuStep  x8min lvl 5     Sit to Stand  2x10 3x15 3x15   Glute Extension   3x15 37.5lbs B 3x15 37.5lbs B   Hip Ext/ABD   3x10 3x10   Sidestepping    3x35ft red TB                                   Manual Therapy: (0min) Done in order to improve joint and soft tissue mobility,reduce muscle guarding, and decrease muscle tone   Date:   Date:   Date:   Date:     Type Parameters      Joint Mobilization       Soft Tissue Mobilization           Modalities: (-) Done in order to reduce swelling and pain    Assessment: Pt continues to make improvements with balance and needs minimal corrections with UEs    Plan: continue with balance    Future Appointments   Date Time Provider Lita Garcia   11/18/2020  1:00 PM PRITESH Jones   11/25/2020  1:00 PM PRITESH Jones   12/2/2020  9:15 AM MD MOLLY [facility]   5/11/2021  3:00 PM DO MOLLY Iglesias [facility] UCD       Units: 2TE/ 1Ndusty Tucker, PT, DPT, OCS

## 2020-11-18 ENCOUNTER — HOSPITAL ENCOUNTER (OUTPATIENT)
Dept: PHYSICAL THERAPY | Age: 85
Discharge: HOME | End: 2020-11-18
Payer: MEDICARE

## 2020-11-18 NOTE — PROGRESS NOTES
Jillian Douglas  : 1934  [place]  Secondary: [address]ан Brown [address] at [address], [address] [place] [address]  [phone]   [phone]      OUTPATIENT DAILY NOTE    NAME/AGE/GENDER: Jillian Douglas is a 80 y.o. male. DATE: 2020    Mr. Lorri Chong CANCELED for today's appointment due to schedule conflicit.  Andrea Ruiz, PTA

## 2020-12-02 ENCOUNTER — HOSPITAL ENCOUNTER (OUTPATIENT)
Dept: PHYSICAL THERAPY | Age: 85
Discharge: HOME | End: 2020-12-02
Payer: MEDICARE

## 2020-12-02 PROCEDURE — 97112 NEUROMUSCULAR REEDUCATION: CPT

## 2020-12-02 PROCEDURE — 97110 THERAPEUTIC EXERCISES: CPT

## 2020-12-02 NOTE — PROGRESS NOTES
Willian Tapia  : 1934  [place]  Secondary: Иван Brown [address] at [address]  [address], [address]  [phone]   [phone]         OUTPATIENT PHYSICAL THERAPY:Daily Note 2020      TREATMENT:   PT Patient Time In/Time Out  Time In: 1300  Time Out: 1345      Total Time: 45min  Visit Count:  5     ICD-10: Treatment Diagnosis: R26.2, R00.1, I25.10  Medication Last Reviewed: 20      TREATMENT PLAN  Effective Dates: 10/14/2020 TO 2021 (90 days). Frequency/Duration:1- 2 times a week for 90 Day(s)         Subjective: Pt states he is doing well, feels like he may have gotten a little worse because he has been slacking   Pain:     Objective: Therapeutic Exercise/Neuro Re-Education: (30min/15min) Done in order to improve strength, ROM and understanding of current condition.     Date:  10/21 Date:   Date:   Date:     Activity/Exercise       Education       Standing Balance · EO: tandem/DL head movements; ball passes, lifts  · EC: DL/Tandem, small head movements  · Foam: DL and tandem · EO: tandem/DL head movements  · EC: DL/Tandem, small head movements  · Foam: DL and tandem  · Split Stance: EO, head movements, EC  · Perturbations  · Tandem Walking 4x45 · Foam: DL and tandem, head movements, pushes  · Floor: EO/EC, DL and tandem, hard pushes  · SL for duration · Foam: DL and tandem, head movements, pushes, distractions   NuStep x8min lvl 5   x8min lvl 6   Sit to Stand 2x10 3x15 3x15 3x15   Glute Extension  3x15 37.5lbs B 3x15 37.5lbs B 3x15 37.5lbs B   Hip Ext/ABD  3x10 3x10    Sidestepping   3x35ft red TB    Hip Flexion    3x15 37.5lbs B                            Manual Therapy: (0min) Done in order to improve joint and soft tissue mobility,reduce muscle guarding, and decrease muscle tone   Date:   Date:   Date:   Date:     Type Parameters      Joint Mobilization       Soft Tissue Mobilization           Modalities: (-) Done in order to reduce swelling and pain    Assessment: Pt with a little more difficulty than normal, able to multi-task with balance    Plan: continue with balance    Future Appointments   Date Time Provider Lita Sanzi   1/27/2021  3:00 PM Shen Lloyd MD [facility]   5/11/2021  3:00 PM Amber John,  [facility]       Units: 2TE/ 1Ndusty Jacobs Cap, PT, DPT, OCS

## 2020-12-09 ENCOUNTER — HOSPITAL ENCOUNTER (OUTPATIENT)
Dept: PHYSICAL THERAPY | Age: 85
Discharge: HOME | End: 2020-12-09
Payer: MEDICARE

## 2020-12-09 NOTE — PROGRESS NOTES
Surekha Montalvo  : 1934  [place]  Secondary: [address]ан Brown [address] at [facility] Therapy  [address] [place] [address], [facility] [address]  [phone]   [phone]        OUTPATIENT DAILY NOTE    NAME/AGE/GENDER: Surekha Montalvo is a 80 y.o. male. DATE: 2020    Mr. Aarti Watt CANCELED for today's appointment due to being sick with bronchitis.     Navid Carey, PT

## 2020-12-16 ENCOUNTER — HOSPITAL ENCOUNTER (OUTPATIENT)
Dept: PHYSICAL THERAPY | Age: 85
Discharge: HOME | End: 2020-12-16
Payer: MEDICARE

## 2020-12-16 PROCEDURE — 97110 THERAPEUTIC EXERCISES: CPT

## 2020-12-16 NOTE — PROGRESS NOTES
Arjun Oconnor  : 1934  [place]  Secondary: Иван Brown [address] at [facility] Therapy  [address], [address]  [phone]   [phone]         OUTPATIENT PHYSICAL THERAPY:Daily Note 2020      TREATMENT:   PT Patient Time In/Time Out  Time In: 0145  Time Out: 0230      Total Time: 45min  Visit Count:  6     ICD-10: Treatment Diagnosis: R26.2, R00.1, I25.10  Medication Last Reviewed: 20      TREATMENT PLAN  Effective Dates: 10/14/2020 TO 2021 (90 days). Frequency/Duration:1- 2 times a week for 90 Day(s)         Subjective: Pt states he is doing well, feels like he may have gotten a little worse because he has been slacking   Pain:     Objective: Therapeutic Exercise/Neuro Re-Education: (45min/min) Done in order to improve strength, ROM and understanding of current condition. Date:   Date:   Date  20   Activity/Exercise      Education      Standing Balance · Foam: DL and tandem, head movements, pushes  · Floor: EO/EC, DL and tandem, hard pushes  · SL for duration · Foam: DL and tandem, head movements, pushes, distractions ·    NuStep  x8min lvl 6 X 10 level 3   Sit to Stand 3x15 3x15 3 x15   Glute Extension 3x15 37.5lbs B 3x15 37.5lbs B    Hip Ext/ABD 3x10     Sidestepping 3x35ft red TB     Hip Flexion  3x15 37.5lbs B    Hip abd in sidelying    2 x 10   Marching   2 x 10   SLR   2 x 10   bridging   2 x 10   clams   2 x 10       Manual Therapy: (0min) Done in order to improve joint and soft tissue mobility,reduce muscle guarding, and decrease muscle tone   Date:   Date:   Date:   Date:     Type Parameters      Joint Mobilization       Soft Tissue Mobilization           Modalities: (-) Done in order to reduce swelling and pain    Assessment: Patient tolerated treatment well. Patient demonstrated good effort with all exercises.  Instructed patient to continue home exercises as directed.     Plan: continue with balance    Future Appointments   Date Time Provider Lita Garcia   12/23/2020  1:00 PM Suni Will PT [facility]   1/27/2021  3:00 PM Shoaib Christy MD [facility]   5/11/2021  3:00 PM Jose Gaytan DO [facility]       Units: 3TE/ Neuro      Gaylin Boeck, PTA

## 2020-12-23 ENCOUNTER — HOSPITAL ENCOUNTER (OUTPATIENT)
Dept: PHYSICAL THERAPY | Age: 85
Discharge: HOME | End: 2020-12-23
Payer: MEDICARE

## 2020-12-23 PROCEDURE — 97110 THERAPEUTIC EXERCISES: CPT

## 2020-12-23 NOTE — PROGRESS NOTES
Arleth Ashford  : 1934  [place]  Secondary: Иван Brown 75 at [facility]  [address], [address]  [phone]   [phone]         OUTPATIENT PHYSICAL THERAPY:Daily Note 2020      TREATMENT:   PT Patient Time In/Time Out  Time In: 1040  Time Out: 1122      Total Time: 42min  Visit Count:  7     ICD-10: Treatment Diagnosis: R26.2, R00.1, I25.10  Medication Last Reviewed: 20      TREATMENT PLAN  Effective Dates: 10/14/2020 TO 2021 (90 days). Frequency/Duration:1- 2 times a week for 90 Day(s)         Subjective: Pt states he fell again the other day, is getting really fatigued on his walks   Pain:     Objective: Therapeutic Exercise/Neuro Re-Education: (42min/min) Done in order to improve strength, ROM and understanding of current condition.     Date:   Date:   Date  20 Date:     Activity/Exercise       Education       Standing Balance · Foam: DL and tandem, head movements, pushes  · Floor: EO/EC, DL and tandem, hard pushes  · SL for duration · Foam: DL and tandem, head movements, pushes, distractions ·  ·    NuStep  x8min lvl 6 X 10 level 3 x16min lvl 2   Sit to Stand 3x15 3x15 3 x15 3x15   Glute Extension 3x15 37.5lbs B 3x15 37.5lbs B     Hip Ext/ABD 3x10   3x10 B   Sidestepping 3x35ft red TB      Hip Flexion  3x15 37.5lbs B     Hip abd in sidelying    2 x 10    Marching   2 x 10    SLR   2 x 10 3x15   bridging   2 x 10 3x10   clams   2 x 10        Manual Therapy: (0min) Done in order to improve joint and soft tissue mobility,reduce muscle guarding, and decrease muscle tone   Date:   Date:   Date:   Date:     Type Parameters      Joint Mobilization       Soft Tissue Mobilization           Modalities: (-) Done in order to reduce swelling and pain    Assessment: Patient is getting fatigued easier than usual due to recovering from bronchitis, but was able to do slightly more    Plan: continue with balance    Future Appointments   Date Time Provider Lita Garcia   12/31/2020  1:00 PM Jeffrey Waldrop, PT [facility]   1/6/2021 11:00 AM Jeffrey Wladrop PT [facility]   1/13/2021 11:00 AM Jeffrey Waldrop PT [facility]   1/27/2021  3:00 PM Nina Doss MD [facility]   5/11/2021  3:00 PM DO MOLLY Orozco [facility]       Units: Shabbir Fletcher, PT,

## 2020-12-31 ENCOUNTER — HOSPITAL ENCOUNTER (OUTPATIENT)
Dept: PHYSICAL THERAPY | Age: 85
Discharge: HOME | End: 2020-12-31
Payer: MEDICARE

## 2020-12-31 NOTE — PROGRESS NOTES
Adrian Gomez  : 1934  [place]  Secondary: Иван Brown 75 at [facility]  [address] [place] [address], [facility] [address]  [phone]   [phone]        OUTPATIENT DAILY NOTE    NAME/AGE/GENDER: Adrian Gomez is a 80 y.o. male. DATE: 2020    Mr. Saucedo Harshal CANCELED for today's appointment due to unknown reasons.     Florentin Elam, PT

## 2021-01-06 ENCOUNTER — HOSPITAL ENCOUNTER (OUTPATIENT)
Dept: PHYSICAL THERAPY | Age: 86
Discharge: HOME | End: 2021-01-06
Payer: MEDICARE

## 2021-01-06 PROCEDURE — 97110 THERAPEUTIC EXERCISES: CPT

## 2021-01-06 PROCEDURE — 97112 NEUROMUSCULAR REEDUCATION: CPT

## 2021-01-06 NOTE — PROGRESS NOTES
Agustina Serum  : 1934  Primary: Suzanne Yung Medicare Hmo  Secondary:  [address] at [facility]  [address], [address]  [phone]   [phone]         OUTPATIENT PHYSICAL THERAPY:Daily Note 2021      TREATMENT:   PT Patient Time In/Time Out  Time In: 1105      Total Time: 42min  Visit Count:  8     ICD-10: Treatment Diagnosis: R26.2, R00.1, I25.10  Medication Last Reviewed: 21      TREATMENT PLAN  Effective Dates: 10/14/2020 TO 2021 (90 days). Frequency/Duration:1- 2 times a week for 90 Day(s)         Subjective: Pt states he has not had any falls, just stumbles   Pain:     Objective: Therapeutic Exercise/Neuro Re-Education: (30min/min) Done in order to improve strength, ROM and understanding of current condition.     Date:   Date  20 Date:   Date:     Activity/Exercise       Education       Standing Balance · Foam: DL and tandem, head movements, pushes, distractions ·  ·  · Foam: DL, tandem w/ and w/o head movements   NuStep x8min lvl 6 X 10 level 3 x16min lvl 2 x15min lvl 5   Sit to Stand 3x15 3 x15 3x15 2x15   Glute Extension 3x15 37.5lbs B      Hip Ext/ABD   3x10 B    Sidestepping       Hip Flexion 3x15 37.5lbs B      Hip abd in sidelying   2 x 10     Marching  2 x 10     SLR  2 x 10 3x15 2x15   bridging  2 x 10 3x10 3x10   clams  2 x 10         Manual Therapy: (0min) Done in order to improve joint and soft tissue mobility,reduce muscle guarding, and decrease muscle tone   Date:   Date:   Date:   Date:     Type Parameters      Joint Mobilization       Soft Tissue Mobilization           Modalities: (-) Done in order to reduce swelling and pain    Assessment: Patient with increased difficulty with balance tasks    Plan: continue with balance    Future Appointments   Date Time Provider Lita Garcia   2021  2:30 PM Nilay Carvalho, PT [facility]   2021  3:00 PM Helen Ocasio MD [facility]   5/11/2021  3:00 PM DO MOLLY Chaparro [facility]       Units: [id number]/ [id number]      Demetria Thomas, PT,

## 2021-01-13 ENCOUNTER — APPOINTMENT (OUTPATIENT)
Dept: PHYSICAL THERAPY | Age: 86
End: 2021-01-13
Payer: MEDICARE

## 2021-01-20 ENCOUNTER — HOSPITAL ENCOUNTER (OUTPATIENT)
Dept: PHYSICAL THERAPY | Age: 86
Discharge: HOME | End: 2021-01-20
Payer: MEDICARE

## 2021-01-20 NOTE — PROGRESS NOTES
Linda Watson  : 1934  Primary: Yissel Ericka Yung Medicare Hmo  Secondary:  [address]  at [facility]  [address], [facility] [address]  [phone]   [phone]        OUTPATIENT DAILY NOTE    NAME/AGE/GENDER: Linda Watson is a 80 y.o. male. DATE: 2021    Mr. Thorne Cons CANCELED for today's appointment due to not feeling well.     Sergioee Sam, PT

## 2021-01-29 ENCOUNTER — APPOINTMENT (RX ONLY)
Dept: URBAN - METROPOLITAN AREA CLINIC 349 | Facility: CLINIC | Age: 86
Setting detail: DERMATOLOGY
End: 2021-01-29

## 2021-01-29 DIAGNOSIS — Z85.828 PERSONAL HISTORY OF OTHER MALIGNANT NEOPLASM OF SKIN: ICD-10-CM

## 2021-01-29 DIAGNOSIS — D22 MELANOCYTIC NEVI: ICD-10-CM | Status: STABLE

## 2021-01-29 DIAGNOSIS — L57.0 ACTINIC KERATOSIS: ICD-10-CM

## 2021-01-29 DIAGNOSIS — L57.8 OTHER SKIN CHANGES DUE TO CHRONIC EXPOSURE TO NONIONIZING RADIATION: ICD-10-CM

## 2021-01-29 DIAGNOSIS — B00.1 HERPESVIRAL VESICULAR DERMATITIS: ICD-10-CM

## 2021-01-29 DIAGNOSIS — D485 NEOPLASM OF UNCERTAIN BEHAVIOR OF SKIN: ICD-10-CM

## 2021-01-29 PROBLEM — D22.5 MELANOCYTIC NEVI OF TRUNK: Status: ACTIVE | Noted: 2021-01-29

## 2021-01-29 PROBLEM — D48.5 NEOPLASM OF UNCERTAIN BEHAVIOR OF SKIN: Status: ACTIVE | Noted: 2021-01-29

## 2021-01-29 PROCEDURE — ? OTHER

## 2021-01-29 PROCEDURE — ? SUNSCREEN RECOMMENDATIONS

## 2021-01-29 PROCEDURE — ? COUNSELING

## 2021-01-29 PROCEDURE — 17000 DESTRUCT PREMALG LESION: CPT

## 2021-01-29 PROCEDURE — 17003 DESTRUCT PREMALG LES 2-14: CPT

## 2021-01-29 PROCEDURE — ? OBSERVATION

## 2021-01-29 PROCEDURE — ? LIQUID NITROGEN

## 2021-01-29 PROCEDURE — 99214 OFFICE O/P EST MOD 30 MIN: CPT | Mod: 25

## 2021-01-29 PROCEDURE — ? PHOTO-DOCUMENTATION

## 2021-01-29 PROCEDURE — ? MEDICAL PHOTOGRAPHY REVIEW

## 2021-01-29 PROCEDURE — ? PRESCRIPTION MEDICATION MANAGEMENT

## 2021-01-29 ASSESSMENT — LOCATION SIMPLE DESCRIPTION DERM
LOCATION SIMPLE: RIGHT TEMPLE
LOCATION SIMPLE: RIGHT EAR
LOCATION SIMPLE: LEFT HAND
LOCATION SIMPLE: UPPER BACK
LOCATION SIMPLE: RIGHT SHOULDER
LOCATION SIMPLE: LEFT EAR
LOCATION SIMPLE: LEFT PRETIBIAL REGION
LOCATION SIMPLE: CHEST
LOCATION SIMPLE: RIGHT FOREHEAD
LOCATION SIMPLE: LEFT UPPER LIP
LOCATION SIMPLE: LEFT THIGH
LOCATION SIMPLE: RIGHT FOREARM
LOCATION SIMPLE: ABDOMEN
LOCATION SIMPLE: LEFT SHOULDER

## 2021-01-29 ASSESSMENT — LOCATION DETAILED DESCRIPTION DERM
LOCATION DETAILED: LEFT ANTERIOR PROXIMAL THIGH
LOCATION DETAILED: LEFT LATERAL ABDOMEN
LOCATION DETAILED: LEFT POSTERIOR SHOULDER
LOCATION DETAILED: LEFT RADIAL DORSAL HAND
LOCATION DETAILED: RIGHT LATERAL FOREHEAD
LOCATION DETAILED: RIGHT MEDIAL SUPERIOR CHEST
LOCATION DETAILED: LEFT SUPERIOR VERMILION BORDER
LOCATION DETAILED: RIGHT CENTRAL TEMPLE
LOCATION DETAILED: RIGHT SUPERIOR HELIX
LOCATION DETAILED: RIGHT DISTAL RADIAL DORSAL FOREARM
LOCATION DETAILED: LEFT ANTIHELIX
LOCATION DETAILED: LEFT LATERAL PROXIMAL PRETIBIAL REGION
LOCATION DETAILED: INFERIOR THORACIC SPINE
LOCATION DETAILED: RIGHT POSTERIOR SHOULDER

## 2021-01-29 ASSESSMENT — LOCATION ZONE DERM
LOCATION ZONE: EAR
LOCATION ZONE: TRUNK
LOCATION ZONE: FACE
LOCATION ZONE: HAND
LOCATION ZONE: LEG
LOCATION ZONE: ARM
LOCATION ZONE: LIP

## 2021-01-29 ASSESSMENT — PAIN INTENSITY VAS: HOW INTENSE IS YOUR PAIN 0 BEING NO PAIN, 10 BEING THE MOST SEVERE PAIN POSSIBLE?: 1/10 PAIN

## 2021-01-29 NOTE — PROCEDURE: OTHER
Other (Free Text): Previously observed. \\nCompared to photo lesion is still present, treated with LN2 at today’s visit.
Detail Level: Detailed
Other (Free Text): Compared to photo, lesion has improved but is still present. Will continue to monitor.
Note Text (......Xxx Chief Complaint.): This diagnosis correlates with the
Render Risk Assessment In Note?: no
Other (Free Text): Per patient, occurred after a cardiac surgery 3-4 years ago. Patient discussed this with the surgeon but did not state what this is. Patient states surgeon told him not to fool with this. Patient does not have a Primary care physician, will discuss with patients wife. Wife confirms that cardiologist knows about this lesion and was not concerned about its removal. Will attempt to contact cardiologist for confirmation.\\nThis lesion is “not getting smaller”\\nWill consider referral patient to a general surgeon for evaluation and management.

## 2021-01-29 NOTE — PROCEDURE: LIQUID NITROGEN
Post-Care Instructions: I reviewed with the patient in detail post-care instructions. Patient is to wear sunprotection, and avoid picking at any of the treated lesions. Pt may apply Vaseline to crusted or scabbing areas.
Consent: The patient's consent was obtained including but not limited to risks of crusting, scabbing, blistering, scarring, darker or lighter pigmentary change, recurrence, incomplete removal and infection.
Number Of Freeze-Thaw Cycles: 2 freeze-thaw cycles
Render Post-Care Instructions In Note?: no
Duration Of Freeze Thaw-Cycle (Seconds): 3
Detail Level: Detailed

## 2021-01-29 NOTE — PROCEDURE: PRESCRIPTION MEDICATION MANAGEMENT
Plan: Previously prescribed Valacyclovoir 1 gram take two pills twice daily for one day. Patient does not remember this currently. Patient will
Detail Level: Zone
Render In Strict Bullet Format?: No

## 2021-04-07 NOTE — THERAPY DISCHARGE
Ildefonso Mijares has been seen in physical therapy from 10/14/2020 to 1/6/2021 for 8 visits. Treatment has been discontinued at this time due to Decline in medical status and patient failing to return for additional treatment. The below goals were met prior to discontinuation. Some goals were not met due to unable to reassess.    Thank you for this referral.

## 2021-11-17 ENCOUNTER — HOSPITAL ENCOUNTER (OUTPATIENT)
Dept: LAB | Age: 86
Discharge: HOME | End: 2021-11-17
Payer: MEDICARE

## 2021-11-17 DIAGNOSIS — R00.1 BRADYCARDIA: ICD-10-CM

## 2021-11-17 DIAGNOSIS — I95.1 ORTHOSTATIC HYPOTENSION: ICD-10-CM

## 2021-11-17 LAB
ALBUMIN SERPL-MCNC: 3.5 G/DL (ref 3.2–4.6)
ALBUMIN/GLOB SERPL: 0.9 {RATIO} (ref 1.2–3.5)
ALP SERPL-CCNC: 73 U/L (ref 50–136)
ALT SERPL-CCNC: 21 U/L (ref 12–65)
ANION GAP SERPL CALC-SCNC: 3 MMOL/L (ref 7–16)
AST SERPL-CCNC: 23 U/L (ref 15–37)
BASOPHILS # BLD: 0.1 K/UL (ref 0–0.2)
BASOPHILS NFR BLD: 1 % (ref 0–2)
BILIRUB SERPL-MCNC: 0.4 MG/DL (ref 0.2–1.1)
BUN SERPL-MCNC: 38 MG/DL (ref 8–23)
CALCIUM SERPL-MCNC: 9.1 MG/DL (ref 8.3–10.4)
CHLORIDE SERPL-SCNC: 107 MMOL/L (ref 98–107)
CO2 SERPL-SCNC: 30 MMOL/L (ref 21–32)
CREAT SERPL-MCNC: 1.47 MG/DL (ref 0.8–1.5)
DIFFERENTIAL METHOD BLD: ABNORMAL
EOSINOPHIL # BLD: 0.3 K/UL (ref 0–0.8)
EOSINOPHIL NFR BLD: 5 % (ref 0.5–7.8)
ERYTHROCYTE [DISTWIDTH] IN BLOOD BY AUTOMATED COUNT: 14.1 % (ref 11.9–14.6)
GLOBULIN SER CALC-MCNC: 4.1 G/DL (ref 2.3–3.5)
GLUCOSE SERPL-MCNC: 109 MG/DL (ref 65–100)
HCT VFR BLD AUTO: 34.9 % (ref 41.1–50.3)
HGB BLD-MCNC: 10.7 G/DL (ref 13.6–17.2)
IMM GRANULOCYTES # BLD AUTO: 0 K/UL (ref 0–0.5)
IMM GRANULOCYTES NFR BLD AUTO: 0 % (ref 0–5)
LYMPHOCYTES # BLD: 1.8 K/UL (ref 0.5–4.6)
LYMPHOCYTES NFR BLD: 26 % (ref 13–44)
MAGNESIUM SERPL-MCNC: 2.5 MG/DL (ref 1.8–2.4)
MCH RBC QN AUTO: 30.1 PG (ref 26.1–32.9)
MCHC RBC AUTO-ENTMCNC: 30.7 G/DL (ref 31.4–35)
MCV RBC AUTO: 98.3 FL (ref 79.6–97.8)
MONOCYTES # BLD: 0.6 K/UL (ref 0.1–1.3)
MONOCYTES NFR BLD: 8 % (ref 4–12)
NEUTS SEG # BLD: 4.2 K/UL (ref 1.7–8.2)
NEUTS SEG NFR BLD: 60 % (ref 43–78)
NRBC # BLD: 0 K/UL (ref 0–0.2)
PLATELET # BLD AUTO: 343 K/UL (ref 150–450)
PMV BLD AUTO: 9.4 FL (ref 9.4–12.3)
POTASSIUM SERPL-SCNC: 4.2 MMOL/L (ref 3.5–5.1)
PROT SERPL-MCNC: 7.6 G/DL (ref 6.3–8.2)
RBC # BLD AUTO: 3.55 M/UL (ref 4.23–5.6)
SODIUM SERPL-SCNC: 140 MMOL/L (ref 138–145)
TSH SERPL DL<=0.005 MIU/L-ACNC: 1.84 UIU/ML (ref 0.36–3.74)
WBC # BLD AUTO: 7 K/UL (ref 4.3–11.1)

## 2021-11-17 PROCEDURE — 80053 COMPREHEN METABOLIC PANEL: CPT

## 2021-11-17 PROCEDURE — 85025 COMPLETE CBC W/AUTO DIFF WBC: CPT

## 2021-11-17 PROCEDURE — 83735 ASSAY OF MAGNESIUM: CPT

## 2021-11-17 PROCEDURE — 36415 COLL VENOUS BLD VENIPUNCTURE: CPT

## 2021-11-17 PROCEDURE — 84443 ASSAY THYROID STIM HORMONE: CPT

## 2021-11-17 NOTE — PROGRESS NOTES
Please call wife, he has some dementia. Labs reviewed. TSH normal, good news. He has some anemia. But, not significantly worse from before, I would check with PCP. Anemia is not terrible though. Call for issues, no med changes. No obvious lab abnormalities to explain weight loss.   Thanks

## 2022-01-31 ENCOUNTER — APPOINTMENT (RX ONLY)
Dept: URBAN - METROPOLITAN AREA CLINIC 349 | Facility: CLINIC | Age: 87
Setting detail: DERMATOLOGY
End: 2022-01-31

## 2022-01-31 DIAGNOSIS — B00.1 HERPESVIRAL VESICULAR DERMATITIS: ICD-10-CM

## 2022-01-31 DIAGNOSIS — D485 NEOPLASM OF UNCERTAIN BEHAVIOR OF SKIN: ICD-10-CM

## 2022-01-31 DIAGNOSIS — Z85.828 PERSONAL HISTORY OF OTHER MALIGNANT NEOPLASM OF SKIN: ICD-10-CM

## 2022-01-31 DIAGNOSIS — D22 MELANOCYTIC NEVI: ICD-10-CM | Status: STABLE

## 2022-01-31 DIAGNOSIS — L57.0 ACTINIC KERATOSIS: ICD-10-CM

## 2022-01-31 PROBLEM — D48.5 NEOPLASM OF UNCERTAIN BEHAVIOR OF SKIN: Status: ACTIVE | Noted: 2022-01-31

## 2022-01-31 PROBLEM — D22.5 MELANOCYTIC NEVI OF TRUNK: Status: ACTIVE | Noted: 2022-01-31

## 2022-01-31 PROCEDURE — 99213 OFFICE O/P EST LOW 20 MIN: CPT | Mod: 25

## 2022-01-31 PROCEDURE — 17000 DESTRUCT PREMALG LESION: CPT | Mod: 59

## 2022-01-31 PROCEDURE — ? OTHER

## 2022-01-31 PROCEDURE — ? BIOPSY BY SHAVE METHOD

## 2022-01-31 PROCEDURE — ? PRESCRIPTION MEDICATION MANAGEMENT

## 2022-01-31 PROCEDURE — ? LIQUID NITROGEN

## 2022-01-31 PROCEDURE — ? OBSERVATION

## 2022-01-31 PROCEDURE — ? MEDICAL PHOTOGRAPHY REVIEW

## 2022-01-31 PROCEDURE — ? PHOTO-DOCUMENTATION

## 2022-01-31 PROCEDURE — 11102 TANGNTL BX SKIN SINGLE LES: CPT

## 2022-01-31 PROCEDURE — ? COUNSELING

## 2022-01-31 PROCEDURE — 17003 DESTRUCT PREMALG LES 2-14: CPT

## 2022-01-31 ASSESSMENT — LOCATION ZONE DERM
LOCATION ZONE: EAR
LOCATION ZONE: LEG
LOCATION ZONE: LIP
LOCATION ZONE: HAND
LOCATION ZONE: ARM
LOCATION ZONE: TRUNK
LOCATION ZONE: FACE

## 2022-01-31 ASSESSMENT — LOCATION SIMPLE DESCRIPTION DERM
LOCATION SIMPLE: UPPER BACK
LOCATION SIMPLE: RIGHT TEMPLE
LOCATION SIMPLE: RIGHT EAR
LOCATION SIMPLE: LEFT UPPER LIP
LOCATION SIMPLE: LEFT THIGH
LOCATION SIMPLE: LEFT HAND
LOCATION SIMPLE: RIGHT CHEEK
LOCATION SIMPLE: RIGHT FOREARM
LOCATION SIMPLE: ABDOMEN
LOCATION SIMPLE: RIGHT FOREHEAD
LOCATION SIMPLE: LEFT EAR

## 2022-01-31 ASSESSMENT — LOCATION DETAILED DESCRIPTION DERM
LOCATION DETAILED: INFERIOR THORACIC SPINE
LOCATION DETAILED: RIGHT INFERIOR MEDIAL FOREHEAD
LOCATION DETAILED: RIGHT DISTAL RADIAL DORSAL FOREARM
LOCATION DETAILED: LEFT LATERAL ABDOMEN
LOCATION DETAILED: LEFT SUPERIOR VERMILION BORDER
LOCATION DETAILED: LEFT SCAPHA
LOCATION DETAILED: RIGHT CENTRAL MALAR CHEEK
LOCATION DETAILED: RIGHT LATERAL TEMPLE
LOCATION DETAILED: LEFT ANTERIOR PROXIMAL THIGH
LOCATION DETAILED: RIGHT SUPERIOR HELIX
LOCATION DETAILED: LEFT ULNAR DORSAL HAND

## 2022-01-31 ASSESSMENT — PAIN INTENSITY VAS: HOW INTENSE IS YOUR PAIN 0 BEING NO PAIN, 10 BEING THE MOST SEVERE PAIN POSSIBLE?: 1/10 PAIN

## 2022-01-31 NOTE — PROCEDURE: BIOPSY BY SHAVE METHOD
Hide Additional Anticipated Plan?: No
Accession #: skin path
Hemostasis: Aluminum Chloride
Render Path Notes In Note?: Yes
Silver Nitrate Text: The wound bed was treated with silver nitrate after the biopsy was performed.
Size Of Lesion In Cm: 0
Consent: Written consent was obtained and risks were reviewed including but not limited to scarring, infection, bleeding, scabbing, incomplete removal, nerve damage and allergy to anesthesia.
Detail Level: Detailed
Biopsy Method: Personna blade
Anesthesia Type: 1% lidocaine with 1:100,000 epinephrine and a 1:10 solution of 8.4% sodium bicarbonate
Post-Care Instructions: I reviewed with the patient in detail post-care instructions. Patient is to keep the biopsy site dry overnight, and then apply Vaseline  daily until healed. Patient may apply hydrogen peroxide soaks to remove any crusting. After the procedure, the patient was oriented to person, place, and time. Patient denied feeling dizzy, queasy, and and declined further observation after initial 5 minute observation time.
Electrodesiccation Text: The wound bed was treated with electrodesiccation after the biopsy was performed.
Curettage Text: The wound bed was treated with curettage after the biopsy was performed.
Dressing: bandage
Information: Selecting Yes will display possible errors in your note based on the variables you have selected. This validation is only offered as a suggestion for you. PLEASE NOTE THAT THE VALIDATION TEXT WILL BE REMOVED WHEN YOU FINALIZE YOUR NOTE. IF YOU WANT TO FAX A PRELIMINARY NOTE YOU WILL NEED TO TOGGLE THIS TO 'NO' IF YOU DO NOT WANT IT IN YOUR FAXED NOTE.
Biopsy Type: H and E
Notification Instructions: Patient will be notified of biopsy results. However, patient instructed to call the office if not contacted within 2 weeks. After the procedure, the patient was oriented to person, place, and time. Patient denied feeling dizzy, queasy, and and declined further observation after initial 5 minute observation time.
Electrodesiccation And Curettage Text: The wound bed was treated with electrodesiccation and curettage after the biopsy was performed.
Type Of Destruction Used: Curettage
Wound Care: Vaseline
Cryotherapy Text: The wound bed was treated with cryotherapy after the biopsy was performed.
Depth Of Biopsy: dermis
Anesthesia Volume In Cc (Will Not Render If 0): 1
Additional Anesthesia Volume In Cc (Will Not Render If 0): 1.5
Billing Type: Third-Party Bill

## 2022-01-31 NOTE — PROCEDURE: OTHER
Note Text (......Xxx Chief Complaint.): This diagnosis correlates with the
Other (Free Text): Patient’s wife states they did see Dr Miranda in regards to this. \\nPatient does chooses not to treat at this time. Will obtain records.
Render Risk Assessment In Note?: yes
Detail Level: Detailed

## 2022-01-31 NOTE — PROCEDURE: LIQUID NITROGEN
Show Aperture Variable?: Yes
Duration Of Freeze Thaw-Cycle (Seconds): 3
Render Note In Bullet Format When Appropriate: No
Post-Care Instructions: I reviewed with the patient in detail post-care instructions. Patient is to wear sunprotection, and avoid picking at any of the treated lesions. Pt may apply Vaseline to crusted or scabbing areas.
Number Of Freeze-Thaw Cycles: 2 freeze-thaw cycles
Consent: The patient's consent was obtained including but not limited to risks of crusting, scabbing, blistering, scarring, darker or lighter pigmentary change, recurrence, incomplete removal and infection.
Detail Level: Detailed

## 2022-01-31 NOTE — PROCEDURE: PRESCRIPTION MEDICATION MANAGEMENT
Plan: Previously prescribed Valacyclovoir 1 gram take two pills twice daily for one day. Patient does not remember this currently. Patient will call if refills are needed.
Detail Level: Zone
Render In Strict Bullet Format?: No

## 2022-03-01 ENCOUNTER — APPOINTMENT (RX ONLY)
Dept: URBAN - METROPOLITAN AREA CLINIC 329 | Facility: CLINIC | Age: 87
Setting detail: DERMATOLOGY
End: 2022-03-01

## 2022-03-01 PROBLEM — D04.72 CARCINOMA IN SITU OF SKIN OF LEFT LOWER LIMB, INCLUDING HIP: Status: ACTIVE | Noted: 2022-03-01

## 2022-03-01 PROCEDURE — 17263 DSTRJ MAL LES T/A/L 2.1-3.0: CPT

## 2022-03-01 PROCEDURE — ? CURETTAGE AND DESTRUCTION

## 2022-03-01 PROCEDURE — ? COUNSELING

## 2022-03-01 NOTE — PROCEDURE: CURETTAGE AND DESTRUCTION
Detail Level: Detailed
Number Of Curettages: 3
Size Of Lesion In Cm: 2.3
Add Intralesional Injection: No
Concentration (Mg/Ml Or Millions Of Plaque Forming Units/Cc): 0.01
Total Volume (Ccs): 1
Anesthesia Type: 1% lidocaine with epinephrine
Cautery Type: electrodesiccation
What Was Performed First?: Curettage
Final Size Statement: The size of the lesion after curettage was
Additional Information: (Optional): The wound was cleaned, and a pressure dressing was applied.  The patient received detailed post-op instructions.
Consent was obtained from the patient. The risks, benefits and alternatives to therapy were discussed in detail. Specifically, the risks of infection, scarring, bleeding, prolonged wound healing, nerve injury, incomplete removal, allergy to anesthesia and recurrence were addressed. Alternatives to ED&C, such as: surgical removal and XRT were also discussed.  Prior to the procedure, the treatment site was clearly identified and confirmed by the patient. All components of Universal Protocol/PAUSE Rule completed.
Post-Care Instructions: I reviewed with the patient in detail post-care instructions. Patient is to keep the area dry for 48 hours, and not to engage in any swimming until the area is healed. Should the patient develop any fevers, chills, bleeding, severe pain patient will contact the office immediately.
Bill As A Line Item Or As Units: Line Item

## 2022-03-07 ENCOUNTER — APPOINTMENT (RX ONLY)
Dept: URBAN - METROPOLITAN AREA CLINIC 329 | Facility: CLINIC | Age: 87
Setting detail: DERMATOLOGY
End: 2022-03-07

## 2022-03-07 DIAGNOSIS — S31000A OPEN WOUND(S) (MULTIPLE) OF UNSPECIFIED SITE(S), WITHOUT MENTION OF COMPLICATION: ICD-10-CM

## 2022-03-07 DIAGNOSIS — Z48.817 ENCOUNTER FOR SURGICAL AFTERCARE FOLLOWING SURGERY ON THE SKIN AND SUBCUTANEOUS TISSUE: ICD-10-CM

## 2022-03-07 PROBLEM — S51.801A UNSPECIFIED OPEN WOUND OF RIGHT FOREARM, INITIAL ENCOUNTER: Status: ACTIVE | Noted: 2022-03-07

## 2022-03-07 PROBLEM — S41.101A UNSPECIFIED OPEN WOUND OF RIGHT UPPER ARM, INITIAL ENCOUNTER: Status: ACTIVE | Noted: 2022-03-07

## 2022-03-07 PROCEDURE — ? PRESCRIPTION

## 2022-03-07 PROCEDURE — ? PHOTO-DOCUMENTATION

## 2022-03-07 PROCEDURE — ? ORDER TESTS

## 2022-03-07 PROCEDURE — 99213 OFFICE O/P EST LOW 20 MIN: CPT | Mod: 24

## 2022-03-07 PROCEDURE — ? PRESCRIPTION MEDICATION MANAGEMENT

## 2022-03-07 PROCEDURE — ? COUNSELING

## 2022-03-07 PROCEDURE — ? DRESSING CHANGE

## 2022-03-07 RX ORDER — MUPIROCIN 20 MG/G
OINTMENT TOPICAL
Qty: 22 | Refills: 1 | Status: ERX | COMMUNITY
Start: 2022-03-07

## 2022-03-07 RX ORDER — CEFDINIR 300 MG/1
CAPSULE ORAL
Qty: 20 | Refills: 0 | Status: ERX | COMMUNITY
Start: 2022-03-07

## 2022-03-07 RX ADMIN — MUPIROCIN: 20 OINTMENT TOPICAL at 00:00

## 2022-03-07 RX ADMIN — CEFDINIR: 300 CAPSULE ORAL at 00:00

## 2022-03-07 ASSESSMENT — LOCATION DETAILED DESCRIPTION DERM
LOCATION DETAILED: RIGHT DISTAL POSTERIOR UPPER ARM
LOCATION DETAILED: LEFT DISTAL PRETIBIAL REGION
LOCATION DETAILED: RIGHT PROXIMAL DORSAL FOREARM

## 2022-03-07 ASSESSMENT — LOCATION ZONE DERM
LOCATION ZONE: LEG
LOCATION ZONE: ARM

## 2022-03-07 ASSESSMENT — LOCATION SIMPLE DESCRIPTION DERM
LOCATION SIMPLE: RIGHT UPPER ARM
LOCATION SIMPLE: RIGHT FOREARM
LOCATION SIMPLE: LEFT PRETIBIAL REGION

## 2022-03-07 NOTE — PROCEDURE: ORDER TESTS
Billing Type: Third-Party Bill
Expected Date Of Service: 03/07/2022
Performing Laboratory: 0
Bill For Surgical Tray: no

## 2022-03-07 NOTE — PROCEDURE: DRESSING CHANGE
Detail Level: Detailed
Add 29853 Cpt? (Important Note: In 2017 The Use Of 02064 Is Being Tracked By Cms To Determine Future Global Period Reimbursement For Global Periods): no

## 2022-03-11 RX ORDER — CEFDINIR 300 MG/1
CAPSULE ORAL
Qty: 20 | Refills: 0 | Status: ERX

## 2022-03-18 PROBLEM — K21.9 GERD (GASTROESOPHAGEAL REFLUX DISEASE): Status: ACTIVE | Noted: 2019-05-19

## 2022-03-18 PROBLEM — Z95.1 S/P CABG X 4: Status: ACTIVE | Noted: 2017-11-17

## 2022-03-19 PROBLEM — G47.34 NOCTURNAL HYPOXEMIA: Status: ACTIVE | Noted: 2017-11-17

## 2022-03-19 PROBLEM — R00.1 BRADYCARDIA: Status: ACTIVE | Noted: 2020-05-28

## 2022-03-19 PROBLEM — I25.10 CAD (CORONARY ARTERY DISEASE): Status: ACTIVE | Noted: 2017-11-17

## 2022-03-19 PROBLEM — I77.9 BILATERAL CAROTID ARTERY DISEASE (HCC): Status: ACTIVE | Noted: 2017-11-28

## 2022-03-19 PROBLEM — J44.9 COPD, SEVERE (HCC): Status: ACTIVE | Noted: 2018-01-22

## 2022-05-02 ENCOUNTER — APPOINTMENT (RX ONLY)
Dept: URBAN - METROPOLITAN AREA CLINIC 329 | Facility: CLINIC | Age: 87
Setting detail: DERMATOLOGY
End: 2022-05-02

## 2022-05-02 DIAGNOSIS — L82.0 INFLAMED SEBORRHEIC KERATOSIS: ICD-10-CM

## 2022-05-02 DIAGNOSIS — Z85.828 PERSONAL HISTORY OF OTHER MALIGNANT NEOPLASM OF SKIN: ICD-10-CM

## 2022-05-02 DIAGNOSIS — D22 MELANOCYTIC NEVI: ICD-10-CM | Status: STABLE

## 2022-05-02 DIAGNOSIS — L29.8 OTHER PRURITUS: ICD-10-CM

## 2022-05-02 DIAGNOSIS — B00.1 HERPESVIRAL VESICULAR DERMATITIS: ICD-10-CM

## 2022-05-02 DIAGNOSIS — L57.0 ACTINIC KERATOSIS: ICD-10-CM

## 2022-05-02 DIAGNOSIS — D485 NEOPLASM OF UNCERTAIN BEHAVIOR OF SKIN: ICD-10-CM

## 2022-05-02 DIAGNOSIS — L29.89 OTHER PRURITUS: ICD-10-CM

## 2022-05-02 PROBLEM — D22.5 MELANOCYTIC NEVI OF TRUNK: Status: ACTIVE | Noted: 2022-05-02

## 2022-05-02 PROBLEM — D48.5 NEOPLASM OF UNCERTAIN BEHAVIOR OF SKIN: Status: ACTIVE | Noted: 2022-05-02

## 2022-05-02 PROCEDURE — 99213 OFFICE O/P EST LOW 20 MIN: CPT | Mod: 25

## 2022-05-02 PROCEDURE — ? BIOPSY BY SHAVE METHOD

## 2022-05-02 PROCEDURE — ? LIQUID NITROGEN

## 2022-05-02 PROCEDURE — ? COUNSELING

## 2022-05-02 PROCEDURE — 11102 TANGNTL BX SKIN SINGLE LES: CPT | Mod: 59

## 2022-05-02 PROCEDURE — ? FULL BODY SKIN EXAM

## 2022-05-02 PROCEDURE — ? PRESCRIPTION MEDICATION MANAGEMENT

## 2022-05-02 PROCEDURE — ? OTHER

## 2022-05-02 PROCEDURE — 17003 DESTRUCT PREMALG LES 2-14: CPT | Mod: 59

## 2022-05-02 PROCEDURE — ? MEDICAL PHOTOGRAPHY REVIEW

## 2022-05-02 PROCEDURE — 17110 DESTRUCTION B9 LES UP TO 14: CPT

## 2022-05-02 PROCEDURE — 17000 DESTRUCT PREMALG LESION: CPT | Mod: 59

## 2022-05-02 ASSESSMENT — LOCATION SIMPLE DESCRIPTION DERM
LOCATION SIMPLE: LEFT EAR
LOCATION SIMPLE: LEFT UPPER LIP
LOCATION SIMPLE: LEFT FOREHEAD
LOCATION SIMPLE: UPPER BACK
LOCATION SIMPLE: RIGHT EAR
LOCATION SIMPLE: ABDOMEN
LOCATION SIMPLE: RIGHT CHEEK
LOCATION SIMPLE: RIGHT FOREHEAD
LOCATION SIMPLE: RIGHT FOREARM
LOCATION SIMPLE: LEFT CHEEK
LOCATION SIMPLE: RIGHT TEMPLE

## 2022-05-02 ASSESSMENT — LOCATION ZONE DERM
LOCATION ZONE: LIP
LOCATION ZONE: ARM
LOCATION ZONE: TRUNK
LOCATION ZONE: EAR
LOCATION ZONE: FACE

## 2022-05-02 ASSESSMENT — LOCATION DETAILED DESCRIPTION DERM
LOCATION DETAILED: RIGHT SUPERIOR HELIX
LOCATION DETAILED: LEFT CENTRAL MALAR CHEEK
LOCATION DETAILED: RIGHT LATERAL TEMPLE
LOCATION DETAILED: RIGHT LATERAL FOREHEAD
LOCATION DETAILED: LEFT SUPERIOR LATERAL MALAR CHEEK
LOCATION DETAILED: LEFT POSTERIOR EAR
LOCATION DETAILED: LEFT SUPERIOR FOREHEAD
LOCATION DETAILED: LEFT ANTIHELIX
LOCATION DETAILED: LEFT LATERAL ABDOMEN
LOCATION DETAILED: RIGHT SUPERIOR LATERAL FOREHEAD
LOCATION DETAILED: INFERIOR THORACIC SPINE
LOCATION DETAILED: LEFT SUPERIOR VERMILION BORDER
LOCATION DETAILED: RIGHT DISTAL RADIAL DORSAL FOREARM
LOCATION DETAILED: RIGHT MID PREAURICULAR CHEEK

## 2022-05-02 ASSESSMENT — PAIN INTENSITY VAS: HOW INTENSE IS YOUR PAIN 0 BEING NO PAIN, 10 BEING THE MOST SEVERE PAIN POSSIBLE?: 1/10 PAIN

## 2022-05-02 NOTE — PROCEDURE: BIOPSY BY SHAVE METHOD
Detail Level: Detailed
Depth Of Biopsy: dermis
Was A Bandage Applied: Yes
Size Of Lesion In Cm: 0
Accession #: skin path
Biopsy Type: H and E
Biopsy Method: Dermablade
Anesthesia Type: 1% lidocaine with epinephrine and a 1:10 solution of 8.4% sodium bicarbonate
Anesthesia Volume In Cc (Will Not Render If 0): 0.5
Hemostasis: Aluminum Chloride
Wound Care: Petrolatum
Dressing: bandage
Destruction After The Procedure: No
Type Of Destruction Used: Curettage
Curettage Text: The wound bed was treated with curettage after the biopsy was performed.
Cryotherapy Text: The wound bed was treated with cryotherapy after the biopsy was performed.
Electrodesiccation Text: The wound bed was treated with electrodesiccation after the biopsy was performed.
Electrodesiccation And Curettage Text: The wound bed was treated with electrodesiccation and curettage after the biopsy was performed.
Silver Nitrate Text: The wound bed was treated with silver nitrate after the biopsy was performed.
Lab: 6
Consent: Written consent was obtained and risks were reviewed including but not limited to scarring, infection, bleeding, scabbing, incomplete removal, nerve damage and allergy to anesthesia.
Post-Care Instructions: I reviewed with the patient in detail post-care instructions. Patient is to keep the biopsy site dry overnight, and then apply bacitracin twice daily until healed. Patient may apply hydrogen peroxide soaks to remove any crusting.
Notification Instructions: Patient will be notified of biopsy results. However, patient instructed to call the office if not contacted within 2 weeks.
Billing Type: Third-Party Bill
Information: Selecting Yes will display possible errors in your note based on the variables you have selected. This validation is only offered as a suggestion for you. PLEASE NOTE THAT THE VALIDATION TEXT WILL BE REMOVED WHEN YOU FINALIZE YOUR NOTE. IF YOU WANT TO FAX A PRELIMINARY NOTE YOU WILL NEED TO TOGGLE THIS TO 'NO' IF YOU DO NOT WANT IT IN YOUR FAXED NOTE.

## 2022-05-02 NOTE — PROCEDURE: MIPS QUALITY
Quality 110: Preventive Care And Screening: Influenza Immunization: Influenza Immunization Administered during Influenza season
Quality 47: Advance Care Plan: Advance care planning not documented, reason not otherwise specified.
Detail Level: Detailed
Quality 111:Pneumonia Vaccination Status For Older Adults: Pneumococcal Vaccination Previously Received
Quality 431: Preventive Care And Screening: Unhealthy Alcohol Use - Screening: Patient not identified as an unhealthy alcohol user when screened for unhealthy alcohol use using a systematic screening method
Quality 130: Documentation Of Current Medications In The Medical Record: Current Medications Documented
Quality 402: Tobacco Use And Help With Quitting Among Adolescents: Patient screened for tobacco and never smoked
Quality 226: Preventive Care And Screening: Tobacco Use: Screening And Cessation Intervention: Patient screened for tobacco use and is an ex/non-smoker

## 2022-05-02 NOTE — PROCEDURE: OTHER
Note Text (......Xxx Chief Complaint.): This diagnosis correlates with the
Detail Level: Generalized
Render Risk Assessment In Note?: yes
Other (Free Text): Patient consent was obtained to proceed with the visit and recommended plan of care after discussion of all risks and benefits, including the risks of COVID-19 exposure.

## 2022-05-02 NOTE — PROCEDURE: LIQUID NITROGEN
Show Aperture Variable?: Yes
Duration Of Freeze Thaw-Cycle (Seconds): 2
Render Note In Bullet Format When Appropriate: No
Consent: The patient's consent was obtained including but not limited to risks of crusting, scabbing, blistering, scarring, darker or lighter pigmentary change, recurrence, incomplete removal and infection.
Number Of Freeze-Thaw Cycles: 3 freeze-thaw cycles
Detail Level: Detailed
Post-Care Instructions: I reviewed with the patient in detail post-care instructions. Patient is to wear sunprotection, and avoid picking at any of the treated lesions. Pt may apply Vaseline to crusted or scabbing areas.
Spray Paint Text: The liquid nitrogen was applied to the skin utilizing a spray paint frosting technique.
Medical Necessity Clause: This procedure was medically necessary because the lesions that were treated were:
Medical Necessity Information: It is in your best interest to select a reason for this procedure from the list below. All of these items fulfill various CMS LCD requirements except the new and changing color options.

## 2022-05-02 NOTE — PROCEDURE: PRESCRIPTION MEDICATION MANAGEMENT
Plan: Previously prescribed Valacyclovoir 1 gram take two pills twice daily for one day. Patient does not remember this currently. Patient will call if refills are needed.
Detail Level: Zone
Render In Strict Bullet Format?: No
Plan: Treated with betamethasone and clobetasol. This did not seem to make a difference. This has been an issue for about 1 week. Recommended patient continue betamethasone or clobetasol bid x 1 more week.

## 2022-05-19 DIAGNOSIS — R00.1 BRADYCARDIA: Primary | ICD-10-CM

## 2022-05-19 PROBLEM — N18.30 CHRONIC RENAL DISEASE, STAGE III (HCC): Status: ACTIVE | Noted: 2022-05-19

## 2022-05-26 ENCOUNTER — ANCILLARY ORDERS (OUTPATIENT)
Dept: CARDIOLOGY CLINIC | Age: 87
End: 2022-05-26

## 2022-05-26 DIAGNOSIS — R00.1 SEVERE SINUS BRADYCARDIA: ICD-10-CM

## 2022-06-02 ENCOUNTER — TELEPHONE (OUTPATIENT)
Dept: CARDIOLOGY CLINIC | Age: 87
End: 2022-06-02

## 2022-06-02 NOTE — TELEPHONE ENCOUNTER
Still waiting on results, I could not see them. If HR is low, then needs EP consult soon for low HR and symptoms. Can you find monitor and refer to EP as needed.    Thanks

## 2022-06-05 ENCOUNTER — HOSPITAL ENCOUNTER (INPATIENT)
Age: 87
LOS: 2 days | Discharge: HOME HEALTH | DRG: 177 | End: 2022-06-07
Attending: EMERGENCY MEDICINE
Payer: MEDICARE

## 2022-06-05 ENCOUNTER — APPOINTMENT (OUTPATIENT)
Dept: GENERAL RADIOLOGY | Age: 87
DRG: 177 | End: 2022-06-05
Payer: MEDICARE

## 2022-06-05 DIAGNOSIS — J96.00 ACUTE RESPIRATORY FAILURE DUE TO COVID-19 (HCC): ICD-10-CM

## 2022-06-05 DIAGNOSIS — R19.7 DIARRHEA, UNSPECIFIED TYPE: ICD-10-CM

## 2022-06-05 DIAGNOSIS — U07.1 ACUTE RESPIRATORY FAILURE DUE TO COVID-19 (HCC): ICD-10-CM

## 2022-06-05 DIAGNOSIS — U07.1 COVID-19: Primary | ICD-10-CM

## 2022-06-05 PROBLEM — K21.9 GERD (GASTROESOPHAGEAL REFLUX DISEASE): Status: ACTIVE | Noted: 2019-05-19

## 2022-06-05 PROBLEM — Z95.1 S/P CABG X 4: Status: ACTIVE | Noted: 2017-11-17

## 2022-06-05 PROBLEM — J44.9 COPD, SEVERE (HCC): Status: ACTIVE | Noted: 2018-01-22

## 2022-06-05 PROBLEM — J96.01 ACUTE HYPOXEMIC RESPIRATORY FAILURE DUE TO COVID-19 (HCC): Status: ACTIVE | Noted: 2022-06-05

## 2022-06-05 PROBLEM — I25.10 CAD (CORONARY ARTERY DISEASE): Status: ACTIVE | Noted: 2017-11-17

## 2022-06-05 PROBLEM — N18.30 CHRONIC RENAL DISEASE, STAGE III (HCC): Status: ACTIVE | Noted: 2022-05-19

## 2022-06-05 PROBLEM — A08.39 GASTROENTERITIS DUE TO COVID-19 VIRUS: Status: ACTIVE | Noted: 2022-06-05

## 2022-06-05 LAB
ALBUMIN SERPL-MCNC: 3.3 G/DL (ref 3.2–4.6)
ALBUMIN/GLOB SERPL: 0.9 {RATIO} (ref 1.2–3.5)
ALP SERPL-CCNC: 75 U/L (ref 50–136)
ALT SERPL-CCNC: 25 U/L (ref 12–65)
ANION GAP SERPL CALC-SCNC: 5 MMOL/L (ref 7–16)
AST SERPL-CCNC: 35 U/L (ref 15–37)
BILIRUB SERPL-MCNC: 0.3 MG/DL (ref 0.2–1.1)
BUN SERPL-MCNC: 34 MG/DL (ref 8–23)
CALCIUM SERPL-MCNC: 8.7 MG/DL (ref 8.3–10.4)
CHLORIDE SERPL-SCNC: 106 MMOL/L (ref 98–107)
CO2 SERPL-SCNC: 29 MMOL/L (ref 21–32)
CREAT SERPL-MCNC: 1.2 MG/DL (ref 0.8–1.5)
CRP SERPL-MCNC: 2.9 MG/DL (ref 0–0.9)
D DIMER PPP FEU-MCNC: 0.7 UG/ML(FEU)
ERYTHROCYTE [DISTWIDTH] IN BLOOD BY AUTOMATED COUNT: 14.2 % (ref 11.9–14.6)
FERRITIN SERPL-MCNC: 135 NG/ML (ref 8–388)
GLOBULIN SER CALC-MCNC: 3.7 G/DL (ref 2.3–3.5)
GLUCOSE SERPL-MCNC: 113 MG/DL (ref 65–100)
HCT VFR BLD AUTO: 34.2 % (ref 41.1–50.3)
HGB BLD-MCNC: 10.7 G/DL (ref 13.6–17.2)
LACTATE SERPL-SCNC: 0.4 MMOL/L (ref 0.4–2)
LACTATE SERPL-SCNC: 0.9 MMOL/L (ref 0.4–2)
MAGNESIUM SERPL-MCNC: 2 MG/DL (ref 1.8–2.4)
MCH RBC QN AUTO: 30 PG (ref 26.1–32.9)
MCHC RBC AUTO-ENTMCNC: 31.3 G/DL (ref 31.4–35)
MCV RBC AUTO: 95.8 FL (ref 79.6–97.8)
NRBC # BLD: 0 K/UL (ref 0–0.2)
PLATELET # BLD AUTO: 208 K/UL (ref 150–450)
PMV BLD AUTO: 9.9 FL (ref 9.4–12.3)
POTASSIUM SERPL-SCNC: 4.1 MMOL/L (ref 3.5–5.1)
PROCALCITONIN SERPL-MCNC: <0.05 NG/ML (ref 0–0.49)
PROT SERPL-MCNC: 7 G/DL (ref 6.3–8.2)
RBC # BLD AUTO: 3.57 M/UL (ref 4.23–5.6)
SARS-COV-2 RDRP RESP QL NAA+PROBE: DETECTED
SODIUM SERPL-SCNC: 140 MMOL/L (ref 136–145)
SOURCE: ABNORMAL
WBC # BLD AUTO: 6.5 K/UL (ref 4.3–11.1)

## 2022-06-05 PROCEDURE — 6360000002 HC RX W HCPCS: Performed by: FAMILY MEDICINE

## 2022-06-05 PROCEDURE — 87635 SARS-COV-2 COVID-19 AMP PRB: CPT

## 2022-06-05 PROCEDURE — 83735 ASSAY OF MAGNESIUM: CPT

## 2022-06-05 PROCEDURE — 2580000003 HC RX 258: Performed by: FAMILY MEDICINE

## 2022-06-05 PROCEDURE — 83605 ASSAY OF LACTIC ACID: CPT

## 2022-06-05 PROCEDURE — 80053 COMPREHEN METABOLIC PANEL: CPT

## 2022-06-05 PROCEDURE — 84145 PROCALCITONIN (PCT): CPT

## 2022-06-05 PROCEDURE — 71046 X-RAY EXAM CHEST 2 VIEWS: CPT

## 2022-06-05 PROCEDURE — 82728 ASSAY OF FERRITIN: CPT

## 2022-06-05 PROCEDURE — 85379 FIBRIN DEGRADATION QUANT: CPT

## 2022-06-05 PROCEDURE — 1100000000 HC RM PRIVATE

## 2022-06-05 PROCEDURE — 6370000000 HC RX 637 (ALT 250 FOR IP): Performed by: EMERGENCY MEDICINE

## 2022-06-05 PROCEDURE — 99285 EMERGENCY DEPT VISIT HI MDM: CPT

## 2022-06-05 PROCEDURE — 6370000000 HC RX 637 (ALT 250 FOR IP): Performed by: FAMILY MEDICINE

## 2022-06-05 PROCEDURE — 2580000003 HC RX 258: Performed by: EMERGENCY MEDICINE

## 2022-06-05 PROCEDURE — 85027 COMPLETE CBC AUTOMATED: CPT

## 2022-06-05 PROCEDURE — 86140 C-REACTIVE PROTEIN: CPT

## 2022-06-05 RX ORDER — QUETIAPINE FUMARATE 25 MG/1
25 TABLET, FILM COATED ORAL DAILY
Status: DISCONTINUED | OUTPATIENT
Start: 2022-06-06 | End: 2022-06-07 | Stop reason: HOSPADM

## 2022-06-05 RX ORDER — DIPHENOXYLATE HYDROCHLORIDE AND ATROPINE SULFATE 2.5; .025 MG/1; MG/1
2 TABLET ORAL
Status: COMPLETED | OUTPATIENT
Start: 2022-06-05 | End: 2022-06-05

## 2022-06-05 RX ORDER — DEXAMETHASONE 4 MG/1
6 TABLET ORAL DAILY
Status: DISCONTINUED | OUTPATIENT
Start: 2022-06-05 | End: 2022-06-07 | Stop reason: HOSPADM

## 2022-06-05 RX ORDER — ASPIRIN 81 MG/1
81 TABLET ORAL DAILY
Status: DISCONTINUED | OUTPATIENT
Start: 2022-06-05 | End: 2022-06-05

## 2022-06-05 RX ORDER — ENOXAPARIN SODIUM 100 MG/ML
30 INJECTION SUBCUTANEOUS 2 TIMES DAILY
Status: DISCONTINUED | OUTPATIENT
Start: 2022-06-05 | End: 2022-06-07 | Stop reason: HOSPADM

## 2022-06-05 RX ORDER — GUAIFENESIN/DEXTROMETHORPHAN 100-10MG/5
5 SYRUP ORAL EVERY 4 HOURS PRN
Status: DISCONTINUED | OUTPATIENT
Start: 2022-06-05 | End: 2022-06-07 | Stop reason: HOSPADM

## 2022-06-05 RX ORDER — MIDODRINE HYDROCHLORIDE 5 MG/1
5 TABLET ORAL
Status: DISCONTINUED | OUTPATIENT
Start: 2022-06-06 | End: 2022-06-07 | Stop reason: HOSPADM

## 2022-06-05 RX ORDER — FAMOTIDINE 20 MG/1
20 TABLET, FILM COATED ORAL DAILY
Status: DISCONTINUED | OUTPATIENT
Start: 2022-06-05 | End: 2022-06-05

## 2022-06-05 RX ORDER — MAGNESIUM HYDROXIDE/ALUMINUM HYDROXICE/SIMETHICONE 120; 1200; 1200 MG/30ML; MG/30ML; MG/30ML
30 SUSPENSION ORAL EVERY 6 HOURS PRN
Status: DISCONTINUED | OUTPATIENT
Start: 2022-06-05 | End: 2022-06-07 | Stop reason: HOSPADM

## 2022-06-05 RX ORDER — ACETAMINOPHEN 325 MG/1
650 TABLET ORAL EVERY 6 HOURS PRN
Status: DISCONTINUED | OUTPATIENT
Start: 2022-06-05 | End: 2022-06-07 | Stop reason: HOSPADM

## 2022-06-05 RX ORDER — ATORVASTATIN CALCIUM 40 MG/1
20 TABLET, FILM COATED ORAL DAILY
Status: DISCONTINUED | OUTPATIENT
Start: 2022-06-06 | End: 2022-06-07 | Stop reason: HOSPADM

## 2022-06-05 RX ORDER — ACETAMINOPHEN 650 MG/1
650 SUPPOSITORY RECTAL EVERY 6 HOURS PRN
Status: DISCONTINUED | OUTPATIENT
Start: 2022-06-05 | End: 2022-06-07 | Stop reason: HOSPADM

## 2022-06-05 RX ORDER — FAMOTIDINE 20 MG/1
20 TABLET, FILM COATED ORAL DAILY
Status: DISCONTINUED | OUTPATIENT
Start: 2022-06-06 | End: 2022-06-07 | Stop reason: HOSPADM

## 2022-06-05 RX ORDER — SODIUM CHLORIDE 0.9 % (FLUSH) 0.9 %
5-40 SYRINGE (ML) INJECTION PRN
Status: DISCONTINUED | OUTPATIENT
Start: 2022-06-05 | End: 2022-06-07 | Stop reason: HOSPADM

## 2022-06-05 RX ORDER — POLYETHYLENE GLYCOL 3350 17 G/17G
17 POWDER, FOR SOLUTION ORAL DAILY PRN
Status: DISCONTINUED | OUTPATIENT
Start: 2022-06-05 | End: 2022-06-07 | Stop reason: HOSPADM

## 2022-06-05 RX ORDER — QUETIAPINE FUMARATE 25 MG/1
25 TABLET, FILM COATED ORAL NIGHTLY PRN
Status: DISCONTINUED | OUTPATIENT
Start: 2022-06-05 | End: 2022-06-07 | Stop reason: HOSPADM

## 2022-06-05 RX ORDER — ASPIRIN 81 MG/1
81 TABLET ORAL EVERY OTHER DAY
Status: DISCONTINUED | OUTPATIENT
Start: 2022-06-06 | End: 2022-06-07 | Stop reason: HOSPADM

## 2022-06-05 RX ORDER — SODIUM CHLORIDE 0.9 % (FLUSH) 0.9 %
5-40 SYRINGE (ML) INJECTION EVERY 12 HOURS SCHEDULED
Status: DISCONTINUED | OUTPATIENT
Start: 2022-06-05 | End: 2022-06-07 | Stop reason: HOSPADM

## 2022-06-05 RX ORDER — QUETIAPINE FUMARATE 25 MG/1
25 TABLET, FILM COATED ORAL 2 TIMES DAILY
Status: DISCONTINUED | OUTPATIENT
Start: 2022-06-05 | End: 2022-06-05

## 2022-06-05 RX ORDER — 0.9 % SODIUM CHLORIDE 0.9 %
1000 INTRAVENOUS SOLUTION INTRAVENOUS ONCE
Status: COMPLETED | OUTPATIENT
Start: 2022-06-05 | End: 2022-06-05

## 2022-06-05 RX ORDER — MEMANTINE HYDROCHLORIDE 5 MG/1
10 TABLET ORAL 2 TIMES DAILY
Status: DISCONTINUED | OUTPATIENT
Start: 2022-06-05 | End: 2022-06-07 | Stop reason: HOSPADM

## 2022-06-05 RX ORDER — LAMOTRIGINE 100 MG/1
200 TABLET ORAL DAILY
Status: DISCONTINUED | OUTPATIENT
Start: 2022-06-06 | End: 2022-06-07 | Stop reason: HOSPADM

## 2022-06-05 RX ORDER — SODIUM CHLORIDE 9 MG/ML
INJECTION, SOLUTION INTRAVENOUS PRN
Status: DISCONTINUED | OUTPATIENT
Start: 2022-06-05 | End: 2022-06-07 | Stop reason: HOSPADM

## 2022-06-05 RX ORDER — ACETAMINOPHEN 500 MG
1000 TABLET ORAL
Status: COMPLETED | OUTPATIENT
Start: 2022-06-05 | End: 2022-06-05

## 2022-06-05 RX ORDER — ONDANSETRON 4 MG/1
4 TABLET, ORALLY DISINTEGRATING ORAL EVERY 8 HOURS PRN
Status: DISCONTINUED | OUTPATIENT
Start: 2022-06-05 | End: 2022-06-07 | Stop reason: HOSPADM

## 2022-06-05 RX ORDER — ALBUTEROL SULFATE 90 UG/1
2 AEROSOL, METERED RESPIRATORY (INHALATION) EVERY 4 HOURS PRN
Status: DISCONTINUED | OUTPATIENT
Start: 2022-06-05 | End: 2022-06-07 | Stop reason: HOSPADM

## 2022-06-05 RX ORDER — ONDANSETRON 2 MG/ML
4 INJECTION INTRAMUSCULAR; INTRAVENOUS EVERY 6 HOURS PRN
Status: DISCONTINUED | OUTPATIENT
Start: 2022-06-05 | End: 2022-06-07 | Stop reason: HOSPADM

## 2022-06-05 RX ORDER — SODIUM CHLORIDE, SODIUM LACTATE, POTASSIUM CHLORIDE, CALCIUM CHLORIDE 600; 310; 30; 20 MG/100ML; MG/100ML; MG/100ML; MG/100ML
INJECTION, SOLUTION INTRAVENOUS CONTINUOUS
Status: DISCONTINUED | OUTPATIENT
Start: 2022-06-05 | End: 2022-06-06

## 2022-06-05 RX ADMIN — MEMANTINE 10 MG: 5 TABLET ORAL at 21:45

## 2022-06-05 RX ADMIN — ACETAMINOPHEN 1000 MG: 500 TABLET ORAL at 17:53

## 2022-06-05 RX ADMIN — SODIUM CHLORIDE, PRESERVATIVE FREE 5 ML: 5 INJECTION INTRAVENOUS at 21:46

## 2022-06-05 RX ADMIN — ENOXAPARIN SODIUM 30 MG: 100 INJECTION SUBCUTANEOUS at 21:46

## 2022-06-05 RX ADMIN — DIPHENOXYLATE HYDROCHLORIDE AND ATROPINE SULFATE 2 TABLET: 2.5; .025 TABLET ORAL at 17:53

## 2022-06-05 RX ADMIN — DEXAMETHASONE 6 MG: 4 TABLET ORAL at 21:45

## 2022-06-05 RX ADMIN — SODIUM CHLORIDE, POTASSIUM CHLORIDE, SODIUM LACTATE AND CALCIUM CHLORIDE: 600; 310; 30; 20 INJECTION, SOLUTION INTRAVENOUS at 20:28

## 2022-06-05 RX ADMIN — SODIUM CHLORIDE 1000 ML: 9 INJECTION, SOLUTION INTRAVENOUS at 17:54

## 2022-06-05 RX ADMIN — BARICITINIB 2 MG: 2 TABLET, FILM COATED ORAL at 18:43

## 2022-06-05 ASSESSMENT — ENCOUNTER SYMPTOMS
ABDOMINAL PAIN: 0
FACIAL SWELLING: 0
NAUSEA: 0
EYE REDNESS: 0
SHORTNESS OF BREATH: 0
BACK PAIN: 0
EYE DISCHARGE: 0
DIARRHEA: 0
COUGH: 1
RHINORRHEA: 0
COLOR CHANGE: 0
VOMITING: 0

## 2022-06-05 ASSESSMENT — PAIN - FUNCTIONAL ASSESSMENT
PAIN_FUNCTIONAL_ASSESSMENT: NONE - DENIES PAIN
PAIN_FUNCTIONAL_ASSESSMENT: NONE - DENIES PAIN

## 2022-06-05 ASSESSMENT — PAIN SCALES - GENERAL
PAINLEVEL_OUTOF10: 0

## 2022-06-05 NOTE — ED PROVIDER NOTES
[employer] Emergency Department Provider Note                   PCP:                Elio Schaeffer MD               Age: 80 y.o. Sex: male     No diagnosis found. DISPOSITION         New Prescriptions    No medications on file       Orders Placed This Encounter   Procedures    COVID-19, Rapid    XR CHEST (2 VW)    CBC    Comprehensive Metabolic Panel    Magnesium    Lactic Acid    Droplet Plus Isolation    Insert peripheral IV         Naeem Zhu is a 80 y.o. male who presents to the Emergency Department with chief complaint of    Chief Complaint   Patient presents with    Positive For Covid-23     79-year-old gentleman with dementia presents to the ER from home accompanied by his wife for COVID concerns. Patient started feeling ill this past Friday, just 3 days ago on Gerda 3. He had been spiking fevers to 101 had a mild cough which seems to have subsided now. No dyspnea, no nausea, no vomiting. Patient has had few spells of diarrhea today. At the onset he did complain of some body aches, but wife says they seem to not be bothering him now. Patient is status post COVID immunization with 1 booster. He has never had the disease itself. Patient has not been around any known COVID exposures to their awareness. Review of Systems   Unable to perform ROS: Dementia   Constitutional: Positive for activity change, fatigue and fever. Negative for chills. HENT: Negative for congestion, facial swelling and rhinorrhea. Eyes: Negative for discharge and redness. Respiratory: Positive for cough. Negative for shortness of breath. Cardiovascular: Negative for chest pain and palpitations. Gastrointestinal: Negative for abdominal pain, diarrhea, nausea and vomiting. Genitourinary: Negative for difficulty urinating, dysuria and frequency. Musculoskeletal: Positive for arthralgias and myalgias. Negative for back pain. Skin: Negative for color change and pallor. Neurological: Negative for dizziness, light-headedness and headaches. All other systems reviewed and are negative. All other systems reviewed and are negative. 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 Richard Crocker Right     fx R arm x 2- -required hardware and later removed    UROLOGICAL SURGERY      penile implant        Family History   Problem Relation Age of Onset    Heart Disease Father     Stroke Brother            Social Connections:     Frequency of Communication with Friends and Family: Not on file    Frequency of Social Gatherings with Friends and Family: Not on file    Attends Jew Services: Not on file    Active Member of Clubs or Organizations: Not on file    Attends Club or Organization Meetings: Not on file    Marital Status: Not on file        No Known Allergies     Vitals signs and nursing note reviewed. Patient Vitals for the past 4 hrs:   Temp Pulse Resp BP SpO2   06/05/22 1645 -- -- -- 136/65 93 %   06/05/22 1630 -- -- -- 134/63 95 %   06/05/22 1602 (!) 100.8 °F (38.2 °C) 61 18 (!) 117/51 94 %          Physical Exam  Vitals and nursing note reviewed.    Constitutional:       General: He is not in acute distress. Appearance: Normal appearance. He is not ill-appearing. Comments: Dropped from 94% to 86% with just a short trial of ambulation at the bedside   HENT:      Head: Normocephalic and atraumatic. Right Ear: External ear normal.      Left Ear: External ear normal.      Nose: Nose normal. No rhinorrhea. Eyes:      General: No scleral icterus. Right eye: No discharge. Left eye: No discharge. Extraocular Movements: Extraocular movements intact. Conjunctiva/sclera: Conjunctivae normal.   Cardiovascular:      Rate and Rhythm: Normal rate and regular rhythm. Pulmonary:      Effort: Pulmonary effort is normal. No respiratory distress. Breath sounds: Normal breath sounds. No wheezing, rhonchi or rales. Abdominal:      Palpations: Abdomen is soft. Tenderness: There is no abdominal tenderness. There is no guarding or rebound. Musculoskeletal:         General: No swelling, tenderness or signs of injury. Normal range of motion. Cervical back: Normal range of motion and neck supple. No rigidity. Skin:     General: Skin is warm and dry. Coloration: Skin is not jaundiced or pale. Neurological:      General: No focal deficit present. Mental Status: He is alert. Gait: Gait normal.   Psychiatric:         Behavior: Behavior normal.          MDM  Number of Diagnoses or Management Options  Acute respiratory failure due to COVID-19 [facility]): new, needed workup  COVID-19: new, needed workup  Diarrhea, unspecified type: new, needed workup  Diagnosis management comments: Mild COVID check chest x-ray and labs   A dose of Tylenol and some IV fluids, awaiting ambulatory pulse ox to guide therapy  . Chest x-ray does not look bad, however sats dropped to 86% with a short trial of ambulation at the bedside.   We will start baricitinib, and admit to the hospitalist service       Amount and/or Complexity of Data Reviewed  Clinical lab tests: reviewed and ordered (Results Include:    Recent Results (from the past 24 hour(s))  -CBC:   Collection Time: 06/05/22  4:12 PM       Result                      Value             Ref Range           WBC                         6.5               4.3 - 11.1 K*       RBC                         3.57 (L)          4.23 - 5.6 M*       Hemoglobin                  10.7 (L)          13.6 - 17.2 *       Hematocrit                  34.2 (L)          41.1 - 50.3 %       MCV                         95.8              79.6 - 97.8 *       MCH                         30.0              26.1 - 32.9 *       MCHC                        31.3 (L)          31.4 - 35.0 *       RDW                         14.2              11.9 - 14.6 %       Platelets                   208               150 - 450 K/*       MPV                         9.9               9.4 - 12.3 FL       nRBC                        0.00              0.0 - 0.2 K/*  -Comprehensive Metabolic Panel:   Collection Time: 06/05/22  4:12 PM       Result                      Value             Ref Range           Sodium                      140               136 - 145 mm*       Potassium                   4.1               3.5 - 5.1 mm*       Chloride                    106               98 - 107 mmo*       CO2                         29                21 - 32 mmol*       Anion Gap                   5 (L)             7 - 16 mmol/L       Glucose                     113 (H)           65 - 100 mg/*       BUN                         34 (H)            8 - 23 MG/DL        CREATININE                  1.20              0.8 - 1.5 MG*       GFR         >60               >60 ml/min/1*       GFR Non- Americ*     >60               >60 ml/min/1*       Calcium                     8.7               8.3 - 10.4 M*       Total Bilirubin             0.3               0.2 - 1.1 MG*       ALT                         25                12 - 65 U/L         AST                         35 15 - 37 U/L         Alk Phosphatase             75                50 - 136 U/L        Total Protein               7.0               6.3 - 8.2 g/*       Albumin                     3.3               3.2 - 4.6 g/*       Globulin                    3.7 (H)           2.3 - 3.5 g/*       Albumin/Globulin Ratio      0.9 (L)           1.2 - 3.5      -Magnesium:   Collection Time: 06/05/22  4:12 PM       Result                      Value             Ref Range           Magnesium                   2.0               1.8 - 2.4 mg*  -Lactic Acid:   Collection Time: 06/05/22  5:02 PM       Result                      Value             Ref Range           Lactic Acid, Plasma         0.9               0.4 - 2.0 MM*  )  Tests in the radiology section of CPT®: reviewed and ordered (XR CHEST (2 VW)   Final Result - Stable chronic lung disease.   No acute findings in the chest     )  Decide to obtain previous medical records or to obtain history from someone other than the patient: yes  Obtain history from someone other than the patient: yes (Wife at bedside)  Discuss the patient with other providers: yes (Hospitalist service)    Risk of Complications, Morbidity, and/or Mortality  Presenting problems: moderate  Diagnostic procedures: low  Management options: low    Patient Progress  Patient progress: improved      Procedures    Labs Reviewed   CBC - Abnormal; Notable for the following components:       Result Value    RBC 3.57 (*)     Hemoglobin 10.7 (*)     Hematocrit 34.2 (*)     MCHC 31.3 (*)     All other components within normal limits   COMPREHENSIVE METABOLIC PANEL - Abnormal; Notable for the following components:    Anion Gap 5 (*)     Glucose 113 (*)     BUN 34 (*)     Globulin 3.7 (*)     Albumin/Globulin Ratio 0.9 (*)     All other components within normal limits   COVID-19, RAPID   MAGNESIUM   LACTIC ACID   LACTIC ACID        XR CHEST (2 VW)    (Results Pending)            Didier Coma Scale  Eye Opening: Spontaneous  Best Verbal Response: Oriented  Best Motor Response: Obeys commands  McWilliams Coma Scale Score: 15                     Voice dictation software was used during the making of this note. This software is not perfect and grammatical and other typographical errors may be present. This note has not been completely proofread for errors.        Jef Ashotn MD  06/05/22 Serg George MD  06/05/22 [id number]

## 2022-06-05 NOTE — ED TRIAGE NOTES
Pt to triage with mask in place. Family reports pt had positive home COVID test. Pt has been having diarrhea, decreased energy and weakness since Friday night.

## 2022-06-05 NOTE — H&P
Hospitalist History and Physical   Admit Date:  2022  4:24 PM   Name:  Terrence Bence   Age:  80 y.o. Sex:  male  :  1934   MRN:  [id number]   Room:  [facility]    Presenting Complaint: Positive For Covid-19     Reason(s) for Admission: Diarrhea, unspecified type [R19.7]  Acute hypoxemic respiratory failure due to COVID-19 ([address] [U07.1, J96.01]  COVID-19 [U07.1]  Acute respiratory failure due to COVID-19 ([address] [U07.1, J96.00]     History of Present Illness:   Terrence Bence is a 80 y.o. male with medical history of CAD s/p CABG, Emphysema, GERD, HLD, EUGENE  who presented with malaise, myalgias, fevers, diarrhea, productive cough worsening x 3 days associated with dizziness with position change. He was found to have covid in ED. He desatted to 86% on RA with just a short trial of ambulation at the bedside. In the ED, T 100.8 /51 CXR no acute findings. In the ED, he was given 1 L NS and baricitib 2 mg PO. hospitalist consulted for admission. Review of Systems:  Review of Systems   Constitutional: Positive for activity change, appetite change, fatigue and fever. HENT: Negative for congestion, rhinorrhea and sinus pain. Eyes: Negative for visual disturbance. Respiratory: Positive for cough. Negative for shortness of breath. Cardiovascular: Negative for chest pain, palpitations and leg swelling. Gastrointestinal: Positive for diarrhea and nausea. Negative for blood in stool and vomiting. Endocrine: Negative for cold intolerance. Genitourinary: Negative for difficulty urinating and dysuria. Musculoskeletal: Positive for gait problem and myalgias. Skin: Negative for rash. Allergic/Immunologic: Negative for immunocompromised state. Neurological: Positive for dizziness, weakness and light-headedness. Psychiatric/Behavioral: Negative for agitation, behavioral problems and hallucinations.          Assessment & Plan:     Principal Problem:    Acute hypoxemic respiratory failure due to COVID-19   - COVID + on 6/5  - Fully vaccinated   - Decadron 6 mg x 10 doses   - s/p baricitib 2 mg in ED. (CRP 2). Hold for now maral hollis low inflammatory markers   - Charted   2L. Wean as able  - Awake proning as tolerated, anti-tussive PRN, Bronchodilators     Gastroenteritis due to COVID-19 - supportive care. IVF. EUGENE - previously on CPAP. Lost 60 lbs. Stopped wearing CPAP. Severe COPD - former smoker. Bradycardia - monitor on telemetry. Was suppose to FU with cardiology tomorrow   CAD s/p CABG - ASA, statin. Orthostatic hypotension - midodrine 5 mg bid   HLD - statin     Anemia - chronic. H/h @ baseline    Alzheimer's Dementia with behavorial disturbance - resume home meds. Reviewed with wife     CKD stage 2 - Scr @ baseline. Dispo/Discharge Planning:     Admit remote tele     Diet: ADULT DIET; Regular; GI [place] (GERD/Peptic Ulcer)  VTE ppx: loveonx  Code status: Full Code    Hospital Problems:  Principal Problem:    Acute hypoxemic respiratory failure due to COVID-19 [facility])  Active Problems:    Gastroenteritis due to COVID-19 virus    EUGENE on CPAP    S/P CABG x 4    Centrilobular emphysema (HCC)    GERD (gastroesophageal reflux disease)    COPD, severe (HCC)    CAD (coronary artery disease)    Dyslipidemia    Chronic anemia    Orthostatic hypotension    Late onset Alzheimer's dementia with behavioral disturbance ([address]    Chronic renal disease, stage III ([facility]  Resolved Problems:    * No resolved hospital problems.  *       Past History:     Past Medical History:   Diagnosis Date    Asymptomatic gallstones     CAD (coronary artery disease) 11/17/2017    Centrilobular emphysema (HCC)     Chronic anemia     Colon polyps     Dyslipidemia     GERD (gastroesophageal reflux disease)     History of penile implant     Iron deficiency anemia     Late onset Alzheimer's disease without behavioral disturbance (HCC)     Mood disorder (HCC)     Orthostatic hypotension  EUGENE on CPAP     S/P CABG x 4 2017    Sigmoid diverticulosis     TIA (transient ischemic attack)      Past Surgical History:   Procedure Laterality Date    APPENDECTOMY  193    CARDIAC CATHETERIZATION  11/15/2017    mulivessel disease    CATARACT REMOVAL Bilateral     CORONARY ARTERY BYPASS GRAFT  2017    CABG x4, Dr. Vazquez Zacarias Right     fx R arm x 2- -required hardware and later removed    UROLOGICAL SURGERY      penile implant      No Known Allergies   Social History     Tobacco Use    Smoking status: Former Smoker     Packs/day: 1.50     Quit date: 1977     Years since quittin.4    Smokeless tobacco: Never Used   Substance Use Topics    Alcohol use: No      Family History   Problem Relation Age of Onset    Heart Disease Father     Stroke Brother       Family history reviewed and negative except as noted above.       Immunization History   Administered Date(s) Administered    COVID-19, Pfizer Purple top, DILUTE for use, 12+ yrs, 30mcg/0.3mL dose 2021, 2021, 10/13/2021    Influenza Virus Vaccine 2020    Influenza, High Dose (Fluzone 65 yrs and older) 2021    Influenza, Quadv, adjuvanted, 65 yrs +, IM, PF (Fluad) 2020    PPD Test 2017     Prior to Admit Medications:  Current Outpatient Medications   Medication Instructions    aspirin 81 MG EC tablet Oral, EVERY OTHER DAY    atorvastatin (LIPITOR) 20 mg, Oral, DAILY    famotidine (PEPCID) 20 mg, Oral, DAILY    Ferrous Sulfate 324 mg, Oral, DAILY BEFORE BREAKFAST    lamoTRIgine (LAMICTAL) 200 mg, Oral, DAILY    memantine (NAMENDA) 10 mg, Oral, 2 TIMES DAILY    midodrine (PROAMATINE) 5 mg, Oral, 2 TIMES DAILY WITH MEALS    sertraline (ZOLOFT) 100 MG tablet 1.5 tab po daily    vitamin E 200 Units, Oral, 2 TIMES DAILY         Objective:     Patient Vitals for the past 24 hrs:   Temp Pulse Resp BP SpO2   22 2203 -- (!) 41 -- -- --   06/05/22 2156 -- (!) 42 -- -- --   06/05/22 2128 97.3 °F (36.3 °C) (!) 44 20 (!) 156/61 98 %   06/05/22 2029 99.1 °F (37.3 °C) 50 17 (!) 121/57 94 %   06/05/22 1845 -- -- -- (!) 169/62 96 %   06/05/22 1730 -- -- -- (!) 153/63 93 %   06/05/22 1715 -- -- -- (!) 139/58 93 %   06/05/22 1700 -- -- -- (!) 137/56 95 %   06/05/22 1645 -- -- -- 136/65 93 %   06/05/22 1630 -- -- -- 134/63 95 %   06/05/22 1602 (!) 100.8 °F (38.2 °C) 61 18 (!) 117/51 94 %       Oxygen Therapy  SpO2: 98 %  O2 Device: Nasal cannula  Skin Assessment: Clean, dry, & intact  Skin Protection for O2 Device: No  O2 Flow Rate (L/min): 2 L/min    Estimated body mass index is 19.53 kg/m² as calculated from the following:    Height as of this encounter: 5' 11\" (1.803 m). Weight as of this encounter: 140 lb (63.5 kg). Intake/Output Summary (Last 24 hours) at 6/5/2022 2354  Last data filed at 6/5/2022 2305  Gross per 24 hour   Intake --   Output 350 ml   Net -350 ml         Physical Exam:    Blood pressure (!) 156/61, pulse (!) 41, temperature 97.3 °F (36.3 °C), temperature source Oral, resp. rate 20, height 5' 11\" (1.803 m), weight 140 lb (63.5 kg), SpO2 98 %. General:    Well nourished. No overt distress  Head:  Normocephalic, atraumatic  Eyes:  Sclerae appear normal.  Pupils equally round. ENT:  Nares appear normal, no drainage. Moist oral mucosa  Neck:  No restricted ROM. Trachea midline   CV:   RRR. No m/r/g. No jugular venous distension. Lungs:   CTAB. No wheezing, rhonchi, or rales. Respirations even, unlabored  Abdomen: Bowel sounds present. Soft, nontender, nondistended. Extremities: No cyanosis or clubbing. No edema  Skin:     No rashes and normal coloration. Warm and dry. Neuro:  CN II-XII grossly intact. Sensation intact. A&Ox3  Psych:  Normal mood and affect.       I have reviewed ordered lab tests and independently visualized imaging below:    Last 24hr Labs:  Recent Results (from the past 24 hour(s))   CBC Collection Time: 06/05/22  4:12 PM   Result Value Ref Range    WBC 6.5 4.3 - 11.1 K/uL    RBC 3.57 (L) 4.23 - 5.6 M/uL    Hemoglobin 10.7 (L) 13.6 - 17.2 g/dL    Hematocrit 34.2 (L) 41.1 - 50.3 %    MCV 95.8 79.6 - 97.8 FL    MCH 30.0 26.1 - 32.9 PG    MCHC 31.3 (L) 31.4 - 35.0 g/dL    RDW 14.2 11.9 - 14.6 %    Platelets 284 893 - 559 K/uL    MPV 9.9 9.4 - 12.3 FL    nRBC 0.00 0.0 - 0.2 K/uL   Comprehensive Metabolic Panel    Collection Time: 06/05/22  4:12 PM   Result Value Ref Range    Sodium 140 136 - 145 mmol/L    Potassium 4.1 3.5 - 5.1 mmol/L    Chloride 106 98 - 107 mmol/L    CO2 29 21 - 32 mmol/L    Anion Gap 5 (L) 7 - 16 mmol/L    Glucose 113 (H) 65 - 100 mg/dL    BUN 34 (H) 8 - 23 MG/DL    CREATININE 1.20 0.8 - 1.5 MG/DL    GFR African American >60 >60 ml/min/1.73m2    GFR Non- >60 >60 ml/min/1.73m2    Calcium 8.7 8.3 - 10.4 MG/DL    Total Bilirubin 0.3 0.2 - 1.1 MG/DL    ALT 25 12 - 65 U/L    AST 35 15 - 37 U/L    Alk Phosphatase 75 50 - 136 U/L    Total Protein 7.0 6.3 - 8.2 g/dL    Albumin 3.3 3.2 - 4.6 g/dL    Globulin 3.7 (H) 2.3 - 3.5 g/dL    Albumin/Globulin Ratio 0.9 (L) 1.2 - 3.5     Magnesium    Collection Time: 06/05/22  4:12 PM   Result Value Ref Range    Magnesium 2.0 1.8 - 2.4 mg/dL   Lactic Acid    Collection Time: 06/05/22  5:02 PM   Result Value Ref Range    Lactic Acid, Plasma 0.9 0.4 - 2.0 MMOL/L   COVID-19, Rapid    Collection Time: 06/05/22  6:09 PM    Specimen: Nasopharyngeal   Result Value Ref Range    Source Nasopharyngeal      SARS-CoV-2, Rapid Detected (AA) NOTD     C-Reactive Protein    Collection Time: 06/05/22  6:45 PM   Result Value Ref Range    CRP 2.9 (H) 0.0 - 0.9 mg/dL   D-Dimer, Quantitative    Collection Time: 06/05/22  6:45 PM   Result Value Ref Range    D-Dimer, Quant 0.70 (H) <0.56 ug/ml(FEU)   Ferritin    Collection Time: 06/05/22  6:45 PM   Result Value Ref Range    Ferritin 135 8 - 388 NG/ML   Procalcitonin    Collection Time: 06/05/22  6:45 PM tablet 150 mg    atorvastatin (LIPITOR) tablet 20 mg    memantine (NAMENDA) tablet 10 mg    lamoTRIgine (LAMICTAL) tablet 200 mg    DISCONTD: QUEtiapine (SEROQUEL) tablet 25 mg    aspirin EC tablet 81 mg    famotidine (PEPCID) tablet 20 mg    midodrine (PROAMATINE) tablet 5 mg    QUEtiapine (SEROQUEL) tablet 25 mg    QUEtiapine (SEROQUEL) tablet 25 mg           Signed:  Rigo Antunez DO DO    Part of this note may have been written by using a voice dictation software. The note has been proof read but may still contain some grammatical/other typographical errors.

## 2022-06-06 ENCOUNTER — TELEPHONE (OUTPATIENT)
Dept: INTERNAL MEDICINE CLINIC | Facility: CLINIC | Age: 87
End: 2022-06-06

## 2022-06-06 LAB
25(OH)D3 SERPL-MCNC: 38.5 NG/ML (ref 30–100)
ALBUMIN SERPL-MCNC: 3.1 G/DL (ref 3.2–4.6)
ALBUMIN/GLOB SERPL: 1 {RATIO} (ref 1.2–3.5)
ALP SERPL-CCNC: 71 U/L (ref 50–136)
ALT SERPL-CCNC: 21 U/L (ref 12–65)
ANION GAP SERPL CALC-SCNC: 4 MMOL/L (ref 7–16)
AST SERPL-CCNC: 31 U/L (ref 15–37)
BASOPHILS # BLD: 0 K/UL (ref 0–0.2)
BASOPHILS NFR BLD: 0 % (ref 0–2)
BILIRUB SERPL-MCNC: 0.2 MG/DL (ref 0.2–1.1)
BUN SERPL-MCNC: 26 MG/DL (ref 8–23)
CALCIUM SERPL-MCNC: 8.1 MG/DL (ref 8.3–10.4)
CHLORIDE SERPL-SCNC: 107 MMOL/L (ref 98–107)
CO2 SERPL-SCNC: 30 MMOL/L (ref 21–32)
CREAT SERPL-MCNC: 1 MG/DL (ref 0.8–1.5)
CRP SERPL-MCNC: 3.3 MG/DL (ref 0–0.9)
D DIMER PPP FEU-MCNC: 0.61 UG/ML(FEU)
DIFFERENTIAL METHOD BLD: ABNORMAL
EKG ATRIAL RATE: 38 BPM
EKG DIAGNOSIS: NORMAL
EKG P AXIS: 56 DEGREES
EKG P-R INTERVAL: 152 MS
EKG Q-T INTERVAL: 508 MS
EKG QRS DURATION: 88 MS
EKG QTC CALCULATION (BAZETT): 403 MS
EKG R AXIS: 33 DEGREES
EKG T AXIS: 15 DEGREES
EKG VENTRICULAR RATE: 38 BPM
EOSINOPHIL # BLD: 0 K/UL (ref 0–0.8)
EOSINOPHIL NFR BLD: 1 % (ref 0.5–7.8)
ERYTHROCYTE [DISTWIDTH] IN BLOOD BY AUTOMATED COUNT: 14.1 % (ref 11.9–14.6)
GLOBULIN SER CALC-MCNC: 3 G/DL (ref 2.3–3.5)
GLUCOSE SERPL-MCNC: 94 MG/DL (ref 65–100)
HCT VFR BLD AUTO: 33.6 % (ref 41.1–50.3)
HGB BLD-MCNC: 10.4 G/DL (ref 13.6–17.2)
IMM GRANULOCYTES # BLD AUTO: 0 K/UL (ref 0–0.5)
IMM GRANULOCYTES NFR BLD AUTO: 0 % (ref 0–5)
LDH SERPL L TO P-CCNC: 203 U/L (ref 110–210)
LYMPHOCYTES # BLD: 1.7 K/UL (ref 0.5–4.6)
LYMPHOCYTES NFR BLD: 40 % (ref 13–44)
MAGNESIUM SERPL-MCNC: 2.1 MG/DL (ref 1.8–2.4)
MCH RBC QN AUTO: 29.8 PG (ref 26.1–32.9)
MCHC RBC AUTO-ENTMCNC: 31 G/DL (ref 31.4–35)
MCV RBC AUTO: 96.3 FL (ref 79.6–97.8)
MONOCYTES # BLD: 0.3 K/UL (ref 0.1–1.3)
MONOCYTES NFR BLD: 7 % (ref 4–12)
NEUTS SEG # BLD: 2.2 K/UL (ref 1.7–8.2)
NEUTS SEG NFR BLD: 52 % (ref 43–78)
NRBC # BLD: 0 K/UL (ref 0–0.2)
PLATELET # BLD AUTO: 189 K/UL (ref 150–450)
PMV BLD AUTO: 9.9 FL (ref 9.4–12.3)
POTASSIUM SERPL-SCNC: 4.5 MMOL/L (ref 3.5–5.1)
PROT SERPL-MCNC: 6.1 G/DL (ref 6.3–8.2)
RBC # BLD AUTO: 3.49 M/UL (ref 4.23–5.6)
SODIUM SERPL-SCNC: 141 MMOL/L (ref 138–145)
T4 FREE SERPL-MCNC: 0.8 NG/DL (ref 0.78–1.46)
TROPONIN I SERPL HS-MCNC: 15.5 PG/ML (ref 0–14)
TSH, 3RD GENERATION: 0.71 UIU/ML (ref 0.36–3.74)
WBC # BLD AUTO: 4.3 K/UL (ref 4.3–11.1)

## 2022-06-06 PROCEDURE — 97530 THERAPEUTIC ACTIVITIES: CPT

## 2022-06-06 PROCEDURE — 84484 ASSAY OF TROPONIN QUANT: CPT

## 2022-06-06 PROCEDURE — 84443 ASSAY THYROID STIM HORMONE: CPT

## 2022-06-06 PROCEDURE — 84439 ASSAY OF FREE THYROXINE: CPT

## 2022-06-06 PROCEDURE — 36415 COLL VENOUS BLD VENIPUNCTURE: CPT

## 2022-06-06 PROCEDURE — 83615 LACTATE (LD) (LDH) ENZYME: CPT

## 2022-06-06 PROCEDURE — 2580000003 HC RX 258: Performed by: FAMILY MEDICINE

## 2022-06-06 PROCEDURE — 6370000000 HC RX 637 (ALT 250 FOR IP): Performed by: FAMILY MEDICINE

## 2022-06-06 PROCEDURE — 93005 ELECTROCARDIOGRAM TRACING: CPT | Performed by: FAMILY MEDICINE

## 2022-06-06 PROCEDURE — 82306 VITAMIN D 25 HYDROXY: CPT

## 2022-06-06 PROCEDURE — 97161 PT EVAL LOW COMPLEX 20 MIN: CPT

## 2022-06-06 PROCEDURE — 80053 COMPREHEN METABOLIC PANEL: CPT

## 2022-06-06 PROCEDURE — 1100000000 HC RM PRIVATE

## 2022-06-06 PROCEDURE — 6360000002 HC RX W HCPCS: Performed by: FAMILY MEDICINE

## 2022-06-06 PROCEDURE — 85379 FIBRIN DEGRADATION QUANT: CPT

## 2022-06-06 PROCEDURE — 85025 COMPLETE CBC W/AUTO DIFF WBC: CPT

## 2022-06-06 PROCEDURE — 99223 1ST HOSP IP/OBS HIGH 75: CPT | Performed by: INTERNAL MEDICINE

## 2022-06-06 PROCEDURE — 83735 ASSAY OF MAGNESIUM: CPT

## 2022-06-06 PROCEDURE — 86140 C-REACTIVE PROTEIN: CPT

## 2022-06-06 RX ORDER — MULTIVIT-MIN/IRON/FOLIC ACID/K 18-600-40
1 CAPSULE ORAL DAILY
COMMUNITY

## 2022-06-06 RX ADMIN — SERTRALINE 150 MG: 50 TABLET, FILM COATED ORAL at 08:23

## 2022-06-06 RX ADMIN — FAMOTIDINE 20 MG: 20 TABLET, FILM COATED ORAL at 08:24

## 2022-06-06 RX ADMIN — DEXAMETHASONE 6 MG: 4 TABLET ORAL at 08:23

## 2022-06-06 RX ADMIN — LAMOTRIGINE 200 MG: 100 TABLET ORAL at 08:24

## 2022-06-06 RX ADMIN — ENOXAPARIN SODIUM 30 MG: 100 INJECTION SUBCUTANEOUS at 21:17

## 2022-06-06 RX ADMIN — SODIUM CHLORIDE, PRESERVATIVE FREE 10 ML: 5 INJECTION INTRAVENOUS at 08:24

## 2022-06-06 RX ADMIN — MEMANTINE 10 MG: 5 TABLET ORAL at 08:23

## 2022-06-06 RX ADMIN — ATORVASTATIN CALCIUM 20 MG: 40 TABLET, FILM COATED ORAL at 08:24

## 2022-06-06 RX ADMIN — ENOXAPARIN SODIUM 30 MG: 100 INJECTION SUBCUTANEOUS at 08:23

## 2022-06-06 RX ADMIN — QUETIAPINE FUMARATE 25 MG: 25 TABLET ORAL at 08:23

## 2022-06-06 RX ADMIN — SODIUM CHLORIDE, PRESERVATIVE FREE 10 ML: 5 INJECTION INTRAVENOUS at 21:00

## 2022-06-06 RX ADMIN — MEMANTINE 10 MG: 5 TABLET ORAL at 21:16

## 2022-06-06 RX ADMIN — MIDODRINE HYDROCHLORIDE 5 MG: 5 TABLET ORAL at 16:24

## 2022-06-06 RX ADMIN — SODIUM CHLORIDE, POTASSIUM CHLORIDE, SODIUM LACTATE AND CALCIUM CHLORIDE: 600; 310; 30; 20 INJECTION, SOLUTION INTRAVENOUS at 06:32

## 2022-06-06 RX ADMIN — QUETIAPINE FUMARATE 25 MG: 25 TABLET ORAL at 21:16

## 2022-06-06 RX ADMIN — ASPIRIN 81 MG: 81 TABLET ORAL at 08:23

## 2022-06-06 ASSESSMENT — PAIN SCALES - GENERAL
PAINLEVEL_OUTOF10: 0

## 2022-06-06 ASSESSMENT — ENCOUNTER SYMPTOMS
DIARRHEA: 1
SHORTNESS OF BREATH: 0
SINUS PAIN: 0
VOMITING: 0
NAUSEA: 1
BLOOD IN STOOL: 0
COUGH: 1
RHINORRHEA: 0

## 2022-06-06 NOTE — PROGRESS NOTES
Telemetry just called to inform that HR 38 and sustaining. He remains asymptomatic. This RN attempted to call the patient's wife (only contact on file) to determine if the patient's baseline is bradycardia. No answer; left voicemail. Dr. Weiss Kind notified; no new orders. Will continue to closely monitor.

## 2022-06-06 NOTE — PROGRESS NOTES
PHYSICAL THERAPY Initial Assessment and Daily Note  (Link to Caseload Tracking: PT Visit Days : 1  Acknowledge Orders  Time In/Out  PT Charge Capture  Rehab Caseload Tracker    Demetrius Vegas is a 80 y.o. male   PRIMARY DIAGNOSIS: Acute hypoxemic respiratory failure due to COVID-19 ([address]  Diarrhea, unspecified type [R19.7]  Acute hypoxemic respiratory failure due to COVID-19 ([address] [U07.1, J96.01]  COVID-19 [U07.1]  Acute respiratory failure due to COVID-19 ([address] [U07.1, J96.00]       Reason for Referral: Repeated Falls (R29.6)  Inpatient: Payor: Lizbeth Null / Plan: [employer] HMO / Product Type: *No Product type* /     ASSESSMENT:     REHAB RECOMMENDATIONS:   Recommendation to date pending progress:  [address] [place] [address] Therapy    Equipment:     None     ASSESSMENT:  Mr. Gurpreet Gann lives with his wife. He is independent in home and community, but relates a history of falls. Patient disoriented to year. He required cueing to stay on task and was easily distracted. Patient independent with bed mobility and sit to stand. Standing balance fair, patient unable to perform Romberg. Patient able to ambulate 125' with SBA-CGA. Javiera Daysantiff SpO2 remainded >90% throughout mobility. Mr. Gurpreet Gann would benefit from skilled physical therapy while in acute care (medically necessary) to address his deficits and maximize his function.    .     [employer] AM-PAC 6 Clicks Basic Mobility Inpatient Short Form  AM-PAC Mobility Inpatient   How much difficulty turning over in bed?: None  How much difficulty sitting down on / standing up from a chair with arms?: None  How much difficulty moving from lying on back to sitting on side of bed?: None  How much help from another person moving to and from a bed to a chair?: None  How much help from another person needed to walk in hospital room?: A Little  How much help from another person for climbing 3-5 steps with a railing?: A Little  AM-PAC Inpatient Mobility Raw Score : 22  AM-PAC Inpatient T-Scale Score : 53.28  Mobility Inpatient CMS 0-100% Score: 20.91  Mobility Inpatient CMS G-Code Modifier : CJ    SUBJECTIVE:   Mr. Jie Ashby states, Judie Drivers you for coming. \"      Social/Functional Lives With: Spouse  Type of Home: House  Home Layout: Two level,Able to Live on Main level with bedroom/bathroom  Home Access: Stairs to enter with rails  Entrance Stairs - Number of Steps: 2  Home Equipment: Cane  ADL Assistance: Needs assistance  Mode of Transportation: Car    OBJECTIVE:     PAIN: Moriah Keren / O2: PRECAUTION / Catherne Jayuya / DRAINS:   Pre Treatment:   Pain Assessment: None - Denies Pain      Post Treatment: 0 Vitals        Oxygen      IV    RESTRICTIONS/PRECAUTIONS:                    GROSS EVALUATION: Intact Impaired (Comments):   AROM [x]     PROM []    Strength [x]     Balance [] Standing balance impaired   Posture [] Forward Head  Rounded Shoulders   Sensation []     Coordination []      Tone []     Edema []    Activity Tolerance []      []      COGNITION/  PERCEPTION: Intact Impaired (Comments):   Orientation []  Oriented to person, month.    Vision []     Hearing []     Cognition  []       MOBILITY: I Mod I S SBA CGA Min Mod Max Total  NT x2 Comments:   Bed Mobility    Rolling [x] [] [] [] [] [] [] [] [] [] []    Supine to Sit [x] [] [] [] [] [] [] [] [] [] []    Scooting [x] [] [] [] [] [] [] [] [] [] []    Sit to Supine [x] [] [] [] [] [] [] [] [] [] []    Transfers    Sit to Stand [x] [] [] [] [] [] [] [] [] [] []    Bed to Chair [] [] [] [x] [] [] [] [] [] [] []    Stand to Sit [x] [] [] [] [] [] [] [] [] [] []     [] [] [] [] [] [] [] [] [] [] []    I=Independent, Mod I=Modified Independent, S=Supervision, SBA=Standby Assistance, CGA=Contact Guard Assistance,   Min=Minimal Assistance, Mod=Moderate Assistance, Max=Maximal Assistance, Total=Total Assistance, NT=Not Tested    GAIT: I Mod I S SBA CGA Min Mod Max Total  NT x2 Comments:   Level of Assistance [] [] [] [x] [x] [] [] [] [] [] [] Distance 125 feet    DME Gait Belt    Gait Quality Decreased sissy , Decreased step clearance, Decreased step length and wide JOSE ANGEL    Weightbearing Status      Stairs      I=Independent, Mod I=Modified Independent, S=Supervision, SBA=Standby Assistance, CGA=Contact Guard Assistance,   Min=Minimal Assistance, Mod=Moderate Assistance, Max=Maximal Assistance, Total=Total Assistance, NT=Not Tested    PLAN:   ACUTE PHYSICAL THERAPY GOALS:   (Developed with and agreed upon by patient and/or caregiver. )  LTG's  (1.)Mr. Joann Arenas will ambulate with supervision for 250+ feet with least restrictive device within 7 day(s). (2.)Mr. Joann Arenas will perform standing static and dynamic balance activities x 10 minutes with SUPERVISION to improve safety/activity tolerance within 7 day(s). FREQUENCY AND DURATION: 3 times/week for duration of hospital stay or until stated goals are met, whichever comes first.    THERAPY PROGNOSIS: Good    PROBLEM LIST:   (Skilled intervention is medically necessary to address:)  Decreased Activity Tolerance  Decreased Balance  Decreased Cognition  Decreased Gait Ability  Decreased Transfer Abilities INTERVENTIONS PLANNED:   (Benefits and precautions of physical therapy have been discussed with the patient.)  Therapeutic Activity  Therapeutic Exercise/HEP  Gait Training  Education       TREATMENT:   EVALUATION: MODERATE COMPLEXITY: (Untimed Charge)    TREATMENT:   Therapeutic Activity (23 Minutes): Therapeutic activity included Ambulation on level ground and Standing balance to improve functional Activity tolerance, Balance and Mobility.     TREATMENT GRID:  N/A    AFTER TREATMENT PRECAUTIONS: Alarm Activated, Bed, Needs within reach and RN notified    INTERDISCIPLINARY COLLABORATION:  RN/ PCT and PT/ PTA    EDUCATION: Education Given To: Patient  Education Provided: Role of Therapy;Plan of Care  Education Method: Verbal  Education Outcome: Verbalized understanding    TIME IN/OUT:  Time In: 1533  Time Out: [address]  Minutes: 30    A Nisa Morejon, PT

## 2022-06-06 NOTE — PROGRESS NOTES
TRANSFER - IN REPORT:    Verbal report received from [facility] on [address]  being received from ED for routine progression of patient care      Report consisted of patient's Situation, Background, Assessment and   Recommendations(SBAR). Information from the following report(s) Nurse Handoff Report was reviewed with the receiving nurse. Opportunity for questions and clarification was provided. Assessment completed upon patient's arrival to unit and care assumed.

## 2022-06-06 NOTE — PROGRESS NOTES
Pt HR 44 @ 2128 and 42 @ 2156 for a recheck. Telemetry box in place and showing sinus ridge with PACs and HR 41 @ 2203. Pt reports being asymptomatic; no signs of distress noted. Dr. Andrea Jones notified; no new orders. Will continue to closely monitor.

## 2022-06-06 NOTE — TELEPHONE ENCOUNTER
Patients wife called back and states patient is currently in the hospital for low heart rate. She wanted me to let Dr. Jhonatan Crisostomo know.

## 2022-06-06 NOTE — PROGRESS NOTES
Hospitalist Progress Note   Admit Date:  2022  4:24 PM   Name:  Jada Pearce   Age:  80 y.o. Sex:  male  :  1934   MRN:  [id number]   Room:  [facility]/    Presenting Complaint: Positive For Covid-19     Reason(s) for Admission: Diarrhea, unspecified type [R19.7]  Acute hypoxemic respiratory failure due to COVID-19 ([address] [U07.1, J96.01]  COVID-19 [U07.1]  Acute respiratory failure due to COVID-19 ([address] [U07.1, J96.00]     Hospital Course & Interval History:   80 y.o. male with medical history of CAD s/p CABG, Emphysema, GERD, HLD, EUGENE  who presented with malaise, myalgias, fevers, diarrhea, productive cough worsening x 3 days associated with dizziness with position change. He was found to have covid in ED. He desatted to 86% on RA with just a short trial of ambulation at the bedside. In the ED, T 100.8 /51 CXR no acute findings. In the ED, he was given 1 L NS and baricitib 2 mg PO. Subjective/24hr Events (22):     Pt doing well this afternoon. Weaned off of oxygen this afternoon. No more diarrhea. Wife at bedside. Cough much improved. Pt denies any needs. He has spoken to Cardiology. Smarterer to go home. No fever, chills, SOB, chest pain, abdominal pain, N/V, diarrhea or constipation. Assessment & Plan:     Acute hypoxemic respiratory failure due to COVID-19  Fully vaccinated. O2 sat 86% on RA on admission and placed on 2LO2NC. Tmax 100. 8. positive Covid . CXR shows stable chronic lung disease, no acute findings. D-Dimer 0.61, CRP 3.3  Dexamethasone for 10 days EOT   D/c baricitinib as pt has been weaned off O2  Added IS and albuterol prn  Prone as tolerated  On RA. RT for walking pulse ox prior to discharge    Sinus bradycardia  Chronic, sees [facility] cardiology outpatient. Most likely worsened in acute setting of Covid 19 infection. TSH/free T4 wnl.   CTM on telemetry  Keep K>4, Mag>2  Consult cardiology since has appt , appreciate recs    Gastroenteritis due to COVID-19 virus  Supportive care  D/C IVF as pt tolerating po and diarrhea resolved    EUGENE on CPAP  CPAP nightly    CAD S/P CABG x 4  Cont home ASA, statin    COPD, severe  Centrilobular emphysema  Cont breathing treatments    GERD  Cont pepcid    Dyslipidemia  Cont statin    Chronic anemia  Hgb at baseline ~10  CTM    Orthostatic hypotension  Hold midodrine if SBP>160    Late onset Alzheimer's dementia with behavioral disturbance  Resume home memantine, Lamictal, Zoloft, Seroquel    Chronic renal disease, stage III  Scr at baseline Cr ~1.4  Avoid nephrotoxic meds      Discharge Planning:      Hopeful 1-2 days if O2 sat remains stable. F/u card recs. PT/OT    Diet:  ADULT DIET; Regular; GI [place] (GERD/Peptic Ulcer)  DVT PPx: Lovenox SQ  Code status: Full Code     I spent 37 minutes of time caring for this patient at bedside or nearby on the unit, more than 50 percent of which was spent on coordination of care and/or counseling regarding the disease process, treatment options, and treatment plan. Hospital Problems:  Principal Problem:    Acute hypoxemic respiratory failure due to COVID-19 [facility])  Active Problems:    Gastroenteritis due to COVID-19 virus    EUGENE on CPAP    S/P CABG x 4    Centrilobular emphysema (HCC)    GERD (gastroesophageal reflux disease)    COPD, severe (HCC)    CAD (coronary artery disease)    Dyslipidemia    Chronic anemia    Orthostatic hypotension    Late onset Alzheimer's dementia with behavioral disturbance [facility]    Chronic renal disease, stage III ([facility]  Resolved Problems:    * No resolved hospital problems.  *      Objective:     Patient Vitals for the past 24 hrs:   Temp Pulse Resp BP SpO2   06/06/22 1109 97.6 °F (36.4 °C) 53 19 139/79 96 %   06/06/22 0828 97.8 °F (36.6 °C) (!) 40 19 (!) 175/63 96 %   06/06/22 0541 97.9 °F (36.6 °C) (!) 44 20 (!) 177/69 98 %   06/06/22 0046 97.5 °F (36.4 °C) (!) 46 20 (!) 156/63 97 %   06/06/22 0032 -- (!) 45 -- -- --   06/05/22 2203 -- (!) 41 -- -- --   06/05/22 2156 -- (!) 42 -- -- --   06/05/22 2128 97.3 °F (36.3 °C) (!) 44 20 (!) 156/61 98 %   06/05/22 2029 99.1 °F (37.3 °C) 50 17 (!) 121/57 94 %   06/05/22 1845 -- -- -- (!) 169/62 96 %   06/05/22 1730 -- -- -- (!) 153/63 93 %   06/05/22 1715 -- -- -- (!) 139/58 93 %   06/05/22 1700 -- -- -- (!) 137/56 95 %   06/05/22 1645 -- -- -- 136/65 93 %   06/05/22 1630 -- -- -- 134/63 95 %   06/05/22 1602 (!) 100.8 °F (38.2 °C) 61 18 (!) 117/51 94 %       Oxygen Therapy  SpO2: 96 %  O2 Device: None (Room air)  Skin Assessment: Clean, dry, & intact  Skin Protection for O2 Device: No  O2 Flow Rate (L/min): 2 L/min    Estimated body mass index is 19.53 kg/m² as calculated from the following:    Height as of this encounter: 5' 11\" (1.803 m). Weight as of this encounter: 140 lb (63.5 kg). Intake/Output Summary (Last 24 hours) at 6/6/2022 1427  Last data filed at 6/6/2022 1414  Gross per 24 hour   Intake 360 ml   Output 1900 ml   Net -1540 ml         Physical Exam:     Blood pressure 139/79, pulse 53, temperature 97.6 °F (36.4 °C), temperature source Oral, resp. rate 19, height 5' 11\" (1.803 m), weight 140 lb (63.5 kg), SpO2 96 %. General:    Well nourished. Head:  Normocephalic, atraumatic  Eyes:  Sclerae appear normal.  Pupils equally round. ENT:  Nares appear normal, no drainage. Moist oral mucosa  Neck:  No restricted ROM. Trachea midline   CV:   Bradycardic rate, regular rhythm. No m/r/g. No jugular venous distension. Lungs:   CTAB. No wheezing, rhonchi, or rales. Respirations even, unlabored  Abdomen: Bowel sounds present. Soft, nontender, nondistended. Extremities: No cyanosis or clubbing. No edema  Skin:     No rashes and normal coloration. Warm and dry. Neuro:  CN II-XII grossly intact. Sensation intact. A&Ox3  Psych:  Normal mood and affect.       I have reviewed ordered lab tests and independently visualized imaging below:    Recent Labs:  Recent Results (from the past 48 hour(s)) 6:45 PM   Result Value Ref Range    Procalcitonin <0.05 0.00 - 0.49 ng/mL   Lactic Acid    Collection Time: 06/05/22  8:07 PM   Result Value Ref Range    Lactic Acid, Plasma 0.4 0.4 - 2.0 MMOL/L   Troponin    Collection Time: 06/06/22  3:37 AM   Result Value Ref Range    Troponin, High Sensitivity 15.5 (H) 0 - 14 pg/mL   Vitamin D 25 Hydroxy    Collection Time: 06/06/22  3:38 AM   Result Value Ref Range    Vit D, 25-Hydroxy 38.5 30.0 - 100.0 ng/mL   C-Reactive Protein    Collection Time: 06/06/22  3:38 AM   Result Value Ref Range    CRP 3.3 (H) 0.0 - 0.9 mg/dL   Lactate Dehydrogenase    Collection Time: 06/06/22  3:38 AM   Result Value Ref Range     110 - 210 U/L   D-Dimer, Quantitative    Collection Time: 06/06/22  3:38 AM   Result Value Ref Range    D-Dimer, Quant 0.61 (H) <0.56 ug/ml(FEU)   CBC with Auto Differential    Collection Time: 06/06/22  3:38 AM   Result Value Ref Range    WBC 4.3 4.3 - 11.1 K/uL    RBC 3.49 (L) 4.23 - 5.6 M/uL    Hemoglobin 10.4 (L) 13.6 - 17.2 g/dL    Hematocrit 33.6 (L) 41.1 - 50.3 %    MCV 96.3 79.6 - 97.8 FL    MCH 29.8 26.1 - 32.9 PG    MCHC 31.0 (L) 31.4 - 35.0 g/dL    RDW 14.1 11.9 - 14.6 %    Platelets 988 926 - 937 K/uL    MPV 9.9 9.4 - 12.3 FL    nRBC 0.00 0.0 - 0.2 K/uL    Differential Type AUTOMATED      Seg Neutrophils 52 43 - 78 %    Lymphocytes 40 13 - 44 %    Monocytes 7 4.0 - 12.0 %    Eosinophils % 1 0.5 - 7.8 %    Basophils 0 0.0 - 2.0 %    Immature Granulocytes 0 0.0 - 5.0 %    Segs Absolute 2.2 1.7 - 8.2 K/UL    Absolute Lymph # 1.7 0.5 - 4.6 K/UL    Absolute Mono # 0.3 0.1 - 1.3 K/UL    Absolute Eos # 0.0 0.0 - 0.8 K/UL    Basophils Absolute 0.0 0.0 - 0.2 K/UL    Absolute Immature Granulocyte 0.0 0.0 - 0.5 K/UL   Comprehensive Metabolic Panel w/ Reflex to MG    Collection Time: 06/06/22  3:38 AM   Result Value Ref Range    Sodium 141 138 - 145 mmol/L    Potassium 4.5 3.5 - 5.1 mmol/L    Chloride 107 98 - 107 mmol/L    CO2 30 21 - 32 mmol/L    Anion Gap 4 (L) 7 - 16 mmol/L    Glucose 94 65 - 100 mg/dL    BUN 26 (H) 8 - 23 MG/DL    CREATININE 1.00 0.8 - 1.5 MG/DL    GFR African American >60 >60 ml/min/1.73m2    GFR Non- >60 >60 ml/min/1.73m2    Calcium 8.1 (L) 8.3 - 10.4 MG/DL    Total Bilirubin 0.2 0.2 - 1.1 MG/DL    ALT 21 12 - 65 U/L    AST 31 15 - 37 U/L    Alk Phosphatase 71 50 - 136 U/L    Total Protein 6.1 (L) 6.3 - 8.2 g/dL    Albumin 3.1 (L) 3.2 - 4.6 g/dL    Globulin 3.0 2.3 - 3.5 g/dL    Albumin/Globulin Ratio 1.0 (L) 1.2 - 3.5     EKG 12 Lead    Collection Time: 06/06/22  8:25 AM   Result Value Ref Range    Ventricular Rate 38 BPM    Atrial Rate 38 BPM    P-R Interval 152 ms    QRS Duration 88 ms    Q-T Interval 508 ms    QTc Calculation (Bazett) 403 ms    P Axis 56 degrees    R Axis 33 degrees    T Axis 15 degrees    Diagnosis Marked sinus bradycardia    TSH    Collection Time: 06/06/22  9:03 AM   Result Value Ref Range    TSH, 3RD GENERATION 0.715 0.358 - 3.740 uIU/mL   T4, Free    Collection Time: 06/06/22  9:03 AM   Result Value Ref Range    T4 Free 0.8 0.78 - 1.46 NG/DL   Magnesium    Collection Time: 06/06/22  9:03 AM   Result Value Ref Range    Magnesium 2.1 1.8 - 2.4 mg/dL       Other Studies:  XR CHEST (2 VW)    Result Date: 6/5/2022  Chest X-ray INDICATION: Shortness of breath, COVID-19 PA and lateral views of the chest were obtained. FINDINGS: Lungs are slightly hyperexpanded. There are no focal infiltrates or effusions. The heart size is normal.  There are sternotomy changes. Stable chronic lung disease.   No acute findings in the chest       Current Meds:  Current Facility-Administered Medications   Medication Dose Route Frequency    [Held by provider] baricitinib (OLUMIANT) tablet 2 mg  2 mg Oral Q24H    sodium chloride flush 0.9 % injection 5-40 mL  5-40 mL IntraVENous 2 times per day    sodium chloride flush 0.9 % injection 5-40 mL  5-40 mL IntraVENous PRN    0.9 % sodium chloride infusion   IntraVENous PRN    enoxaparin Sodium (LOVENOX) injection 30 mg  30 mg SubCUTAneous BID    ondansetron (ZOFRAN-ODT) disintegrating tablet 4 mg  4 mg Oral Q8H PRN    Or    ondansetron (ZOFRAN) injection 4 mg  4 mg IntraVENous Q6H PRN    polyethylene glycol (GLYCOLAX) packet 17 g  17 g Oral Daily PRN    acetaminophen (TYLENOL) tablet 650 mg  650 mg Oral Q6H PRN    Or    acetaminophen (TYLENOL) suppository 650 mg  650 mg Rectal Q6H PRN    dexamethasone (DECADRON) tablet 6 mg  6 mg Oral Daily    guaiFENesin-dextromethorphan (ROBITUSSIN DM) 100-10 MG/5ML syrup 5 mL  5 mL Oral Q4H PRN    lactated ringers infusion   IntraVENous Continuous    albuterol sulfate  (90 Base) MCG/ACT inhaler 2 puff  2 puff Inhalation Q4H PRN    aluminum & magnesium hydroxide-simethicone (MAALOX) 200-200-20 MG/5ML suspension 30 mL  30 mL Oral Q6H PRN    sertraline (ZOLOFT) tablet 150 mg  150 mg Oral Daily    atorvastatin (LIPITOR) tablet 20 mg  20 mg Oral Daily    memantine (NAMENDA) tablet 10 mg  10 mg Oral BID    lamoTRIgine (LAMICTAL) tablet 200 mg  200 mg Oral Daily    aspirin EC tablet 81 mg  81 mg Oral Every Other Day    famotidine (PEPCID) tablet 20 mg  20 mg Oral Daily    [Held by provider] midodrine (PROAMATINE) tablet 5 mg  5 mg Oral TID WC    QUEtiapine (SEROQUEL) tablet 25 mg  25 mg Oral Daily    QUEtiapine (SEROQUEL) tablet 25 mg  25 mg Oral Nightly PRN       Signed: Ashlee Ill, DO    Part of this note may have been written by using a voice dictation software. The note has been proof read but may still contain some grammatical/other typographical errors.

## 2022-06-06 NOTE — CARE COORDINATION
ASSESSMENT NOTE    Attending Physician: David Rose, DO  Admit Problem: Diarrhea, unspecified type [R19.7]  Acute hypoxemic respiratory failure due to COVID-19 ([facility]) [U07.1, J96.01]  COVID-19 [U07.1]  Acute respiratory failure due to COVID-19 ([address] [U07.1, J96.00]  Date/Time of Admission: 6/5/2022  4:24 PM  Problem List:  Patient Active Problem List   Diagnosis    EUGENE on CPAP    S/P CABG x 4    Centrilobular emphysema (HCC)    GERD (gastroesophageal reflux disease)    Mood disorder (HCC)    COPD, severe ([facility]    CAD (coronary artery disease)    Sigmoid diverticulosis    Bilateral carotid artery disease ([address]    History of penile implant    Nocturnal hypoxemia    Bradycardia    Asymptomatic gallstones    Dyslipidemia    Chronic anemia    Colon polyps    Chronic renal disease, stage II    Orthostatic hypotension    Late onset Alzheimer's dementia with behavioral disturbance ([facility]    Chronic renal disease, stage III ([facility]    Acute hypoxemic respiratory failure due to COVID-19 ([address]    Gastroenteritis due to COVID-19 virus       Service Assessment  Patient Orientation     Cognition     History Provided By (P) Spouse   Primary Caregiver (P) Spouse   Accompanied By/Relationship     Support Systems (P) Spouse/Significant Other   Patient's Healthcare Decision Maker is:     PCP Verified by CM (P) Yes   Last Visit to PCP     Prior Functional Level (P) Other (see comment) (patient needs assistance with ADLs occasionally)   Current Functional Level     Can patient return to prior living arrangement (P) Yes   Ability to make needs known: (P) Good   Family able to assist with home care needs: (P) Yes   Would you like for me to discuss the discharge plan with any other family members/significant others, and if so, who?      Financial Resources     Community Resources     CM/SW Referral       Social/Functional History  Lives With (P) Spouse   Type of Home     Home Layout     Home Access     Entrance Stairs - Number of Steps     Entrance Stairs - Rails     Bathroom Shower/Tub     Bathroom Toilet     Bathroom Equipment     Bathroom Accessibility     Home Equipment     Receives Help From     ADL Assistance (P) Needs assistance   [employer]     Grooming     Feeding     Toileting     Homemaking Assistance     Meal Prep     Laundry     Vacuuming     Cleaning     Gardening     Yard Work     Driving     Shopping          Other (Comment)     [address] Paying/Finance Responsibility     [address] Management     Other (Comment)     Ambuation Assistance     Transfer Assisstance     Active      Patient's  Info     Mode of Transportation (P) Car   Education     Occupation     Type of Occupation       Discharge Planning   Type of [address] [facility] (P) Spouse/Significant Other   Current Services Prior To Admission     Potential Assistance Needed     DME     DME     DME Ordered? Potential Assistance Purchasing Medications     Meds-to-Beds: Does the patient want to have any new prescriptions delivered to bedside prior to discharge? Type of Home Care Services     Patient expects to be discharged to: Follow Up Appointment: Best Day/Time     One/Two Story Residence:     # of Interior Steps     Height of Each Step (in)     [employer] Available     History of Falls? Services At/After Discharge  Transition of Care Consult (CM Consult): Internal Home Health     Internal Hospice     Reason Outside Agency [address]     Partner SNF     Reason Why Partner SNF Not Chosen     Internal Comfort Care     Reason Outside [address] Discharge     [address] Provided?      Mode of Transport at Discharge     Hospital Transport Time of Discharge     Confirm Follow Up Transport       Condition of Participation: Discharge Planning  The plan for Transition of Care is related to the following treatment goals: The Patient and/or Patient Representative was provided with a Choice of Provider? Name of the Patient Representative who was provided with the Choice of Provider and agrees with the Discharge Plan? The Patient and/or Patient Representative Agree with the Discharge Plan? Freedom of Choice list was provided with basic dialogue that supports the individualized plan of care/goals, treatment preferences, and shares the quality data associated with the providers? Documentation for Discharge Appeal  Discharge Appealed by     Date notified by QIO of appeal request:     Time notified by QIO of appeal request:     Detailed Notice of Discharge given to:     Date Notice of Discharge given:     Time Notice of Discharge given:     Date records sent to [address]     Time records sent to [address]     Date Notified of Outcome     Time Notified of Outcome     Outcome of appeal     Wife at bedside answered questions. Patient is independent with ambulation. Occasionally he needs assistance with ADLs. His wife drives him to appointments. Confirmed patient has a pcp. DME: none  No history of HH or rehab. PT/OT consulted. CM following for discharge needs.           Anusha Ramey., HELEN 06/06/22 9:43 AM

## 2022-06-06 NOTE — PROGRESS NOTES
Pt resting quietly in bed. Respirations even and unlabored on room air. Pt denies pain and reports no further needs at this time. No signs of distress noted. LR currently infusing at 100 mL/h. Telemetry called again to notify of HR 37. Pt seems to be asymptomatic and states that his heart \"feels normal.\" Safety measures are in place. Will continue to monitor and give report to oncoming RN.

## 2022-06-06 NOTE — PROGRESS NOTES
Pt arrived to the floor via stretcher with transport. Pt ambulated from stretcher to bed with unsteady gait; weakness noted to BLE. Pt now resting in bed watching TV; A&O x2 to self and situation; disoriented to place and time; confusion noted. Respirations even and unlabored on 2L NC. Pt denies pain and reports no further needs at this time. No signs of distress noted. Dual skin assessment completed with Marco A Kyle RN; no pressure injuries noted. LR currently infusing at 100 mL/h. Safety measures are in place. Will continue to monitor.

## 2022-06-06 NOTE — PLAN OF CARE
Problem: Discharge Planning  Goal: Discharge to home or other facility with appropriate resources  Outcome: Progressing  Flowsheets (Taken 6/6/2022 0740)  Discharge to home or other facility with appropriate resources: Identify barriers to discharge with patient and caregiver     Problem: Safety - Adult  Goal: Free from fall injury  Description: INTERVENTIONS:  6/6/2022 1023 by Judy Wong RN  Outcome: Progressing  6/6/2022 0141 by Roxanna Pritchett RN  Outcome: Progressing     Problem: Confusion  Goal: Confusion, delirium, dementia, or psychosis is improved or at baseline  Description: INTERVENTIONS:  1. Assess for possible contributors to thought disturbance, including medications, impaired vision or hearing, underlying metabolic abnormalities, dehydration, psychiatric diagnoses, and notify attending LIP  2. [place] high risk fall precautions, as indicated  3. Provide frequent short contacts to provide reality reorientation, refocusing and direction  4. Decrease environmental stimuli, including noise as appropriate  5. Monitor and intervene to maintain adequate nutrition, hydration, elimination, sleep and activity  6. If unable to ensure safety without constant attention obtain sitter and review sitter guidelines with assigned personnel  7.  Initiate Psychosocial CNS and Spiritual Care consult, as indicated  Outcome: Progressing  Flowsheets (Taken 6/6/2022 0740)  Effect of thought disturbance (confusion, delirium, dementia, or psychosis) are managed with adequate functional status: Assess for contributors to thought disturbance, including medications, impaired vision or hearing, underlying metabolic abnormalities, dehydration, psychiatric diagnoses, notify LIP

## 2022-06-06 NOTE — PROGRESS NOTES
Attempted to see patient this PM for occupational therapy evaluation session. MD in w/ pt. Will follow and re-attempt as schedule permits/patient available.  Thank you,    Marci Vasquez, OT    Rehab Caseload Tracker

## 2022-06-06 NOTE — H&P
[facility] Cardiology Initial Cardiac Evaluation   Primary Cardiologist:Dr. Aaliyah Natarajan  Attending Cardiologist: Dr. Alem Terrazas     Date of  Admission: 6/5/2022  4:24 PM     HPI:  Brad George is an 80 y. o. WM with h/o CAD s/p CABG 2017 x4, bradycardia, orthostatic hypotension, CKD stage 3,  COPD/emphysema, Alzheimer's dementia, GERD, HLD, anemia and EUGENE who presented to [facility] ED with malaise, myalgias, fevers, diarrhea, productive cough worsening x 3 days and dizziness with position change. In the ED, he was COVID+, desatted to 86% on RA with just a short trial of ambulation at the bedside, T 100.8, /51, CXR no acute findings. Pt was admitted by the hospitalist. ECG showed sinus bradycardia 38 bpm. Cardiology was consulted. Pt recently saw Dr. Aaliyah Natarajan on 5/19/22 with ECG showing bradycardia 51 bpm and monitor was ordered, results still pending. Pt reports fatigue, weakness and cough but also has fever and COVID+. Telemetry monitor shows bradycardia in the 50's. Pt denies CP, palpitations, SOB or syncope.      Past Medical History:   Diagnosis Date    Asymptomatic gallstones     CAD (coronary artery disease) 11/17/2017    Centrilobular emphysema (HCC)     Chronic anemia     Colon polyps     Dyslipidemia     GERD (gastroesophageal reflux disease)     History of penile implant     Iron deficiency anemia     Late onset Alzheimer's disease without behavioral disturbance (HCC)     Mood disorder (HCC)     Orthostatic hypotension     EUGENE on CPAP     S/P CABG x 4 11/2017    Sigmoid diverticulosis     TIA (transient ischemic attack)       Past Surgical History:   Procedure Laterality Date    APPENDECTOMY  1938    CARDIAC CATHETERIZATION  11/15/2017    mulivessel disease    CATARACT REMOVAL Bilateral 2007    CORONARY ARTERY BYPASS GRAFT  11/17/2017    CABG x4, Dr. Vazquez Zacarias Right     fx R arm x 2- -required hardware and later removed    UROLOGICAL SURGERY      penile implant       No Known Allergies   Social History     Socioeconomic History    Marital status:      Spouse name: Not on file    Number of children: Not on file    Years of education: Not on file    Highest education level: Not on file   Occupational History    Not on file   Tobacco Use    Smoking status: Former Smoker     Packs/day: 1.50     Quit date: 1977     Years since quittin.4    Smokeless tobacco: Never Used   Substance and Sexual Activity    Alcohol use: No    Drug use: No    Sexual activity: Not on file   Other Topics Concern    Not on file   Social History Narrative    Has 3 biologic children, 3 step kids.  and lives with wife. Retired from sales. No pets. Social Determinants of Health     Financial Resource Strain:     Difficulty of Paying Living Expenses: Not on file   Food Insecurity:     Worried About Running Out of Food in the Last Year: Not on file    Purnima of Food in the Last Year: Not on file   Transportation Needs:     Lack of Transportation (Medical): Not on file    Lack of Transportation (Non-Medical):  Not on file   Physical Activity:     Days of Exercise per Week: Not on file    Minutes of Exercise per Session: Not on file   Stress:     Feeling of Stress : Not on file   Social Connections:     Frequency of Communication with Friends and Family: Not on file    Frequency of Social Gatherings with Friends and Family: Not on file    Attends Christian Services: Not on file    Active Member of Clubs or Organizations: Not on file    Attends Club or Organization Meetings: Not on file    Marital Status: Not on file   Intimate Partner Violence:     Fear of Current or Ex-Partner: Not on file    Emotionally Abused: Not on file    Physically Abused: Not on file    Sexually Abused: Not on file   Housing Stability:     Unable to Pay for Housing in the Last Year: Not on file    Number of [place] in the Last Year: Not on file    Unstable Housing in the Last Year: Not on file     Family History   Problem Relation Age of Onset    Heart Disease Father     Stroke Brother         Current Facility-Administered Medications   Medication Dose Route Frequency    [Held by provider] baricitinib (OLUMIANT) tablet 2 mg  2 mg Oral Q24H    sodium chloride flush 0.9 % injection 5-40 mL  5-40 mL IntraVENous 2 times per day    sodium chloride flush 0.9 % injection 5-40 mL  5-40 mL IntraVENous PRN    0.9 % sodium chloride infusion   IntraVENous PRN    enoxaparin Sodium (LOVENOX) injection 30 mg  30 mg SubCUTAneous BID    ondansetron (ZOFRAN-ODT) disintegrating tablet 4 mg  4 mg Oral Q8H PRN    Or    ondansetron (ZOFRAN) injection 4 mg  4 mg IntraVENous Q6H PRN    polyethylene glycol (GLYCOLAX) packet 17 g  17 g Oral Daily PRN    acetaminophen (TYLENOL) tablet 650 mg  650 mg Oral Q6H PRN    Or    acetaminophen (TYLENOL) suppository 650 mg  650 mg Rectal Q6H PRN    dexamethasone (DECADRON) tablet 6 mg  6 mg Oral Daily    guaiFENesin-dextromethorphan (ROBITUSSIN DM) 100-10 MG/5ML syrup 5 mL  5 mL Oral Q4H PRN    lactated ringers infusion   IntraVENous Continuous    albuterol sulfate  (90 Base) MCG/ACT inhaler 2 puff  2 puff Inhalation Q4H PRN    aluminum & magnesium hydroxide-simethicone (MAALOX) 200-200-20 MG/5ML suspension 30 mL  30 mL Oral Q6H PRN    sertraline (ZOLOFT) tablet 150 mg  150 mg Oral Daily    atorvastatin (LIPITOR) tablet 20 mg  20 mg Oral Daily    memantine (NAMENDA) tablet 10 mg  10 mg Oral BID    lamoTRIgine (LAMICTAL) tablet 200 mg  200 mg Oral Daily    aspirin EC tablet 81 mg  81 mg Oral Every Other Day    famotidine (PEPCID) tablet 20 mg  20 mg Oral Daily    [Held by provider] midodrine (PROAMATINE) tablet 5 mg  5 mg Oral TID WC    QUEtiapine (SEROQUEL) tablet 25 mg  25 mg Oral Daily    QUEtiapine (SEROQUEL) tablet 25 mg  25 mg Oral Nightly PRN       Review of symptoms:  General: no Oral  Oral Oral   SpO2: 97% 98% 96% 96%   Weight:       Height:          GEN: A&O, no acute distress  HEENT: NCAT, PERRL, no JVD or carotid bruits  CV: S1S2 RRR, slow  Chest: CTA B  Abd: soft, +BS, non tender  Ext: no LE edema, cyanosis or clubbing    Labs:   Recent Results (from the past 24 hour(s))   CBC    Collection Time: 06/05/22  4:12 PM   Result Value Ref Range    WBC 6.5 4.3 - 11.1 K/uL    RBC 3.57 (L) 4.23 - 5.6 M/uL    Hemoglobin 10.7 (L) 13.6 - 17.2 g/dL    Hematocrit 34.2 (L) 41.1 - 50.3 %    MCV 95.8 79.6 - 97.8 FL    MCH 30.0 26.1 - 32.9 PG    MCHC 31.3 (L) 31.4 - 35.0 g/dL    RDW 14.2 11.9 - 14.6 %    Platelets 272 289 - 349 K/uL    MPV 9.9 9.4 - 12.3 FL    nRBC 0.00 0.0 - 0.2 K/uL   Comprehensive Metabolic Panel    Collection Time: 06/05/22  4:12 PM   Result Value Ref Range    Sodium 140 136 - 145 mmol/L    Potassium 4.1 3.5 - 5.1 mmol/L    Chloride 106 98 - 107 mmol/L    CO2 29 21 - 32 mmol/L    Anion Gap 5 (L) 7 - 16 mmol/L    Glucose 113 (H) 65 - 100 mg/dL    BUN 34 (H) 8 - 23 MG/DL    CREATININE 1.20 0.8 - 1.5 MG/DL    GFR African American >60 >60 ml/min/1.73m2    GFR Non- >60 >60 ml/min/1.73m2    Calcium 8.7 8.3 - 10.4 MG/DL    Total Bilirubin 0.3 0.2 - 1.1 MG/DL    ALT 25 12 - 65 U/L    AST 35 15 - 37 U/L    Alk Phosphatase 75 50 - 136 U/L    Total Protein 7.0 6.3 - 8.2 g/dL    Albumin 3.3 3.2 - 4.6 g/dL    Globulin 3.7 (H) 2.3 - 3.5 g/dL    Albumin/Globulin Ratio 0.9 (L) 1.2 - 3.5     Magnesium    Collection Time: 06/05/22  4:12 PM   Result Value Ref Range    Magnesium 2.0 1.8 - 2.4 mg/dL   Lactic Acid    Collection Time: 06/05/22  5:02 PM   Result Value Ref Range    Lactic Acid, Plasma 0.9 0.4 - 2.0 MMOL/L   COVID-19, Rapid    Collection Time: 06/05/22  6:09 PM    Specimen: Nasopharyngeal   Result Value Ref Range    Source Nasopharyngeal      SARS-CoV-2, Rapid Detected (AA) NOTD     C-Reactive Protein    Collection Time: 06/05/22  6:45 PM   Result Value Ref Range    CRP 2.9 (H) 0.0 - 0.9 mg/dL   D-Dimer, Quantitative    Collection Time: 06/05/22  6:45 PM   Result Value Ref Range    D-Dimer, Quant 0.70 (H) <0.56 ug/ml(FEU)   Ferritin    Collection Time: 06/05/22  6:45 PM   Result Value Ref Range    Ferritin 135 8 - 388 NG/ML   Procalcitonin    Collection Time: 06/05/22  6:45 PM   Result Value Ref Range    Procalcitonin <0.05 0.00 - 0.49 ng/mL   Lactic Acid    Collection Time: 06/05/22  8:07 PM   Result Value Ref Range    Lactic Acid, Plasma 0.4 0.4 - 2.0 MMOL/L   Troponin    Collection Time: 06/06/22  3:37 AM   Result Value Ref Range    Troponin, High Sensitivity 15.5 (H) 0 - 14 pg/mL   Vitamin D 25 Hydroxy    Collection Time: 06/06/22  3:38 AM   Result Value Ref Range    Vit D, 25-Hydroxy 38.5 30.0 - 100.0 ng/mL   C-Reactive Protein    Collection Time: 06/06/22  3:38 AM   Result Value Ref Range    CRP 3.3 (H) 0.0 - 0.9 mg/dL   Lactate Dehydrogenase    Collection Time: 06/06/22  3:38 AM   Result Value Ref Range     110 - 210 U/L   D-Dimer, Quantitative    Collection Time: 06/06/22  3:38 AM   Result Value Ref Range    D-Dimer, Quant 0.61 (H) <0.56 ug/ml(FEU)   CBC with Auto Differential    Collection Time: 06/06/22  3:38 AM   Result Value Ref Range    WBC 4.3 4.3 - 11.1 K/uL    RBC 3.49 (L) 4.23 - 5.6 M/uL    Hemoglobin 10.4 (L) 13.6 - 17.2 g/dL    Hematocrit 33.6 (L) 41.1 - 50.3 %    MCV 96.3 79.6 - 97.8 FL    MCH 29.8 26.1 - 32.9 PG    MCHC 31.0 (L) 31.4 - 35.0 g/dL    RDW 14.1 11.9 - 14.6 %    Platelets 106 105 - 847 K/uL    MPV 9.9 9.4 - 12.3 FL    nRBC 0.00 0.0 - 0.2 K/uL    Differential Type AUTOMATED      Seg Neutrophils 52 43 - 78 %    Lymphocytes 40 13 - 44 %    Monocytes 7 4.0 - 12.0 %    Eosinophils % 1 0.5 - 7.8 %    Basophils 0 0.0 - 2.0 %    Immature Granulocytes 0 0.0 - 5.0 %    Segs Absolute 2.2 1.7 - 8.2 K/UL    Absolute Lymph # 1.7 0.5 - 4.6 K/UL    Absolute Mono # 0.3 0.1 - 1.3 K/UL    Absolute Eos # 0.0 0.0 - 0.8 K/UL    Basophils Absolute 0.0 0.0 - 0.2 K/UL Absolute Immature Granulocyte 0.0 0.0 - 0.5 K/UL   Comprehensive Metabolic Panel w/ Reflex to MG    Collection Time: 06/06/22  3:38 AM   Result Value Ref Range    Sodium 141 138 - 145 mmol/L    Potassium 4.5 3.5 - 5.1 mmol/L    Chloride 107 98 - 107 mmol/L    CO2 30 21 - 32 mmol/L    Anion Gap 4 (L) 7 - 16 mmol/L    Glucose 94 65 - 100 mg/dL    BUN 26 (H) 8 - 23 MG/DL    CREATININE 1.00 0.8 - 1.5 MG/DL    GFR African American >60 >60 ml/min/1.73m2    GFR Non- >60 >60 ml/min/1.73m2    Calcium 8.1 (L) 8.3 - 10.4 MG/DL    Total Bilirubin 0.2 0.2 - 1.1 MG/DL    ALT 21 12 - 65 U/L    AST 31 15 - 37 U/L    Alk Phosphatase 71 50 - 136 U/L    Total Protein 6.1 (L) 6.3 - 8.2 g/dL    Albumin 3.1 (L) 3.2 - 4.6 g/dL    Globulin 3.0 2.3 - 3.5 g/dL    Albumin/Globulin Ratio 1.0 (L) 1.2 - 3.5     EKG 12 Lead    Collection Time: 06/06/22  8:25 AM   Result Value Ref Range    Ventricular Rate 38 BPM    Atrial Rate 38 BPM    P-R Interval 152 ms    QRS Duration 88 ms    Q-T Interval 508 ms    QTc Calculation (Bazett) 403 ms    P Axis 56 degrees    R Axis 33 degrees    T Axis 15 degrees    Diagnosis Marked sinus bradycardia    TSH    Collection Time: 06/06/22  9:03 AM   Result Value Ref Range    TSH, 3RD GENERATION 0.715 0.358 - 3.740 uIU/mL   T4, Free    Collection Time: 06/06/22  9:03 AM   Result Value Ref Range    T4 Free 0.8 0.78 - 1.46 NG/DL   Magnesium    Collection Time: 06/06/22  9:03 AM   Result Value Ref Range    Magnesium 2.1 1.8 - 2.4 mg/dL       Pt was seen and examined by Dr. Antonina Chambers, he agrees with the following A&P. Assessment/Plan:       Diagnosis    Acute hypoxemic respiratory failure due to COVID-19- tx per primary team    Gastroenteritis due to COVID-19 virus- tx per primary team    Chronic renal disease, stage III- monitor    Bradycardia- continue to monitor on telemetry, trying to obtain recent holter monitor results.  No plan for PPM with COVID, fever    COPD, severe- tx per primary team    Dyslipidemia- statin    Orthostatic hypotension    CAD (coronary artery disease) S/P CABG x 4- ASA, Statin, no BB with bradycardia    Late onset Alzheimer's dementia with behavioral disturbance (HCC)    Bilateral carotid artery disease (HCC)    Chronic anemia    EUGENE     GERD (gastroesophageal reflux disease)- PPI       Thank you for allowing us to participate in the care of this patient, we will continue to follow along with you. Navya Candelario PA-C      [facility] CARDIOLOGY     6/6/2022     4:26 PM    I have personally seen and examined Demetrius Vegas with  Navya ELIZONDO. I agree and confirm findings in history, physical exam, and assessment/plan as outlined above with following pertinent additions/exceptions:       19-year-old male with a history of late onset Alzheimer's dementia and bradycardia presents with signs and symptoms concerning for COVID-19 infection. Underwent test in the ER showing he did have COVID. On the heart monitor who was heart rate has been in the 50s initial EKG showed a heart rate in the high 30s. He reports dizziness and problems with balance. He is undergoing an evaluation due to bradycardia as an outpatient and has recently worn a heart monitor we do not have the results of those immediately available. RRR normal S1/S2  Lungs CTA  Abd NT ND  Normal PPP  AOx3    A/p  -We will continue him on telemetry while he is in the hospital  -COVID-19 infection care per primary team  -We are currently searching to find the results from his heart monitor  -I see no immediate need for implantation of a pacemaker and given his current fever and infection feel it would be best to wait and see how he does after his infection resolves. -Recommended updated echocardiogram as an outpatient.       Jessica Ochoa MD

## 2022-06-06 NOTE — TELEPHONE ENCOUNTER
Paged Sunday at 5 PM with \"covid positive. \"  Spoke with patient's wife. Patient with pos covid test, fever, sats at 82 and some lethergy.   Advised ER eval.

## 2022-06-06 NOTE — ED NOTES
TRANSFER - OUT REPORT:    Verbal report given to Josefa MCKEON on Malorie Armijo  being transferred to 8th floor for routine progression of patient care       Report consisted of patient's Situation, Background, Assessment and   Recommendations(SBAR). Information from the following report(s) ED SBAR was reviewed with the receiving nurse. Lines:   Peripheral IV 06/05/22 Left;Ventral Hand (Active)        Opportunity for questions and clarification was provided.       Patient transported with:  O2 @ 1lpm and Tech       Pepito Kiran RN  06/05/22 2025

## 2022-06-07 ENCOUNTER — HOME HEALTH ADMISSION (OUTPATIENT)
Dept: HOME HEALTH SERVICES | Facility: HOME HEALTH | Age: 87
End: 2022-06-07

## 2022-06-07 VITALS
BODY MASS INDEX: 20.12 KG/M2 | RESPIRATION RATE: 19 BRPM | SYSTOLIC BLOOD PRESSURE: 149 MMHG | TEMPERATURE: 99 F | WEIGHT: 143.7 LBS | DIASTOLIC BLOOD PRESSURE: 55 MMHG | HEIGHT: 71 IN | OXYGEN SATURATION: 92 % | HEART RATE: 55 BPM

## 2022-06-07 LAB
ANION GAP SERPL CALC-SCNC: 7 MMOL/L (ref 7–16)
BUN SERPL-MCNC: 22 MG/DL (ref 8–23)
CALCIUM SERPL-MCNC: 8.8 MG/DL (ref 8.3–10.4)
CHLORIDE SERPL-SCNC: 105 MMOL/L (ref 98–107)
CO2 SERPL-SCNC: 28 MMOL/L (ref 21–32)
CREAT SERPL-MCNC: 1 MG/DL (ref 0.8–1.5)
ERYTHROCYTE [DISTWIDTH] IN BLOOD BY AUTOMATED COUNT: 13.6 % (ref 11.9–14.6)
GLUCOSE SERPL-MCNC: 100 MG/DL (ref 65–100)
HCT VFR BLD AUTO: 32.3 % (ref 41.1–50.3)
HGB BLD-MCNC: 10.5 G/DL (ref 13.6–17.2)
MCH RBC QN AUTO: 30.3 PG (ref 26.1–32.9)
MCHC RBC AUTO-ENTMCNC: 32.5 G/DL (ref 31.4–35)
MCV RBC AUTO: 93.4 FL (ref 79.6–97.8)
NRBC # BLD: 0 K/UL (ref 0–0.2)
PLATELET # BLD AUTO: 224 K/UL (ref 150–450)
PMV BLD AUTO: 10.2 FL (ref 9.4–12.3)
POTASSIUM SERPL-SCNC: 4.3 MMOL/L (ref 3.5–5.1)
RBC # BLD AUTO: 3.46 M/UL (ref 4.23–5.6)
SODIUM SERPL-SCNC: 140 MMOL/L (ref 138–145)
WBC # BLD AUTO: 7.8 K/UL (ref 4.3–11.1)

## 2022-06-07 PROCEDURE — 6370000000 HC RX 637 (ALT 250 FOR IP): Performed by: FAMILY MEDICINE

## 2022-06-07 PROCEDURE — 36415 COLL VENOUS BLD VENIPUNCTURE: CPT

## 2022-06-07 PROCEDURE — 99232 SBSQ HOSP IP/OBS MODERATE 35: CPT | Performed by: INTERNAL MEDICINE

## 2022-06-07 PROCEDURE — 2580000003 HC RX 258: Performed by: FAMILY MEDICINE

## 2022-06-07 PROCEDURE — 80048 BASIC METABOLIC PNL TOTAL CA: CPT

## 2022-06-07 PROCEDURE — 85027 COMPLETE CBC AUTOMATED: CPT

## 2022-06-07 PROCEDURE — 6360000002 HC RX W HCPCS: Performed by: FAMILY MEDICINE

## 2022-06-07 PROCEDURE — 94761 N-INVAS EAR/PLS OXIMETRY MLT: CPT

## 2022-06-07 RX ORDER — ALBUTEROL SULFATE 90 UG/1
2 AEROSOL, METERED RESPIRATORY (INHALATION) EVERY 4 HOURS PRN
Qty: 18 G | Refills: 1 | Status: SHIPPED | OUTPATIENT
Start: 2022-06-07

## 2022-06-07 RX ORDER — QUETIAPINE FUMARATE 25 MG/1
25 TABLET, FILM COATED ORAL DAILY
COMMUNITY
Start: 2022-04-12

## 2022-06-07 RX ADMIN — QUETIAPINE FUMARATE 25 MG: 25 TABLET ORAL at 09:59

## 2022-06-07 RX ADMIN — ATORVASTATIN CALCIUM 20 MG: 40 TABLET, FILM COATED ORAL at 09:59

## 2022-06-07 RX ADMIN — SERTRALINE 150 MG: 50 TABLET, FILM COATED ORAL at 09:59

## 2022-06-07 RX ADMIN — FAMOTIDINE 20 MG: 20 TABLET, FILM COATED ORAL at 09:59

## 2022-06-07 RX ADMIN — DEXAMETHASONE 6 MG: 4 TABLET ORAL at 09:58

## 2022-06-07 RX ADMIN — ENOXAPARIN SODIUM 30 MG: 100 INJECTION SUBCUTANEOUS at 09:58

## 2022-06-07 RX ADMIN — MEMANTINE 10 MG: 5 TABLET ORAL at 09:58

## 2022-06-07 RX ADMIN — SODIUM CHLORIDE, PRESERVATIVE FREE 10 ML: 5 INJECTION INTRAVENOUS at 09:59

## 2022-06-07 RX ADMIN — LAMOTRIGINE 200 MG: 100 TABLET ORAL at 09:58

## 2022-06-07 NOTE — PROGRESS NOTES
Oxygen Qualifier       Room air: SpO2 with O2 and liter flow   Resting SpO2  92%     Ambulating SpO2  90%        Completed by:Patient ambulated without difficulty and with no need of O2    Ted Nielsen RCP

## 2022-06-07 NOTE — CARE COORDINATION
06/07/22 [address] Resource Information Provided? No   Mode of Transport at Discharge Other (see comment)   Confirm Follow Up Transport Family   Condition of Participation: Discharge Planning   The Patient and/or Patient Representative was provided with a Choice of Provider? Patient Representative   The Patient and/Or Patient Representative agree with the Discharge Plan? Yes   Patient is discharging home with [facility]  RN/PT/OT. Patient and spouse are agreeable to this plan.

## 2022-06-07 NOTE — PROGRESS NOTES
[facility] CARDIOLOGY PROGRESS NOTE           6/7/2022 12:59 PM    Admit Date: 6/5/2022      Subjective:   Patient has mild underlying dementia. He appears markedly better. He is off of oxygen. He has had persistent moderate sinus bradycardia which is asymptomatic. ROS:  Cardiovascular:  As noted above    Objective:      Vitals:    06/07/22 0257 06/07/22 0408 06/07/22 0748 06/07/22 1210   BP: (!) 167/71  (!) 174/74 (!) 149/55   Pulse: (!) 46  (!) 43 (!) 48   Resp: 12 18 18   Temp: 97.7 °F (36.5 °C)  97.5 °F (36.4 °C) 99 °F (37.2 °C)   TempSrc:   Oral Oral   SpO2: 93%  97% 94%   Weight:  143 lb 11.2 oz (65.2 kg)     Height:           Physical Exam:  General-No Acute Distress  Neck- supple, no JVD  CV- regular rate and rhythm no MRG  Lung- clear bilaterally  Abd- soft, nontender, nondistended  Ext- no edema bilaterally. Skin- warm and dry    Data Review:   Recent Labs     06/05/22  1612 06/05/22  1612 06/06/22  0338 06/06/22  0903 06/07/22  0412      < > 141  --  140   K 4.1   < > 4.5  --  4.3   MG 2.0  --   --  2.1  --    BUN 34*   < > 26*  --  22   WBC 6.5   < > 4.3  --  7.8   HGB 10.7*   < > 10.4*  --  10.5*   HCT 34.2*   < > 33.6*  --  32.3*      < > 189  --  224    < > = values in this interval not displayed. Assessment/Plan:     Principal Problem:    Acute hypoxemic respiratory failure due to COVID-19 [facility])  Plan: Patient currently on room air. Treatment per primary team.  Active Problems:    Gastroenteritis due to COVID-19 virus  Plan: Improved. EUGENE on CPAP  Plan: Continue CPAP as outpatient    S/P CABG x 4  Plan: No active angina. Centrilobular emphysema (HCC)  Plan: Managed per primary team    GERD (gastroesophageal reflux disease)  Plan: Chronic and stable    COPD, severe ([address]  Plan: Managed per primary team    CAD (coronary artery disease)  Plan: Patient denies angina.   Given patient's propensity for hypotension are avoiding medications such as ACE/ARB's. Given bradycardia all beta-blockade's have been held. Dyslipidemia  Plan: Continue atorvastatin 20 mg daily    Chronic anemia  Plan: Managed per primary team    Orthostatic hypotension  Plan: Continue midodrine 5 mg 3 times daily.     Late onset Alzheimer's dementia with behavioral disturbance ([facility] [facility]  Plan: Progressive    Chronic renal disease, stage III ([facility] [address] [facility]  Plan: Chronic and stable        JOHN KOROMA MD  6/7/2022 12:59 PM

## 2022-06-07 NOTE — PROGRESS NOTES
HR 43 /74. patient has midodrine 5 mg PO due this AM.   Dr. Schilling Fruits notified of the above. MD states okay to hold midodrine at this time.

## 2022-06-07 NOTE — PROGRESS NOTES
HR 48 /55. Midodrine 5 mg due at this time. Dr. Singh Scale notified of the above. MD states okay to hold midodrine at this time.

## 2022-06-07 NOTE — DISCHARGE SUMMARY
Hospitalist Discharge Summary   Admit Date:  2022  4:24 PM   DC Note date: 2022  Name:  Clark Leija   Age:  80 y.o. Sex:  male  :  1934   MRN:  [id number]   Room:  [facility]  PCP:  Indiana Pike MD    Presenting Complaint: Positive For Covid-19     Initial Admission Diagnosis: Diarrhea, unspecified type [R19.7]  Acute hypoxemic respiratory failure due to COVID-19 ([address] [U07.1, J96.01]  COVID-19 [U07.1]  Acute respiratory failure due to COVID-19 ([address] [U07.1, J96.00]     Problem List for this Hospitalization (present on admission):    Principal Problem:    Acute hypoxemic respiratory failure due to COVID-19 [facility])  Active Problems:    Gastroenteritis due to COVID-19 virus    EUGENE on CPAP    S/P CABG x 4    Centrilobular emphysema (HCC)    GERD (gastroesophageal reflux disease)    COPD, severe (HCC)    CAD (coronary artery disease)    Dyslipidemia    Chronic anemia    Orthostatic hypotension    Late onset Alzheimer's dementia with behavioral disturbance ([address]    Chronic renal disease, stage III ([address]  Resolved Problems:    * No resolved hospital problems. *    Did Patient have Sepsis (YES OR NO): No    Hospital Course:  80 y. o. male with medical history of CAD s/p CABG, Emphysema, GERD, HLD, EUGENE  who presented with malaise, myalgias, fevers, diarrhea, productive cough worsening x 3 days associated with dizziness with position change. He was found to have covid in ED. He desatted to 86% on RA with just a short trial of ambulation at the bedside. In the ED, T 100.8 /51 CXR no acute findings. In the ED, he was given 1 L NS and baricitib 2 mg PO. At baseline, patient has severe dementia and follows geriatric medicine. Wife reported he normally alert and oriented to person and date of birth, follows commands well and attempts to carry on conversation but overall demented.     Patient was admitted for acute hypoxemic respiratory failure due to COVID-19 as well as viral gastroenteritis due to COVID-19 infection. He remained stable on 2 O2 NC and was able to wean down to room air. His GI symptoms resolved on IVF and supportive care. He only required a brief course of Decadron. He was noted to have sinus bradycardia during hospital course. Apparently this has been chronic since April and has seen [facility] cardiology in the past a month ago. He has recently worn a heart monitor but cardiology did not have the results of those immediately available. TSH/free T4 within normal limits. Suspected worsening bradycardia in setting of COVID-19 infection. Patient remains asymptomatic despite bradycardia. Cardiology was consulted. Recommended no immediate need for implantation of a pacemaker and given his current fever and infection, cardiology feel it would be best to wait and see how he does after his infection resolves. Recommended updated echocardiogram as an outpatient. Discussed with cardiology Dr. Latisha Moore and patient was okay to be discharged from cardiology standpoint since he is asymptomatic. Follow-up with [facility]iology in 2 weeks. Pt has been afebrile >48h. Discussed with patient extensively about return precautions and staying active at home to decrease risk of blood clots; signs and symptoms for this reviewed. Pt will need to practice isolated contact precautions for 10 days until 6/15. Discharge medications per below. Pt will need to follow up with PCP in 3-5 days for hospital follow up and repeat labs. He will need to follow-up with cardiology in 1-2 weeks. All concerns and questions answered. Plan discussed with patient's wife as well. Disposition: Home with home health  Diet: ADULT DIET; Regular; GI [place] (GERD/Peptic Ulcer)  Code Status: Full Code    Follow Ups:  PCP in 1 week, repeat CBC and BMP  Cardiology in 2 weeks      Time spent in patient discharge and coordination 45 minutes. Plan was discussed with patient and wife. All questions answered.   Patient was stable at time of discharge. Instructions given to call a physician or return if any concerns. Current Discharge Medication List      START taking these medications    Details   albuterol sulfate HFA (PROVENTIL;VENTOLIN;PROAIR) 108 (90 Base) MCG/ACT inhaler Inhale 2 puffs into the lungs every 4 hours as needed for Wheezing  Qty: 18 g, Refills: 1         CONTINUE these medications which have NOT CHANGED    Details   QUEtiapine (SEROQUEL) 25 MG tablet Take 25 mg by mouth 2 times daily      Cholecalciferol (VITAMIN D) 50 MCG (2000 UT) CAPS capsule Take 1 capsule by mouth Pt's wife unsure of strength. aspirin 81 MG EC tablet Take by mouth every other day      atorvastatin (LIPITOR) 20 MG tablet Take 20 mg by mouth daily      famotidine (PEPCID) 20 MG tablet Take 20 mg by mouth daily      Ferrous Sulfate 324 MG TBEC Take 324 mg by mouth every morning (before breakfast)      lamoTRIgine (LAMICTAL) 200 MG tablet Take 200 mg by mouth daily      memantine (NAMENDA) 10 MG tablet Take 10 mg by mouth 2 times daily      midodrine (PROAMATINE) 5 MG tablet Take 5 mg by mouth 2 times daily (with meals)      sertraline (ZOLOFT) 100 MG tablet 1.5 tab po daily      vitamin E 400 UNIT capsule Take 200 Units by mouth 2 times daily             Procedures done this admission:  * No surgery found *    Consults this admission:  IP CONSULT TO CARDIOLOGY  FOLLOW UP VISIT  FOLLOW UP VISIT    Echocardiogram results:  No results found for this or any previous visit. Diagnostic Imaging/Tests:   XR CHEST (2 VW)    Result Date: 6/5/2022  Chest X-ray INDICATION: Shortness of breath, COVID-19 PA and lateral views of the chest were obtained. FINDINGS: Lungs are slightly hyperexpanded. There are no focal infiltrates or effusions. The heart size is normal.  There are sternotomy changes. Stable chronic lung disease.   No acute findings in the chest         Labs: Results:       BMP, Mg, Phos Recent Labs     06/05/22  1612 06/06/22  0338 06/06/22  0903 06/07/22  0412    141  --  140   K 4.1 4.5  --  4.3    107  --  105   CO2 29 30  --  28   BUN 34* 26*  --  22   MG 2.0  --  2.1  --       CBC Recent Labs     06/05/22  1612 06/06/22  0338 06/07/22 0412   WBC 6.5 4.3 7.8   RBC 3.57* 3.49* 3.46*   HGB 10.7* 10.4* 10.5*   HCT 34.2* 33.6* 32.3*    189 224      LFT Recent Labs     06/05/22 1612 06/06/22 0338   ALT 25 21   GLOB 3.7* 3.0      Cardiac Testing No results found for: BNP, CPK, RCK1, CKMB   Coagulation Tests No results found for: INR, APTT   A1c No results found for: HBA1C   Lipid Panel Lab Results   Component Value Date    CHOL 163 12/02/2020    HDL 62 12/02/2020    VLDL 12 12/02/2020      Thyroid Panel Lab Results   Component Value Date    TSH 1.840 11/17/2021    TSH 1.580 05/28/2020        Most Recent UA No results found for: MUCUS, UCOM       All Labs from Last 24 Hrs:  Recent Results (from the past 24 hour(s))   CBC    Collection Time: 06/07/22  4:12 AM   Result Value Ref Range    WBC 7.8 4.3 - 11.1 K/uL    RBC 3.46 (L) 4.23 - 5.6 M/uL    Hemoglobin 10.5 (L) 13.6 - 17.2 g/dL    Hematocrit 32.3 (L) 41.1 - 50.3 %    MCV 93.4 79.6 - 97.8 FL    MCH 30.3 26.1 - 32.9 PG    MCHC 32.5 31.4 - 35.0 g/dL    RDW 13.6 11.9 - 14.6 %    Platelets 276 437 - 889 K/uL    MPV 10.2 9.4 - 12.3 FL    nRBC 0.00 0.0 - 0.2 K/uL   Basic Metabolic Panel    Collection Time: 06/07/22  4:12 AM   Result Value Ref Range    Sodium 140 138 - 145 mmol/L    Potassium 4.3 3.5 - 5.1 mmol/L    Chloride 105 98 - 107 mmol/L    CO2 28 21 - 32 mmol/L    Anion Gap 7 7 - 16 mmol/L    Glucose 100 65 - 100 mg/dL    BUN 22 8 - 23 MG/DL    CREATININE 1.00 0.8 - 1.5 MG/DL    GFR African American >60 >60 ml/min/1.73m2    GFR Non- >60 >60 ml/min/1.73m2    Calcium 8.8 8.3 - 10.4 MG/DL       No Known Allergies  Immunization History   Administered Date(s) Administered    COVID-19, Pfizer Purple top, DILUTE for use, 12+ yrs, 30mcg/0.3mL dose 01/27/2021, 02/13/2021, 10/13/2021    Influenza Virus Vaccine 09/19/2020    Influenza, High Dose (Fluzone 65 yrs and older) 09/29/2021    Influenza, Paolo Griffin, adjuvanted, 65 yrs +, IM, PF (Fluad) 09/19/2020    PPD Test 11/17/2017       Recent Vital Data:  Patient Vitals for the past 24 hrs:   Temp Pulse Resp BP SpO2   06/07/22 0748 97.5 °F (36.4 °C) (!) 43 18 (!) 174/74 97 %   06/07/22 0257 97.7 °F (36.5 °C) (!) 46 12 (!) 167/71 93 %   06/06/22 2350 97.7 °F (36.5 °C) (!) 42 14 (!) 181/64 94 %   06/06/22 2013 97.9 °F (36.6 °C) (!) 48 14 137/63 93 %   06/06/22 1935 -- 50 -- -- --   06/06/22 1545 98.6 °F (37 °C) 50 18 119/70 95 %       Oxygen Therapy  SpO2: 97 %  O2 Device: None (Room air)  Skin Assessment: Clean, dry, & intact  Skin Protection for O2 Device: No  O2 Flow Rate (L/min): 2 L/min    Estimated body mass index is 20.04 kg/m² as calculated from the following:    Height as of this encounter: 5' 11\" (1.803 m). Weight as of this encounter: 143 lb 11.2 oz (65.2 kg). Intake/Output Summary (Last 24 hours) at 6/7/2022 1150  Last data filed at 6/7/2022 0910  Gross per 24 hour   Intake 480 ml   Output 490 ml   Net -10 ml         Physical Exam:  General:    Well nourished. No overt distress  Head:  Normocephalic, atraumatic  Eyes:  Sclerae appear normal.  Pupils equally round. HENT:  Nares appear normal, no drainage. Moist mucous membranes  Neck:  No restricted ROM. Trachea midline  CV:   RRR. No m/r/g. No JVD  Lungs:   CTAB. No wheezing, rhonchi, or rales. Even, unlabored  Abdomen:   Soft, nontender, nondistended. Extremities: Warm and dry. No cyanosis or clubbing. No edema. Skin:     No rashes. Normal coloration  Neuro:  CN II-XII grossly intact. At baseline only alert and oriented to person and date of birth but not time place or president. Follows commands well. Attempt to maintain social fabiana and carry conversation. Confused conversation at times.   Psych:  Pleasantly demented    Signed: Ramesh Herzog DO    Part of this note may have been written by using a voice dictation software. The note has been proof read but may still contain some grammatical/other typographical errors.

## 2022-06-07 NOTE — PLAN OF CARE
Problem: Discharge Planning  Goal: Discharge to home or other facility with appropriate resources  Outcome: Completed     Problem: Safety - Adult  Goal: Free from fall injury  Description: INTERVENTIONS:  Outcome: Completed     Problem: Confusion  Goal: Confusion, delirium, dementia, or psychosis is improved or at baseline  Description: INTERVENTIONS:  1. Assess for possible contributors to thought disturbance, including medications, impaired vision or hearing, underlying metabolic abnormalities, dehydration, psychiatric diagnoses, and notify attending LIP  2. [place] high risk fall precautions, as indicated  3. Provide frequent short contacts to provide reality reorientation, refocusing and direction  4. Decrease environmental stimuli, including noise as appropriate  5. Monitor and intervene to maintain adequate nutrition, hydration, elimination, sleep and activity  6. If unable to ensure safety without constant attention obtain sitter and review sitter guidelines with assigned personnel  7. Initiate Psychosocial CNS and Spiritual Care consult, as indicated  Outcome: Completed     Problem: Skin/Tissue Integrity  Goal: Absence of new skin breakdown  Description: 1. Monitor for areas of redness and/or skin breakdown  2. Assess vascular access sites hourly  3. Every 4-6 hours minimum:  Change oxygen saturation probe site  4. Every 4-6 hours:  If on nasal continuous positive airway pressure, respiratory therapy assess nares and determine need for appliance change or resting period.   Outcome: Completed

## 2022-06-07 NOTE — PROGRESS NOTES
Patient AxO x2 in room. On room air. All lines removed. No signs of distress. Patient's wife present at bedside. Patient's wife AxO x4. Discharge instructions provided to patient and patient's wife. Opportunity for questions provided. All questions answered. Patient discharged via wheelchair by assistantMary Ann. Discharged with all patient belongings.

## 2022-06-07 NOTE — PROGRESS NOTES
Overnight patient has been awake and up, trying to get out of room. He has pulled his cardiac monitor off many times, and all leads have been replaced. Just now he said he \"had to get ready for the day, go take a shower, etc.\"  Has to be redirected many times, and will be compliant for a few minutes, then he is back on a tangent going somewhere else. He is pleasantly confused. He will continue to be monitored and assisted as needed and will prepare to give report to oncoming nurse. Bed alarm is on when he is in bed, and this nurse is in eye sight of him at all times currently.

## 2022-06-08 ENCOUNTER — CARE COORDINATION (OUTPATIENT)
Dept: CARE COORDINATION | Facility: CLINIC | Age: 87
End: 2022-06-08

## 2022-06-08 NOTE — CARE COORDINATION
[facility] Transitions Initial Follow Up Call    Call within 2 business days of discharge: Yes    Patient: Destiny Williamson Patient : 1934   MRN: [id number]  Reason for Admission: Acute respiratory failure due to COVID-19  Discharge Date: 22 RARS: Readmission Risk Score: 15.2 ( )  Spoke with spouse    Last Discharge [facility]       Complaint Diagnosis Description Type Department Provider    22 Positive For Covid-19 COVID-19 . .. ED to Hosp-Admission (Discharged) (ADMITTED) [id number] Maryse Wagner DO; James Gibbons MD;... Transitions of Care Initial Call    Was this an external facility discharge? No Discharge Facility: [facility]    Challenges to be reviewed by the provider   Additional needs identified to be addressed with provider: No  none             Method of communication with provider : none    Advance Care Planning:   Does patient have an Advance Directive: reviewed and current. Care Transition Nurse contacted the family by telephone to perform post hospital discharge assessment. Verified name and  with family as identifiers. Provided introduction to self, and explanation of the CTN role. CTN reviewed discharge instructions, medical action plan and red flags with family who verbalized understanding. Family given an opportunity to ask questions and does not have any further questions or concerns at this time. Were discharge instructions available to patient? Yes. Reviewed appropriate site of care based on symptoms and resources available to patient including: PCP  Specialist  Urgent care clinics  [facility] . The family agrees to contact the PCP office for questions related to their healthcare. Medication reconciliation was performed with family, who verbalizes understanding of administration of home medications. Advised obtaining a 90-day supply of all daily and as-needed medications. Was patient discharged with a pulse oximeter?  Yes (patient has his own)discussed and confirmed pulse oximeter discharge instructions and when to notify provider or seek emergency care. CTN provided contact information. Plan for follow-up call in 5-7 days based on severity of symptoms and risk factors. Plan for next call: symptom management  Non-face-to-face services provided:  Obtained and reviewed discharge summary and/or continuity of care documents  Confirmed medications obtained with patient's spouse  Confirmed PCP follow up appointment 6/13/2022  Monitor O2 for PCP review  Adequate nutrition and fluid intake  Goals Addressed                 This Visit's Progress     Returns to baseline activity level.                  Follow Up  Future Appointments   Date Time Provider Kassandra Sawyer   6/13/2022 10:30 AM MD [facility]NA[facility]INE [facility]   6/14/2022 10:15 AM Rosangela Winter MD [facility]   8/10/2022  3:30 PM Marialuisa Valdivia MD [facility]       Connie Siddiqui RN

## 2022-06-09 ENCOUNTER — HOSPITAL ENCOUNTER (EMERGENCY)
Dept: GENERAL RADIOLOGY | Age: 87
Discharge: HOME | End: 2022-06-12
Payer: MEDICARE

## 2022-06-09 ENCOUNTER — TELEPHONE (OUTPATIENT)
Dept: INTERNAL MEDICINE CLINIC | Facility: CLINIC | Age: 87
End: 2022-06-09

## 2022-06-09 ENCOUNTER — HOSPITAL ENCOUNTER (EMERGENCY)
Age: 87
Discharge: HOME | End: 2022-06-09
Attending: EMERGENCY MEDICINE | Admitting: EMERGENCY MEDICINE
Payer: MEDICARE

## 2022-06-09 VITALS
OXYGEN SATURATION: 96 % | RESPIRATION RATE: 18 BRPM | HEIGHT: 71 IN | TEMPERATURE: 98 F | DIASTOLIC BLOOD PRESSURE: 61 MMHG | BODY MASS INDEX: 20.02 KG/M2 | SYSTOLIC BLOOD PRESSURE: 179 MMHG | WEIGHT: 143 LBS | HEART RATE: 45 BPM

## 2022-06-09 DIAGNOSIS — R53.1 GENERALIZED WEAKNESS: Primary | ICD-10-CM

## 2022-06-09 DIAGNOSIS — R00.1 BRADYCARDIA: ICD-10-CM

## 2022-06-09 LAB
ALBUMIN SERPL-MCNC: 3.1 G/DL (ref 3.2–4.6)
ALBUMIN/GLOB SERPL: 0.8 {RATIO} (ref 1.2–3.5)
ALP SERPL-CCNC: 64 U/L (ref 50–136)
ALT SERPL-CCNC: 46 U/L (ref 12–65)
ANION GAP SERPL CALC-SCNC: 5 MMOL/L (ref 7–16)
AST SERPL-CCNC: 61 U/L (ref 15–37)
BASOPHILS # BLD: 0 K/UL (ref 0–0.2)
BASOPHILS NFR BLD: 0 % (ref 0–2)
BILIRUB SERPL-MCNC: 0.4 MG/DL (ref 0.2–1.1)
BUN SERPL-MCNC: 28 MG/DL (ref 8–23)
CALCIUM SERPL-MCNC: 8.8 MG/DL (ref 8.3–10.4)
CHLORIDE SERPL-SCNC: 104 MMOL/L (ref 98–107)
CO2 SERPL-SCNC: 29 MMOL/L (ref 21–32)
CREAT SERPL-MCNC: 1.1 MG/DL (ref 0.8–1.5)
DIFFERENTIAL METHOD BLD: ABNORMAL
EOSINOPHIL # BLD: 0.2 K/UL (ref 0–0.8)
EOSINOPHIL NFR BLD: 2 % (ref 0.5–7.8)
ERYTHROCYTE [DISTWIDTH] IN BLOOD BY AUTOMATED COUNT: 13.7 % (ref 11.9–14.6)
GLOBULIN SER CALC-MCNC: 3.8 G/DL (ref 2.3–3.5)
GLUCOSE SERPL-MCNC: 95 MG/DL (ref 65–100)
HCT VFR BLD AUTO: 33.8 % (ref 41.1–50.3)
HGB BLD-MCNC: 10.8 G/DL (ref 13.6–17.2)
IMM GRANULOCYTES # BLD AUTO: 0 K/UL (ref 0–0.5)
IMM GRANULOCYTES NFR BLD AUTO: 0 % (ref 0–5)
LYMPHOCYTES # BLD: 1.9 K/UL (ref 0.5–4.6)
LYMPHOCYTES NFR BLD: 24 % (ref 13–44)
MCH RBC QN AUTO: 30.3 PG (ref 26.1–32.9)
MCHC RBC AUTO-ENTMCNC: 32 G/DL (ref 31.4–35)
MCV RBC AUTO: 94.9 FL (ref 79.6–97.8)
MONOCYTES # BLD: 0.9 K/UL (ref 0.1–1.3)
MONOCYTES NFR BLD: 11 % (ref 4–12)
NEUTS SEG # BLD: 5 K/UL (ref 1.7–8.2)
NEUTS SEG NFR BLD: 63 % (ref 43–78)
NRBC # BLD: 0 K/UL (ref 0–0.2)
PLATELET # BLD AUTO: 192 K/UL (ref 150–450)
PMV BLD AUTO: 9.8 FL (ref 9.4–12.3)
POTASSIUM SERPL-SCNC: 4 MMOL/L (ref 3.5–5.1)
PROT SERPL-MCNC: 6.9 G/DL (ref 6.3–8.2)
RBC # BLD AUTO: 3.56 M/UL (ref 4.23–5.6)
SODIUM SERPL-SCNC: 138 MMOL/L (ref 138–145)
WBC # BLD AUTO: 7.9 K/UL (ref 4.3–11.1)

## 2022-06-09 PROCEDURE — 80053 COMPREHEN METABOLIC PANEL: CPT

## 2022-06-09 PROCEDURE — 71045 X-RAY EXAM CHEST 1 VIEW: CPT

## 2022-06-09 PROCEDURE — 93005 ELECTROCARDIOGRAM TRACING: CPT | Performed by: EMERGENCY MEDICINE

## 2022-06-09 PROCEDURE — 85025 COMPLETE CBC W/AUTO DIFF WBC: CPT

## 2022-06-09 PROCEDURE — 99285 EMERGENCY DEPT VISIT HI MDM: CPT

## 2022-06-09 PROCEDURE — 2580000003 HC RX 258: Performed by: EMERGENCY MEDICINE

## 2022-06-09 RX ORDER — 0.9 % SODIUM CHLORIDE 0.9 %
500 INTRAVENOUS SOLUTION INTRAVENOUS
Status: COMPLETED | OUTPATIENT
Start: 2022-06-09 | End: 2022-06-09

## 2022-06-09 RX ADMIN — SODIUM CHLORIDE 500 ML: 9 INJECTION, SOLUTION INTRAVENOUS at 17:22

## 2022-06-09 NOTE — ED PROVIDER NOTES
[facility] Emergency Department Provider Note                   PCP:                Candelario Webster MD               Age: 80 y.o. Sex: male     No diagnosis found. DISPOSITION         New Prescriptions    No medications on file       Orders Placed This Encounter   Procedures    XR CHEST PORTABLE    CBC with Auto Differential    Comprehensive Metabolic Panel    Cardiac Monitor - ED Only    Continuous Pulse Oximetry    Orthostatic blood pressure and pulse        Yvonne Perry MD, MD 3:17 PM      MDM  Number of Diagnoses or Management Options  Generalized weakness  Diagnosis management comments: Patient is quite frail he is very much wishing to return for trial at home he does have supportive wife. Have encouraged him to use something other than a cane which he may not have actually even using consistently. He does have a walker available they state. Is walked with nurse accompanying in our department and sats remained in the 92% and above range. States he feels some better in our department. Is known to have bradycardia but plan is to wait for patient to have natural course of present COVID illness to have been completed before any considerations of intervention.        Amount and/or Complexity of Data Reviewed  Clinical lab tests: ordered and reviewed  Tests in the radiology section of CPT®: ordered and reviewed  Obtain history from someone other than the patient: yes  Review and summarize past medical records: yes  Discuss the patient with other providers: yes  Independent visualization of images, tracings, or specimens: yes    Risk of Complications, Morbidity, and/or Mortality  Presenting problems: high  Diagnostic procedures: high  Management options: high    Patient Progress  Patient progress: stable       Kelvin Galvan is a 80 y.o. male who presents to the Emergency Department with chief complaint of    Chief Complaint   Patient presents with    Fatigue      Patient has just had a hospitalization for COVID. He went home and returns after having had several falls when he was up and walking. Wife states that he is more unstable with his ambulation than he was before his recent illness. He slept throughout much of the day yesterday and did have a fall. He unable to walk normal distances recently as well. They state that he does have oxygen available at home. Was in the hospital Sunday to Tuesday. The history is provided by the patient and the spouse. Fatigue  Severity:  Moderate  Progression:  Unchanged  Worsened by: Activity  Associated symptoms: no chest pain        All other systems reviewed and are negative. Review of Systems   Constitutional: Positive for fatigue. Respiratory: Negative for wheezing and stridor. Cardiovascular: Negative for chest pain and leg swelling. Gastrointestinal: Negative. Genitourinary: Negative. Neurological: Negative. Psychiatric/Behavioral: Negative for confusion and decreased concentration. All other systems reviewed and are negative.       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 Bobbette Meigs Right     fx R arm x 2- -required hardware and later removed  UROLOGICAL SURGERY      penile implant        Family History   Problem Relation Age of Onset    Heart Disease Father     Stroke Brother            Social Connections:     Frequency of Communication with Friends and Family: Not on file    Frequency of Social Gatherings with Friends and Family: Not on file    Attends [employer] Services: Not on file    Active Member of Clubs or Organizations: Not on file    Attends Club or Organization Meetings: Not on file    Marital Status: Not on file        No Known Allergies     Vitals signs and nursing note reviewed. Patient Vitals for the past 4 hrs:   Temp Pulse Resp BP SpO2   06/09/22 1300 98 °F (36.7 °C) 55 16 110/62 96 %          Physical Exam  Vitals and nursing note reviewed. Constitutional:       Comments: Frail and elderly but extremely alert and interactive when told will probably be able to let him go home he immediately sits up with a giant smile reaches out grabs my hand and shakes it vigorously  Have reminded him that even though this is the plan he should return with any worsening   HENT:      Head: Atraumatic. Right Ear: External ear normal.      Left Ear: External ear normal.      Nose: Nose normal.   Eyes:      General: No scleral icterus. Neck:      Vascular: No carotid bruit. Cardiovascular:      Rate and Rhythm: Bradycardia present. Comments: Heart rate tends to be in the mid 40 range review of hospital records and records from Dr. Austin Balderrama office show that he has been having this heart rate for some time  Pulmonary:      Effort: Pulmonary effort is normal.      Breath sounds: No wheezing, rhonchi or rales. Chest:      Chest wall: No tenderness. Abdominal:      Palpations: Abdomen is soft. Tenderness: There is no abdominal tenderness. There is no right CVA tenderness, left CVA tenderness, guarding or rebound. Musculoskeletal:         General: No tenderness, deformity or signs of injury.       Cervical back: No tenderness. Skin:     General: Skin is warm and dry. Findings: No erythema or rash. Neurological:      Mental Status: He is alert. Mental status is at baseline. Psychiatric:         Mood and Affect: Mood normal.         Behavior: Behavior normal.          Procedures    ED EKG Interpretation  EKG was interpreted in the absence of a cardiologist.    Rate: Rate: Bradycardia  EKG Interpretation: EKG Interpretation: sinus rhythm  ST Segments: Nonspecific ST segments - NO STEMI    Labs Reviewed   CBC WITH AUTO DIFFERENTIAL - Abnormal; Notable for the following components:       Result Value    RBC 3.56 (*)     Hemoglobin 10.8 (*)     Hematocrit 33.8 (*)     All other components within normal limits   COMPREHENSIVE METABOLIC PANEL - Abnormal; Notable for the following components:    Anion Gap 5 (*)     BUN 28 (*)     AST 61 (*)     Albumin 3.1 (*)     Globulin 3.8 (*)     Albumin/Globulin Ratio 0.8 (*)     All other components within normal limits        XR CHEST PORTABLE   Final Result   New retrocardiac infiltrate likely pneumonia. Voice dictation software was used during the making of this note. This software is not perfect and grammatical and other typographical errors may be present. This note has not been completely proofread for errors.         Radha Castillo MD  06/10/22 3423

## 2022-06-09 NOTE — ED TRIAGE NOTES
Pt to triage with mask in place. Pt reports weakness and frequent falls since being d/c on Tuesday. Pt was dx with COVID on Tuesday.

## 2022-06-09 NOTE — ED NOTES
I have reviewed discharge instructions with the patient. The patient verbalized understanding. Patient left ED via Discharge Method: ambulatory to Home with (Spouse). Opportunity for questions and clarification provided. Patient given 0 scripts. To continue your aftercare when you leave the hospital, you may receive an automated call from our care team to check in on how you are doing. This is a free service and part of our promise to provide the best care and service to meet your aftercare needs.  If you have questions, or wish to unsubscribe from this service please call [phone]. Thank you for Choosing our [facility] Emergency Department.         Jose Hays RN  06/09/22 5601

## 2022-06-09 NOTE — TELEPHONE ENCOUNTER
I spoke with the pts wife, Nuria Zhao (on [facility]), to take the pt back to the ER if he doing this poorly.   She verbalized understanding and was agreeable

## 2022-06-09 NOTE — ED NOTES
Patient ambulated around the ED. Maintained an SPO2 of 96%. Once back in room spo2 went down to 90% and then back up 96% after a minute.      Jose Hays RN  06/09/22 1044

## 2022-06-10 ENCOUNTER — CARE COORDINATION (OUTPATIENT)
Dept: CARE COORDINATION | Facility: CLINIC | Age: 87
End: 2022-06-10

## 2022-06-10 LAB
EKG ATRIAL RATE: 44 BPM
EKG DIAGNOSIS: NORMAL
EKG P AXIS: 81 DEGREES
EKG P-R INTERVAL: 169 MS
EKG Q-T INTERVAL: 472 MS
EKG QRS DURATION: 113 MS
EKG QTC CALCULATION (BAZETT): 409 MS
EKG R AXIS: 69 DEGREES
EKG T AXIS: 80 DEGREES
EKG VENTRICULAR RATE: 45 BPM

## 2022-06-10 ASSESSMENT — ENCOUNTER SYMPTOMS
WHEEZING: 0
STRIDOR: 0
GASTROINTESTINAL NEGATIVE: 1

## 2022-06-10 NOTE — CARE COORDINATION
Date/Time:  6/10/2022 11:52 AM  Attempted to reach patient by telephone. Call within 2 business days of discharge: Yes Left HIPPA compliant message requesting a return call. Patient is currently engaged with CTN for Transitions of Care. Outreach attempted today after return visit to ED on 6/9/22. Notified CTN who will f/u with patient next week.

## 2022-06-13 ENCOUNTER — OFFICE VISIT (OUTPATIENT)
Dept: INTERNAL MEDICINE CLINIC | Facility: CLINIC | Age: 87
End: 2022-06-13
Payer: MEDICARE

## 2022-06-13 ENCOUNTER — HOME HEALTH ADMISSION (OUTPATIENT)
Dept: HOME HEALTH SERVICES | Facility: HOME HEALTH | Age: 87
End: 2022-06-13
Payer: MEDICARE

## 2022-06-13 VITALS
RESPIRATION RATE: 15 BRPM | OXYGEN SATURATION: 91 % | DIASTOLIC BLOOD PRESSURE: 46 MMHG | HEIGHT: 68 IN | WEIGHT: 149 LBS | HEART RATE: 45 BPM | SYSTOLIC BLOOD PRESSURE: 117 MMHG | TEMPERATURE: 98.1 F | BODY MASS INDEX: 22.58 KG/M2

## 2022-06-13 DIAGNOSIS — G93.32 COVID-19 LONG HAULER MANIFESTING CHRONIC FATIGUE: ICD-10-CM

## 2022-06-13 DIAGNOSIS — R29.6 FALLS FREQUENTLY: ICD-10-CM

## 2022-06-13 DIAGNOSIS — U09.9 COVID-19 LONG HAULER MANIFESTING CHRONIC FATIGUE: ICD-10-CM

## 2022-06-13 DIAGNOSIS — G30.1 LATE ONSET ALZHEIMER'S DEMENTIA WITH BEHAVIORAL DISTURBANCE (HCC): Primary | ICD-10-CM

## 2022-06-13 DIAGNOSIS — F02.818 LATE ONSET ALZHEIMER'S DEMENTIA WITH BEHAVIORAL DISTURBANCE (HCC): Primary | ICD-10-CM

## 2022-06-13 PROCEDURE — 99495 TRANSJ CARE MGMT MOD F2F 14D: CPT | Performed by: INTERNAL MEDICINE

## 2022-06-13 ASSESSMENT — PATIENT HEALTH QUESTIONNAIRE - PHQ9
SUM OF ALL RESPONSES TO PHQ9 QUESTIONS 1 & 2: 0
1. LITTLE INTEREST OR PLEASURE IN DOING THINGS: 0
SUM OF ALL RESPONSES TO PHQ QUESTIONS 1-9: 0
2. FEELING DOWN, DEPRESSED OR HOPELESS: 0
SUM OF ALL RESPONSES TO PHQ QUESTIONS 1-9: 0

## 2022-06-13 NOTE — PROGRESS NOTES
Indira Yoon M.D. Internal Medicine  [facility], [address] [place] [address]  Office : [phone]  Fax : [phone]    Chief Complaint   Patient presents with   [address] [place] [facility]     6-5-22 thru 6-7-22  unsteady, no strength       History of Present Illness:  Rosi Montano is a 80 y.o. male. HPI    S/p Covid  Patient was hospitalized for 48 hours and went home but has been weak and falling. He is somnolent today but does respond. Spouse wanted patient in SNF for rehab but he was not offered this. She requests HHN and PT to help hi get stronger.   Of note he has bradycardia but cardiology has consulted and did not want to place a pacemaker at this time    Past Medical History:  Past Medical History:   Diagnosis Date    Asymptomatic gallstones     CAD (coronary artery disease) 11/17/2017    Centrilobular emphysema (HCC)     Chronic anemia     Colon polyps     Dyslipidemia     GERD (gastroesophageal reflux disease)     History of penile implant     Iron deficiency anemia     Late onset Alzheimer's disease without behavioral disturbance (HCC)     Mood disorder (HCC)     Orthostatic hypotension     EUGENE on CPAP     S/P CABG x 4 11/2017    Sigmoid diverticulosis     TIA (transient ischemic attack)      Past Surgical History:  Past Surgical History:   Procedure Laterality Date    APPENDECTOMY  1938    CARDIAC CATHETERIZATION  11/15/2017    mulivessel disease    CATARACT REMOVAL Bilateral 2007    CORONARY ARTERY BYPASS GRAFT  11/17/2017    CABG x4, Dr. Deborah Merritt Right     fx R arm x 2- -required hardware and later removed    UROLOGICAL SURGERY      penile implant     Allergies:   No Known Allergies  Medications:   Current Outpatient Medications   Medication Sig Dispense Refill    albuterol sulfate HFA (PROVENTIL;VENTOLIN;PROAIR) 108 (90 Base) MCG/ACT inhaler Inhale 2 puffs into the lungs every 4 hours as needed for Wheezing 18 g 1    QUEtiapine (SEROQUEL) 25 MG tablet Take 25 mg by mouth 2 times daily      Cholecalciferol (VITAMIN D) 50 MCG ( UT) CAPS capsule Take 1 capsule by mouth Pt's wife unsure of strength.  aspirin 81 MG EC tablet Take by mouth every other day      atorvastatin (LIPITOR) 20 MG tablet Take 20 mg by mouth daily      famotidine (PEPCID) 20 MG tablet Take 20 mg by mouth daily      Ferrous Sulfate 324 MG TBEC Take 324 mg by mouth every morning (before breakfast)      lamoTRIgine (LAMICTAL) 200 MG tablet Take 200 mg by mouth daily      memantine (NAMENDA) 10 MG tablet Take 10 mg by mouth 2 times daily      midodrine (PROAMATINE) 5 MG tablet Take 5 mg by mouth 3 times daily       sertraline (ZOLOFT) 100 MG tablet 1.5 tab po daily      vitamin E 400 UNIT capsule Take 200 Units by mouth 2 times daily       No current facility-administered medications for this visit. Social History:  Social History     Tobacco Use    Smoking status: Former Smoker     Packs/day: 1.50     Quit date: 1977     Years since quittin.4    Smokeless tobacco: Never Used   Substance Use Topics    Alcohol use: No     Family History  Family History   Problem Relation Age of Onset    Heart Disease Father     Stroke Brother        Review of Systems   Unable to perform ROS: Dementia         Vital Signs  BP (!) 117/46 (Site: Left Upper Arm, Position: Sitting, Cuff Size: Small Adult)   Pulse (!) 45   Temp 98.1 °F (36.7 °C) (Temporal)   Resp 15   Ht 5' 7.91\" (1.725 m)   Wt 149 lb (67.6 kg)   SpO2 91%   BMI 22.71 kg/m²   Body mass index is 22.71 kg/m². Physical Exam  Vitals reviewed. Constitutional:       General: He is not in acute distress. Appearance: Normal appearance. He is normal weight. He is not ill-appearing or toxic-appearing. HENT:      Head: Normocephalic and atraumatic. Eyes:      General: No scleral icterus. Conjunctiva/sclera: Conjunctivae normal.   Neck:      Vascular: No carotid bruit. Cardiovascular:      Rate and Rhythm: Regular rhythm. Bradycardia present. Heart sounds: Normal heart sounds. Pulmonary:      Effort: Pulmonary effort is normal.      Breath sounds: Normal breath sounds. Skin:     Coloration: Skin is not jaundiced. Findings: No rash. Neurological:      General: No focal deficit present. Mental Status: Mental status is at baseline. Cranial Nerves: No cranial nerve deficit. Motor: Weakness present. Gait: Gait abnormal.   Psychiatric:         Behavior: Behavior is slowed. Cognition and Memory: Cognition is impaired. Memory is impaired. Assessment/Plan:  Vic Lr was seen today for follow-up from hospital.    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   __  Amada Gross M.D.

## 2022-06-14 ENCOUNTER — HOME CARE VISIT (OUTPATIENT)
Dept: SCHEDULING | Facility: HOME HEALTH | Age: 87
End: 2022-06-14
Payer: MEDICARE

## 2022-06-14 ENCOUNTER — CARE COORDINATION (OUTPATIENT)
Dept: CARE COORDINATION | Facility: CLINIC | Age: 87
End: 2022-06-14

## 2022-06-14 ENCOUNTER — TELEPHONE (OUTPATIENT)
Dept: INTERNAL MEDICINE CLINIC | Facility: CLINIC | Age: 87
End: 2022-06-14

## 2022-06-14 VITALS
OXYGEN SATURATION: 97 % | HEART RATE: 60 BPM | DIASTOLIC BLOOD PRESSURE: 58 MMHG | TEMPERATURE: 96.8 F | RESPIRATION RATE: 17 BRPM | SYSTOLIC BLOOD PRESSURE: 104 MMHG

## 2022-06-14 PROCEDURE — 1090000001 HH PPS REVENUE CREDIT

## 2022-06-14 PROCEDURE — 400013 HH SOC

## 2022-06-14 PROCEDURE — 1090000002 HH PPS REVENUE DEBIT

## 2022-06-14 PROCEDURE — 0221000100 HH NO PAY CLAIM PROCEDURE

## 2022-06-14 PROCEDURE — G0151 HHCP-SERV OF PT,EA 15 MIN: HCPCS

## 2022-06-14 ASSESSMENT — ENCOUNTER SYMPTOMS
DYSPNEA ACTIVITY LEVEL: AFTER AMBULATING 10 - 20 FT
COUGH: 1
COUGH CHARACTERISTICS: PRODUCTIVE

## 2022-06-14 NOTE — CARE COORDINATION
[facility] Transitions Follow Up Call    2022    Patient: Leslie Lewis  Patient : 1934   MRN: [id number]  Reason for Admission: Acute respiratory failure due to [place]  Discharge Date: 22 RARS: Readmission Risk Score: 15.2 ( )    Care Transitions Follow Up Call    Needs to be reviewed by the provider   Additional needs identified to be addressed with provider: No  none             Method of communication with provider : none      Care Transition Nurse contacted the family by telephone to follow up after admission on 2022. Verified name and  with family as identifiers. Addressed changes since last contact: Patient at ED 2022 due to frequent falls and fatigue. No new orders. Spouse reports patient doing better than he was at PCP appointment 2022. [facility] PT ordered with first visit occurring this am. Eating and drinking well with O2 remaining above 90%  Discussed follow-up appointments. If no appointment was previously scheduled, appointment scheduling offered: Yes. Is follow up appointment scheduled within 7 days of discharge? Yes. Advance Care Planning:   Does patient have an Advance Directive: reviewed and current. CTN reviewed discharge instructions, medical action plan and red flags with family and discussed any barriers to care and/or understanding of plan of care after discharge. Discussed appropriate site of care based on symptoms and resources available to patient including: PCP  Urgent care clinics  [facility]. The family agrees to contact the PCP office for questions related to their healthcare.      Patients top risk factors for readmission: medical condition-Acute respiratory failure due to COVID, CAD, COPD, CKD, Late onset Alzheimer's dementia with behavorial disturbance, Gastroenteritis due to COVID  Interventions to address risk factors:      Non-[facility] follow up appointment(s): n/a    CTN provided contact information for future needs. Will outreach to patient's spouse upon returning to office. Plan for next call: symptom management  Continue taking all medications as ordered  Continue with adequate nutritional and fluid intake  Continue to monitor O2 for MD review  Advance activities as tolerated  Fall precautions   Provided spouse with contact information to Rufina Burger with [facility] to discuss NAVA possibilities. Goals Addressed                 This Visit's Progress     Returns to baseline activity level. On track          Care Transitions Subsequent and Final Call    Schedule Follow Up Appointment with PCP: Completed  Subsequent and Final Calls  Do you have any ongoing symptoms?: No  Have your medications changed?: No  Do you have any questions related to your medications?: No  Do you currently have any active services?: Yes  Are you currently active with any services?: Home Health  Do you have any needs or concerns that I can assist you with?: Yes  Patient-reported Needs or Concerns: spouse interested in possible Assited living Placement. Forwarded Dominique Carrel information with [employer] for assistance. Identified Barriers: Impairment, Other  Care Transitions Interventions    Senior Services: Completed    Other Interventions:            Follow Up  Future Appointments   Date Time Provider Kassadnra Sawyer   6/30/2022  3:00 PM DO CAL Goodwin [facility]   8/10/2022  3:30 PM Mallorie Sanchez MD [facility]       Jimbo Elena RN

## 2022-06-14 NOTE — TELEPHONE ENCOUNTER
Griselda from [facility] called regarding the patient. She is calling to get clarification on orders for vital signs monitoring. She states that some of the vital sign parameters are outside the scope of a PT, and more inline for a nurse.  Please contact Griselda at [phone]

## 2022-06-14 NOTE — TELEPHONE ENCOUNTER
It appears that orders were done yesterday. So, I will forward to Dr. Cortney Sears for him to advise.

## 2022-06-15 PROCEDURE — 1090000002 HH PPS REVENUE DEBIT

## 2022-06-15 PROCEDURE — 1090000001 HH PPS REVENUE CREDIT

## 2022-06-15 NOTE — TELEPHONE ENCOUNTER
I called and LMOM for Nhung Jorge notifying her that Dr. Heather Chatterjee needs clarification on this. He wants to know what the parameters are for PT and exactly what they are needing.

## 2022-06-16 PROCEDURE — 1090000001 HH PPS REVENUE CREDIT

## 2022-06-16 PROCEDURE — 1090000002 HH PPS REVENUE DEBIT

## 2022-06-17 PROCEDURE — 1090000001 HH PPS REVENUE CREDIT

## 2022-06-17 PROCEDURE — 1090000002 HH PPS REVENUE DEBIT

## 2022-06-18 PROCEDURE — 1090000001 HH PPS REVENUE CREDIT

## 2022-06-18 PROCEDURE — 1090000002 HH PPS REVENUE DEBIT

## 2022-06-19 PROCEDURE — 1090000001 HH PPS REVENUE CREDIT

## 2022-06-19 PROCEDURE — 1090000002 HH PPS REVENUE DEBIT

## 2022-06-20 PROCEDURE — 1090000002 HH PPS REVENUE DEBIT

## 2022-06-20 PROCEDURE — 1090000001 HH PPS REVENUE CREDIT

## 2022-06-21 ENCOUNTER — HOME CARE VISIT (OUTPATIENT)
Dept: SCHEDULING | Facility: HOME HEALTH | Age: 87
End: 2022-06-21
Payer: MEDICARE

## 2022-06-21 PROCEDURE — G0155 HHCP-SVS OF CSW,EA 15 MIN: HCPCS

## 2022-06-21 PROCEDURE — 1090000001 HH PPS REVENUE CREDIT

## 2022-06-21 PROCEDURE — G0157 HHC PT ASSISTANT EA 15: HCPCS

## 2022-06-21 PROCEDURE — 1090000002 HH PPS REVENUE DEBIT

## 2022-06-21 NOTE — TELEPHONE ENCOUNTER
I called Griselda back. She stated the PTA's have a very general knowledge of cardio-pulmonary issues. They have the parameters, number wise, for the readings, but they need to know if there is anything else specific in regards to contacting Dr. Stephanie Herzog when it comes to the deterioration of cardio-pulmonary. She stated these parameters are more in line with the management of [facility] nursing, not PTA. It sounds like nursing may need to be ordered as well.

## 2022-06-22 VITALS
RESPIRATION RATE: 16 BRPM | SYSTOLIC BLOOD PRESSURE: 120 MMHG | TEMPERATURE: 97.1 F | DIASTOLIC BLOOD PRESSURE: 72 MMHG | HEART RATE: 68 BPM | OXYGEN SATURATION: 97 %

## 2022-06-22 PROCEDURE — 1090000002 HH PPS REVENUE DEBIT

## 2022-06-22 PROCEDURE — 1090000001 HH PPS REVENUE CREDIT

## 2022-06-22 NOTE — TELEPHONE ENCOUNTER
I called Griselda with [facility] [address] and notified her of what he recommended. She stated she was unaware that nursing was ordered. She stated she will get nursing out to see him and it will cover all the parameters they need.

## 2022-06-22 NOTE — TELEPHONE ENCOUNTER
I ordered HHN and PT so PT could be preformed at his home. Unfortunately their response is so non-specific I am still not sure what they are requesting.   If it is because he has bradycardia, which cardiology is aware about, and does not feel a pacemaker is warranted yet,  then if his heart rate is less than 40 bpm call cardiology or send back to ER

## 2022-06-23 ENCOUNTER — HOME CARE VISIT (OUTPATIENT)
Dept: SCHEDULING | Facility: HOME HEALTH | Age: 87
End: 2022-06-23
Payer: MEDICARE

## 2022-06-23 PROCEDURE — G0157 HHC PT ASSISTANT EA 15: HCPCS

## 2022-06-23 PROCEDURE — 1090000001 HH PPS REVENUE CREDIT

## 2022-06-23 PROCEDURE — 1090000002 HH PPS REVENUE DEBIT

## 2022-06-24 VITALS
HEART RATE: 64 BPM | SYSTOLIC BLOOD PRESSURE: 120 MMHG | TEMPERATURE: 97.8 F | RESPIRATION RATE: 16 BRPM | OXYGEN SATURATION: 99 % | DIASTOLIC BLOOD PRESSURE: 60 MMHG

## 2022-06-24 PROCEDURE — 1090000001 HH PPS REVENUE CREDIT

## 2022-06-24 PROCEDURE — 1090000002 HH PPS REVENUE DEBIT

## 2022-06-25 PROCEDURE — 1090000001 HH PPS REVENUE CREDIT

## 2022-06-25 PROCEDURE — 1090000002 HH PPS REVENUE DEBIT

## 2022-06-26 PROCEDURE — 1090000002 HH PPS REVENUE DEBIT

## 2022-06-26 PROCEDURE — 1090000001 HH PPS REVENUE CREDIT

## 2022-06-27 ENCOUNTER — HOME CARE VISIT (OUTPATIENT)
Dept: SCHEDULING | Facility: HOME HEALTH | Age: 87
End: 2022-06-27
Payer: MEDICARE

## 2022-06-27 PROCEDURE — G0299 HHS/HOSPICE OF RN EA 15 MIN: HCPCS

## 2022-06-27 PROCEDURE — 1090000002 HH PPS REVENUE DEBIT

## 2022-06-27 PROCEDURE — 1090000001 HH PPS REVENUE CREDIT

## 2022-06-28 ENCOUNTER — TELEPHONE (OUTPATIENT)
Dept: INTERNAL MEDICINE CLINIC | Facility: CLINIC | Age: 87
End: 2022-06-28

## 2022-06-28 ENCOUNTER — HOME CARE VISIT (OUTPATIENT)
Dept: SCHEDULING | Facility: HOME HEALTH | Age: 87
End: 2022-06-28
Payer: MEDICARE

## 2022-06-28 ENCOUNTER — CARE COORDINATION (OUTPATIENT)
Dept: CARE COORDINATION | Facility: CLINIC | Age: 87
End: 2022-06-28

## 2022-06-28 VITALS
RESPIRATION RATE: 16 BRPM | TEMPERATURE: 97.4 F | OXYGEN SATURATION: 98 % | DIASTOLIC BLOOD PRESSURE: 70 MMHG | SYSTOLIC BLOOD PRESSURE: 124 MMHG | HEART RATE: 60 BPM

## 2022-06-28 VITALS
HEART RATE: 70 BPM | RESPIRATION RATE: 16 BRPM | OXYGEN SATURATION: 91 % | TEMPERATURE: 98.2 F | SYSTOLIC BLOOD PRESSURE: 122 MMHG | DIASTOLIC BLOOD PRESSURE: 62 MMHG

## 2022-06-28 PROCEDURE — 1090000001 HH PPS REVENUE CREDIT

## 2022-06-28 PROCEDURE — 1090000002 HH PPS REVENUE DEBIT

## 2022-06-28 PROCEDURE — G0157 HHC PT ASSISTANT EA 15: HCPCS

## 2022-06-28 ASSESSMENT — ENCOUNTER SYMPTOMS: CONSTIPATION: 1

## 2022-06-28 NOTE — CARE COORDINATION
[facility] Transitions Follow Up Call    2022    Patient: Rosi Montano  Patient : 1934   MRN: [id number]  Reason for Admission: Acute respiratory failure due to [place]  Discharge Date: 22 RARS: Readmission Risk Score: 15.2 ( )  Spoke with: Spouse  Care Transitions Follow Up Call    Needs to be reviewed by the provider   Additional needs identified to be addressed with provider: No  none             Method of communication with provider : none      Care Transition Nurse contacted the family by telephone to follow up after admission on 2022. Verified name and  with family as identifiers. Addressed changes since last contact: Spouse reports patient is doing well with no symptoms of COVID. Her main concern is regarding his Alzheimer's. HH MARYAM has visited with family and family in the process of meeting with an  and visiting Memory Care Facilities. She agrees to contact CTN with any issues prior to next outreach. Discussed follow-up appointments. If no appointment was previously scheduled, appointment scheduling offered: Yes. Is follow up appointment scheduled within 7 days of discharge? Yes. Advance Care Planning:   Does patient have an Advance Directive: reviewed and current. CTN reviewed discharge instructions, medical action plan and red flags with family and discussed any barriers to care and/or understanding of plan of care after discharge. Discussed appropriate site of care based on symptoms and resources available to patient including: PCP  Urgent care clinics  [facility]. The family agrees to contact the PCP office for questions related to their healthcare.      Patients top risk factors for readmission: COVID, CAD, CKD, COPD, Late onset Alzheimer's dementia with behavorial disturbance, Gastroenteritis due to COVID  Interventions to address risk factors:  Continue taking all medications as ordered  Continue with adequate nutritional and fluid intake  Continue to monitor O2 for MD review  Advance activities as tolerated  Fall precautions   Goals Addressed                 This Visit's Progress     Returns to baseline activity level. On track            [id number] follow up appointment(s): n/a    CTN provided contact information for future needs. Plan for follow-up call in 7-10 days based on severity of symptoms and risk factors. Plan for next call: symptom management            Care Transitions Subsequent and Final Call    Schedule Follow Up Appointment with PCP: Completed  Subsequent and Final Calls  Do you have any ongoing symptoms?: No  Have your medications changed?: No  Do you have any questions related to your medications?: No  Do you currently have any active services?: Yes  Are you currently active with any services?: Home Health  Do you have any needs or concerns that I can assist you with?: No  Identified Barriers:  Other  Care Transitions Interventions    Senior Services: Completed        Follow Up  Future Appointments   Date Time Provider Kassandra Sawyer   6/28/2022 To Be Determined Zain Mendenhall, PEPE Acevedo   6/29/2022 To Be [facility], PE[facility] [place]    6/30/2022 To Be Determined [address]   6/30/2022  3:00 PM Sundar Thomson DO [facility] AMB   7/1/2022 10:00 AM MIKE Curtis   7/5/2022 To Be Determined MIKE Curtis   7/8/2022 To Be Determined MIKE Curtis   7/12/2022 To Be Determined MIKE Curtis   8/10/2022  3:30 PM Josias Peres MD [facility]       Serg Nielsen RN

## 2022-06-28 NOTE — TELEPHONE ENCOUNTER
Romana from [facility] was calling to inform that they are admitting the pt to home health services and monitoring his vitals, they will report any abnormal findings. If you have any questions Farhat Roman can be reached at [phone].

## 2022-06-29 PROCEDURE — 1090000001 HH PPS REVENUE CREDIT

## 2022-06-29 PROCEDURE — 1090000002 HH PPS REVENUE DEBIT

## 2022-06-30 ENCOUNTER — HOME CARE VISIT (OUTPATIENT)
Dept: SCHEDULING | Facility: HOME HEALTH | Age: 87
End: 2022-06-30
Payer: MEDICARE

## 2022-06-30 ENCOUNTER — OFFICE VISIT (OUTPATIENT)
Dept: CARDIOLOGY CLINIC | Age: 87
End: 2022-06-30
Payer: MEDICARE

## 2022-06-30 VITALS
OXYGEN SATURATION: 95 % | TEMPERATURE: 98.7 F | HEART RATE: 62 BPM | RESPIRATION RATE: 17 BRPM | SYSTOLIC BLOOD PRESSURE: 120 MMHG | DIASTOLIC BLOOD PRESSURE: 62 MMHG

## 2022-06-30 VITALS
BODY MASS INDEX: 28.47 KG/M2 | HEART RATE: 60 BPM | HEIGHT: 60 IN | DIASTOLIC BLOOD PRESSURE: 76 MMHG | WEIGHT: 145 LBS | SYSTOLIC BLOOD PRESSURE: 120 MMHG

## 2022-06-30 DIAGNOSIS — R00.1 BRADYCARDIA: ICD-10-CM

## 2022-06-30 DIAGNOSIS — I95.1 ORTHOSTATIC HYPOTENSION: ICD-10-CM

## 2022-06-30 DIAGNOSIS — I65.23 BILATERAL CAROTID ARTERY STENOSIS: Primary | ICD-10-CM

## 2022-06-30 PROCEDURE — 1090000001 HH PPS REVENUE CREDIT

## 2022-06-30 PROCEDURE — 99214 OFFICE O/P EST MOD 30 MIN: CPT | Performed by: INTERNAL MEDICINE

## 2022-06-30 PROCEDURE — G0151 HHCP-SERV OF PT,EA 15 MIN: HCPCS

## 2022-06-30 PROCEDURE — 1036F TOBACCO NON-USER: CPT | Performed by: INTERNAL MEDICINE

## 2022-06-30 PROCEDURE — 1090000002 HH PPS REVENUE DEBIT

## 2022-06-30 PROCEDURE — 1111F DSCHRG MED/CURRENT MED MERGE: CPT | Performed by: INTERNAL MEDICINE

## 2022-06-30 PROCEDURE — G8427 DOCREV CUR MEDS BY ELIG CLIN: HCPCS | Performed by: INTERNAL MEDICINE

## 2022-06-30 PROCEDURE — 1123F ACP DISCUSS/DSCN MKR DOCD: CPT | Performed by: INTERNAL MEDICINE

## 2022-06-30 PROCEDURE — G8417 CALC BMI ABV UP PARAM F/U: HCPCS | Performed by: INTERNAL MEDICINE

## 2022-06-30 ASSESSMENT — ENCOUNTER SYMPTOMS: CONSTIPATION: 1

## 2022-06-30 NOTE — PROGRESS NOTES
[address], [address] [facility], [address] [place] [address]  PHONE: [phone]     22    NAME:  Neha Dinero  : 1934  MRN: [id number]       SUBJECTIVE:   Neha Dinero is a 80 y.o. male seen for a follow up visit regarding the following:     Chief Complaint   Patient presents with    Coronary Artery Disease       HPI:  Here after 4v CABG on 17. Echo 2017:  EF normal.   Carotid US 2017: mild Dz   Echo 2020: normal EF. Off BB due to bradycardia, weakness after CABG     Dizziness better now, feeling better, memory worsening. Still going to the [facility], seated bike now at the gym, feeling well overall. Balance is poor. Memory worsening per wife, losing some weight. No CP, SIBLEY, edema, palp. Patient denies recent history of orthopnea, PND, excessive dizziness and/or syncope. Past Medical History, Past Surgical History, Family history, Social History, and Medications were all reviewed with the patient today and updated as necessary. Current Outpatient Medications   Medication Sig Dispense Refill    docusate sodium (COLACE) 100 MG capsule Take 100 mg by mouth 2 times daily.  polyethylene glycol (CLEARLAX) 17 GM/SCOOP powder Take 17 g by mouth as needed (constipation).       QUEtiapine (SEROQUEL) 25 MG tablet Take 25 mg by mouth daily      Cholecalciferol (VITAMIN D) 50 MCG (2000) CAPS capsule Take 1 capsule by mouth daily      aspirin 81 MG EC tablet Take 81 mg by mouth every other day      atorvastatin (LIPITOR) 20 MG tablet Take 20 mg by mouth daily      famotidine (PEPCID) 20 MG tablet Take 20 mg by mouth daily      lamoTRIgine (LAMICTAL) 200 MG tablet Take 200 mg by mouth daily      memantine (NAMENDA) 10 MG tablet Take 10 mg by mouth 2 times daily      midodrine (PROAMATINE) 5 MG tablet Take 5 mg by mouth 3 times daily       sertraline (ZOLOFT) 100 MG tablet Take 100 mg by mouth daily 1.5 tab by mouth daily  vitamin E 400 UNIT capsule Take 1,000 Units by mouth 2 times daily      albuterol sulfate HFA (PROVENTIL;VENTOLIN;PROAIR) 108 (90 Base) MCG/ACT inhaler Inhale 2 puffs into the lungs every 4 hours as needed for Wheezing 18 g 1     No current facility-administered medications for this visit. No Known Allergies  Patient Active Problem List    Diagnosis Date Noted    Acute hypoxemic respiratory failure due to COVID-19 ([facility] 06/05/2022     Priority: Medium    Gastroenteritis due to COVID-19 virus 06/05/2022     Priority: Medium    Chronic renal disease, stage III ([address] 05/19/2022    Orthostatic hypotension     Bradycardia 05/28/2020    Chronic renal disease, stage II     GERD (gastroesophageal reflux disease) 05/19/2019    EUGENE on CPAP     Sigmoid diverticulosis     Chronic anemia     COPD, severe ([address] 01/22/2018    Late onset Alzheimer's dementia with behavioral disturbance ([address]     Bilateral carotid artery disease ([facility] 11/28/2017    Centrilobular emphysema (HCC)     Dyslipidemia     S/P CABG x 4 11/17/2017    CAD (coronary artery disease) 11/17/2017 11/17/17 (Dr Malick Ramirez) Coronary artery bypass grafting x4 grafts   consisting of:   1. Left internal mammary artery to left anterior descending.    2. Reverse saphenous vein graft to diagonal 1.   3. Reverse saphenous vein graft to obtuse marginal.   4. Reverse saphenous vein graft to the posterior descending   artery.           Nocturnal hypoxemia 11/17/2017    Mood disorder ([address]     History of penile implant     Asymptomatic gallstones     Colon polyps       Past Surgical History:   Procedure Laterality Date    APPENDECTOMY  1938    CARDIAC CATHETERIZATION  11/15/2017    mulivessel disease    CATARACT REMOVAL Bilateral 2007    CORONARY ARTERY BYPASS GRAFT  11/17/2017    CABG x4, Dr. Joanne Avery Right     fx R arm x 2- -required hardware and later removed    UROLOGICAL SURGERY      penile implant     Family History   Problem Relation Age of Onset    Heart Disease Father     Stroke Brother      Social History     Tobacco Use    Smoking status: Former Smoker     Packs/day: 1.50     Quit date: 1977     Years since quittin.5    Smokeless tobacco: Never Used   Substance Use Topics    Alcohol use: No         ROS:    No obvious pertinent positives on review of systems except for what was outlined in the HPI today.       PHYSICAL EXAM:     /76   Pulse 60   Ht 5' (1.524 m)   Wt 145 lb (65.8 kg)   BMI 28.32 kg/m²    General/Constitutional:   Alert and oriented x 3, no acute distress  HEENT:   normocephalic, atraumatic, moist mucous membranes  Neck:   No JVD or carotid bruits bilaterally  Cardiovascular:   regular rate and rhythm, no murmur/rub/gallop appreciated  Pulmonary:   clear to auscultation bilaterally, no respiratory distress  Abdomen:   soft, non-tender, non-distended  Ext:   No sig LE edema bilaterally  Skin:  warm and dry, no obvious rashes seen  Neuro:   no obvious sensory or motor deficits  Psychiatric:   normal mood and affect      Lab Results   Component Value Date/Time     2022 01:05 PM    K 4.0 2022 01:05 PM     2022 01:05 PM    CO2 29 2022 01:05 PM    BUN 28 2022 01:05 PM    CREATININE 1.10 2022 01:05 PM    GLUCOSE 95 2022 01:05 PM    CALCIUM 8.8 2022 01:05 PM        Lab Results   Component Value Date    WBC 7.9 2022    HGB 10.8 (L) 2022    HCT 33.8 (L) 2022    MCV 94.9 2022     2022       Lab Results   Component Value Date    TSH 1.840 2021       No results found for: LABA1C  No results found for: EAG    Lab Results   Component Value Date    CHOL 163 2020    CHOL 150 2019     Lab Results   Component Value Date    TRIG 58 2020    TRIG 85 2019     Lab Results   Component Value Date    HDL 62 2020    HDL 54 2019 Lab Results   Component Value Date    LDLCALC 89 12/02/2020    LDLCALC 79 08/01/2019     Lab Results   Component Value Date    LABVLDL 17 08/01/2019    VLDL 12 12/02/2020     No results found for: CHERYL        I have Independently reviewed prior care notes, any ER records available, cardiac testing, labs and results with the patient and before seeing the patient today. Also independently reviewed outside records when available. ASSESSMENT:    Eugene Spears was seen today for coronary artery disease. Diagnoses and all orders for this visit:    Bilateral carotid artery stenosis    Bradycardia    Orthostatic hypotension          PLAN:        1. Ayaan/weakness: On midodrine. Balance poor, refer for PT. Wife to call. Monitor ok, no pauses, follow for now. Try to avoid PPM.  Memory worsening, plan on conservative mgmt for now. 2. CAD:  No angina with exercising. Remain on ASA and statin. Conservative mgmt. 3. Carotid Dz:    No more testing needed now. 4. HPL:  stop statin? 5. CKD: follow. Need to follow. Patient has been instructed and agrees to call our office with any issues or other concerns related to their cardiac condition(s) and/or complaint(s). Return in about 3 months (around 9/30/2022).        JUANI QUINTERO, DO  6/30/2022

## 2022-07-01 ENCOUNTER — HOME CARE VISIT (OUTPATIENT)
Dept: SCHEDULING | Facility: HOME HEALTH | Age: 87
End: 2022-07-01
Payer: MEDICARE

## 2022-07-01 PROCEDURE — 1090000002 HH PPS REVENUE DEBIT

## 2022-07-01 PROCEDURE — 1090000001 HH PPS REVENUE CREDIT

## 2022-07-01 PROCEDURE — G0299 HHS/HOSPICE OF RN EA 15 MIN: HCPCS

## 2022-07-02 VITALS
TEMPERATURE: 97.3 F | RESPIRATION RATE: 16 BRPM | OXYGEN SATURATION: 92 % | SYSTOLIC BLOOD PRESSURE: 110 MMHG | HEART RATE: 71 BPM | DIASTOLIC BLOOD PRESSURE: 68 MMHG

## 2022-07-02 PROCEDURE — 1090000002 HH PPS REVENUE DEBIT

## 2022-07-02 PROCEDURE — 1090000001 HH PPS REVENUE CREDIT

## 2022-07-03 PROCEDURE — 1090000002 HH PPS REVENUE DEBIT

## 2022-07-03 PROCEDURE — 1090000001 HH PPS REVENUE CREDIT

## 2022-07-04 PROCEDURE — 1090000002 HH PPS REVENUE DEBIT

## 2022-07-04 PROCEDURE — 1090000001 HH PPS REVENUE CREDIT

## 2022-07-05 ENCOUNTER — TELEPHONE (OUTPATIENT)
Dept: INTERNAL MEDICINE CLINIC | Facility: CLINIC | Age: 87
End: 2022-07-05

## 2022-07-05 PROCEDURE — 1090000001 HH PPS REVENUE CREDIT

## 2022-07-05 PROCEDURE — 1090000002 HH PPS REVENUE DEBIT

## 2022-07-05 NOTE — TELEPHONE ENCOUNTER
We received [facility] Certification and Plan of Care orders from [facility]. Dr. Abhishek Nina noted on the cover sheet that he believes he already signed these electronically. This was faxed back to them at f# [phone]. A fax confirmation was received.

## 2022-07-06 PROCEDURE — 1090000001 HH PPS REVENUE CREDIT

## 2022-07-06 PROCEDURE — 1090000002 HH PPS REVENUE DEBIT

## 2022-07-07 ENCOUNTER — HOME CARE VISIT (OUTPATIENT)
Dept: SCHEDULING | Facility: HOME HEALTH | Age: 87
End: 2022-07-07
Payer: MEDICARE

## 2022-07-07 VITALS
RESPIRATION RATE: 16 BRPM | TEMPERATURE: 97.8 F | DIASTOLIC BLOOD PRESSURE: 56 MMHG | SYSTOLIC BLOOD PRESSURE: 110 MMHG | OXYGEN SATURATION: 94 % | HEART RATE: 82 BPM

## 2022-07-07 PROCEDURE — G0299 HHS/HOSPICE OF RN EA 15 MIN: HCPCS

## 2022-07-07 PROCEDURE — 1090000002 HH PPS REVENUE DEBIT

## 2022-07-07 PROCEDURE — 1090000001 HH PPS REVENUE CREDIT

## 2022-07-08 ENCOUNTER — CARE COORDINATION (OUTPATIENT)
Dept: CARE COORDINATION | Facility: CLINIC | Age: 87
End: 2022-07-08

## 2022-07-08 ASSESSMENT — ENCOUNTER SYMPTOMS: DYSPNEA ACTIVITY LEVEL: AFTER AMBULATING MORE THAN 20 FT

## 2022-07-08 NOTE — CARE COORDINATION
Patient has graduated from the Care Transitions program on 7/8/2022. Patient/family has the ability to self-manage at this time. Unable to reach pateint's spouse. Left message requesting a return call with any issues/concerns. Patient has Care Transition Nurse's contact information for any further questions, concerns, or needs.   Patients upcoming visits:    Future Appointments   Date Time Provider Kassandra Sawyer   7/12/2022 To Be Determined Ashish Scott RN [address]   8/10/2022  3:30 PM Wilner Agee MD [address] [facility]   9/30/2022  2:45 PM Antonia Rangel DO [facility]

## 2022-08-10 ENCOUNTER — OFFICE VISIT (OUTPATIENT)
Dept: INTERNAL MEDICINE CLINIC | Facility: CLINIC | Age: 87
End: 2022-08-10
Payer: MEDICARE

## 2022-08-10 VITALS
OXYGEN SATURATION: 92 % | HEIGHT: 60 IN | SYSTOLIC BLOOD PRESSURE: 131 MMHG | DIASTOLIC BLOOD PRESSURE: 68 MMHG | HEART RATE: 59 BPM | TEMPERATURE: 97.2 F | WEIGHT: 145 LBS | BODY MASS INDEX: 28.47 KG/M2

## 2022-08-10 DIAGNOSIS — G47.33 OBSTRUCTIVE SLEEP APNEA SYNDROME: ICD-10-CM

## 2022-08-10 DIAGNOSIS — G30.1 LATE ONSET ALZHEIMER'S DEMENTIA WITH BEHAVIORAL DISTURBANCE (HCC): ICD-10-CM

## 2022-08-10 DIAGNOSIS — J44.9 CHRONIC OBSTRUCTIVE PULMONARY DISEASE, UNSPECIFIED COPD TYPE (HCC): ICD-10-CM

## 2022-08-10 DIAGNOSIS — Z11.1 SCREENING FOR TUBERCULOSIS: ICD-10-CM

## 2022-08-10 DIAGNOSIS — D64.9 CHRONIC ANEMIA: ICD-10-CM

## 2022-08-10 DIAGNOSIS — F02.818 LATE ONSET ALZHEIMER'S DEMENTIA WITH BEHAVIORAL DISTURBANCE (HCC): ICD-10-CM

## 2022-08-10 DIAGNOSIS — E78.49 OTHER HYPERLIPIDEMIA: Primary | ICD-10-CM

## 2022-08-10 PROCEDURE — 1123F ACP DISCUSS/DSCN MKR DOCD: CPT | Performed by: INTERNAL MEDICINE

## 2022-08-10 PROCEDURE — 3023F SPIROM DOC REV: CPT | Performed by: INTERNAL MEDICINE

## 2022-08-10 PROCEDURE — 99214 OFFICE O/P EST MOD 30 MIN: CPT | Performed by: INTERNAL MEDICINE

## 2022-08-10 PROCEDURE — G8427 DOCREV CUR MEDS BY ELIG CLIN: HCPCS | Performed by: INTERNAL MEDICINE

## 2022-08-10 PROCEDURE — 1036F TOBACCO NON-USER: CPT | Performed by: INTERNAL MEDICINE

## 2022-08-10 PROCEDURE — G8417 CALC BMI ABV UP PARAM F/U: HCPCS | Performed by: INTERNAL MEDICINE

## 2022-08-10 RX ORDER — TRAZODONE HYDROCHLORIDE 50 MG/1
50 TABLET ORAL
COMMUNITY
Start: 2022-07-15

## 2022-08-10 ASSESSMENT — PATIENT HEALTH QUESTIONNAIRE - PHQ9
SUM OF ALL RESPONSES TO PHQ QUESTIONS 1-9: 0
3. TROUBLE FALLING OR STAYING ASLEEP: 0
7. TROUBLE CONCENTRATING ON THINGS, SUCH AS READING THE NEWSPAPER OR WATCHING TELEVISION: 0
SUM OF ALL RESPONSES TO PHQ9 QUESTIONS 1 & 2: 0
SUM OF ALL RESPONSES TO PHQ QUESTIONS 1-9: 0
4. FEELING TIRED OR HAVING LITTLE ENERGY: 0
10. IF YOU CHECKED OFF ANY PROBLEMS, HOW DIFFICULT HAVE THESE PROBLEMS MADE IT FOR YOU TO DO YOUR WORK, TAKE CARE OF THINGS AT HOME, OR GET ALONG WITH OTHER PEOPLE: 0
8. MOVING OR SPEAKING SO SLOWLY THAT OTHER PEOPLE COULD HAVE NOTICED. OR THE OPPOSITE, BEING SO FIGETY OR RESTLESS THAT YOU HAVE BEEN MOVING AROUND A LOT MORE THAN USUAL: 0
SUM OF ALL RESPONSES TO PHQ QUESTIONS 1-9: 0
SUM OF ALL RESPONSES TO PHQ QUESTIONS 1-9: 0
9. THOUGHTS THAT YOU WOULD BE BETTER OFF DEAD, OR OF HURTING YOURSELF: 0
1. LITTLE INTEREST OR PLEASURE IN DOING THINGS: 0
2. FEELING DOWN, DEPRESSED OR HOPELESS: 0
5. POOR APPETITE OR OVEREATING: 0
6. FEELING BAD ABOUT YOURSELF - OR THAT YOU ARE A FAILURE OR HAVE LET YOURSELF OR YOUR FAMILY DOWN: 0

## 2022-08-10 ASSESSMENT — ANXIETY QUESTIONNAIRES
GAD7 TOTAL SCORE: 0
7. FEELING AFRAID AS IF SOMETHING AWFUL MIGHT HAPPEN: 0
4. TROUBLE RELAXING: 0
1. FEELING NERVOUS, ANXIOUS, OR ON EDGE: 0
2. NOT BEING ABLE TO STOP OR CONTROL WORRYING: 0
6. BECOMING EASILY ANNOYED OR IRRITABLE: 0
3. WORRYING TOO MUCH ABOUT DIFFERENT THINGS: 0
5. BEING SO RESTLESS THAT IT IS HARD TO SIT STILL: 0
IF YOU CHECKED OFF ANY PROBLEMS ON THIS QUESTIONNAIRE, HOW DIFFICULT HAVE THESE PROBLEMS MADE IT FOR YOU TO DO YOUR WORK, TAKE CARE OF THINGS AT HOME, OR GET ALONG WITH OTHER PEOPLE: NOT DIFFICULT AT ALL

## 2022-08-10 NOTE — PROGRESS NOTES
Nori Donohue M.D. Internal Medicine  [address] , [address]   Office : [phone]  Fax : [phone]    Chief Complaint   Patient presents with    Coronary Artery Disease     Memory and hallucinations are getting worse, has fallen a couple of times with the past few days     Memory Loss       History of Present Illness:  Julieta Hylton is a 80 y.o. male. HPI    Hyperlipidemia  Patient is in for follow-up for hyperlipidemia. Diet and Lifestyle: generally follows a low fat low cholesterol diet. Risk factors for vascular disease consist of hyperlipidemia and COPD. Last LDL was   Lab Results   Component Value Date    LDLCALC 89 12/02/2020   . Last ALT was   Lab Results   Component Value Date/Time    ALT 46 06/09/2022 01:05 PM   .  No muscle aches. COPD:  The patient is being seen for follow up of COPD. Oxygen: he currently is not on home oxygen therapy. Symptoms: wheezing. Patient uses 1 pillows at night. Patient does not smoke cigarettes. Did not try Anoro due to cost so 2 samples provided which helped  But not enough to be worth the cost    Sleep Apnea  Non-Adherent to CPAP nightly now. ASHD  Denies any angina    Alzheimers vs Cognitive decline or benign forgetfulness of the aging  Possible slow decline. Moving to [facility] in [place]. Seeing geriatrics    Anemia  [facility] started iron.   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 Michelle Tavarez Right     fx R arm x 2- -required hardware and later removed    UROLOGICAL SURGERY      penile implant     Allergies:   No Known Allergies  Medications:   Current Outpatient Medications   Medication Sig Dispense Refill    traZODone (DESYREL) 50 MG tablet Take 50 mg by mouth      docusate sodium (COLACE) 100 MG capsule Take 100 mg by mouth 2 times daily. polyethylene glycol (GLYCOLAX) 17 GM/SCOOP powder Take 17 g by mouth as needed (constipation). albuterol sulfate HFA (PROVENTIL;VENTOLIN;PROAIR) 108 (90 Base) MCG/ACT inhaler Inhale 2 puffs into the lungs every 4 hours as needed for Wheezing 18 g 1    QUEtiapine (SEROQUEL) 25 MG tablet Take 25 mg by mouth daily      Cholecalciferol (VITAMIN D) 50 MCG (2000 UT) CAPS capsule Take 1 capsule by mouth daily      aspirin 81 MG EC tablet Take 81 mg by mouth every other day      famotidine (PEPCID) 20 MG tablet Take 20 mg by mouth daily      lamoTRIgine (LAMICTAL) 200 MG tablet Take 200 mg by mouth daily      memantine (NAMENDA) 10 MG tablet Take 10 mg by mouth 2 times daily      midodrine (PROAMATINE) 5 MG tablet Take 5 mg by mouth 3 times daily       sertraline (ZOLOFT) 100 MG tablet Take by mouth daily 1.5 tab by mouth daily      vitamin E 400 UNIT capsule Take 1,000 Units by mouth 2 times daily      atorvastatin (LIPITOR) 20 MG tablet Take 20 mg by mouth daily (Patient not taking: Reported on 8/10/2022)       No current facility-administered medications for this visit.      Social History:  Social History     Tobacco Use    Smoking status: Former     Packs/day: 1.50     Types: Cigarettes     Quit date: 1977     Years since quittin.6    Smokeless tobacco: Never   Substance Use Topics    Alcohol use: No     Family History  Family History   Problem Relation Age of Onset    Heart Disease Father     Stroke Brother        Review of Systems   Unable to perform ROS: Dementia       Vital Signs  /68 (Site: Right Upper Arm, Position: Sitting, Cuff Size: Large Adult)   Pulse 59   Temp 97.2 °F (36.2 °C) (Temporal)   Ht 5' (1.524 m)   Wt 145 lb (65.8 kg)   SpO2 92%   BMI 28.32 kg/m²   Body mass index is 28.32 kg/m². Physical Exam  Vitals reviewed. Constitutional:       General: He is not in acute distress. Appearance: Normal appearance. He is not ill-appearing or toxic-appearing. HENT:      Head: Normocephalic and atraumatic. Right Ear: External ear normal.      Left Ear: External ear normal.      Nose: Nose normal.      Mouth/Throat:      Mouth: Mucous membranes are moist.   Eyes:      General: No scleral icterus. Extraocular Movements: Extraocular movements intact. Conjunctiva/sclera: Conjunctivae normal.   Neck:      Vascular: No carotid bruit. Cardiovascular:      Rate and Rhythm: Normal rate and regular rhythm. Heart sounds: Normal heart sounds. No murmur heard. Pulmonary:      Effort: Pulmonary effort is normal.      Breath sounds: Normal breath sounds. Abdominal:      General: Bowel sounds are normal.      Palpations: Abdomen is soft. Musculoskeletal:         General: No swelling. Skin:     Coloration: Skin is not jaundiced. Findings: No rash. Neurological:      General: No focal deficit present. Mental Status: He is alert. Mental status is at baseline. Cranial Nerves: No cranial nerve deficit. Motor: No weakness.       Gait: Gait normal.   Psychiatric:         Mood and Affect: Mood normal.         Behavior: Behavior normal.         Assessment/Plan:  Awa Lewis was seen today for coronary artery disease and memory loss.    Diagnoses and all orders for this visit:    Other hyperlipidemia    Screening for tuberculosis  -     AMB POC TUBERCULOSIS, INTRADERMAL (SKIN TEST)    Late onset Alzheimer's dementia with behavioral disturbance (HCC)    Chronic obstructive pulmonary disease, unspecified COPD type ([facility]    Obstructive sleep apnea syndrome    Chronic anemia      Return if symptoms worsen or fail to improve.   __  Aby Shirley M.D.

## 2022-10-21 ENCOUNTER — HOSPITAL ENCOUNTER (EMERGENCY)
Age: 87
Discharge: HOME | End: 2022-10-21
Attending: EMERGENCY MEDICINE
Payer: MEDICARE

## 2022-10-21 ENCOUNTER — APPOINTMENT (OUTPATIENT)
Dept: CT IMAGING | Age: 87
End: 2022-10-21
Payer: MEDICARE

## 2022-10-21 VITALS
OXYGEN SATURATION: 96 % | RESPIRATION RATE: 17 BRPM | WEIGHT: 160 LBS | HEIGHT: 72 IN | TEMPERATURE: 97.8 F | DIASTOLIC BLOOD PRESSURE: 87 MMHG | BODY MASS INDEX: 21.67 KG/M2 | HEART RATE: 71 BPM | SYSTOLIC BLOOD PRESSURE: 182 MMHG
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
## 2022-10-21 DIAGNOSIS — S09.90XA CLOSED HEAD INJURY, INITIAL ENCOUNTER: ICD-10-CM

## 2022-10-21 PROCEDURE — 70486 CT MAXILLOFACIAL W/O DYE: CPT

## 2022-10-21 PROCEDURE — 99284 EMERGENCY DEPT VISIT MOD MDM: CPT

## 2022-10-21 PROCEDURE — 72125 CT NECK SPINE W/O DYE: CPT

## 2022-10-21 PROCEDURE — 6370000000 HC RX 637 (ALT 250 FOR IP): Performed by: PHYSICIAN ASSISTANT

## 2022-10-21 PROCEDURE — 70450 CT HEAD/BRAIN W/O DYE: CPT

## 2022-10-21 RX ORDER — ACETAMINOPHEN 325 MG/1
650 TABLET ORAL
Status: COMPLETED | OUTPATIENT
Start: 2022-10-21 | End: 2022-10-21

## 2022-10-21 RX ADMIN — ACETAMINOPHEN 650 MG: 325 TABLET, FILM COATED ORAL at 12:57

## 2022-10-21 ASSESSMENT — PAIN DESCRIPTION - ORIENTATION
ORIENTATION: RIGHT
ORIENTATION: RIGHT;LEFT

## 2022-10-21 ASSESSMENT — PAIN DESCRIPTION - LOCATION
LOCATION: HEAD
LOCATION: HEAD

## 2022-10-21 ASSESSMENT — PAIN DESCRIPTION - DESCRIPTORS
DESCRIPTORS: ACHING
DESCRIPTORS: ACHING

## 2022-10-21 ASSESSMENT — PAIN SCALES - GENERAL
PAINLEVEL_OUTOF10: 6
PAINLEVEL_OUTOF10: 3
PAINLEVEL_OUTOF10: 0

## 2022-10-21 ASSESSMENT — PAIN - FUNCTIONAL ASSESSMENT: PAIN_FUNCTIONAL_ASSESSMENT: 0-10

## 2022-10-21 NOTE — ED PROVIDER NOTES
Emergency Department Provider Note                   PCP:                Roselyn Iglesias MD               Age: 80 y.o. Sex: male       ICD-10-CM    1. Fall, initial encounter  [address] XXXA       2. Closed head injury, initial encounter  S09.90XA           DISPOSITION Decision To Discharge 10/21/2022 01:17:12 PM        MDM  Number of Diagnoses or Management Options  Closed head injury, initial encounter  Fall, initial encounter  Diagnosis management comments:   Overall patient is a well-appearing 63-year-old male presenting today status post mechanical fall at his [facility] care facility today. The fall was witnessed, there was no loss of consciousness and patient is not on any blood thinners. Patient is at baseline mental status per staff and per his wife. On physical exam he does have some ecchymosis and swelling to the right periorbital region but otherwise his physical exam is reassuring. CT head, maxillofacial and cervical spine negative for any acute abnormalities other than some mild soft tissue swelling. Patient stable for discharge back to his facility at this time, patient's wife would like to transport him herself via private vehicle. Amount and/or Complexity of Data Reviewed  Tests in the radiology section of CPT®: ordered and reviewed    Patient Progress  Patient progress: stable       ED Course as of 10/21/22 1332   Fri Oct 21, 2022   1235 FINDINGS:  BONES: No acute fracture. SINUSES: Clear. ORBITS: No evidence of globe hemorrhage or lens dislocation. No radiopaque  foreign body. VISUALIZED BRAIN: No visualized hemorrhage, mass effect, or herniation. SOFT TISSUES: Preorbital soft tissue swelling adjacent and lateral to the right  orbit. IMPRESSION:  1. Preorbital soft tissue swelling lateral to the right orbit. 2.  No acute facial fracture. [KE]   1235 FINDINGS:  BRAIN: No intracranial hemorrhage. No mass effect or herniation.  The grey-white  matter differentiation is maintained. White matter is within normal limits for  age. VENTRICLES/EXTRA-AXIAL: No hydrocephalus or extra-axial fluid collection. BONES: No acute fracture. SOFT TISSUES: The paranasal sinuses and mastoid air cells are clear. Scalp  swelling along the right occipital region. IMPRESSION:  1. No evidence of acute ischemia, hemorrhage, or mass effect. 2.  Scalp swelling in right occipital region. [KE]   1810 FINDINGS:   ACUTE FINDINGS: No acute fracture. VERTEBRAE: The craniocervical junction is unremarkable. The vertebral body  heights are maintained. Minimal retrolisthesis of C3 on C4.     DEGENERATIVE CHANGES: Moderate multilevel degenerative disc disease. There is  fusion of the right C2-C3 facets. Moderate multilevel facet arthropathy. SPINAL CANAL: At least mild central canal narrowing at C3-C4. SURGICAL CHANGES: None. SOFT TISSUES: No evidence of prevertebral soft tissue swelling. The  paravertebral soft tissues are unremarkable. IMPRESSION:  1. No acute abnormality. 2. Moderate to severe multilevel degenerative changes. [KE]      ED Course User Index  [KE] CARO Gonzales        Orders Placed This Encounter   Procedures    CT HEAD WO CONTRAST    CT CERVICAL SPINE WO CONTRAST    CT MAXILLOFACIAL WO CONTRAST        Medications   acetaminophen (TYLENOL) tablet 650 mg (650 mg Oral Given 10/21/22 1257)       New Prescriptions    No medications on file        [address]uster [address] is a 80 y.o. male who presents to the Emergency Department with chief complaint of    Chief Complaint   Patient presents with    Fall      49-year-old well-appearing male presents to the emergency department today for evaluation following mechanical trip and fall at his assisted living facility. Apparently fall was witnessed by staff members, there was no loss of consciousness reported. Patient is not on any blood thinners.   Past medical history significant for coronary artery disease, iron deficiency anemia, Alzheimer's disease. Per chart review patient does have some dementia at baseline, apparently his mental status is unchanged according to staff at the assisted care facility. Patient is not on any blood thinners. ROS limited due to patient's baseline mental status of dementia, however patient reporting mild pain to the right eye and mild headache. Denies pain elsewhere. The history is provided by the patient. No  was used.        Review of Systems   Unable to perform ROS: Dementia     Past Medical History:   Diagnosis Date    Asymptomatic gallstones     CAD (coronary artery disease) 2017    Centrilobular emphysema (HCC)     Chronic anemia     Colon polyps     Dyslipidemia     GERD (gastroesophageal reflux disease)     History of penile implant     Iron deficiency anemia     Late onset Alzheimer's disease without behavioral disturbance (HCC)     Mood disorder (HCC)     Orthostatic hypotension     EUGEEN on CPAP     S/P CABG x 4 2017    Sigmoid diverticulosis     TIA (transient ischemic attack)         Past Surgical History:   Procedure Laterality Date    APPENDECTOMY  193    CARDIAC CATHETERIZATION  11/15/2017    mulivessel disease    CATARACT REMOVAL Bilateral     CORONARY ARTERY BYPASS GRAFT  2017    CABG x4, Dr. Mehrdad Bryan Right     fx R arm x 2- -required hardware and later removed    UROLOGICAL SURGERY      penile implant        Family History   Problem Relation Age of Onset    Heart Disease Father     Stroke Brother         Social History     Socioeconomic History    Marital status:      Spouse name: None    Number of children: None    Years of education: None    Highest education level: None   Tobacco Use    Smoking status: Former     Packs/day: 1.50     Types: Cigarettes     Quit date: 1977     Years since quittin.8    Smokeless tobacco: Never   Substance and Sexual Activity    Alcohol use: No    Drug use: No   Social History Narrative    Has 3 biologic children, 3 step kids.  and lives with wife. Retired from sales. No pets. Patient has no known allergies. Previous Medications    ALBUTEROL SULFATE HFA (PROVENTIL;VENTOLIN;PROAIR) 108 (90 BASE) MCG/ACT INHALER    Inhale 2 puffs into the lungs every 4 hours as needed for Wheezing    ASPIRIN 81 MG EC TABLET    Take 81 mg by mouth every other day    ATORVASTATIN (LIPITOR) 20 MG TABLET    Take 20 mg by mouth daily    CHOLECALCIFEROL (VITAMIN D) 50 MCG (2000 UT) CAPS CAPSULE    Take 1 capsule by mouth daily    DOCUSATE SODIUM (COLACE) 100 MG CAPSULE    Take 100 mg by mouth 2 times daily. FAMOTIDINE (PEPCID) 20 MG TABLET    Take 20 mg by mouth daily    LAMOTRIGINE (LAMICTAL) 200 MG TABLET    Take 200 mg by mouth daily    MEMANTINE (NAMENDA) 10 MG TABLET    Take 10 mg by mouth 2 times daily    MIDODRINE (PROAMATINE) 5 MG TABLET    Take 5 mg by mouth 3 times daily     POLYETHYLENE GLYCOL (GLYCOLAX) 17 GM/SCOOP POWDER    Take 17 g by mouth as needed (constipation). QUETIAPINE (SEROQUEL) 25 MG TABLET    Take 25 mg by mouth daily    SERTRALINE (ZOLOFT) 100 MG TABLET    Take by mouth daily 1.5 tab by mouth daily    TRAZODONE (DESYREL) 50 MG TABLET    Take 50 mg by mouth    VITAMIN E 400 UNIT CAPSULE    Take 1,000 Units by mouth 2 times daily        Vitals signs and nursing note reviewed. Patient Vitals for the past 4 hrs:   Temp Pulse Resp BP SpO2   10/21/22 1328 97.8 °F (36.6 °C) 71 17 (!) 182/87 96 %   10/21/22 1241 -- 70 16 (!) 188/88 96 %   10/21/22 1130 98.2 °F (36.8 °C) 66 18 (!) 181/80 97 %          Physical Exam  Vitals and nursing note reviewed. Constitutional:       General: He is not in acute distress. Appearance: Normal appearance. HENT:      Head: Normocephalic and atraumatic.         Right Ear: Tympanic membrane and ear canal normal.      Left Ear: Tympanic membrane and ear canal normal. Nose: Nose normal.   Eyes:      Extraocular Movements: Extraocular movements intact. Conjunctiva/sclera: Conjunctivae normal.      Pupils: Pupils are equal, round, and reactive to light. Cardiovascular:      Rate and Rhythm: Normal rate and regular rhythm. Heart sounds: No murmur heard. No friction rub. No gallop. Pulmonary:      Effort: Pulmonary effort is normal.      Breath sounds: No wheezing. Abdominal:      Palpations: Abdomen is soft. Tenderness: There is no abdominal tenderness. Musculoskeletal:      Cervical back: Normal range of motion. Skin:     General: Skin is warm and dry. Capillary Refill: Capillary refill takes less than 2 seconds. Neurological:      Mental Status: He is alert. Mental status is at baseline. Psychiatric:         Mood and Affect: Mood normal.         Thought Content: Thought content normal.         Judgment: Judgment normal.        Procedures    Results for orders placed or performed during the hospital encounter of 10/21/22   CT HEAD WO CONTRAST    Narrative    EXAMINATION: CT HEAD WO CONTRAST 10/21/2022 11:47 AM    ACCESSION NUMBER: [id number]    COMPARISON: None available    INDICATION: Pain after fall    TECHNIQUE: Multiple-row detector helical CT examination of the head without  intravenous contrast.     Radiation dose reduction techniques were used for this study. Our CT scanners  use one or all of the following: Automated exposure control, adjustment of the  mA and/or kV according to patient size, iterative reconstruction. FINDINGS:  BRAIN: No intracranial hemorrhage. No mass effect or herniation. The grey-white  matter differentiation is maintained. White matter is within normal limits for  age. VENTRICLES/EXTRA-AXIAL: No hydrocephalus or extra-axial fluid collection. BONES: No acute fracture. SOFT TISSUES: The paranasal sinuses and mastoid air cells are clear. Scalp  swelling along the right occipital region. Impression    1. No evidence of acute ischemia, hemorrhage, or mass effect. 2.  Scalp swelling in right occipital region. CT CERVICAL SPINE WO CONTRAST    Narrative    EXAMINATION: CT CERVICAL SPINE WO CONTRAST 10/21/2022 11:47 AM    ACCESSION NUMBER: [id number]    COMPARISON: None available    INDICATION: Pain after fall    TECHNIQUE: Contiguous CT images of cervical spine were obtained without  intravenous contrast. Coronal and sagittal reformations were made. Radiation dose reduction techniques were used for this study. Our CT scanners  use one or all of the following: Automated exposure control, adjustment of the  mA and/or kV according to patient size, iterative reconstruction. FINDINGS:   ACUTE FINDINGS: No acute fracture. VERTEBRAE: The craniocervical junction is unremarkable. The vertebral body  heights are maintained. Minimal retrolisthesis of C3 on C4.    DEGENERATIVE CHANGES: Moderate multilevel degenerative disc disease. There is  fusion of the right C2-C3 facets. Moderate multilevel facet arthropathy. SPINAL CANAL: At least mild central canal narrowing at C3-C4. SURGICAL CHANGES: None. SOFT TISSUES: No evidence of prevertebral soft tissue swelling. The  paravertebral soft tissues are unremarkable. Impression    1. No acute abnormality. 2. Moderate to severe multilevel degenerative changes. CT MAXILLOFACIAL WO CONTRAST    Narrative    EXAMINATION: CT MAXILLOFACIAL WO CONTRAST 10/21/2022 11:47 AM    ACCESSION NUMBER: [id number]    COMPARISON: None available    INDICATION: fall, right eye swelling    TECHNIQUE: Dedicated noncontrast CT of the face was performed with axial images. Reformats are provided. Intravenous contrast was not administered. Radiation dose reduction techniques were used for this study.  Our CT scanners  use one or all of the following: Automated exposure control, adjustment of the  mA and/or kV according to patient size, iterative reconstruction. FINDINGS:  BONES: No acute fracture. SINUSES: Clear. ORBITS: No evidence of globe hemorrhage or lens dislocation. No radiopaque  foreign body. VISUALIZED BRAIN: No visualized hemorrhage, mass effect, or herniation. SOFT TISSUES: Preorbital soft tissue swelling adjacent and lateral to the right  orbit. Impression    1. Preorbital soft tissue swelling lateral to the right orbit. 2.  No acute facial fracture. CT HEAD WO CONTRAST   Final Result   1. No evidence of acute ischemia, hemorrhage, or mass effect. 2.  Scalp swelling in right occipital region. CT CERVICAL SPINE WO CONTRAST   Final Result   1. No acute abnormality. 2. Moderate to severe multilevel degenerative changes. CT MAXILLOFACIAL WO CONTRAST   Final Result   1. Preorbital soft tissue swelling lateral to the right orbit. 2.  No acute facial fracture. Voice dictation software was used during the making of this note. This software is not perfect and grammatical and other typographical errors may be present. This note has not been completely proofread for errors.        Fortino Minier, [place]  10/21/22 1439

## 2022-10-21 NOTE — ED NOTES
I have reviewed discharge instructions with the patient and caregiver. The patient and caregiver verbalized understanding. Patient left ED via Discharge Method: wheelchair to Home with family. Opportunity for questions and clarification provided. Patient given 0 scripts. To continue your aftercare when you leave the hospital, you may receive an automated call from our care team to check in on how you are doing. This is a free service and part of our promise to provide the best care and service to meet your aftercare needs.  If you have questions, or wish to unsubscribe from this service please call [phone]. Thank you for Choosing our [address] [place] [address] Emergency Department.         Soren Luis RN  10/21/22 0845

## 2022-10-21 NOTE — ED TRIAGE NOTES
Pt brought in by ems from [place] [address].  Pt was walking around inside facility and had trip and fall (-)LOC, blood thinners  (+)head strike with swelling to R eye   Baseline confusion d/t dementia, unchanged per staff at facility

## 2022-10-23 ENCOUNTER — APPOINTMENT (OUTPATIENT)
Dept: CT IMAGING | Age: 87
End: 2022-10-23
Payer: MEDICARE

## 2022-10-23 ENCOUNTER — HOSPITAL ENCOUNTER (EMERGENCY)
Age: 87
Discharge: HOME | End: 2022-10-23
Attending: EMERGENCY MEDICINE
Payer: MEDICARE

## 2022-10-23 VITALS
SYSTOLIC BLOOD PRESSURE: 141 MMHG | RESPIRATION RATE: 14 BRPM | HEART RATE: 71 BPM | OXYGEN SATURATION: 95 % | TEMPERATURE: 98.4 F | BODY MASS INDEX: 21.67 KG/M2 | DIASTOLIC BLOOD PRESSURE: 66 MMHG | HEIGHT: 72 IN | WEIGHT: 160 LBS

## 2022-10-23 DIAGNOSIS — R46.89 BEHAVIORAL CHANGE: Primary | ICD-10-CM

## 2022-10-23 LAB
ANION GAP SERPL CALC-SCNC: 3 MMOL/L (ref 2–11)
BUN SERPL-MCNC: 33 MG/DL (ref 8–23)
CALCIUM SERPL-MCNC: 8.8 MG/DL (ref 8.3–10.4)
CHLORIDE SERPL-SCNC: 109 MMOL/L (ref 101–110)
CO2 SERPL-SCNC: 30 MMOL/L (ref 21–32)
CREAT SERPL-MCNC: 1.6 MG/DL (ref 0.8–1.5)
ERYTHROCYTE [DISTWIDTH] IN BLOOD BY AUTOMATED COUNT: 14.1 % (ref 11.9–14.6)
GLUCOSE SERPL-MCNC: 101 MG/DL (ref 65–100)
HCT VFR BLD AUTO: 29.8 % (ref 41.1–50.3)
HGB BLD-MCNC: 9.2 G/DL (ref 13.6–17.2)
MCH RBC QN AUTO: 30.5 PG (ref 26.1–32.9)
MCHC RBC AUTO-ENTMCNC: 30.9 G/DL (ref 31.4–35)
MCV RBC AUTO: 98.7 FL (ref 82–102)
NRBC # BLD: 0 K/UL (ref 0–0.2)
PLATELET # BLD AUTO: 253 K/UL (ref 150–450)
PMV BLD AUTO: 9.3 FL (ref 9.4–12.3)
POTASSIUM SERPL-SCNC: 4.2 MMOL/L (ref 3.5–5.1)
RBC # BLD AUTO: 3.02 M/UL (ref 4.23–5.6)
SODIUM SERPL-SCNC: 142 MMOL/L (ref 133–143)
WBC # BLD AUTO: 6 K/UL (ref 4.3–11.1)

## 2022-10-23 PROCEDURE — 85027 COMPLETE CBC AUTOMATED: CPT

## 2022-10-23 PROCEDURE — 70450 CT HEAD/BRAIN W/O DYE: CPT

## 2022-10-23 PROCEDURE — 80048 BASIC METABOLIC PNL TOTAL CA: CPT

## 2022-10-23 PROCEDURE — 99284 EMERGENCY DEPT VISIT MOD MDM: CPT

## 2022-10-23 ASSESSMENT — PAIN - FUNCTIONAL ASSESSMENT: PAIN_FUNCTIONAL_ASSESSMENT: 0-10

## 2022-10-23 ASSESSMENT — PAIN SCALES - GENERAL: PAINLEVEL_OUTOF10: 0

## 2022-10-23 NOTE — DISCHARGE INSTRUCTIONS
Return with any fevers, vomiting, worsening symptoms, or additional concerns. Follow-up with your primary care doctor for reevaluation.

## 2022-10-23 NOTE — ED PROVIDER NOTES
Emergency Department Provider Note                   PCP:                Stefany Desai MD               Age: 80 y.o. Sex: male     No diagnosis found. DISPOSITION          MDM  Number of Diagnoses or Management Options  Diagnosis management comments: Apparently had a fall a couple of days ago and I think that is where some of the bruising is from. I do not find thing urgent or emergent and I do not have any specific explanation for his symptoms. I will discharge him back to the nursing facility. Orders Placed This Encounter   Procedures    CT HEAD WO CONTRAST    CBC    Basic Metabolic Panel    POCT Urine Dipstick        Medications - No data to display    New Prescriptions    No medications on file        Katie Rosales is a 80 y.o. male who presents to the Emergency Department with chief complaint of    Chief Complaint   Patient presents with    Altered Mental Status      42-year-old gentleman with a history of dementia who is at his usual baseline mental status who apparently was grabbing at and potentially being somewhat aggressive towards female staff. This is apparently a little unusual for him. Patient denies any symptoms but he does have some mild dementia. No other known issues. Elements of this note were created using speech recognition software. As such, errors of speech recognition may be present.         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 Florentin Chavez Right     fx R arm x 2- -required hardware and later removed    UROLOGICAL SURGERY      penile implant        Family History   Problem Relation Age of Onset    Heart Disease Father     Stroke Brother         Social History     Socioeconomic History    Marital status:      Spouse name: None    Number of children: None    Years of education: None    Highest education level: None   Tobacco Use    Smoking status: Former     Packs/day: 1.50     Types: Cigarettes     Quit date: 1977     Years since quittin.8    Smokeless tobacco: Never   Substance and Sexual Activity    Alcohol use: No    Drug use: No   Social History Narrative    Has 3 biologic children, 3 step kids.  and lives with wife. Retired from sales. No pets. Patient has no known allergies. Previous Medications    ALBUTEROL SULFATE HFA (PROVENTIL;VENTOLIN;PROAIR) 108 (90 BASE) MCG/ACT INHALER    Inhale 2 puffs into the lungs every 4 hours as needed for Wheezing    ASPIRIN 81 MG EC TABLET    Take 81 mg by mouth every other day    ATORVASTATIN (LIPITOR) 20 MG TABLET    Take 20 mg by mouth daily    CHOLECALCIFEROL (VITAMIN D) 50 MCG ( UT) CAPS CAPSULE    Take 1 capsule by mouth daily    DOCUSATE SODIUM (COLACE) 100 MG CAPSULE    Take 100 mg by mouth 2 times daily.     FAMOTIDINE (PEPCID) 20 MG TABLET    Take 20 mg by mouth daily    LAMOTRIGINE (LAMICTAL) 200 MG TABLET    Take 200 mg by mouth daily    MEMANTINE (NAMENDA) 10 MG TABLET    Take 10 mg by mouth 2 times daily    MIDODRINE (PROAMATINE) 5 MG TABLET    Take 5 mg by mouth 3 times daily     POLYETHYLENE GLYCOL (GLYCOLAX) 17 GM/SCOOP POWDER    Take 17 g by mouth as needed (constipation). QUETIAPINE (SEROQUEL) 25 MG TABLET    Take 25 mg by mouth daily    SERTRALINE (ZOLOFT) 100 MG TABLET    Take by mouth daily 1.5 tab by mouth daily    TRAZODONE (DESYREL) 50 MG TABLET    Take 50 mg by mouth    VITAMIN E 400 UNIT CAPSULE    Take 1,000 Units by mouth 2 times daily        Vitals signs and nursing note reviewed. Patient Vitals for the past 4 hrs:   Temp Pulse Resp BP SpO2   10/23/22 1821 -- 71 14 (!) 141/66 95 %   10/23/22 1730 -- -- -- (!) 160/73 95 %   10/23/22 1727 -- -- -- (!) 156/72 --   10/23/22 1655 98.4 °F (36.9 °C) 63 16 (!) 159/70 98 %          Physical Exam  HENT:      Head:      Comments: Small hematoma on his head  Cardiovascular:      Rate and Rhythm: Normal rate. Pulmonary:      Effort: Pulmonary effort is normal.   Skin:     Findings: Bruising present. Neurological:      General: No focal deficit present. Mental Status: He is alert. Mental status is at baseline. Procedures    Results for orders placed or performed during the hospital encounter of 10/23/22   CT HEAD WO CONTRAST    Narrative    CT HEAD WITHOUT CONTRAST, 10/23/2022     History: Trip and fall with confusion. Comparison: CT head without contrast 10/21/2020     Technique:   5 mm axial scans from the skull base to the vertex. All CT scans  performed at this facility use one or all of the following: Automated exposure  control, adjustment of the mA and/or kVp according to patient's size, iterative  reconstruction. Findings:  No evidence of intracranial hemorrhage is seen. More chronic  appearing bilateral subdural collections are seen which measure 5 mm on the  right ends 4 mm on the left which do not demonstrate acute features and  unchanged from the prior study. Age-appropriate chronic cortical volume loss is  seen. No evidence for acute hydrocephalus is seen. No evidence of midline  shift or herniation is seen.   No abnormal edema pattern is seen in a vascular  distribution to suggest large artery infarction. Evaluation with bone windows shows no acute osseous changes. The visualized  sinuses, middle ears, and mastoid air cells are well aerated. Impression    1. No acute intracranial process evident by noncontrast CT study of the head. This report was made using voice transcription. Despite my best efforts to avoid  any, transcription errors may persist. If there is any question about the  accuracy of the report or need for clarification, then please call [phone], or text me through [email or web address] for clarification or correction. CBC   Result Value Ref Range    WBC 6.0 4.3 - 11.1 K/uL    RBC 3.02 (L) 4.23 - 5.6 M/uL    Hemoglobin 9.2 (L) 13.6 - 17.2 g/dL    Hematocrit 29.8 (L) 41.1 - 50.3 %    MCV 98.7 82 - 102 FL    MCH 30.5 26.1 - 32.9 PG    MCHC 30.9 (L) 31.4 - 35.0 g/dL    RDW 14.1 11.9 - 14.6 %    Platelets 420 246 - 324 K/uL    MPV 9.3 (L) 9.4 - 12.3 FL    nRBC 0.00 0.0 - 0.2 K/uL   Basic Metabolic Panel   Result Value Ref Range    Sodium 142 133 - 143 mmol/L    Potassium 4.2 3.5 - 5.1 mmol/L    Chloride 109 101 - 110 mmol/L    CO2 30 21 - 32 mmol/L    Anion Gap 3 2 - 11 mmol/L    Glucose 101 (H) 65 - 100 mg/dL    BUN 33 (H) 8 - 23 MG/DL    Creatinine 1.60 (H) 0.8 - 1.5 MG/DL    Est, Glom Filt Rate 41 (L) >60 ml/min/1.73m2    Calcium 8.8 8.3 - 10.4 MG/DL        CT HEAD WO CONTRAST   Final Result   1. No acute intracranial process evident by noncontrast CT study of the head. This report was made using voice transcription. Despite my best efforts to avoid   any, transcription errors may persist. If there is any question about the   accuracy of the report or need for clarification, then please call [phone], or text me through [email or web address] for clarification or correction. Voice dictation software was used during the making of this note.   This software is not perfect and grammatical and other typographical errors may be present. This note has not been completely proofread for errors.         Jeri Rodriguez MD  10/23/22 1300

## 2022-10-23 NOTE — ED NOTES
Pt found standing at foot of bed, assisted to ambulate to bathroom. Pt declined to provide UA. Pt assisted back to room (+)confusion  RN attempting to call wife for ride home.       Sandra Nina RN  10/23/22 6188

## 2022-10-23 NOTE — ED NOTES
RN notifying  transport needed home     Nell Mai [facility]  10/23/22 1911

## 2022-10-23 NOTE — ED TRIAGE NOTES
Pt brought in by ems from [facility], staff called d/t pt \"grabbing\" female staff and having aggressive behavior to staff. Pt was seen here 10/21 s/p trip and fall. BGL 119mg/dL  Per staff pt at current baseline confusion w hx dementia.

## 2022-10-24 NOTE — ED NOTES
I have reviewed discharge instructions with the patient. The patient and spouse verbalized understanding. Patient left ED via Discharge Method: [facility] [place]   to Home with wife      Opportunity for questions and clarification provided. Patient given 0 scripts. To continue your aftercare when you leave the hospital, you may receive an automated call from our care team to check in on how you are doing. This is a free service and part of our promise to provide the best care and service to meet your aftercare needs.  If you have questions, or wish to unsubscribe from this service please call [phone]. Thank you for Choosing our [address] [place] [address] Emergency Department.         Emerita Davis RN  10/23/22 2014

## 2023-10-13 NOTE — PROCEDURE: OBSERVATION
Dangers of cigarette smoking were reviewed with patient in detail. Patient was Counseled for 3-10 minutes. Nicotine replacement options were discussed. Nicotine replacement was discussed- not prescribed per patient's request  
Size Of Lesion In Cm (Optional): 0
Detail Level: Detailed

## 2024-01-31 NOTE — PROCEDURE: OTHER
Refill request for OCP   Pt last seen 6/15/23 for cpe     Med already filled today   Refill declined     
Note Text (......Xxx Chief Complaint.): This diagnosis correlates with the
Other (Free Text): Per patient this occurred after open heart surgery (CABG) patient states he was told this was a hematoma. Patient states surgery was 2 years ago, this lesion has not changed since. Recommended patient discuss this with the surgeon that performed procedure or discuss referral with his primary physician to a surgeon. This is larger than we remove in office.
Detail Level: Detailed

## 2024-08-02 NOTE — CONSULTS
[facility] Consult Note       Referring Physician:  Sincere Silva    Consult Date:  5/19/2019    Admit Date:  5/19/2019    Chief Complaint:  LGIB    Subjective:     History of Present Illness:  Patient is a 80 y.o. male patient of Dr. Harman Fortune with a history of CAD s/p CABGx4 2017, TIA,  GI bleeding in 2017 likely secondary to diverticulosis who is seen in consultation at the request of Dr. Kimo Vallejo for hematochezia and lower GI bleed. He came in last night after having a very large episode of hematochezia last night. He had 4-5 total episodes, none in > 7 hours. He denies abd pain, NSAID/ASA use, N/V, fever, chills, CP, SOB, dizziness or fatigue. EGD by Dr. Aniceto Solano during hospitalization 8/2017 with biopsy proven mild gastritis, no source of bleeding. Colonoscopy 10/2017 with Dr. Alvin Sheikh - fair prep, severe right and left colon diverticulosis, multiple small tubular adenomas. He recommended no further colonoscopy due to age.     PMH:  Past Medical History:   Diagnosis Date    Asymptomatic gallstones     CAD (coronary artery disease) 11/17/2017    Centrilobular emphysema CT Findings ([address]     Chronic anemia     Chronic renal insufficiency, stage III (moderate) (HCC)     Colon polyps     Dyslipidemia     Ex-smoker for more than 1 year     Quit 1977 -  15 cig. 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 Ana Maria Gomez  11/15/2017    kailey disease    HX ORTHOPAEDIC Right     fx R arm x 2- -required hardware and later removed    HX UROLOGICAL      penile implant       Allergies:  No Known Allergies    Home Medications:  Prior to Admission medications    Medication Sig Start Date End Date Taking? Authorizing Provider   ferrous fumarate (HEMOCYTE) 324 mg (106 mg iron) tab Take 1 Tab by mouth daily. 12/10/18   Jeanie Hernandez MD   rivastigmine (EXELON) 9.5 mg/24 hr patch 1 Patch by TransDERmal route daily. Provider, Historical   tamsulosin (FLOMAX) 0.4 mg capsule Take 1 Cap by mouth daily. 11/14/18   Jeanie Hernandez MD   vitamin E, dl,tocopheryl acet, (VITAMIN E ACETATE) 400 unit cap capsule Take  by mouth daily. Provider, Historical   atorvastatin (LIPITOR) 20 mg tablet Take  by mouth daily. Provider, Historical   colestipol (COLESTID) 1 gram tablet Take 2 Tabs by mouth two (2) times daily as needed. As needed for diarrhea 7/25/18   Jeanie Hernandez MD   aspirin delayed-release 81 mg tablet Take 1 Tab by mouth daily. Patient taking differently: Take 81 mg by mouth every other day. 11/22/17   Candis Cifuentes NP   raNITIdine hcl 150 mg capsule Take 150 mg by mouth two (2) times a day. Golden Wu MD   sertraline (ZOLOFT) 100 mg tablet Take 150 mg by mouth daily. Golden Wu MD   lamoTRIgine (LAMICTAL) 200 mg tablet Take 200 mg by mouth daily.     Golden Wu MD       Hospital Medications:  Current Facility-Administered Medications   Medication Dose Route Frequency    sodium chloride (NS) flush 5-40 mL  5-40 mL IntraVENous Q8H    sodium chloride (NS) flush 5-40 mL  5-40 mL IntraVENous PRN    0.9% sodium chloride infusion 1,000 mL  1,000 mL IntraVENous CONTINUOUS    ondansetron (ZOFRAN) injection 4 mg  4 mg IntraVENous Q4H PRN    pantoprazole (PROTONIX) 40 mg in sodium chloride 0.9% 10 mL injection  40 mg IntraVENous Q12H       Social History:  Social History     Tobacco Use    Smoking status: Former Smoker     Packs/day: 1.50 Years: 25.00     Pack years: 37.50     Types: Cigarettes     Last attempt to quit:      Years since quittin.4    Smokeless tobacco: Never Used   Substance Use Topics    Alcohol use: No         Family History:  Family History   Problem Relation Age of Onset    Heart Disease Father     Stroke Brother        Review of Systems:  A detailed 10 system ROS is obtained, with pertinent positives as listed above. All others are negative. Diet:      Objective:     Physical Exam:  Vitals:  Visit Vitals  /73   Pulse (!) 49   Temp 98.3 °F (36.8 °C)   Resp 18   SpO2 90%     Gen:  Pt is alert, cooperative, no acute distress  Skin:  Extremities and face reveal no rashes. No palmar erythema. No telangiectasias on the chest wall. HEENT: Sclerae anicteric. Extra-occular muscles are intact. No oral ulcers. No abnormal pigmentation of the lips. The neck is supple. Cardiovascular: Regular rate and rhythm. No murmurs, gallops, or rubs. Sternotomy scar  Respiratory:  Comfortable breathing with no accessory muscle use. Clear breath sounds anteriorly with no wheezes, rales, or rhonchi. GI:  Abdomen nondistended, soft, and nontender. Normal active bowel sounds. No enlargement of the liver or spleen. No masses palpable. Rectal:  Deferred  Musculoskeletal:  No pitting edema of the lower legs. Extremities have good range of motion. No costovertebral tenderness. Neurological:  Gross memory appears intact. Patient is alert and oriented. Psychiatric:  Mood appears appropriate with judgement intact. Lymphatic:  No cervical or supraclavicular adenopathy.     Laboratory:    Recent Labs     19  0622 19  2242   WBC 4.7 4.9   HGB 9.2* 11.1*   HCT 29.2* 34.6*    217   MCV 97.3 97.2   NA  --  142   K  --  4.2   CL  --  106   CO2  --  30   BUN  --  30*   CREA  --  1.09   CA  --  8.9   MG 2.3  --    GLU  --  116*   PTP 13.6  --    INR 1.1  --           Assessment:       Active Problems:    Lower GI bleed (8/24/2017)      Sigmoid diverticulosis ()      Chronic anemia ()      S/P CABG x 4 (11/17/2017)      CAD (coronary artery disease) (11/17/2017)      Overview: 11/17/17 (Dr Eduin Ferguson) Coronary artery bypass grafting x4 grafts       consisting of:       1. Left internal mammary artery to left anterior descending. 2. Reverse saphenous vein graft to diagonal 1.       3. Reverse saphenous vein graft to obtuse marginal.       4. Reverse saphenous vein graft to the posterior descending       artery.             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 male patient of Dr. Patricia Haley with a history of CAD s/p CABGx4 2017, TIA,  GI bleeding in 2017 likely secondary to diverticulosis who is seen in consultation at the request of Dr. Ivan Stands for hematochezia and lower GI bleed. The bleed is likely secondary to diverticulosis and has clinically subsided.       Plan:       Acquire 2nd peripheral IV  Clear liquid diet  IR if rebleeds, especially since his last preparation was inadequate for small lesions  Transfuse as needed      Mere Paredes MD  [facility] [place] [Menstruating] : menstruating [Definite ___ (Date)] : the last menstrual period was [unfilled] [Excessive Bleeding] : there was excessive bleeding [Abnormal Duration ___ days] : the duration was abnormal lasting [unfilled] days [Total Preg ___] : G[unfilled] [Full Term ___] : Full Term: [unfilled] [Living ___] : Living: [unfilled]

## (undated) DEVICE — SUTURE ETHBND EXCEL SZ 0 L18IN NONABSORBABLE GRN L36MM CT-1 CX21D

## (undated) DEVICE — SUTURE PERMAHAND SZ 2-0 L12X18IN NONABSORBABLE BLK SILK A185H

## (undated) DEVICE — BUTTON SWITCH PENCIL BLADE ELECTRODE, HOLSTER: Brand: EDGE

## (undated) DEVICE — SUTURE S STL SZ 6 L18IN NONABSORBABLE SIL L48MM CCS 1/2 CIR M654G

## (undated) DEVICE — (D)STRIP SKN CLSR 0.5X4IN WHT --

## (undated) DEVICE — CLIP INT SM TI EZ LD LIG SYS WECK HORZ

## (undated) DEVICE — CONTAINER PREFIL FRMLN 40ML --

## (undated) DEVICE — CONNECTOR TBNG OD5-7MM O2 END DISP

## (undated) DEVICE — APPLIER CLP SM BLK TI AUTO HNDL DISP W/ 20 CLP PREM SURGICLP

## (undated) DEVICE — CLAMP INSERT: Brand: STEALTH® CLAMP INSERT

## (undated) DEVICE — SUTURE PROL SZ 6-0 L30IN NONABSORBABLE BLU L13MM CC-1 3/8 M8707

## (undated) DEVICE — (D)PREP SKN CHLRAPRP APPL 26ML -- CONVERT TO ITEM 371833

## (undated) DEVICE — AMD ANTIMICROBIAL GAUZE SPONGES,12 PLY USP TYPE VII, 0.2% POLYHEXAMETHYLENE BIGUANIDE HCI (PHMB): Brand: CURITY

## (undated) DEVICE — Device

## (undated) DEVICE — PLEDGET VASC W3/16XL3/8IN THK1/16IN PTFE SFT

## (undated) DEVICE — Device: Brand: JELCO

## (undated) DEVICE — CANNULA PERF L1.8MM TIP L1MM S STL SHFT BLB SHP TIP FEM

## (undated) DEVICE — TRAY FOLEY 16FR TEMP SENS F400 --

## (undated) DEVICE — SURGIFOAM SPNG SZ 100

## (undated) DEVICE — SS SUTURE, 3 PER SLEEVE: Brand: MYO/WIRE II

## (undated) DEVICE — SUTURE PDS II SZ 1 L36IN ABSRB VLT L48MM CTX 1/2 CIR Z371T

## (undated) DEVICE — APPLIER CLP L9.38IN M LIG TI DISP STR RNG HNDL LIGACLP

## (undated) DEVICE — SUTURE ETHBND EXCEL 2-0 L30IN NONABSORBABLE GRN L26MM SH MX563

## (undated) DEVICE — FORCEPS BX L240CM JAW DIA2.8MM L CAP W/ NDL MIC MESH TOOTH

## (undated) DEVICE — CATHETER THOR 24FR L22IN PVC 5 EYELET STR ATRAUM

## (undated) DEVICE — SOLUTION IV 1000ML 0.9% SOD CHL

## (undated) DEVICE — SUTURE NONABSORBABLE L24IN SZ 7-0 M0-5 BV175-8 EP 24 BLU M8745

## (undated) DEVICE — AORTIC PUNCHES ARE USED TO CREATE A UNIFORM OPENING IN BLOOD VESSELS DURING CARDIOVASCULAR SURGERY. THE VESSEL GRAFT IS INSERTED INTO THE CREATED OPENING AND SUTURED TO THE VESSEL WALL. AORTIC LANCETS ARE USED TO MAKE A SMALL UNIFORM CUT IN A BLOOD VESSEL TO FACILITATE INSERTION OF AN AORTIC PUNCH.  PUNCHES COME IN VARIOUS LENGTHS, DIAMETERS AND TIP CONFIGURATIONS.: Brand: CLEANCUT ROTATING AORTIC PUNCH

## (undated) DEVICE — SUTURE VCRL SZ 3-0 L36IN ABSRB UD L36MM CT-1 1/2 CIR J944H

## (undated) DEVICE — 3000CC GUARDIAN II: Brand: GUARDIAN

## (undated) DEVICE — CANNULA INJ L2.5IN BLNT TIP 3MM CLR BODY W/ 1 W VLV DLP

## (undated) DEVICE — DRAPE SLUSH DISC W44XL66IN ST FOR RND BSIN HUSH SLUSH SYS

## (undated) DEVICE — GLOVE SURG SZ 7 L11.2IN THK9.8MIL STRW LTX POLYMER BEAD CUF

## (undated) DEVICE — CATH URETH INTMIT ROB 14FR FUN -- USE ITEM 179521

## (undated) DEVICE — CANNULA NSL ORAL AD FOR CAPNOFLEX CO2 O2 AIRLFE

## (undated) DEVICE — SUT PROL 4-0 30IN SH1 DA BLU --

## (undated) DEVICE — CONNECTOR IV 3/8X3/8X3/8 Y

## (undated) DEVICE — MEDI-TRACE CADENCE ADULT, DEFIBRILLATION ELECTRODE -RTS  (10 PR/PK) - ZOLL: Brand: MEDI-TRACE CADENCE

## (undated) DEVICE — STERILE HOOK LOCK LATEX FREE ELASTIC BANDAGE 6INX5YD: Brand: HOOK LOCK™

## (undated) DEVICE — SUTURE VCRL SZ 4-0 L27IN ABSRB UD L19MM PS-2 3/8 CIR PRIM J426H

## (undated) DEVICE — BASIC SINGLE BASIN-LF: Brand: MEDLINE INDUSTRIES, INC.

## (undated) DEVICE — AMD ANTIMICROBIAL NON-ADHERENT PAD,0.2% POLYHEXAMETHYLENE BIGUANIDE HCI (PHMB): Brand: TELFA

## (undated) DEVICE — SUT PROL 3-0 36IN SH DA BLU --

## (undated) DEVICE — AMD ANTIMICROBIAL BANDAGE ROLL,6 PLY: Brand: KERLIX

## (undated) DEVICE — SOLUTION IRRIG 3000ML 0.9% SOD CHL FLX CONT 0797208] ICU MEDICAL INC]

## (undated) DEVICE — CATHETER THOR 32FR L23IN PVC 6 EYELET STR ATRAUM

## (undated) DEVICE — SUTURE TEMP PACE WIRE SZ 2-0 L24IN NONABSORBABLE LIGHT/DARK

## (undated) DEVICE — DRAIN CHEST ADL/PED DRY/WET -- PLEUR-EVAC

## (undated) DEVICE — 1/4 FORCE SURGICAL SPRING CLIP: Brand: STEALTH® SPRING CLIP

## (undated) DEVICE — 48" PROBE COVER W/GEL, ULTRASOUND, STERILE: Brand: SITE-RITE

## (undated) DEVICE — REM POLYHESIVE ADULT PATIENT RETURN ELECTRODE: Brand: VALLEYLAB

## (undated) DEVICE — BLOCK BITE AD 60FR W/ VELC STRP ADDRESSES MOST PT AND

## (undated) DEVICE — BLADE OPHTH MINI BEAV SHRP --

## (undated) DEVICE — STERILE HOOK LOCK LATEX FREE ELASTIC BANDAGE 4INX5YD: Brand: HOOK LOCK™

## (undated) DEVICE — SUT CHRMC 4-0 27IN SH BRN --

## (undated) DEVICE — KENDALL RADIOLUCENT FOAM MONITORING ELECTRODE RECTANGULAR SHAPE: Brand: KENDALL

## (undated) DEVICE — SUTURE SILK PERMAHAND PRECUT 6 X 30 IN SZ 1 BLK BRAID A307H

## (undated) DEVICE — SUTURE PROL DBL ARMED 8 0 BV130 5 24IN N ABSRB MFIL BLU M8739

## (undated) DEVICE — BLADE SAW W10XL54MM FOR PRI REPEAT STRNOTMY

## (undated) DEVICE — 6 FOOT DISPOSABLE EXTENSION CABLE WITH SAFE CONNECT / SCREW-DOWN